# Patient Record
Sex: FEMALE | Employment: OTHER | URBAN - METROPOLITAN AREA
[De-identification: names, ages, dates, MRNs, and addresses within clinical notes are randomized per-mention and may not be internally consistent; named-entity substitution may affect disease eponyms.]

---

## 2021-12-06 ENCOUNTER — LAB REQUISITION (OUTPATIENT)
Dept: LAB | Facility: CLINIC | Age: 71
End: 2021-12-06

## 2021-12-06 ENCOUNTER — TRANSITIONAL CARE UNIT VISIT (OUTPATIENT)
Dept: GERIATRICS | Facility: CLINIC | Age: 71
End: 2021-12-06
Payer: COMMERCIAL

## 2021-12-06 VITALS
DIASTOLIC BLOOD PRESSURE: 75 MMHG | WEIGHT: 208 LBS | HEART RATE: 99 BPM | SYSTOLIC BLOOD PRESSURE: 139 MMHG | BODY MASS INDEX: 36.86 KG/M2 | HEIGHT: 63 IN | TEMPERATURE: 97.8 F | OXYGEN SATURATION: 96 % | RESPIRATION RATE: 18 BRPM

## 2021-12-06 DIAGNOSIS — U07.1 COVID-19: ICD-10-CM

## 2021-12-06 DIAGNOSIS — R60.0 EDEMA OF BOTH LEGS: ICD-10-CM

## 2021-12-06 DIAGNOSIS — E11.9 TYPE 2 DIABETES, HBA1C GOAL < 7% (H): Primary | ICD-10-CM

## 2021-12-06 DIAGNOSIS — Z86.73 HISTORY OF CVA (CEREBROVASCULAR ACCIDENT): ICD-10-CM

## 2021-12-06 DIAGNOSIS — I48.20 ATRIAL FIBRILLATION, CHRONIC (H): ICD-10-CM

## 2021-12-06 PROCEDURE — 99306 1ST NF CARE HIGH MDM 50: CPT | Performed by: FAMILY MEDICINE

## 2021-12-06 PROCEDURE — U0005 INFEC AGEN DETEC AMPLI PROBE: HCPCS | Performed by: FAMILY MEDICINE

## 2021-12-06 RX ORDER — ACETAMINOPHEN 325 MG/1
650 TABLET ORAL EVERY 4 HOURS PRN
COMMUNITY
End: 2023-05-10

## 2021-12-06 RX ORDER — FUROSEMIDE 20 MG
10 TABLET ORAL DAILY
COMMUNITY
End: 2022-04-08

## 2021-12-06 RX ORDER — LANOLIN ALCOHOL/MO/W.PET/CERES
1 CREAM (GRAM) TOPICAL
COMMUNITY
End: 2022-09-14

## 2021-12-06 RX ORDER — GLIMEPIRIDE 1 MG/1
1 TABLET ORAL
COMMUNITY
End: 2022-01-14

## 2021-12-06 RX ORDER — ATORVASTATIN CALCIUM 20 MG/1
20 TABLET, FILM COATED ORAL AT BEDTIME
COMMUNITY

## 2021-12-06 RX ORDER — METOPROLOL TARTRATE 100 MG
25 TABLET ORAL 2 TIMES DAILY
COMMUNITY
End: 2022-09-14

## 2021-12-06 ASSESSMENT — MIFFLIN-ST. JEOR: SCORE: 1409.67

## 2021-12-06 NOTE — LETTER
12/6/2021        RE: Angela Beckham  [address] [facility] [address]        [facility]       Patient Angela Beckham  MRN: [id number]        Reason for Visit     Chief Complaint   Patient presents with     Hospital F/U       Code Status     CPR/Full code     Assessment     Acute ischemic rt MCA CVA  Acute encephalopathy/delirium secondary to CVA  New onset atrial fibrillation with rapid ventricular response  C-spine ligamentous injury  Persistent leukocytosis  Chronic lower extremity edema  Diabetes type 2  Hypertension  Generalized weakness    Plan     Pt is admitted to TCU for strengthening and rehab.  Patient admitted after a welfare check was done on her and she was found on the floor.  She was brought in with acute delirium to the hospital subsequently work-up revealed the delirium was secondary to CVA.  Imaging had shown acute ischemic CVA.  Neurology was consulted.  They suspect this is embolic.  She is on atorvastatin 20 mg daily.  Aspirin has been discontinued.  Speech-language pathology recommended a dysphagia diet.  She is on a modified diet  She is on anticoagulation with Eliquis.  Noted to have elevated heart rates with atrial fibrillation which is new in onset.  Currently discharged on a higher dose of metoprolol.  Continue Eliquis.  Noted to have a C-spine ligamentous injury on imaging.  Neurosurgery consulted.  Given an [place] collar.  She will need an outpatient cervical MRI to further work-up for ligamentous injury.  Also noted to have chronic lymphedema bilateral lower extremity with worsening she was given a higher dose of diuretics.  Weights to be monitored.  Recheck labs for leukocytosis.  Has pressure injury of her back.  Also noted to have a blister on her BKA stump which will require wound care  Diabetes treatment optimized she remains on Metformin and glimepiride as well as Lantus.  Continue to monitor  So far all blood sugars are under 200  Patient is also exhibiting  signs of some cognitive impairment.  BMs 12/15.  Will await for a CPT.  Recheck labs  Continue with PT/OT-at present is quite weak and requires a two-person assist with ADLs and a Kendy for transfers  She is also reportedly incontinent for bowel and bladder.  She continues to have several psychosocial issues and remains unrealistic in her expectations.  She told me she like to go home in the evening.  When I asked her how she had no answer she thought somebody would transfer her home.  She has been living independently as per her and believes she can still manage her cares though she just had an episode of bowel incontinence in her bed.  On questioning she believes she could have clean herself up.  In addition she stated that she has been driving up till now.  She also has been managing her cares independently with minimal psychosocial support other than some friends she does not have any children.  Will await cognitive testing results as well    will have  involved suspect she may no longer be a candidate for living independently.    Patient however is determined that she wants to go home  She denies any weakness from her CVA though at present she cannot walk.  Continue with her PT and OT.  Total time spent is 45 minutes with 28 minutes spent face-to-face talking this patient reviewing her care concerns including discharge planning and her ability to live independently especially with below the knee amputation.  Patient reports that she has been doing well so far and managing her cares including driving all by herself prior to hospitalization.  She has no recall of events leading to her being found on the floor    History     Patient is a very pleasant 71 year old female who is admitted to TCU  Patient was admitted after she was found on the floor.  She was brought to the emergency room  Imaging did reveal that she had acute ischemic CVA.  She also had acute encephalopathy with fluctuating level of  consciousness.  Neurology was consulted.  She also was found a new onset atrial fibrillation with rapid ventricular response.  She has been started on metoprolol.  Noted to have a C-spine ligamentous injury on CT imaging.  Neurosurgery consulted and she has been given an [place] collar.  She had significant edema of her bilateral lower extremity with worsening diuretics were resumed at discharge  Also noted to have a pressure injury and discharged to the TCU for strengthening and rehab    Past Medical & Surgical History     PAST MEDICAL HISTORY:   Past Medical History:   Diagnosis Date     Diabetic neuropathy (H)      HTN      Type 2 diabetes mellitus (H)       PAST SURGICAL HISTORY:  natividad henderson      Past Social History     Reviewed,  has an unknown smoking status. She has never used smokeless tobacco. She reports that she does not drink alcohol and does not use drugs.    Family History     Reviewed, and family history includes Alzheimer Disease in her father; Cerebrovascular Disease in her maternal grandmother and mother; Diabetes in her paternal grandfather; Hypertension in her brother and mother; Obesity in her brother; Respiratory in her father.    Medication List   Post Discharge Medication Reconciliation Status: Post Discharge Medication Reconciliation Status: discharge medications reconciled, continue medications without change.  Current Outpatient Medications   Medication     acetaminophen (TYLENOL) 325 MG tablet     apixaban ANTICOAGULANT (ELIQUIS) 5 MG tablet     atorvastatin (LIPITOR) 20 MG tablet     cyanocobalamin 1000 MCG SUBL     dimethicone 1 % external cream     furosemide (LASIX) 20 MG tablet     glimepiride (AMARYL) 1 MG tablet     melatonin 3 MG tablet     METFORMIN  MG OR TABS     metoprolol tartrate (LOPRESSOR) 100 MG tablet     vitamin B-Complex     vitamin D3 (CHOLECALCIFEROL) 250 mcg (79171 units) capsule     GLIMEPIRIDE 4 MG OR TABS     LISINOPRIL 20 MG OR TABS     SB LOW DOSE ASA EC 81 MG  "OR TBEC     SIMVASTATIN 20 MG OR TABS     No current facility-administered medications for this visit.       Allergies     No Known Allergies    Review of Systems   A comprehensive review of 14 systems was done. Pertinent findings noted here and in history of present illness. All the rest negative.  Constitutional: Negative.  Negative for fever, chills, she has  activity change, appetite change and fatigue.   HENT: Negative for congestion and facial swelling.    Eyes: Negative for photophobia, redness and visual disturbance.   Respiratory: Negative for cough and chest tightness.    Cardiovascular: Negative for chest pain, palpitations and leg swelling.   Gastrointestinal: Negative for nausea, diarrhea, constipation, blood in stool and abdominal distention.   Genitourinary: Negative.  Reporting incontinence  Musculoskeletal: Currently very weak and bedbound she cannot walk currently  Skin: Negative.    Neurological: Negative for dizziness, tremors, syncope, weakness, light-headedness and headaches.   Denies any weakness from cva even though she is bed bound  Hematological: Does not bruise/bleed easily.   Psychiatric/Behavioral: Negative.  Affect is flat  Patient is unrealistic in her expectations thinking that she could discharge tonight to her own home and come back tomorrow  Continues to insist she can manage her own cares      Physical Exam   /75   Pulse 99   Temp 97.8  F (36.6  C)   Resp 18   Ht 1.588 m (5' 2.5\")   Wt 94.3 kg (208 lb)   SpO2 96%   BMI 37.44 kg/m       Constitutional: Oriented to person, place, and time and appears well-developed.   HEENT:  Normocephalic and atraumatic.  Eyes: Conjunctivae and EOM are normal. Pupils are equal, round, and reactive to light. No discharge.  No scleral icterus. Nose normal. Mouth/Throat: Oropharynx is clear and moist. No oropharyngeal exudate.    NECK: Normal range of motion. Neck supple. No JVD present. No tracheal deviation present. No thyromegaly " present.   CARDIOVASCULAR: Normal rate, regular rhythm and intact distal pulses.  Exam reveals no gallop and no friction rub.  Systolic murmur present.  PULMONARY: Effort normal and breath sounds normal. No respiratory distress.No Wheezing or rales.  ABDOMEN: Soft. Bowel sounds are normal. No distension and no mass.  There is no tenderness. There is no rebound and no guarding. No HSM.  Stage 2 pressure injury on left low back  MUSCULOSKELETAL: Normal range of motion. No edema and no tenderness. Mild kyphosis, no tenderness.  Has a left BKA  LYMPH NODES: Has no cervical, supraclavicular, axillary and groin adenopathy.   NEUROLOGICAL: Alert and oriented to person, place, and time. No cranial nerve deficit.  Normal muscle tone. Coordination normal.   GENITOURINARY: Deferred exam.  SKIN: Skin is warm and dry. No rash noted. No erythema. No pallor.   Skin blister on her left BKA is healing with no concern for infection  EXTREMITIES: No cyanosis, no clubbing, no edema. No Deformity.  PSYCHIATRIC: abNormal mood, affect and behavior.  Does not have much recall of recent events.  Expectations are also somewhat unrealistic      Lab Results       Creat 0.9    Electronically signed by    Ginny Johnson MD                             Sincerely,        OPAL Galarza

## 2021-12-07 ENCOUNTER — LAB REQUISITION (OUTPATIENT)
Dept: LAB | Facility: CLINIC | Age: 71
End: 2021-12-07

## 2021-12-07 DIAGNOSIS — U07.1 COVID-19: ICD-10-CM

## 2021-12-07 LAB
SARS-COV-2 RNA RESP QL NAA+PROBE: NEGATIVE
SARS-COV-2 RNA RESP QL NAA+PROBE: NEGATIVE

## 2021-12-07 PROCEDURE — U0003 INFECTIOUS AGENT DETECTION BY NUCLEIC ACID (DNA OR RNA); SEVERE ACUTE RESPIRATORY SYNDROME CORONAVIRUS 2 (SARS-COV-2) (CORONAVIRUS DISEASE [COVID-19]), AMPLIFIED PROBE TECHNIQUE, MAKING USE OF HIGH THROUGHPUT TECHNOLOGIES AS DESCRIBED BY CMS-2020-01-R: HCPCS | Performed by: FAMILY MEDICINE

## 2021-12-09 ENCOUNTER — LAB REQUISITION (OUTPATIENT)
Dept: LAB | Facility: CLINIC | Age: 71
End: 2021-12-09
Payer: COMMERCIAL

## 2021-12-09 ENCOUNTER — TRANSITIONAL CARE UNIT VISIT (OUTPATIENT)
Dept: GERIATRICS | Facility: CLINIC | Age: 71
End: 2021-12-09
Payer: COMMERCIAL

## 2021-12-09 VITALS
DIASTOLIC BLOOD PRESSURE: 73 MMHG | WEIGHT: 208 LBS | OXYGEN SATURATION: 97 % | BODY MASS INDEX: 36.86 KG/M2 | SYSTOLIC BLOOD PRESSURE: 148 MMHG | HEIGHT: 63 IN | HEART RATE: 76 BPM | RESPIRATION RATE: 18 BRPM | TEMPERATURE: 98 F

## 2021-12-09 DIAGNOSIS — I48.20 ATRIAL FIBRILLATION, CHRONIC (H): ICD-10-CM

## 2021-12-09 DIAGNOSIS — R60.9 EDEMA, UNSPECIFIED TYPE: ICD-10-CM

## 2021-12-09 DIAGNOSIS — I63.9 ISCHEMIC CEREBROVASCULAR ACCIDENT (CVA) (H): Primary | ICD-10-CM

## 2021-12-09 DIAGNOSIS — E11.59 TYPE 2 DIABETES MELLITUS WITH OTHER CIRCULATORY COMPLICATION, WITHOUT LONG-TERM CURRENT USE OF INSULIN (H): ICD-10-CM

## 2021-12-09 DIAGNOSIS — E66.01 MORBID OBESITY (H): ICD-10-CM

## 2021-12-09 DIAGNOSIS — I50.9 HEART FAILURE, UNSPECIFIED (H): ICD-10-CM

## 2021-12-09 DIAGNOSIS — I48.91 UNSPECIFIED ATRIAL FIBRILLATION (H): ICD-10-CM

## 2021-12-09 PROCEDURE — 99310 SBSQ NF CARE HIGH MDM 45: CPT | Performed by: NURSE PRACTITIONER

## 2021-12-09 ASSESSMENT — MIFFLIN-ST. JEOR: SCORE: 1409.67

## 2021-12-09 NOTE — PROGRESS NOTES
Code Status:  FULL CODE  Visit Type: Hospital F/U     Facility:   [facility] ([facility]) [[id number]]        History of Present Illness:   Hospital Admission Date: 11/28/2020  Hospital Discharge Date: 12/5/2021      Angela Beckham is a 71 year old female with a past medical history for type 2 diabetes, osteomyelitis, left BKA, hypertension, peripheral edema and mitral stenosis.  She was recently hospitalized after being found down at home and found to have an acute ischemic CVA.  She was not given lytics and was started on Eliquis and atorvastatin.  Her residual was dysphagia and encephalopathy.  She was found to have new atrial fibrillation and was started on metoprolol along with her Eliquis.  C-spine showed a C-spine ligamentous injury and she was put in an [place] collar and recommended a follow-up MRI with neurosurgery in 2 to 3 weeks.     She was also found to have a blister on her left BKA stump and was treated by a wound care nurse.    Today, she reports dizziness and feeling SOB.  O2 sats are WNL.  Heart sounds are very irregular and averaging low 100s.  Weights is down 10lbs in the past 3 days 208lbs down to 198lbs. she does appear to have some anxiety during my visit. She has intertrigo and miconazole cream was ordered earlier this week. She reports low back pain however states this is chronic. Left BKA with healed scabbed ulcers on either side. No signs and symptoms of infection.    Past Medical History:   Diagnosis Date     Diabetic neuropathy (H)      HTN      Type 2 diabetes mellitus (H)      No past surgical history on file.  Family History   Problem Relation Age of Onset     Cerebrovascular Disease Mother      Hypertension Mother      Alzheimer Disease Father      Respiratory Father      Cerebrovascular Disease Maternal Grandmother      Diabetes Paternal Grandfather      Hypertension Brother      Obesity Brother      Social History     Socioeconomic History     Marital status: Single     Spouse  name: Not on file     Number of children: Not on file     Years of education: Not on file     Highest education level: Not on file   Occupational History     Not on file   Tobacco Use     Smoking status: Unknown If Ever Smoked     Smokeless tobacco: Never Used   Substance and Sexual Activity     Alcohol use: No     Drug use: No     Sexual activity: Never   Other Topics Concern     Parent/sibling w/ CABG, MI or angioplasty before 65F 55M? Not Asked      Service No     Blood Transfusions Yes     Comment: 1970     Caffeine Concern No     Occupational Exposure No     Hobby Hazards No     Sleep Concern No     Stress Concern No     Weight Concern No     Special Diet No     Back Care No     Exercise Yes     Bike Helmet No     Seat Belt Yes     Self-Exams No   Social History Narrative     Not on file     Social Determinants of Health     Financial Resource Strain: Not on file   Food Insecurity: Not on file   Transportation Needs: Not on file   Physical Activity: Not on file   Stress: Not on file   Social Connections: Not on file   Intimate Partner Violence: Not on file   Housing Stability: Not on file       Current Outpatient Medications   Medication Sig Dispense Refill     acetaminophen (TYLENOL) 325 MG tablet Take 650 mg by mouth every 4 hours as needed for mild pain       apixaban ANTICOAGULANT (ELIQUIS) 5 MG tablet Take 5 mg by mouth 2 times daily       atorvastatin (LIPITOR) 20 MG tablet Take 20 mg by mouth daily       cyanocobalamin 1000 MCG SUBL Place 1,000 mcg under the tongue daily       dimethicone 1 % external cream Apply topically 2 times daily       furosemide (LASIX) 20 MG tablet Take 20 mg by mouth daily       glimepiride (AMARYL) 1 MG tablet Take 1 mg by mouth every morning (before breakfast)       melatonin 3 MG tablet Take 1 mg by mouth nightly as needed for sleep       METFORMIN  MG OR TABS 2 TABLETS TWICE DAILY       metoprolol tartrate (LOPRESSOR) 100 MG tablet Take 100 mg by mouth 2  "times daily       vitamin B-Complex Take 1 tablet by mouth daily       vitamin D3 (CHOLECALCIFEROL) 250 mcg (57789 units) capsule Take 1 capsule by mouth daily       No Known Allergies  Immunization History   Administered Date(s) Administered     Influenza (IIV3) PF 01/22/2009, 02/20/2013     Influenza Vaccine IM > 6 months Valent IIV4 (Alfuria,Fluzone) 02/20/2013     Influenza Vaccine, 6+MO IM (QUADRIVALENT W/PRESERVATIVES) 01/22/2009, 02/20/2013     Pneumococcal 23 valent 02/20/2013       Medications list and allergies in the facility chart have been reviewed.  Please see facility EMR for most up to date list.       Review of Systems   Patient denies fever, chills, headache,rhinorrhea, cough, congestion, shortness of breath, chest pain, palpitations, abdominal pain, n/v, diarrhea, constipation, change in appetite, change in sleep pattern, dysuria, frequency, burning or pain with urination.  Other than stated in HPI all other review of systems is negative.         Physical Exam   Vital signs:BP (!) 148/73   Pulse 76   Temp 98  F (36.7  C)   Resp 18   Ht 1.588 m (5' 2.5\")   Wt 94.3 kg (208 lb)   SpO2 97%   BMI 37.44 kg/m     GENERAL APPEARANCE: Well developed, well nourished, in no acute distress.  HEENT: normocephalic, atraumatic   sclerae anicteric, conjunctivae clear and moist, EOM intact  NECK: Supple and symmetric. Trachea is midline, no thyromegaly, no adenopathy, and no tenderness  LUNGS: Lung sounds CTA, no adventitious sounds, respiratory effort normal.  CARD: Irregularly irregular, tacky, S1, S2, without murmurs, gallops, rubs  ABD: Soft, nondistended and nontender with normal bowel sounds.   EXTREMITIES: No cyanosis, clubbing or edema.  NEURO: Alert and oriented x 3. With mild cognitive impairment. Face is symmetric.  SKIN: Inspection of the skin reveals no rashes, ulcerations or petechiae.  PSYCH:     Labs:   CBC with plt (12/01/2021 3:01 PM CST)  CBC with plt (12/01/2021 3:01 PM CST)   Component " Value Ref Range Performed At Pathologist Signature   WBC 13.5 (H) 3.5 - 10.5 x10(9)/L [facility]     RBC 4.51 3.90 - 5.03 x10(12)/L [facility]     Hemoglobin 12.2 12.0 - 15.5 g/dL [facility]     HCT 40.4 34.9 - 44.5 % [facility]     MCV 89.6 80.0 - 100.0 fL [facility]     MCH 27.1 (L) 27.6 - 33.3 pg [facility]     MCHC 30.2 (L) 31.5 - 35.2 g/dL [facility]     RDW 15.0 11.9 - 15.5 % [facility]     Platelets 156 150 - 450 x10(9)/L [facility]     Automated NRBC 0 <=0 /100 WBC [facility]     Basic Metabolic Panel (12/01/2021 1:31 PM CST)  Basic Metabolic Panel (12/01/2021 1:31 PM CST)   Component Value Ref Range Performed At Pathologist [facility]   Sodium 139 136 - 145 mmol/L [facility]     Potassium 4.3 3.5 - 5.1 mmol/L [facility]     Chloride 109 98 - 109 mmol/L [facility]     CO2 20 20 - 29 mmol/L [facility]     Anion Gap 10 7 - 16 mmol/L [facility]     Calcium 8.6 8.4 - 10.4 mg/dL [facility]     BUN 38 (H) 7 - 26 mg/dL [facility]     Creatinine 1.00 0.55 - 1.02 mg/dL [facility]     GFR, Estimated 60 (L) >60 mL/min/1.73m2 [facility]     Glucose 248 (H)Comment: The given reference range is for the fasting state. Non-fasting reference range for glucose is 70 - 180 mg/dL. 70 - 100 mg/dL [facility]       Magnesium in AM (12/03/2021 7:58 AM CST)  Magnesium in AM (12/03/2021 7:58 AM CST)   Component Value Ref Range Performed At Pathologist Signature   Magnesium 2.1 1.6 - 2.6 mg/dL [facility]       Magnesium in AM (12/03/2021 7:58 AM CST)   Specimen   Blood     Magnesium in AM (12/03/2021 7:58 AM CST)   Performing Organization Address City/State/ZIP Code Phone Number   [address] [address]   [phone]       Back to top of Lab Results       Potassium in AM (12/03/2021 7:58 AM CST)  Potassium in AM (12/03/2021 7:58 AM CST)   Component Value Ref Range Performed At  Pathologist Signature   Potassium 5.5 (H) 3.5 - 5.1 mmol/L [facility]         Assessment/plan:   Ischemic cerebrovascular accident (CVA) (H)  Rehab, continue on apixaban, atorvastatin. Watch for mood disorder with anxiety.    Atrial fibrillation, chronic (H)  Rate is rapid on the low end of 100. We will have nursing check apical pulses every shift. Continue on metoprolol and apixaban. She will be seen by cardiology tomorrow.    Morbid obesity (H)  Does decrease her mobility and increases her morbidity.    Edema, unspecified type  No edema at this time I believe that she is over diuresed and so will hold her Lasix x2 days and check a BMP tomorrow.    Type 2 diabetes mellitus with other circulatory complication, without long-term current use of insulin (H)  Blood sugars all less than 180 likely can change blood sugar checks to once a day next week if continues to have good blood sugars. Continue with meds [place] and glipizide.    C-spine ligamentous injury: Wear [place] collar when out of bed. Follow-up with an MRI and neurosurgery in 2 to 3 weeks.    35 total minutes spent with 20 minutes spent with patient in counseling regarding atrial fibrillation and her feeling of dizziness. Counseled patient to be sure to talk with cardiology regarding this tomorrow. Counseled nursing to monitor pulses apically only.    Electronically signed by: Berenice Nolen NP

## 2021-12-09 NOTE — LETTER
12/9/2021        RE: Angela Beckham  [address], [place] [address]        Code Status:  FULL CODE  Visit Type: Hospital F/U     Facility:   [facility] ([facility]) [[id number]]        History of Present Illness:   Hospital Admission Date: 11/28/2020  Hospital Discharge Date: 12/5/2021      Angela Beckham is a 71 year old female with a past medical history for type 2 diabetes, osteomyelitis, left BKA, hypertension, peripheral edema and mitral stenosis.  She was recently hospitalized after being found down at home and found to have an acute ischemic CVA.  She was not given lytics and was started on Eliquis and atorvastatin.  Her residual was dysphagia and encephalopathy.  She was found to have new atrial fibrillation and was started on metoprolol along with her Eliquis.  C-spine showed a C-spine ligamentous injury and she was put in an [place] collar and recommended a follow-up MRI with neurosurgery in 2 to 3 weeks.     She was also found to have a blister on her left BKA stump and was treated by a wound care nurse.    Today, she reports dizziness and feeling SOB.  O2 sats are WNL.  Heart sounds are very irregular and averaging low 100s.  Weights is down 10lbs in the past 3 days 208lbs down to 198lbs. she does appear to have some anxiety during my visit. She has intertrigo and miconazole cream was ordered earlier this week. She reports low back pain however states this is chronic. Left BKA with healed scabbed ulcers on either side. No signs and symptoms of infection.    Past Medical History:   Diagnosis Date     Diabetic neuropathy (H)      HTN      Type 2 diabetes mellitus (H)      No past surgical history on file.  Family History   Problem Relation Age of Onset     Cerebrovascular Disease Mother      Hypertension Mother      Alzheimer Disease Father      Respiratory Father      Cerebrovascular Disease Maternal Grandmother      Diabetes Paternal Grandfather      Hypertension Brother      Obesity Brother       Social History     Socioeconomic History     Marital status: Single     Spouse name: Not on file     Number of children: Not on file     Years of education: Not on file     Highest education level: Not on file   Occupational History     Not on file   Tobacco Use     Smoking status: Unknown If Ever Smoked     Smokeless tobacco: Never Used   Substance and Sexual Activity     Alcohol use: No     Drug use: No     Sexual activity: Never   Other Topics Concern     Parent/sibling w/ CABG, MI or angioplasty before 65F 55M? Not Asked      Service No     Blood Transfusions Yes     Comment: 1970     Caffeine Concern No     Occupational Exposure No     Hobby Hazards No     Sleep Concern No     Stress Concern No     Weight Concern No     Special Diet No     Back Care No     Exercise Yes     Bike Helmet No     Seat Belt Yes     Self-Exams No   Social History Narrative     Not on file     Social Determinants of Health     Financial Resource Strain: Not on file   Food Insecurity: Not on file   Transportation Needs: Not on file   Physical Activity: Not on file   Stress: Not on file   Social Connections: Not on file   Intimate Partner Violence: Not on file   Housing Stability: Not on file       Current Outpatient Medications   Medication Sig Dispense Refill     acetaminophen (TYLENOL) 325 MG tablet Take 650 mg by mouth every 4 hours as needed for mild pain       apixaban ANTICOAGULANT (ELIQUIS) 5 MG tablet Take 5 mg by mouth 2 times daily       atorvastatin (LIPITOR) 20 MG tablet Take 20 mg by mouth daily       cyanocobalamin 1000 MCG SUBL Place 1,000 mcg under the tongue daily       dimethicone 1 % external cream Apply topically 2 times daily       furosemide (LASIX) 20 MG tablet Take 20 mg by mouth daily       glimepiride (AMARYL) 1 MG tablet Take 1 mg by mouth every morning (before breakfast)       melatonin 3 MG tablet Take 1 mg by mouth nightly as needed for sleep       METFORMIN  MG OR TABS 2 TABLETS TWICE  "DAILY       metoprolol tartrate (LOPRESSOR) 100 MG tablet Take 100 mg by mouth 2 times daily       vitamin B-Complex Take 1 tablet by mouth daily       vitamin D3 (CHOLECALCIFEROL) 250 mcg (51610 units) capsule Take 1 capsule by mouth daily       No Known Allergies  Immunization History   Administered Date(s) Administered     Influenza (IIV3) PF 01/22/2009, 02/20/2013     Influenza Vaccine IM > 6 months Valent IIV4 (Alfuria,Fluzone) 02/20/2013     Influenza Vaccine, 6+MO IM (QUADRIVALENT W/PRESERVATIVES) 01/22/2009, 02/20/2013     Pneumococcal 23 valent 02/20/2013       Medications list and allergies in the facility chart have been reviewed.  Please see facility EMR for most up to date list.       Review of Systems   Patient denies fever, chills, headache,rhinorrhea, cough, congestion, shortness of breath, chest pain, palpitations, abdominal pain, n/v, diarrhea, constipation, change in appetite, change in sleep pattern, dysuria, frequency, burning or pain with urination.  Other than stated in HPI all other review of systems is negative.         Physical Exam   Vital signs:BP (!) 148/73   Pulse 76   Temp 98  F (36.7  C)   Resp 18   Ht 1.588 m (5' 2.5\")   Wt 94.3 kg (208 lb)   SpO2 97%   BMI 37.44 kg/m     GENERAL APPEARANCE: Well developed, well nourished, in no acute distress.  HEENT: normocephalic, atraumatic   sclerae anicteric, conjunctivae clear and moist, EOM intact  NECK: Supple and symmetric. Trachea is midline, no thyromegaly, no adenopathy, and no tenderness  LUNGS: Lung sounds CTA, no adventitious sounds, respiratory effort normal.  CARD: Irregularly irregular, tacky, S1, S2, without murmurs, gallops, rubs  ABD: Soft, nondistended and nontender with normal bowel sounds.   EXTREMITIES: No cyanosis, clubbing or edema.  NEURO: Alert and oriented x 3. With mild cognitive impairment. Face is symmetric.  SKIN: Inspection of the skin reveals no rashes, ulcerations or petechiae.  PSYCH:     Labs:   CBC with " plt (12/01/2021 3:01 PM CST)  CBC with plt (12/01/2021 3:01 PM CST)   Component Value Ref Range Performed At Pathologist [facility]   WBC 13.5 (H) 3.5 - 10.5 x10(9)/L [facility]     RBC 4.51 3.90 - 5.03 x10(12)/L [facility]     Hemoglobin 12.2 12.0 - 15.5 g/dL [facility]     HCT 40.4 34.9 - 44.5 % [facility]     MCV 89.6 80.0 - 100.0 fL [facility]     MCH 27.1 (L) 27.6 - 33.3 pg [facility]     MCHC 30.2 (L) 31.5 - 35.2 g/dL [facility]     RDW 15.0 11.9 - 15.5 % [facility]     Platelets 156 150 - 450 x10(9)/L [facility]     Automated NRBC 0 <=0 /100 WBC [facility]     Basic Metabolic Panel (12/01/2021 1:31 PM CST)  Basic Metabolic Panel (12/01/2021 1:31 PM CST)   Component Value Ref Range Performed At Pathologist [facility]   Sodium 139 136 - 145 mmol/L [facility]     Potassium 4.3 3.5 - 5.1 mmol/L [facility]     Chloride 109 98 - 109 mmol/L [facility]     CO2 20 20 - 29 mmol/L [facility]     Anion Gap 10 7 - 16 mmol/L [facility]     Calcium 8.6 8.4 - 10.4 mg/dL [facility]     BUN 38 (H) 7 - 26 mg/dL [facility]     Creatinine 1.00 0.55 - 1.02 mg/dL [facility]     GFR, Estimated 60 (L) >60 mL/min/1.73m2 [facility]     Glucose 248 (H)Comment: The given reference range is for the fasting state. Non-fasting reference range for glucose is 70 - 180 mg/dL. 70 - 100 mg/dL [facility]       Magnesium in AM (12/03/2021 7:58 AM CST)  Magnesium in AM (12/03/2021 7:58 AM CST)   Component Value Ref Range Performed At Pathologist [facility]   Magnesium 2.1 1.6 - 2.6 mg/dL [facility]       Magnesium in AM (12/03/2021 7:58 AM CST)   Specimen   Blood     Magnesium in AM (12/03/2021 7:58 AM CST)   Performing Organization Address City/State/ZIP Code Phone Number   [address] [address]   [phone]       Back to top of Lab Results       Potassium in AM (12/03/2021 7:58 AM  CST)  Potassium in AM (12/03/2021 7:58 AM CST)   Component Value Ref Range Performed At Pathologist [facility]   Potassium 5.5 (H) 3.5 - 5.1 mmol/L [facility]         Assessment/plan:   Ischemic cerebrovascular accident (CVA) (H)  Rehab, continue on apixaban, atorvastatin. Watch for mood disorder with anxiety.    Atrial fibrillation, chronic (H)  Rate is rapid on the low end of 100. We will have nursing check apical pulses every shift. Continue on metoprolol and apixaban. She will be seen by cardiology tomorrow.    Morbid obesity (H)  Does decrease her mobility and increases her morbidity.    Edema, unspecified type  No edema at this time I believe that she is over diuresed and so will hold her Lasix x2 days and check a BMP tomorrow.    Type 2 diabetes mellitus with other circulatory complication, without long-term current use of insulin (H)  Blood sugars all less than 180 likely can change blood sugar checks to once a day next week if continues to have good blood sugars. Continue with meds [place] and glipizide.    C-spine ligamentous injury: Wear [place] collar when out of bed. Follow-up with an MRI and neurosurgery in 2 to 3 weeks.    35 total minutes spent with 20 minutes spent with patient in counseling regarding atrial fibrillation and her feeling of dizziness. Counseled patient to be sure to talk with cardiology regarding this tomorrow. Counseled nursing to monitor pulses apically only.    Electronically signed by: Berenice Nolen NP          Sincerely,        Berenice Nolen NP

## 2021-12-10 ENCOUNTER — TELEPHONE (OUTPATIENT)
Dept: GERIATRICS | Facility: CLINIC | Age: 71
End: 2021-12-10
Payer: COMMERCIAL

## 2021-12-10 ENCOUNTER — LAB REQUISITION (OUTPATIENT)
Dept: LAB | Facility: CLINIC | Age: 71
End: 2021-12-10
Payer: COMMERCIAL

## 2021-12-10 DIAGNOSIS — I63.9 CEREBRAL INFARCTION, UNSPECIFIED (H): ICD-10-CM

## 2021-12-10 LAB
ANION GAP SERPL CALCULATED.3IONS-SCNC: 13 MMOL/L (ref 5–18)
BUN SERPL-MCNC: 21 MG/DL (ref 8–28)
CALCIUM SERPL-MCNC: 9.8 MG/DL (ref 8.5–10.5)
CHLORIDE BLD-SCNC: 100 MMOL/L (ref 98–107)
CO2 SERPL-SCNC: 24 MMOL/L (ref 22–31)
CREAT SERPL-MCNC: 0.8 MG/DL (ref 0.6–1.1)
ERYTHROCYTE [DISTWIDTH] IN BLOOD BY AUTOMATED COUNT: 14.1 % (ref 10–15)
GFR SERPL CREATININE-BSD FRML MDRD: 73 ML/MIN/1.73M2
GLUCOSE BLD-MCNC: 95 MG/DL (ref 70–125)
HCT VFR BLD AUTO: 41.3 % (ref 35–47)
HGB BLD-MCNC: 13.1 G/DL (ref 11.7–15.7)
MAGNESIUM SERPL-MCNC: 1.8 MG/DL (ref 1.8–2.6)
MCH RBC QN AUTO: 27.1 PG (ref 26.5–33)
MCHC RBC AUTO-ENTMCNC: 31.7 G/DL (ref 31.5–36.5)
MCV RBC AUTO: 85 FL (ref 78–100)
PLATELET # BLD AUTO: 291 10E3/UL (ref 150–450)
POTASSIUM BLD-SCNC: 4.7 MMOL/L (ref 3.5–5)
RBC # BLD AUTO: 4.84 10E6/UL (ref 3.8–5.2)
SODIUM SERPL-SCNC: 137 MMOL/L (ref 136–145)
WBC # BLD AUTO: 10.6 10E3/UL (ref 4–11)

## 2021-12-10 PROCEDURE — 36415 COLL VENOUS BLD VENIPUNCTURE: CPT | Performed by: FAMILY MEDICINE

## 2021-12-10 PROCEDURE — 83735 ASSAY OF MAGNESIUM: CPT | Performed by: FAMILY MEDICINE

## 2021-12-10 PROCEDURE — 80048 BASIC METABOLIC PNL TOTAL CA: CPT | Performed by: FAMILY MEDICINE

## 2021-12-10 PROCEDURE — P9603 ONE-WAY ALLOW PRORATED MILES: HCPCS | Performed by: FAMILY MEDICINE

## 2021-12-10 PROCEDURE — 85027 COMPLETE CBC AUTOMATED: CPT | Performed by: FAMILY MEDICINE

## 2021-12-10 NOTE — TELEPHONE ENCOUNTER
[facility] Nurse Triage Telephone Note    Provider: CEDRIC Ortiz  Facility: [facility] Facility Type:  TCU    Caller: Mavis  Call Back Number: [phone]    Allergies:  No Known Allergies     Reason for call: Nurse reporting Heme 2, BMP, and Mg levels.  Notable meds:  Lasix 20mg daily, Glimepiride 1mg daily, Eliquis 5mg BID, Metformin 1000mg BID, Metoprolol 100mg BID.      Verbal Order/Direction given by Provider: No new orders.      Provider giving Order:  CEDRIC Ortiz    Verbal Order given to: Mavis Campbell RN

## 2021-12-13 ENCOUNTER — TRANSITIONAL CARE UNIT VISIT (OUTPATIENT)
Dept: GERIATRICS | Facility: CLINIC | Age: 71
End: 2021-12-13
Payer: COMMERCIAL

## 2021-12-13 VITALS
RESPIRATION RATE: 16 BRPM | TEMPERATURE: 97.7 F | HEIGHT: 70 IN | SYSTOLIC BLOOD PRESSURE: 117 MMHG | BODY MASS INDEX: 27.06 KG/M2 | HEART RATE: 88 BPM | WEIGHT: 189 LBS | OXYGEN SATURATION: 98 % | DIASTOLIC BLOOD PRESSURE: 61 MMHG

## 2021-12-13 DIAGNOSIS — E11.59 TYPE 2 DIABETES MELLITUS WITH OTHER CIRCULATORY COMPLICATION, WITHOUT LONG-TERM CURRENT USE OF INSULIN (H): ICD-10-CM

## 2021-12-13 DIAGNOSIS — E86.0 DEHYDRATION: Primary | ICD-10-CM

## 2021-12-13 DIAGNOSIS — I63.9 ISCHEMIC CEREBROVASCULAR ACCIDENT (CVA) (H): ICD-10-CM

## 2021-12-13 DIAGNOSIS — R09.81 NASAL CONGESTION: ICD-10-CM

## 2021-12-13 DIAGNOSIS — I48.20 ATRIAL FIBRILLATION, CHRONIC (H): ICD-10-CM

## 2021-12-13 PROCEDURE — 99309 SBSQ NF CARE MODERATE MDM 30: CPT | Performed by: NURSE PRACTITIONER

## 2021-12-13 ASSESSMENT — MIFFLIN-ST. JEOR
SCORE: 1480.65
SCORE: 1442.55

## 2021-12-13 NOTE — PROGRESS NOTES
Code Status:  FULL CODE  Visit Type: RECHECK     Facility:   [facility] ([facility]) [[id number]]        History of Present Illness:   Hospital Admission Date: 11/28/2020  Hospital Discharge Date: 12/5/2021      Angela Beckham is a 71 year old female with a past medical history for type 2 diabetes, osteomyelitis, left BKA, hypertension, peripheral edema and mitral stenosis.  She was recently hospitalized after being found down at home and found to have an acute ischemic CVA.  She was not given lytics and was started on Eliquis and atorvastatin.  Her residual was dysphagia and encephalopathy.  She was found to have new atrial fibrillation and was started on metoprolol along with her Eliquis.  C-spine showed a C-spine ligamentous injury and she was put in an [place] collar and recommended a follow-up MRI with neurosurgery in 2 to 3 weeks.     She was also found to have a blister on her left BKA stump and was treated by a wound care nurse.    Today, she reports improvement with her lightheadedness after holding her Lasix x2 days.  Blood pressures are in good range and heart rates are running 80s to low 100s and does not appear to be as bounding. She endorses dry, congested nares. BS running 80-120s. She feels she is eating adequately.       Current Outpatient Medications   Medication Sig Dispense Refill     acetaminophen (TYLENOL) 325 MG tablet Take 650 mg by mouth every 4 hours as needed for mild pain       apixaban ANTICOAGULANT (ELIQUIS) 5 MG tablet Take 5 mg by mouth 2 times daily       atorvastatin (LIPITOR) 20 MG tablet Take 20 mg by mouth daily       cyanocobalamin 1000 MCG SUBL Place 1,000 mcg under the tongue daily       dimethicone 1 % external cream Apply topically 2 times daily       furosemide (LASIX) 20 MG tablet Take 20 mg by mouth daily       glimepiride (AMARYL) 1 MG tablet Take 1 mg by mouth every morning (before breakfast)       melatonin 3 MG tablet Take 1 mg by mouth nightly as needed for sleep  "      METFORMIN  MG OR TABS 2 TABLETS TWICE DAILY       metoprolol tartrate (LOPRESSOR) 100 MG tablet Take 100 mg by mouth 2 times daily       vitamin B-Complex Take 1 tablet by mouth daily       vitamin D3 (CHOLECALCIFEROL) 250 mcg (81963 units) capsule Take 1 capsule by mouth daily       No Known Allergies  Immunization History   Administered Date(s) Administered     Influenza (IIV3) PF 01/22/2009, 02/20/2013     Influenza Vaccine IM > 6 months Valent IIV4 (Alfuria,Fluzone) 02/20/2013     Influenza Vaccine, 6+MO IM (QUADRIVALENT W/PRESERVATIVES) 01/22/2009, 02/20/2013     Pneumococcal 23 valent 02/20/2013       Medications list and allergies in the facility chart have been reviewed.  Please see facility EMR for most up to date list.       Review of Systems   Patient denies fever, chills, headache,rhinorrhea, cough, congestion, shortness of breath, chest pain, palpitations, abdominal pain, n/v, diarrhea, constipation, change in appetite, change in sleep pattern, dysuria, frequency, burning or pain with urination.  Other than stated in HPI all other review of systems is negative.         Physical Exam   Vital signs:/61   Pulse 88   Temp 97.7  F (36.5  C)   Resp 16   Ht 1.778 m (5' 10\")   Wt 85.7 kg (189 lb)   SpO2 98%   BMI 27.12 kg/m     GENERAL APPEARANCE: Well developed, well nourished, in no acute distress.  HEENT: normocephalic, atraumatic   sclerae anicteric, conjunctivae clear and moist, EOM intact  LUNGS: Lung sounds CTA, no adventitious sounds, respiratory effort normal.  CARD: Irregularly irregular,S1, S2, without murmurs, gallops, rubs  ABD: Soft, nondistended and nontender with normal bowel sounds.   EXTREMITIES: No cyanosis, clubbing or edema.  NEURO: Alert and oriented x 3. With mild cognitive impairment. Face is symmetric.  SKIN: Inspection of the skin reveals no rashes, ulcerations or petechiae.  PSYCH: euthymic    Labs:     Last Comprehensive Metabolic Panel:  Sodium   Date Value " Ref Range Status   12/10/2021 137 136 - 145 mmol/L Final     Potassium   Date Value Ref Range Status   12/10/2021 4.7 3.5 - 5.0 mmol/L Final     Chloride   Date Value Ref Range Status   12/10/2021 100 98 - 107 mmol/L Final     Carbon Dioxide (CO2)   Date Value Ref Range Status   12/10/2021 24 22 - 31 mmol/L Final     Anion Gap   Date Value Ref Range Status   12/10/2021 13 5 - 18 mmol/L Final     Glucose   Date Value Ref Range Status   12/10/2021 95 70 - 125 mg/dL Final     Urea Nitrogen   Date Value Ref Range Status   12/10/2021 21 8 - 28 mg/dL Final     Creatinine   Date Value Ref Range Status   12/10/2021 0.80 0.60 - 1.10 mg/dL Final   10/06/2009 0.81 0.52 - 1.04 mg/dL Final     Comment:     New IDMS-traceable calibration  beginning 5/1/08     GFR Estimate   Date Value Ref Range Status   12/10/2021 73 >60 mL/min/1.73m2 Final     Comment:     As of July 11, 2021, eGFR is calculated by the CKD-EPI creatinine equation, without race adjustment. eGFR can be influenced by muscle mass, exercise, and diet. The reported eGFR is an estimation only and is only applicable if the renal function is stable.   10/06/2009 72 >60 mL/min/1.7m2 Final     Calcium   Date Value Ref Range Status   12/10/2021 9.8 8.5 - 10.5 mg/dL Final     Lab Results   Component Value Date    WBC 10.6 12/10/2021    WBC 6.9 10/06/2009     Lab Results   Component Value Date    RBC 4.84 12/10/2021    RBC 4.29 10/06/2009     Lab Results   Component Value Date    HGB 13.1 12/10/2021    HGB 11.8 10/06/2009     Lab Results   Component Value Date    HCT 41.3 12/10/2021    HCT 34.5 10/06/2009     Lab Results   Component Value Date    MCV 85 12/10/2021    MCV 81 10/06/2009     Lab Results   Component Value Date    MCH 27.1 12/10/2021    MCH 27.4 10/06/2009     Lab Results   Component Value Date    MCHC 31.7 12/10/2021    MCHC 34.0 10/06/2009     Lab Results   Component Value Date    RDW 14.1 12/10/2021    RDW 12.9 10/06/2009     Lab Results   Component Value Date      12/10/2021     10/06/2009         Assessment/plan:   Dehydration  Resolved after holding diuretic x 2 days.  Monitor closely and may need to decrease lasix 10mg daily.  Check BMP in the next week    Nasal congestion  Dry environment, ocean nasal spray at bedside    Ischemic cerebrovascular accident (CVA) (H)  Rehab, continue on apixaban and atorvastatin.  Follow-up with neurology on 12/29.    Atrial fibrillation, chronic (H)  Rate is controlled improved on bounding after hydration.  Continue with metoprolol and apixaban.  Follow-up with cardiology on 1/4.    Type 2 diabetes mellitus with other circulatory complication, without long-term current use of insulin (H)  Blood sugars on the soft and so will decrease Metformin to 1000 mg in the a.m. and 500 mg in the p.m.  Could consider discontinuing Amaryl if continues to have low blood sugars as I feel it would be better for her to be on Metformin alone.    Ischemic cerebrovascular accident (CVA) (H)  Rehab, continue on apixaban, atorvastatin. Watch for mood disorder with anxiety.    C-spine ligamentous injury: Wear [place] collar when out of bed. Follow-up with an MRI and neurosurgery in 2 to 3 weeks.      Electronically signed by: Berenice Nolen NP

## 2021-12-13 NOTE — LETTER
12/13/2021        RE: Angela Beckham  [address] [facility] [address]        Code Status:  FULL CODE  Visit Type: RECHECK     Facility:   [facility] ([facility]) [[id number]]        History of Present Illness:   Hospital Admission Date: 11/28/2020  Hospital Discharge Date: 12/5/2021      Angela Beckham is a 71 year old female with a past medical history for type 2 diabetes, osteomyelitis, left BKA, hypertension, peripheral edema and mitral stenosis.  She was recently hospitalized after being found down at home and found to have an acute ischemic CVA.  She was not given lytics and was started on Eliquis and atorvastatin.  Her residual was dysphagia and encephalopathy.  She was found to have new atrial fibrillation and was started on metoprolol along with her Eliquis.  C-spine showed a C-spine ligamentous injury and she was put in an [place] collar and recommended a follow-up MRI with neurosurgery in 2 to 3 weeks.     She was also found to have a blister on her left BKA stump and was treated by a wound care nurse.    Today, she reports improvement with her lightheadedness after holding her Lasix x2 days.  Blood pressures are in good range and heart rates are running 80s to low 100s and does not appear to be as bounding. She endorses dry, congested nares. BS running 80-120s. She feels she is eating adequately.       Current Outpatient Medications   Medication Sig Dispense Refill     acetaminophen (TYLENOL) 325 MG tablet Take 650 mg by mouth every 4 hours as needed for mild pain       apixaban ANTICOAGULANT (ELIQUIS) 5 MG tablet Take 5 mg by mouth 2 times daily       atorvastatin (LIPITOR) 20 MG tablet Take 20 mg by mouth daily       cyanocobalamin 1000 MCG SUBL Place 1,000 mcg under the tongue daily       dimethicone 1 % external cream Apply topically 2 times daily       furosemide (LASIX) 20 MG tablet Take 20 mg by mouth daily       glimepiride (AMARYL) 1 MG tablet Take 1 mg by mouth every morning (before  "breakfast)       melatonin 3 MG tablet Take 1 mg by mouth nightly as needed for sleep       METFORMIN  MG OR TABS 2 TABLETS TWICE DAILY       metoprolol tartrate (LOPRESSOR) 100 MG tablet Take 100 mg by mouth 2 times daily       vitamin B-Complex Take 1 tablet by mouth daily       vitamin D3 (CHOLECALCIFEROL) 250 mcg (07344 units) capsule Take 1 capsule by mouth daily       No Known Allergies  Immunization History   Administered Date(s) Administered     Influenza (IIV3) PF 01/22/2009, 02/20/2013     Influenza Vaccine IM > 6 months Valent IIV4 (Alfuria,Fluzone) 02/20/2013     Influenza Vaccine, 6+MO IM (QUADRIVALENT W/PRESERVATIVES) 01/22/2009, 02/20/2013     Pneumococcal 23 valent 02/20/2013       Medications list and allergies in the facility chart have been reviewed.  Please see facility EMR for most up to date list.       Review of Systems   Patient denies fever, chills, headache,rhinorrhea, cough, congestion, shortness of breath, chest pain, palpitations, abdominal pain, n/v, diarrhea, constipation, change in appetite, change in sleep pattern, dysuria, frequency, burning or pain with urination.  Other than stated in HPI all other review of systems is negative.         Physical Exam   Vital signs:/61   Pulse 88   Temp 97.7  F (36.5  C)   Resp 16   Ht 1.778 m (5' 10\")   Wt 85.7 kg (189 lb)   SpO2 98%   BMI 27.12 kg/m     GENERAL APPEARANCE: Well developed, well nourished, in no acute distress.  HEENT: normocephalic, atraumatic   sclerae anicteric, conjunctivae clear and moist, EOM intact  LUNGS: Lung sounds CTA, no adventitious sounds, respiratory effort normal.  CARD: Irregularly irregular,S1, S2, without murmurs, gallops, rubs  ABD: Soft, nondistended and nontender with normal bowel sounds.   EXTREMITIES: No cyanosis, clubbing or edema.  NEURO: Alert and oriented x 3. With mild cognitive impairment. Face is symmetric.  SKIN: Inspection of the skin reveals no rashes, ulcerations or " petechiae.  PSYCH: euthymic    Labs:     Last Comprehensive Metabolic Panel:  Sodium   Date Value Ref Range Status   12/10/2021 137 136 - 145 mmol/L Final     Potassium   Date Value Ref Range Status   12/10/2021 4.7 3.5 - 5.0 mmol/L Final     Chloride   Date Value Ref Range Status   12/10/2021 100 98 - 107 mmol/L Final     Carbon Dioxide (CO2)   Date Value Ref Range Status   12/10/2021 24 22 - 31 mmol/L Final     Anion Gap   Date Value Ref Range Status   12/10/2021 13 5 - 18 mmol/L Final     Glucose   Date Value Ref Range Status   12/10/2021 95 70 - 125 mg/dL Final     Urea Nitrogen   Date Value Ref Range Status   12/10/2021 21 8 - 28 mg/dL Final     Creatinine   Date Value Ref Range Status   12/10/2021 0.80 0.60 - 1.10 mg/dL Final   10/06/2009 0.81 0.52 - 1.04 mg/dL Final     Comment:     New IDMS-traceable calibration  beginning 5/1/08     GFR Estimate   Date Value Ref Range Status   12/10/2021 73 >60 mL/min/1.73m2 Final     Comment:     As of July 11, 2021, eGFR is calculated by the CKD-EPI creatinine equation, without race adjustment. eGFR can be influenced by muscle mass, exercise, and diet. The reported eGFR is an estimation only and is only applicable if the renal function is stable.   10/06/2009 72 >60 mL/min/1.7m2 Final     Calcium   Date Value Ref Range Status   12/10/2021 9.8 8.5 - 10.5 mg/dL Final     Lab Results   Component Value Date    WBC 10.6 12/10/2021    WBC 6.9 10/06/2009     Lab Results   Component Value Date    RBC 4.84 12/10/2021    RBC 4.29 10/06/2009     Lab Results   Component Value Date    HGB 13.1 12/10/2021    HGB 11.8 10/06/2009     Lab Results   Component Value Date    HCT 41.3 12/10/2021    HCT 34.5 10/06/2009     Lab Results   Component Value Date    MCV 85 12/10/2021    MCV 81 10/06/2009     Lab Results   Component Value Date    MCH 27.1 12/10/2021    MCH 27.4 10/06/2009     Lab Results   Component Value Date    MCHC 31.7 12/10/2021    [facility] 34.0 10/06/2009     Lab Results    Component Value Date    RDW 14.1 12/10/2021    RDW 12.9 10/06/2009     Lab Results   Component Value Date     12/10/2021     10/06/2009         Assessment/plan:   Dehydration  Resolved after holding diuretic x 2 days.  Monitor closely and may need to decrease lasix 10mg daily.  Check BMP in the next week    Nasal congestion  Dry environment, [place] nasal spray at bedside    Ischemic cerebrovascular accident (CVA) (H)  Rehab, continue on apixaban and atorvastatin.  Follow-up with neurology on 12/29.    Atrial fibrillation, chronic (H)  Rate is controlled improved on bounding after hydration.  Continue with metoprolol and apixaban.  Follow-up with cardiology on 1/4.    Type 2 diabetes mellitus with other circulatory complication, without long-term current use of insulin (H)  Blood sugars on the soft and so will decrease Metformin to 1000 mg in the a.m. and 500 mg in the p.m.  Could consider discontinuing Amaryl if continues to have low blood sugars as I feel it would be better for her to be on Metformin alone.    Ischemic cerebrovascular accident (CVA) (H)  Rehab, continue on apixaban, atorvastatin. Watch for mood disorder with anxiety.    C-spine ligamentous injury: Wear [place] collar when out of bed. Follow-up with an MRI and neurosurgery in 2 to 3 weeks.      Electronically signed by: Berenice Nolen NP          Sincerely,        Berenice Nolen NP

## 2021-12-17 ENCOUNTER — LAB REQUISITION (OUTPATIENT)
Dept: LAB | Facility: CLINIC | Age: 71
End: 2021-12-17

## 2021-12-17 DIAGNOSIS — U07.1 COVID-19: ICD-10-CM

## 2021-12-17 PROCEDURE — U0003 INFECTIOUS AGENT DETECTION BY NUCLEIC ACID (DNA OR RNA); SEVERE ACUTE RESPIRATORY SYNDROME CORONAVIRUS 2 (SARS-COV-2) (CORONAVIRUS DISEASE [COVID-19]), AMPLIFIED PROBE TECHNIQUE, MAKING USE OF HIGH THROUGHPUT TECHNOLOGIES AS DESCRIBED BY CMS-2020-01-R: HCPCS | Performed by: FAMILY MEDICINE

## 2021-12-18 LAB — SARS-COV-2 RNA RESP QL NAA+PROBE: NEGATIVE

## 2021-12-20 ENCOUNTER — TRANSITIONAL CARE UNIT VISIT (OUTPATIENT)
Dept: GERIATRICS | Facility: CLINIC | Age: 71
End: 2021-12-20
Payer: COMMERCIAL

## 2021-12-20 ENCOUNTER — LAB REQUISITION (OUTPATIENT)
Dept: LAB | Facility: CLINIC | Age: 71
End: 2021-12-20
Payer: COMMERCIAL

## 2021-12-20 VITALS
BODY MASS INDEX: 33.42 KG/M2 | HEIGHT: 63 IN | DIASTOLIC BLOOD PRESSURE: 66 MMHG | TEMPERATURE: 98 F | SYSTOLIC BLOOD PRESSURE: 117 MMHG | HEART RATE: 55 BPM | WEIGHT: 188.6 LBS | RESPIRATION RATE: 16 BRPM | OXYGEN SATURATION: 96 %

## 2021-12-20 DIAGNOSIS — I48.20 ATRIAL FIBRILLATION, CHRONIC (H): ICD-10-CM

## 2021-12-20 DIAGNOSIS — I63.9 ISCHEMIC CEREBROVASCULAR ACCIDENT (CVA) (H): Primary | ICD-10-CM

## 2021-12-20 DIAGNOSIS — R60.9 EDEMA, UNSPECIFIED TYPE: ICD-10-CM

## 2021-12-20 DIAGNOSIS — E11.59 TYPE 2 DIABETES MELLITUS WITH OTHER CIRCULATORY COMPLICATION, WITHOUT LONG-TERM CURRENT USE OF INSULIN (H): ICD-10-CM

## 2021-12-20 DIAGNOSIS — I50.9 HEART FAILURE, UNSPECIFIED (H): ICD-10-CM

## 2021-12-20 PROCEDURE — 99309 SBSQ NF CARE MODERATE MDM 30: CPT | Performed by: NURSE PRACTITIONER

## 2021-12-20 ASSESSMENT — MIFFLIN-ST. JEOR: SCORE: 1321.67

## 2021-12-20 NOTE — LETTER
"    12/20/2021        RE: Angela Beckham  [address] [facility] [address]        Code Status:  FULL CODE  Visit Type: RECHECK     Facility:   [facility] ([facility]) [[id number]]        History of Present Illness:   Hospital Admission Date: 11/28/2020  Hospital Discharge Date: 12/5/2021      Angela Beckham is a 71 year old female with a past medical history for type 2 diabetes, osteomyelitis, left BKA, hypertension, peripheral edema and mitral stenosis.  She was recently hospitalized after being found down at home and found to have an acute ischemic CVA.  She was not given lytics and was started on Eliquis and atorvastatin.  Her residual was dysphagia and encephalopathy.  She was found to have new atrial fibrillation and was started on metoprolol along with her Eliquis.  C-spine showed a C-spine ligamentous injury and she was put in an [place] collar and recommended a follow-up MRI with neurosurgery in 2 to 3 weeks.     She was also found to have a blister on her left BKA stump and was treated by a wound care nurse.    Today, she denies any lightheadedness.  She is down 10lbs from admission now at 188lbs.  She has no edema and feels her breathing is \"good\".  HR documented at 55 however when auscultated apically rate is about 100.  Blood sugars are improving ranging from 102-183 with most at 110s.  She reports a good appetite and is on supplements.  She reports [place] nasal spray is improving her nasal congestion.      Current Outpatient Medications   Medication Sig Dispense Refill     acetaminophen (TYLENOL) 325 MG tablet Take 650 mg by mouth every 4 hours as needed for mild pain       apixaban ANTICOAGULANT (ELIQUIS) 5 MG tablet Take 5 mg by mouth 2 times daily       atorvastatin (LIPITOR) 20 MG tablet Take 20 mg by mouth daily       cyanocobalamin 1000 MCG SUBL Place 1,000 mcg under the tongue daily       dimethicone 1 % external cream Apply topically 2 times daily       furosemide (LASIX) 20 MG tablet Take 20 mg " "by mouth daily       glimepiride (AMARYL) 1 MG tablet Take 1 mg by mouth every morning (before breakfast)       melatonin 3 MG tablet Take 1 mg by mouth nightly as needed for sleep       METFORMIN  MG OR TABS Take 500-1,000 mg by mouth 2 times daily (with meals) 1000mg with breakfast and 500mg with supper.       metoprolol tartrate (LOPRESSOR) 100 MG tablet Take 100 mg by mouth 2 times daily       vitamin B-Complex Take 1 tablet by mouth daily       vitamin D3 (CHOLECALCIFEROL) 250 mcg (61525 units) capsule Take 1 capsule by mouth daily       No Known Allergies  Immunization History   Administered Date(s) Administered     Influenza (IIV3) PF 01/22/2009, 02/20/2013     Influenza Vaccine IM > 6 months Valent IIV4 (Alfuria,Fluzone) 02/20/2013     Influenza Vaccine, 6+MO IM (QUADRIVALENT W/PRESERVATIVES) 01/22/2009, 02/20/2013     Pneumococcal 23 valent 02/20/2013       Medications list and allergies in the facility chart have been reviewed.  Please see facility EMR for most up to date list.       Review of Systems   Patient denies fever, chills, headache,rhinorrhea, cough, congestion, shortness of breath, chest pain, palpitations, abdominal pain, n/v, diarrhea, constipation, change in appetite, change in sleep pattern, dysuria, frequency, burning or pain with urination.  Other than stated in HPI all other review of systems is negative.         Physical Exam no changes since my last physical exam  Vital signs:/66   Pulse 55   Temp 98  F (36.7  C)   Resp 16   Ht 1.588 m (5' 2.5\")   Wt 85.5 kg (188 lb 9.6 oz)   SpO2 96%   BMI 33.95 kg/m     GENERAL APPEARANCE: Well developed, well nourished, in no acute distress.  HEENT: normocephalic, atraumatic   sclerae anicteric, conjunctivae clear and moist, EOM intact  LUNGS: Lung sounds CTA, no adventitious sounds, respiratory effort normal.  CARD: Irregularly irregular,S1, S2, without murmurs, gallops, rubs  ABD: Soft, nondistended and nontender with normal " bowel sounds.   EXTREMITIES: No cyanosis, clubbing or edema.  NEURO: Alert and oriented x 3. With mild cognitive impairment. Face is symmetric.  SKIN: Inspection of the skin reveals no rashes, ulcerations or petechiae.  PSYCH: euthymic    Labs:     Last Comprehensive Metabolic Panel:  Sodium   Date Value Ref Range Status   12/10/2021 137 136 - 145 mmol/L Final     Potassium   Date Value Ref Range Status   12/10/2021 4.7 3.5 - 5.0 mmol/L Final     Chloride   Date Value Ref Range Status   12/10/2021 100 98 - 107 mmol/L Final     Carbon Dioxide (CO2)   Date Value Ref Range Status   12/10/2021 24 22 - 31 mmol/L Final     Anion Gap   Date Value Ref Range Status   12/10/2021 13 5 - 18 mmol/L Final     Glucose   Date Value Ref Range Status   12/10/2021 95 70 - 125 mg/dL Final     Urea Nitrogen   Date Value Ref Range Status   12/10/2021 21 8 - 28 mg/dL Final     Creatinine   Date Value Ref Range Status   12/10/2021 0.80 0.60 - 1.10 mg/dL Final   10/06/2009 0.81 0.52 - 1.04 mg/dL Final     Comment:     New IDMS-traceable calibration  beginning 5/1/08     GFR Estimate   Date Value Ref Range Status   12/10/2021 73 >60 mL/min/1.73m2 Final     Comment:     As of July 11, 2021, eGFR is calculated by the CKD-EPI creatinine equation, without race adjustment. eGFR can be influenced by muscle mass, exercise, and diet. The reported eGFR is an estimation only and is only applicable if the renal function is stable.   10/06/2009 72 >60 mL/min/1.7m2 Final     Calcium   Date Value Ref Range Status   12/10/2021 9.8 8.5 - 10.5 mg/dL Final     Lab Results   Component Value Date    WBC 10.6 12/10/2021    WBC 6.9 10/06/2009     Lab Results   Component Value Date    RBC 4.84 12/10/2021    RBC 4.29 10/06/2009     Lab Results   Component Value Date    HGB 13.1 12/10/2021    HGB 11.8 10/06/2009     Lab Results   Component Value Date    HCT 41.3 12/10/2021    HCT 34.5 10/06/2009     Lab Results   Component Value Date    MCV 85 12/10/2021    MCV 81  10/06/2009     Lab Results   Component Value Date    MCH 27.1 12/10/2021    MCH 27.4 10/06/2009     Lab Results   Component Value Date    MCHC 31.7 12/10/2021    MCHC 34.0 10/06/2009     Lab Results   Component Value Date    RDW 14.1 12/10/2021    RDW 12.9 10/06/2009     Lab Results   Component Value Date     12/10/2021     10/06/2009         Assessment/plan:   Ischemic cerebrovascular accident (CVA) (H)  Stable, continue on apixaban and atorvastatin.  She will need to follow-up with neurology on 12/29.    Edema, unspecified type  No edema noted weight down.  Patient happy with current results of Lasix dose.  Check a BMP and consider scaling back if needed.    Weight loss  Combination of caloric versus diuretics.  Is on oral supplements.    Type 2 diabetes mellitus with other circulatory complication, without long-term current use of insulin (H)  Blood sugars improving, continue on lower dose Metformin of 1000 mg in the a.m. and 500 at supper.  Continue with glimepiride.  If blood sugars become consistent in the 200s would consider discontinuing glimepiride.    Atrial fibrillation, chronic (H)  Rate is controlled, apical pulses, continue with metoprolol and apixaban.  Will need follow-up with cardiology scheduled on 1/4.    Dehydration  This was resolved last week after holding diuretics.  I did discuss possibly scaling back on her lasix however she is apprehensive as she feels as if her edema is much improved with current dose of Lasix.  Will continue and monitor hydration status ongoing.     Nasal congestion  Improvement with [place] nasal spray at bedside    C-spine ligamentous injury: Wear [place] collar when out of bed. Follow-up with an MRI and neurosurgery in 2 to 3 weeks.      Electronically signed by: Berenice Nolen NP          Sincerely,        Berenice Nolen NP

## 2021-12-20 NOTE — PROGRESS NOTES
"Code Status:  FULL CODE  Visit Type: RECHECK     Facility:   [facility] ([facility]) [[id number]]        History of Present Illness:   Hospital Admission Date: 11/28/2020  Hospital Discharge Date: 12/5/2021      Angela Beckham is a 71 year old female with a past medical history for type 2 diabetes, osteomyelitis, left BKA, hypertension, peripheral edema and mitral stenosis.  She was recently hospitalized after being found down at home and found to have an acute ischemic CVA.  She was not given lytics and was started on Eliquis and atorvastatin.  Her residual was dysphagia and encephalopathy.  She was found to have new atrial fibrillation and was started on metoprolol along with her Eliquis.  C-spine showed a C-spine ligamentous injury and she was put in an [place] collar and recommended a follow-up MRI with neurosurgery in 2 to 3 weeks.     She was also found to have a blister on her left BKA stump and was treated by a wound care nurse.    Today, she denies any lightheadedness.  She is down 10lbs from admission now at 188lbs.  She has no edema and feels her breathing is \"good\".  HR documented at 55 however when auscultated apically rate is about 100.  Blood sugars are improving ranging from 102-183 with most at 110s.  She reports a good appetite and is on supplements.  She reports [place] nasal spray is improving her nasal congestion.      Current Outpatient Medications   Medication Sig Dispense Refill     acetaminophen (TYLENOL) 325 MG tablet Take 650 mg by mouth every 4 hours as needed for mild pain       apixaban ANTICOAGULANT (ELIQUIS) 5 MG tablet Take 5 mg by mouth 2 times daily       atorvastatin (LIPITOR) 20 MG tablet Take 20 mg by mouth daily       cyanocobalamin 1000 MCG SUBL Place 1,000 mcg under the tongue daily       dimethicone 1 % external cream Apply topically 2 times daily       furosemide (LASIX) 20 MG tablet Take 20 mg by mouth daily       glimepiride (AMARYL) 1 MG tablet Take 1 mg by mouth every " "morning (before breakfast)       melatonin 3 MG tablet Take 1 mg by mouth nightly as needed for sleep       METFORMIN  MG OR TABS Take 500-1,000 mg by mouth 2 times daily (with meals) 1000mg with breakfast and 500mg with supper.       metoprolol tartrate (LOPRESSOR) 100 MG tablet Take 100 mg by mouth 2 times daily       vitamin B-Complex Take 1 tablet by mouth daily       vitamin D3 (CHOLECALCIFEROL) 250 mcg (17322 units) capsule Take 1 capsule by mouth daily       No Known Allergies  Immunization History   Administered Date(s) Administered     Influenza (IIV3) PF 01/22/2009, 02/20/2013     Influenza Vaccine IM > 6 months Valent IIV4 (Alfuria,Fluzone) 02/20/2013     Influenza Vaccine, 6+MO IM (QUADRIVALENT W/PRESERVATIVES) 01/22/2009, 02/20/2013     Pneumococcal 23 valent 02/20/2013       Medications list and allergies in the facility chart have been reviewed.  Please see facility EMR for most up to date list.       Review of Systems   Patient denies fever, chills, headache,rhinorrhea, cough, congestion, shortness of breath, chest pain, palpitations, abdominal pain, n/v, diarrhea, constipation, change in appetite, change in sleep pattern, dysuria, frequency, burning or pain with urination.  Other than stated in HPI all other review of systems is negative.         Physical Exam no changes since my last physical exam  Vital signs:/66   Pulse 55   Temp 98  F (36.7  C)   Resp 16   Ht 1.588 m (5' 2.5\")   Wt 85.5 kg (188 lb 9.6 oz)   SpO2 96%   BMI 33.95 kg/m     GENERAL APPEARANCE: Well developed, well nourished, in no acute distress.  HEENT: normocephalic, atraumatic   sclerae anicteric, conjunctivae clear and moist, EOM intact  LUNGS: Lung sounds CTA, no adventitious sounds, respiratory effort normal.  CARD: Irregularly irregular,S1, S2, without murmurs, gallops, rubs  ABD: Soft, nondistended and nontender with normal bowel sounds.   EXTREMITIES: No cyanosis, clubbing or edema.  NEURO: Alert and " oriented x 3. With mild cognitive impairment. Face is symmetric.  SKIN: Inspection of the skin reveals no rashes, ulcerations or petechiae.  PSYCH: euthymic    Labs:     Last Comprehensive Metabolic Panel:  Sodium   Date Value Ref Range Status   12/10/2021 137 136 - 145 mmol/L Final     Potassium   Date Value Ref Range Status   12/10/2021 4.7 3.5 - 5.0 mmol/L Final     Chloride   Date Value Ref Range Status   12/10/2021 100 98 - 107 mmol/L Final     Carbon Dioxide (CO2)   Date Value Ref Range Status   12/10/2021 24 22 - 31 mmol/L Final     Anion Gap   Date Value Ref Range Status   12/10/2021 13 5 - 18 mmol/L Final     Glucose   Date Value Ref Range Status   12/10/2021 95 70 - 125 mg/dL Final     Urea Nitrogen   Date Value Ref Range Status   12/10/2021 21 8 - 28 mg/dL Final     Creatinine   Date Value Ref Range Status   12/10/2021 0.80 0.60 - 1.10 mg/dL Final   10/06/2009 0.81 0.52 - 1.04 mg/dL Final     Comment:     New IDMS-traceable calibration  beginning 5/1/08     GFR Estimate   Date Value Ref Range Status   12/10/2021 73 >60 mL/min/1.73m2 Final     Comment:     As of July 11, 2021, eGFR is calculated by the CKD-EPI creatinine equation, without race adjustment. eGFR can be influenced by muscle mass, exercise, and diet. The reported eGFR is an estimation only and is only applicable if the renal function is stable.   10/06/2009 72 >60 mL/min/1.7m2 Final     Calcium   Date Value Ref Range Status   12/10/2021 9.8 8.5 - 10.5 mg/dL Final     Lab Results   Component Value Date    WBC 10.6 12/10/2021    WBC 6.9 10/06/2009     Lab Results   Component Value Date    RBC 4.84 12/10/2021    RBC 4.29 10/06/2009     Lab Results   Component Value Date    HGB 13.1 12/10/2021    HGB 11.8 10/06/2009     Lab Results   Component Value Date    HCT 41.3 12/10/2021    HCT 34.5 10/06/2009     Lab Results   Component Value Date    MCV 85 12/10/2021    MCV 81 10/06/2009     Lab Results   Component Value Date    MCH 27.1 12/10/2021    [facility]  27.4 10/06/2009     Lab Results   Component Value Date    MCHC 31.7 12/10/2021    MCHC 34.0 10/06/2009     Lab Results   Component Value Date    RDW 14.1 12/10/2021    RDW 12.9 10/06/2009     Lab Results   Component Value Date     12/10/2021     10/06/2009         Assessment/plan:   Ischemic cerebrovascular accident (CVA) (H)  Stable, continue on apixaban and atorvastatin.  She will need to follow-up with neurology on 12/29.    Edema, unspecified type  No edema noted weight down.  Patient happy with current results of Lasix dose.  Check a BMP and consider scaling back if needed.    Weight loss  Combination of caloric versus diuretics.  Is on oral supplements.    Type 2 diabetes mellitus with other circulatory complication, without long-term current use of insulin (H)  Blood sugars improving, continue on lower dose Metformin of 1000 mg in the a.m. and 500 at supper.  Continue with glimepiride.  If blood sugars become consistent in the 200s would consider discontinuing glimepiride.    Atrial fibrillation, chronic (H)  Rate is controlled, apical pulses, continue with metoprolol and apixaban.  Will need follow-up with cardiology scheduled on 1/4.    Dehydration  This was resolved last week after holding diuretics.  I did discuss possibly scaling back on her lasix however she is apprehensive as she feels as if her edema is much improved with current dose of Lasix.  Will continue and monitor hydration status ongoing.     Nasal congestion  Improvement with [place] nasal spray at bedside    C-spine ligamentous injury: Wear [place] collar when out of bed. Follow-up with an MRI and neurosurgery in 2 to 3 weeks.      Electronically signed by: Berenice Nolen NP

## 2021-12-21 ENCOUNTER — TELEPHONE (OUTPATIENT)
Dept: GERIATRICS | Facility: CLINIC | Age: 71
End: 2021-12-21
Payer: COMMERCIAL

## 2021-12-21 LAB
ANION GAP SERPL CALCULATED.3IONS-SCNC: 9 MMOL/L (ref 5–18)
BUN SERPL-MCNC: 29 MG/DL (ref 8–28)
CALCIUM SERPL-MCNC: 9.7 MG/DL (ref 8.5–10.5)
CHLORIDE BLD-SCNC: 101 MMOL/L (ref 98–107)
CO2 SERPL-SCNC: 29 MMOL/L (ref 22–31)
CREAT SERPL-MCNC: 0.87 MG/DL (ref 0.6–1.1)
GFR SERPL CREATININE-BSD FRML MDRD: 70 ML/MIN/1.73M2
GLUCOSE BLD-MCNC: 105 MG/DL (ref 70–125)
POTASSIUM BLD-SCNC: 4.6 MMOL/L (ref 3.5–5)
SODIUM SERPL-SCNC: 139 MMOL/L (ref 136–145)

## 2021-12-21 PROCEDURE — 36415 COLL VENOUS BLD VENIPUNCTURE: CPT | Performed by: FAMILY MEDICINE

## 2021-12-21 PROCEDURE — P9604 ONE-WAY ALLOW PRORATED TRIP: HCPCS | Performed by: FAMILY MEDICINE

## 2021-12-21 PROCEDURE — 80048 BASIC METABOLIC PNL TOTAL CA: CPT | Performed by: FAMILY MEDICINE

## 2021-12-22 NOTE — TELEPHONE ENCOUNTER
[facility] Nurse Triage Telephone Note    Provider: CEDRIC Ortiz  Facility: [facility] Facility Type:  TCU    Caller: Julio C  Call Back Number: [phone]    Allergies:  No Known Allergies     Reason for call: Nurse calling to report BMP results.  Notable meds:  Metformin 1000mg Q AM and 500mg Q PM, Lasix 20mg daily, Eliquis 5mg BID, Glimepiride 1mg daily, Metoprolol 100mg BID.      Verbal Order/Direction given by Provider: Decrease Lasix to 10mg daily.      Provider giving Order:  CEDRIC Ortiz    Verbal Order given to: Julio C Campbell RN

## 2021-12-24 ENCOUNTER — LAB REQUISITION (OUTPATIENT)
Dept: LAB | Facility: CLINIC | Age: 71
End: 2021-12-24

## 2021-12-24 DIAGNOSIS — U07.1 COVID-19: ICD-10-CM

## 2021-12-24 PROCEDURE — U0003 INFECTIOUS AGENT DETECTION BY NUCLEIC ACID (DNA OR RNA); SEVERE ACUTE RESPIRATORY SYNDROME CORONAVIRUS 2 (SARS-COV-2) (CORONAVIRUS DISEASE [COVID-19]), AMPLIFIED PROBE TECHNIQUE, MAKING USE OF HIGH THROUGHPUT TECHNOLOGIES AS DESCRIBED BY CMS-2020-01-R: HCPCS | Performed by: FAMILY MEDICINE

## 2021-12-25 LAB — SARS-COV-2 RNA RESP QL NAA+PROBE: NEGATIVE

## 2021-12-26 ENCOUNTER — LAB REQUISITION (OUTPATIENT)
Dept: LAB | Facility: CLINIC | Age: 71
End: 2021-12-26

## 2021-12-26 DIAGNOSIS — U07.1 COVID-19: ICD-10-CM

## 2021-12-26 PROCEDURE — U0005 INFEC AGEN DETEC AMPLI PROBE: HCPCS | Performed by: FAMILY MEDICINE

## 2021-12-27 LAB — SARS-COV-2 RNA RESP QL NAA+PROBE: NEGATIVE

## 2021-12-27 NOTE — PROGRESS NOTES
Code Status:  FULL CODE  Visit Type: RECHECK     Facility:   [facility] ([facility]) [[id number]]        History of Present Illness:   Hospital Admission Date: 11/28/2020  Hospital Discharge Date: 12/5/2021      Angela Beckham is a 71 year old female with a past medical history for type 2 diabetes, osteomyelitis, left BKA, hypertension, peripheral edema and mitral stenosis.  She was recently hospitalized after being found down at home and found to have an acute ischemic CVA.  She was not given lytics and was started on Eliquis and atorvastatin.  Her residual was dysphagia and encephalopathy.  She was found to have new atrial fibrillation and was started on metoprolol along with her Eliquis.  C-spine showed a C-spine ligamentous injury and she was put in an [place] collar and recommended a follow-up MRI with neurosurgery in 2 to 3 weeks.     She was also found to have a blister on her left BKA stump and was treated by a wound care nurse.    Today, patient is sitting in wheelchair and denies any pain or discomforts.  She has had quite a lot of weight loss since her admission with an admission weight of 208 pounds now down to 180 pounds.  She does not exhibit any lower extremity edema.  Last week I did decrease her Lasix due to an increased BUN of 29 and weight loss.  Patient states she does not like some of the food here.  Blood sugars have been acceptable .  Blood pressures stable with systolics 103-140s with most in the 110s.      Current Outpatient Medications   Medication Sig Dispense Refill     acetaminophen (TYLENOL) 325 MG tablet Take 650 mg by mouth every 4 hours as needed for mild pain       apixaban ANTICOAGULANT (ELIQUIS) 5 MG tablet Take 5 mg by mouth 2 times daily       atorvastatin (LIPITOR) 20 MG tablet Take 20 mg by mouth daily       cyanocobalamin 1000 MCG SUBL Place 1,000 mcg under the tongue daily       dimethicone 1 % external cream Apply topically 2 times daily       furosemide (LASIX)  "20 MG tablet Take 10 mg by mouth daily       glimepiride (AMARYL) 1 MG tablet Take 1 mg by mouth every morning (before breakfast)       melatonin 3 MG tablet Take 1 mg by mouth nightly as needed for sleep       METFORMIN  MG OR TABS Take 500-1,000 mg by mouth 2 times daily (with meals) 1000mg with breakfast and 500mg with supper.       metoprolol tartrate (LOPRESSOR) 100 MG tablet Take 100 mg by mouth 2 times daily       vitamin B-Complex Take 1 tablet by mouth daily       vitamin D3 (CHOLECALCIFEROL) 250 mcg (67237 units) capsule Take 1 capsule by mouth daily       No Known Allergies  Immunization History   Administered Date(s) Administered     Influenza (High Dose) 3 valent vaccine 10/02/2013     Influenza (IIV3) PF 01/22/2009, 02/20/2013     Influenza Vaccine IM > 6 months Valent IIV4 (Alfuria,Fluzone) 02/20/2013     Influenza Vaccine, 6+MO IM (QUADRIVALENT W/PRESERVATIVES) 01/22/2009, 02/20/2013     Pneumococcal 23 valent 02/20/2013       Medications list and allergies in the facility chart have been reviewed.  Please see facility EMR for most up to date list.       Review of Systems   Patient denies fever, chills, headache,rhinorrhea, cough, congestion, shortness of breath, chest pain, palpitations, abdominal pain, n/v, diarrhea, constipation, change in appetite, change in sleep pattern, dysuria, frequency, burning or pain with urination.  Other than stated in HPI all other review of systems is negative.         Physical Exam no changes since my last physical exam  Vital signs:/71   Pulse 74   Temp 97.5  F (36.4  C)   Resp 18   Ht 1.588 m (5' 2.5\")   Wt 81.6 kg (180 lb)   SpO2 97%   BMI 32.40 kg/m     GENERAL APPEARANCE: Well developed, in no acute distress.  HEENT: normocephalic, atraumatic   sclerae anicteric, conjunctivae clear and moist, EOM intact  LUNGS: Lung sounds CTA, no adventitious sounds, respiratory effort normal.  CARD: Irregularly irregular,S1, S2, without murmurs, gallops, " rubs  ABD: Soft, nondistended and nontender with normal bowel sounds.   EXTREMITIES: No cyanosis, clubbing or edema.  NEURO: Alert and oriented x 3. With mild cognitive impairment. Face is symmetric.  SKIN: Inspection of the skin reveals no rashes, ulcerations or petechiae.  PSYCH: euthymic    Labs:     Last Comprehensive Metabolic Panel:  Sodium   Date Value Ref Range Status   12/21/2021 139 136 - 145 mmol/L Final     Potassium   Date Value Ref Range Status   12/21/2021 4.6 3.5 - 5.0 mmol/L Final     Chloride   Date Value Ref Range Status   12/21/2021 101 98 - 107 mmol/L Final     Carbon Dioxide (CO2)   Date Value Ref Range Status   12/21/2021 29 22 - 31 mmol/L Final     Anion Gap   Date Value Ref Range Status   12/21/2021 9 5 - 18 mmol/L Final     Glucose   Date Value Ref Range Status   12/21/2021 105 70 - 125 mg/dL Final     Urea Nitrogen   Date Value Ref Range Status   12/21/2021 29 (H) 8 - 28 mg/dL Final     Creatinine   Date Value Ref Range Status   12/21/2021 0.87 0.60 - 1.10 mg/dL Final   10/06/2009 0.81 0.52 - 1.04 mg/dL Final     Comment:     New IDMS-traceable calibration  beginning 5/1/08     GFR Estimate   Date Value Ref Range Status   12/21/2021 70 >60 mL/min/1.73m2 Final     Comment:     Effective December 21, 2021 eGFRcr in adults is calculated using the 2021 CKD-EPI creatinine equation which includes age and gender (Moe zhang al., NEJ, DOI: 10.1056/KSFHcg7287968)   10/06/2009 72 >60 mL/min/1.7m2 Final     Calcium   Date Value Ref Range Status   12/21/2021 9.7 8.5 - 10.5 mg/dL Final     Lab Results   Component Value Date    WBC 10.6 12/10/2021    WBC 6.9 10/06/2009     Lab Results   Component Value Date    RBC 4.84 12/10/2021    RBC 4.29 10/06/2009     Lab Results   Component Value Date    HGB 13.1 12/10/2021    HGB 11.8 10/06/2009     Lab Results   Component Value Date    HCT 41.3 12/10/2021    HCT 34.5 10/06/2009     Lab Results   Component Value Date    MCV 85 12/10/2021    MCV 81 10/06/2009      Lab Results   Component Value Date    MCH 27.1 12/10/2021    MCH 27.4 10/06/2009     Lab Results   Component Value Date    MCHC 31.7 12/10/2021    MCHC 34.0 10/06/2009     Lab Results   Component Value Date    RDW 14.1 12/10/2021    RDW 12.9 10/06/2009     Lab Results   Component Value Date     12/10/2021     10/06/2009         Assessment/plan:   Ischemic cerebrovascular accident (CVA) (H)  Stable, continue on apixaban and atorvastatin.  Follow-up with neurology on 1229.    Weight loss  We will have dietitian meet with patient to determine favorite foods.  Start on Magic cup twice daily.    Type 2 diabetes mellitus with other circulatory complication, without long-term current use of insulin (H)  Controlled, blood sugars did improve after decreasing Metformin.  Now on lower dose Metformin 1000 mg in the a.m. and 500 mg at supper.  Continue with her glimepiride.  If blood sugars again drop low would recommend discontinuing glimepiride.    Edema of both legs  None noted, continue with Lasix 10 mg daily likely could discontinue if continues to appear dry.    Atrial fibrillation, chronic (H)  Rate controlled, continue with metoprolol and apixaban.  Follow-up with cardiology on 1/4.    Nasal congestion  Improvement with ocean nasal spray at bedside    C-spine ligamentous injury: No longer wearing [place] collar  Follow-up with an MRI and neurosurgery in the next week.      Electronically signed by: Berenice Nolen NP

## 2021-12-28 ENCOUNTER — TRANSITIONAL CARE UNIT VISIT (OUTPATIENT)
Dept: GERIATRICS | Facility: CLINIC | Age: 71
End: 2021-12-28
Payer: COMMERCIAL

## 2021-12-28 VITALS
RESPIRATION RATE: 18 BRPM | WEIGHT: 180 LBS | BODY MASS INDEX: 31.89 KG/M2 | HEIGHT: 63 IN | TEMPERATURE: 97.5 F | OXYGEN SATURATION: 97 % | HEART RATE: 74 BPM | SYSTOLIC BLOOD PRESSURE: 106 MMHG | DIASTOLIC BLOOD PRESSURE: 71 MMHG

## 2021-12-28 DIAGNOSIS — I48.20 ATRIAL FIBRILLATION, CHRONIC (H): ICD-10-CM

## 2021-12-28 DIAGNOSIS — I63.9 ISCHEMIC CEREBROVASCULAR ACCIDENT (CVA) (H): ICD-10-CM

## 2021-12-28 DIAGNOSIS — R60.0 EDEMA OF BOTH LEGS: ICD-10-CM

## 2021-12-28 DIAGNOSIS — E11.59 TYPE 2 DIABETES MELLITUS WITH OTHER CIRCULATORY COMPLICATION, WITHOUT LONG-TERM CURRENT USE OF INSULIN (H): ICD-10-CM

## 2021-12-28 DIAGNOSIS — R63.4 WEIGHT LOSS: Primary | ICD-10-CM

## 2021-12-28 PROCEDURE — 99309 SBSQ NF CARE MODERATE MDM 30: CPT | Performed by: NURSE PRACTITIONER

## 2021-12-28 ASSESSMENT — MIFFLIN-ST. JEOR: SCORE: 1282.66

## 2021-12-28 NOTE — LETTER
12/28/2021        RE: Angela Beckham  [address] [address]        Code Status:  FULL CODE  Visit Type: RECHECK     Facility:   [facility] ([facility]) [[id number]]        History of Present Illness:   Hospital Admission Date: 11/28/2020  Hospital Discharge Date: 12/5/2021      Angela Beckham is a 71 year old female with a past medical history for type 2 diabetes, osteomyelitis, left BKA, hypertension, peripheral edema and mitral stenosis.  She was recently hospitalized after being found down at home and found to have an acute ischemic CVA.  She was not given lytics and was started on Eliquis and atorvastatin.  Her residual was dysphagia and encephalopathy.  She was found to have new atrial fibrillation and was started on metoprolol along with her Eliquis.  C-spine showed a C-spine ligamentous injury and she was put in an [place] collar and recommended a follow-up MRI with neurosurgery in 2 to 3 weeks.     She was also found to have a blister on her left BKA stump and was treated by a wound care nurse.    Today, patient is sitting in wheelchair and denies any pain or discomforts.  She has had quite a lot of weight loss since her admission with an admission weight of 208 pounds now down to 180 pounds.  She does not exhibit any lower extremity edema.  Last week I did decrease her Lasix due to an increased BUN of 29 and weight loss.  Patient states she does not like some of the food here.  Blood sugars have been acceptable .  Blood pressures stable with systolics 103-140s with most in the 110s.      Current Outpatient Medications   Medication Sig Dispense Refill     acetaminophen (TYLENOL) 325 MG tablet Take 650 mg by mouth every 4 hours as needed for mild pain       apixaban ANTICOAGULANT (ELIQUIS) 5 MG tablet Take 5 mg by mouth 2 times daily       atorvastatin (LIPITOR) 20 MG tablet Take 20 mg by mouth daily       cyanocobalamin 1000 MCG SUBL Place 1,000 mcg under the tongue daily        "dimethicone 1 % external cream Apply topically 2 times daily       furosemide (LASIX) 20 MG tablet Take 10 mg by mouth daily       glimepiride (AMARYL) 1 MG tablet Take 1 mg by mouth every morning (before breakfast)       melatonin 3 MG tablet Take 1 mg by mouth nightly as needed for sleep       METFORMIN  MG OR TABS Take 500-1,000 mg by mouth 2 times daily (with meals) 1000mg with breakfast and 500mg with supper.       metoprolol tartrate (LOPRESSOR) 100 MG tablet Take 100 mg by mouth 2 times daily       vitamin B-Complex Take 1 tablet by mouth daily       vitamin D3 (CHOLECALCIFEROL) 250 mcg (30369 units) capsule Take 1 capsule by mouth daily       No Known Allergies  Immunization History   Administered Date(s) Administered     Influenza (High Dose) 3 valent vaccine 10/02/2013     Influenza (IIV3) PF 01/22/2009, 02/20/2013     Influenza Vaccine IM > 6 months Valent IIV4 (Alfuria,Fluzone) 02/20/2013     Influenza Vaccine, 6+MO IM (QUADRIVALENT W/PRESERVATIVES) 01/22/2009, 02/20/2013     Pneumococcal 23 valent 02/20/2013       Medications list and allergies in the facility chart have been reviewed.  Please see facility EMR for most up to date list.       Review of Systems   Patient denies fever, chills, headache,rhinorrhea, cough, congestion, shortness of breath, chest pain, palpitations, abdominal pain, n/v, diarrhea, constipation, change in appetite, change in sleep pattern, dysuria, frequency, burning or pain with urination.  Other than stated in HPI all other review of systems is negative.         Physical Exam no changes since my last physical exam  Vital signs:/71   Pulse 74   Temp 97.5  F (36.4  C)   Resp 18   Ht 1.588 m (5' 2.5\")   Wt 81.6 kg (180 lb)   SpO2 97%   BMI 32.40 kg/m     GENERAL APPEARANCE: Well developed, in no acute distress.  HEENT: normocephalic, atraumatic   sclerae anicteric, conjunctivae clear and moist, EOM intact  LUNGS: Lung sounds CTA, no adventitious sounds, " respiratory effort normal.  CARD: Irregularly irregular,S1, S2, without murmurs, gallops, rubs  ABD: Soft, nondistended and nontender with normal bowel sounds.   EXTREMITIES: No cyanosis, clubbing or edema.  NEURO: Alert and oriented x 3. With mild cognitive impairment. Face is symmetric.  SKIN: Inspection of the skin reveals no rashes, ulcerations or petechiae.  PSYCH: euthymic    Labs:     Last Comprehensive Metabolic Panel:  Sodium   Date Value Ref Range Status   12/21/2021 139 136 - 145 mmol/L Final     Potassium   Date Value Ref Range Status   12/21/2021 4.6 3.5 - 5.0 mmol/L Final     Chloride   Date Value Ref Range Status   12/21/2021 101 98 - 107 mmol/L Final     Carbon Dioxide (CO2)   Date Value Ref Range Status   12/21/2021 29 22 - 31 mmol/L Final     Anion Gap   Date Value Ref Range Status   12/21/2021 9 5 - 18 mmol/L Final     Glucose   Date Value Ref Range Status   12/21/2021 105 70 - 125 mg/dL Final     Urea Nitrogen   Date Value Ref Range Status   12/21/2021 29 (H) 8 - 28 mg/dL Final     Creatinine   Date Value Ref Range Status   12/21/2021 0.87 0.60 - 1.10 mg/dL Final   10/06/2009 0.81 0.52 - 1.04 mg/dL Final     Comment:     New IDMS-traceable calibration  beginning 5/1/08     GFR Estimate   Date Value Ref Range Status   12/21/2021 70 >60 mL/min/1.73m2 Final     Comment:     Effective December 21, 2021 eGFRcr in adults is calculated using the 2021 CKD-EPI creatinine equation which includes age and gender (Moe et al., NEJM, DOI: 10.1056/SJHOro5672503)   10/06/2009 72 >60 mL/min/1.7m2 Final     Calcium   Date Value Ref Range Status   12/21/2021 9.7 8.5 - 10.5 mg/dL Final     Lab Results   Component Value Date    WBC 10.6 12/10/2021    WBC 6.9 10/06/2009     Lab Results   Component Value Date    RBC 4.84 12/10/2021    RBC 4.29 10/06/2009     Lab Results   Component Value Date    HGB 13.1 12/10/2021    HGB 11.8 10/06/2009     Lab Results   Component Value Date    HCT 41.3 12/10/2021    HCT 34.5  10/06/2009     Lab Results   Component Value Date    MCV 85 12/10/2021    MCV 81 10/06/2009     Lab Results   Component Value Date    MCH 27.1 12/10/2021    MCH 27.4 10/06/2009     Lab Results   Component Value Date    MCHC 31.7 12/10/2021    MCHC 34.0 10/06/2009     Lab Results   Component Value Date    RDW 14.1 12/10/2021    RDW 12.9 10/06/2009     Lab Results   Component Value Date     12/10/2021     10/06/2009         Assessment/plan:   Ischemic cerebrovascular accident (CVA) (H)  Stable, continue on apixaban and atorvastatin.  Follow-up with neurology on 1229.    Weight loss  We will have dietitian meet with patient to determine favorite foods.  Start on Magic cup twice daily.    Type 2 diabetes mellitus with other circulatory complication, without long-term current use of insulin (H)  Controlled, blood sugars did improve after decreasing Metformin.  Now on lower dose Metformin 1000 mg in the a.m. and 500 mg at supper.  Continue with her glimepiride.  If blood sugars again drop low would recommend discontinuing glimepiride.    Edema of both legs  None noted, continue with Lasix 10 mg daily likely could discontinue if continues to appear dry.    Atrial fibrillation, chronic (H)  Rate controlled, continue with metoprolol and apixaban.  Follow-up with cardiology on 1/4.    Nasal congestion  Improvement with [place] nasal spray at bedside    C-spine ligamentous injury: No longer wearing [place] collar  Follow-up with an MRI and neurosurgery in the next week.      Electronically signed by: Berenice Nolen NP          Sincerely,        Berenice Nolen NP

## 2021-12-29 ENCOUNTER — LAB REQUISITION (OUTPATIENT)
Dept: LAB | Facility: CLINIC | Age: 71
End: 2021-12-29
Payer: COMMERCIAL

## 2021-12-29 DIAGNOSIS — I10 ESSENTIAL (PRIMARY) HYPERTENSION: ICD-10-CM

## 2022-01-05 ENCOUNTER — TRANSITIONAL CARE UNIT VISIT (OUTPATIENT)
Dept: GERIATRICS | Facility: CLINIC | Age: 72
End: 2022-01-05
Payer: COMMERCIAL

## 2022-01-05 VITALS
WEIGHT: 182 LBS | HEIGHT: 62 IN | RESPIRATION RATE: 20 BRPM | DIASTOLIC BLOOD PRESSURE: 82 MMHG | OXYGEN SATURATION: 95 % | HEART RATE: 79 BPM | BODY MASS INDEX: 33.49 KG/M2 | SYSTOLIC BLOOD PRESSURE: 124 MMHG | TEMPERATURE: 97.9 F

## 2022-01-05 DIAGNOSIS — I48.20 ATRIAL FIBRILLATION, CHRONIC (H): ICD-10-CM

## 2022-01-05 DIAGNOSIS — I63.9 ISCHEMIC CEREBROVASCULAR ACCIDENT (CVA) (H): Primary | ICD-10-CM

## 2022-01-05 DIAGNOSIS — E11.59 TYPE 2 DIABETES MELLITUS WITH OTHER CIRCULATORY COMPLICATION, WITHOUT LONG-TERM CURRENT USE OF INSULIN (H): ICD-10-CM

## 2022-01-05 DIAGNOSIS — I10 PRIMARY HYPERTENSION: ICD-10-CM

## 2022-01-05 PROCEDURE — 99310 SBSQ NF CARE HIGH MDM 45: CPT | Performed by: FAMILY MEDICINE

## 2022-01-05 ASSESSMENT — MIFFLIN-ST. JEOR: SCORE: 1283.8

## 2022-01-05 NOTE — LETTER
1/5/2022        RE: Angela Beckham  [address] [facility] [address]        [facility]       Patient Angela Beckham  MRN: [id number]        Reason for Visit     Chief Complaint   Patient presents with     RECHECK   follow-up dm; htn ;back pain    Code Status     CPR/Full code     Assessment     Acute low back pain  Acute ischemic rt MCA CVA  Acute encephalopathy/delirium secondary to CVA  New onset atrial fibrillation with rapid ventricular response  C-spine ligamentous injury  Persistent leukocytosis  Chronic lower extremity edema  Diabetes type 2 with low BG; BG as low as 60-90 noted in TCU  Hypertensin with low BP  Generalized weakness    Plan     Pt is admitted to TCU for strengthening and rehab.  Seen today for multiple concerns has been complaining of low back pain and requesting imaging.  No specific trauma or injury has been reported.  I did discuss this with her physical therapist.  She has had some paraspinal pain on exam today which is consistent with the physical therapist finding.  They are trying e-stim which has been effective so far and managing her pain.  She did see her cardiologist and has been told she may need valve surgery she is not very happy with that will await their recommendations for requirement of preop physical.  Cardiology note reviewed.  They have recommended actually that she should have a TAMAR first before any decision is made regarding valve replacement surgery and patient was updated  Diabetes control reviewed.  She has had several low blood sugars including blood sugars of 68 and 86 most of her blood sugars are running under 150 she is not a candidate for tight control.  We will discontinue her Amaryl/glimepiride.  Continue with her Metformin.  Continue to monitor trends.  Not a candidate for tight control  Discontinue magic cups   Follow-up cardiology and neurology as scheduled    History     Patient is a very pleasant 71 year old female who is admitted to  TCU  Patient was admitted after she was found on the floor.  She was brought to the emergency room  Imaging did reveal that she had acute ischemic CVA.  She also had acute encephalopathy with fluctuating level of consciousness.  Neurology was consulted.  She also was found a new onset atrial fibrillation with rapid ventricular response.  She has been started on metoprolol.  Hr are stable  Saw cardiology and now scheduled for TAMAR  Noted to have a C-spine ligamentous injury on CT imaging.  Neurosurgery consulted and she has been given an [place] collar.  She had significant edema of her bilateral lower extremity with worsening diuretics were resumed at discharge  Also noted to have a pressure injury and discharged to the TCU for strengthening and rehab  Upset that she is not walking as yet; blames it on her back injury    Past Medical & Surgical History     PAST MEDICAL HISTORY:   Past Medical History:   Diagnosis Date     Diabetic neuropathy (H)      HTN      Type 2 diabetes mellitus (H)       PAST SURGICAL HISTORY:  natividad henderson      Past Social History     Reviewed,  has an unknown smoking status. She has never used smokeless tobacco. She reports that she does not drink alcohol and does not use drugs.    Family History     Reviewed, and family history includes Alzheimer Disease in her father; Cerebrovascular Disease in her maternal grandmother and mother; Diabetes in her paternal grandfather; Hypertension in her brother and mother; Obesity in her brother; Respiratory in her father.    Medication List   Post Discharge Medication Reconciliation Status: Post Discharge Medication Reconciliation Status: discharge medications reconciled, continue medications without change.  Current Outpatient Medications   Medication     acetaminophen (TYLENOL) 325 MG tablet     apixaban ANTICOAGULANT (ELIQUIS) 5 MG tablet     atorvastatin (LIPITOR) 20 MG tablet     cyanocobalamin 1000 MCG SUBL     dimethicone 1 % external cream     furosemide  "(LASIX) 20 MG tablet     glimepiride (AMARYL) 1 MG tablet     melatonin 3 MG tablet     METFORMIN  MG OR TABS     metoprolol tartrate (LOPRESSOR) 100 MG tablet     miconazole (MICATIN) 2 % cream     sodium chloride (OCEAN) 0.65 % nasal spray     vitamin B-Complex     vitamin D3 (CHOLECALCIFEROL) 250 mcg (78497 units) capsule     No current facility-administered medications for this visit.       Allergies     Allergies   Allergen Reactions     Amoxicillin Unknown     Oxycodone Other (See Comments)     Does not remember     Latex Rash       Review of Systems   A comprehensive review of 14 systems was done. Pertinent findings noted here and in history of present illness. All the rest negative.  Constitutional: Negative.  Negative for fever, chills, she has  activity change, appetite change and fatigue.   HENT: Negative for congestion and facial swelling.    Eyes: Negative for photophobia, redness and visual disturbance.   Respiratory: Negative for cough and chest tightness.    Cardiovascular: Negative for chest pain, palpitations and leg swelling.   Gastrointestinal: Negative for nausea, diarrhea, constipation, blood in stool and abdominal distention.   Genitourinary: Negative.  Reporting incontinence  Musculoskeletal: Currently very weak and bedbound she cannot walk currently  SHE IS having back pain; staff using diathermy  Skin: Negative.    Neurological: Negative for dizziness, tremors, syncope, weakness, light-headedness and headaches.   Denies any weakness from cva even though she is bed bound  Hematological: Does not bruise/bleed easily.   Psychiatric/Behavioral: Negative.  Affect is flat        Physical Exam   /82   Pulse 79   Temp 97.9  F (36.6  C)   Resp 20   Ht 1.575 m (5' 2\")   Wt 82.6 kg (182 lb)   SpO2 95%   BMI 33.29 kg/m       Constitutional: Oriented to person, place, and time and appears well-developed.   HEENT:  Normocephalic and atraumatic.  Eyes: Conjunctivae and EOM are normal. " Pupils are equal, round, and reactive to light. No discharge.  No scleral icterus. Nose normal. Mouth/Throat: Oropharynx is clear and moist. No oropharyngeal exudate.    NECK: Normal range of motion. Neck supple. No JVD present. No tracheal deviation present. No thyromegaly present.   CARDIOVASCULAR: Normal rate, regular rhythm and intact distal pulses.  Exam reveals no gallop and no friction rub.  Systolic murmur present.  PULMONARY: Effort normal and breath sounds normal. No respiratory distress.No Wheezing or rales.  ABDOMEN: Soft. Bowel sounds are normal. No distension and no mass.  There is no tenderness. There is no rebound and no guarding. No HSM.  Stage 2 pressure injury on left low back  MUSCULOSKELETAL: Normal range of motion. No edema and no tenderness. Mild kyphosis, no tenderness.  Has a left BKA  Tender on paraspinal area  LYMPH NODES: Has no cervical, supraclavicular, axillary and groin adenopathy.   NEUROLOGICAL: Alert and oriented to person, place, and time. No cranial nerve deficit.  Normal muscle tone. Coordination normal.   GENITOURINARY: Deferred exam.  SKIN: Skin is warm and dry. No rash noted. No erythema. No pallor.   Skin blister on her left BKA is healing with no concern for infection  EXTREMITIES: No cyanosis, no clubbing, no edema. No Deformity.  PSYCHIATRIC: abNormal mood, affect and behavior.  Does not have much recall of recent events.       Lab Results     Last Comprehensive Metabolic Panel:  Sodium   Date Value Ref Range Status   12/21/2021 139 136 - 145 mmol/L Final     Potassium   Date Value Ref Range Status   12/21/2021 4.6 3.5 - 5.0 mmol/L Final     Chloride   Date Value Ref Range Status   12/21/2021 101 98 - 107 mmol/L Final     Carbon Dioxide (CO2)   Date Value Ref Range Status   12/21/2021 29 22 - 31 mmol/L Final     Anion Gap   Date Value Ref Range Status   12/21/2021 9 5 - 18 mmol/L Final     Glucose   Date Value Ref Range Status   12/21/2021 105 70 - 125 mg/dL Final      Urea Nitrogen   Date Value Ref Range Status   12/21/2021 29 (H) 8 - 28 mg/dL Final     Creatinine   Date Value Ref Range Status   12/21/2021 0.87 0.60 - 1.10 mg/dL Final   10/06/2009 0.81 0.52 - 1.04 mg/dL Final     Comment:     New IDMS-traceable calibration  beginning 5/1/08     GFR Estimate   Date Value Ref Range Status   12/21/2021 70 >60 mL/min/1.73m2 Final     Comment:     Effective December 21, 2021 eGFRcr in adults is calculated using the 2021 CKD-EPI creatinine equation which includes age and gender (Moe et al., NEJM, DOI: 10.1056/DMXMfb5027018)   10/06/2009 72 >60 mL/min/1.7m2 Final     Calcium   Date Value Ref Range Status   12/21/2021 9.7 8.5 - 10.5 mg/dL Final       Electronically signed by    Ginny Johnson MD                                 Sincerely,        OPAL Galarza

## 2022-01-05 NOTE — PROGRESS NOTES
[facility] GERIATRIC SERVICES       Patient Angela Beckham  MRN: [id number]        Reason for Visit     Chief Complaint   Patient presents with     RECHECK   follow-up dm; htn ;back pain    Code Status     CPR/Full code     Assessment     Acute low back pain  Acute ischemic rt MCA CVA  Acute encephalopathy/delirium secondary to CVA  New onset atrial fibrillation with rapid ventricular response  C-spine ligamentous injury  Persistent leukocytosis  Chronic lower extremity edema  Diabetes type 2 with low BG; BG as low as 60-90 noted in TCU  Hypertensin with low BP  Generalized weakness    Plan     Pt is admitted to TCU for strengthening and rehab.  Seen today for multiple concerns has been complaining of low back pain and requesting imaging.  No specific trauma or injury has been reported.  I did discuss this with her physical therapist.  She has had some paraspinal pain on exam today which is consistent with the physical therapist finding.  They are trying e-stim which has been effective so far and managing her pain.  She did see her cardiologist and has been told she may need valve surgery she is not very happy with that will await their recommendations for requirement of preop physical.  Cardiology note reviewed.  They have recommended actually that she should have a TAMAR first before any decision is made regarding valve replacement surgery and patient was updated  Diabetes control reviewed.  She has had several low blood sugars including blood sugars of 68 and 86 most of her blood sugars are running under 150 she is not a candidate for tight control.  We will discontinue her Amaryl/glimepiride.  Continue with her Metformin.  Continue to monitor trends.  Not a candidate for tight control  Discontinue magic cups   Follow-up cardiology and neurology as scheduled    History     Patient is a very pleasant 71 year old female who is admitted to TCU  Patient was admitted after she was found on the floor.  She was brought to  the emergency room  Imaging did reveal that she had acute ischemic CVA.  She also had acute encephalopathy with fluctuating level of consciousness.  Neurology was consulted.  She also was found a new onset atrial fibrillation with rapid ventricular response.  She has been started on metoprolol.  Hr are stable  Saw cardiology and now scheduled for TAMAR  Noted to have a C-spine ligamentous injury on CT imaging.  Neurosurgery consulted and she has been given an [place] collar.  She had significant edema of her bilateral lower extremity with worsening diuretics were resumed at discharge  Also noted to have a pressure injury and discharged to the TCU for strengthening and rehab  Upset that she is not walking as yet; blames it on her back injury    Past Medical & Surgical History     PAST MEDICAL HISTORY:   Past Medical History:   Diagnosis Date     Diabetic neuropathy (H)      HTN      Type 2 diabetes mellitus (H)       PAST SURGICAL HISTORY:  natividad henderson      Past Social History     Reviewed,  has an unknown smoking status. She has never used smokeless tobacco. She reports that she does not drink alcohol and does not use drugs.    Family History     Reviewed, and family history includes Alzheimer Disease in her father; Cerebrovascular Disease in her maternal grandmother and mother; Diabetes in her paternal grandfather; Hypertension in her brother and mother; Obesity in her brother; Respiratory in her father.    Medication List   Post Discharge Medication Reconciliation Status: Post Discharge Medication Reconciliation Status: discharge medications reconciled, continue medications without change.  Current Outpatient Medications   Medication     acetaminophen (TYLENOL) 325 MG tablet     apixaban ANTICOAGULANT (ELIQUIS) 5 MG tablet     atorvastatin (LIPITOR) 20 MG tablet     cyanocobalamin 1000 MCG SUBL     dimethicone 1 % external cream     furosemide (LASIX) 20 MG tablet     glimepiride (AMARYL) 1 MG tablet     melatonin 3 MG  "tablet     METFORMIN  MG OR TABS     metoprolol tartrate (LOPRESSOR) 100 MG tablet     miconazole (MICATIN) 2 % cream     sodium chloride (OCEAN) 0.65 % nasal spray     vitamin B-Complex     vitamin D3 (CHOLECALCIFEROL) 250 mcg (54839 units) capsule     No current facility-administered medications for this visit.       Allergies     Allergies   Allergen Reactions     Amoxicillin Unknown     Oxycodone Other (See Comments)     Does not remember     Latex Rash       Review of Systems   A comprehensive review of 14 systems was done. Pertinent findings noted here and in history of present illness. All the rest negative.  Constitutional: Negative.  Negative for fever, chills, she has  activity change, appetite change and fatigue.   HENT: Negative for congestion and facial swelling.    Eyes: Negative for photophobia, redness and visual disturbance.   Respiratory: Negative for cough and chest tightness.    Cardiovascular: Negative for chest pain, palpitations and leg swelling.   Gastrointestinal: Negative for nausea, diarrhea, constipation, blood in stool and abdominal distention.   Genitourinary: Negative.  Reporting incontinence  Musculoskeletal: Currently very weak and bedbound she cannot walk currently  SHE IS having back pain; staff using diathermy  Skin: Negative.    Neurological: Negative for dizziness, tremors, syncope, weakness, light-headedness and headaches.   Denies any weakness from cva even though she is bed bound  Hematological: Does not bruise/bleed easily.   Psychiatric/Behavioral: Negative.  Affect is flat        Physical Exam   /82   Pulse 79   Temp 97.9  F (36.6  C)   Resp 20   Ht 1.575 m (5' 2\")   Wt 82.6 kg (182 lb)   SpO2 95%   BMI 33.29 kg/m       Constitutional: Oriented to person, place, and time and appears well-developed.   HEENT:  Normocephalic and atraumatic.  Eyes: Conjunctivae and EOM are normal. Pupils are equal, round, and reactive to light. No discharge.  No scleral " icterus. Nose normal. Mouth/Throat: Oropharynx is clear and moist. No oropharyngeal exudate.    NECK: Normal range of motion. Neck supple. No JVD present. No tracheal deviation present. No thyromegaly present.   CARDIOVASCULAR: Normal rate, regular rhythm and intact distal pulses.  Exam reveals no gallop and no friction rub.  Systolic murmur present.  PULMONARY: Effort normal and breath sounds normal. No respiratory distress.No Wheezing or rales.  ABDOMEN: Soft. Bowel sounds are normal. No distension and no mass.  There is no tenderness. There is no rebound and no guarding. No HSM.  Stage 2 pressure injury on left low back  MUSCULOSKELETAL: Normal range of motion. No edema and no tenderness. Mild kyphosis, no tenderness.  Has a left BKA  Tender on paraspinal area  LYMPH NODES: Has no cervical, supraclavicular, axillary and groin adenopathy.   NEUROLOGICAL: Alert and oriented to person, place, and time. No cranial nerve deficit.  Normal muscle tone. Coordination normal.   GENITOURINARY: Deferred exam.  SKIN: Skin is warm and dry. No rash noted. No erythema. No pallor.   Skin blister on her left BKA is healing with no concern for infection  EXTREMITIES: No cyanosis, no clubbing, no edema. No Deformity.  PSYCHIATRIC: abNormal mood, affect and behavior.  Does not have much recall of recent events.       Lab Results     Last Comprehensive Metabolic Panel:  Sodium   Date Value Ref Range Status   12/21/2021 139 136 - 145 mmol/L Final     Potassium   Date Value Ref Range Status   12/21/2021 4.6 3.5 - 5.0 mmol/L Final     Chloride   Date Value Ref Range Status   12/21/2021 101 98 - 107 mmol/L Final     Carbon Dioxide (CO2)   Date Value Ref Range Status   12/21/2021 29 22 - 31 mmol/L Final     Anion Gap   Date Value Ref Range Status   12/21/2021 9 5 - 18 mmol/L Final     Glucose   Date Value Ref Range Status   12/21/2021 105 70 - 125 mg/dL Final     Urea Nitrogen   Date Value Ref Range Status   12/21/2021 29 (H) 8 - 28 mg/dL  Final     Creatinine   Date Value Ref Range Status   12/21/2021 0.87 0.60 - 1.10 mg/dL Final   10/06/2009 0.81 0.52 - 1.04 mg/dL Final     Comment:     New IDMS-traceable calibration  beginning 5/1/08     GFR Estimate   Date Value Ref Range Status   12/21/2021 70 >60 mL/min/1.73m2 Final     Comment:     Effective December 21, 2021 eGFRcr in adults is calculated using the 2021 CKD-EPI creatinine equation which includes age and gender (Moe et al., NEJM, DOI: 10.1056/BWDCsm4945267)   10/06/2009 72 >60 mL/min/1.7m2 Final     Calcium   Date Value Ref Range Status   12/21/2021 9.7 8.5 - 10.5 mg/dL Final       Electronically signed by    Ginny Johnson MD

## 2022-01-14 ENCOUNTER — LAB REQUISITION (OUTPATIENT)
Dept: LAB | Facility: CLINIC | Age: 72
End: 2022-01-14

## 2022-01-14 ENCOUNTER — TRANSITIONAL CARE UNIT VISIT (OUTPATIENT)
Dept: GERIATRICS | Facility: CLINIC | Age: 72
End: 2022-01-14
Payer: COMMERCIAL

## 2022-01-14 VITALS
OXYGEN SATURATION: 98 % | RESPIRATION RATE: 20 BRPM | HEART RATE: 79 BPM | TEMPERATURE: 97.5 F | WEIGHT: 178 LBS | HEIGHT: 62 IN | BODY MASS INDEX: 32.76 KG/M2 | SYSTOLIC BLOOD PRESSURE: 130 MMHG | DIASTOLIC BLOOD PRESSURE: 98 MMHG

## 2022-01-14 DIAGNOSIS — E11.59 TYPE 2 DIABETES MELLITUS WITH OTHER CIRCULATORY COMPLICATION, WITHOUT LONG-TERM CURRENT USE OF INSULIN (H): ICD-10-CM

## 2022-01-14 DIAGNOSIS — I48.20 ATRIAL FIBRILLATION, CHRONIC (H): ICD-10-CM

## 2022-01-14 DIAGNOSIS — I10 PRIMARY HYPERTENSION: ICD-10-CM

## 2022-01-14 DIAGNOSIS — U07.1 COVID-19: ICD-10-CM

## 2022-01-14 DIAGNOSIS — I63.9 ISCHEMIC CEREBROVASCULAR ACCIDENT (CVA) (H): Primary | ICD-10-CM

## 2022-01-14 PROCEDURE — 99309 SBSQ NF CARE MODERATE MDM 30: CPT | Performed by: NURSE PRACTITIONER

## 2022-01-14 PROCEDURE — U0005 INFEC AGEN DETEC AMPLI PROBE: HCPCS | Performed by: FAMILY MEDICINE

## 2022-01-14 PROCEDURE — U0003 INFECTIOUS AGENT DETECTION BY NUCLEIC ACID (DNA OR RNA); SEVERE ACUTE RESPIRATORY SYNDROME CORONAVIRUS 2 (SARS-COV-2) (CORONAVIRUS DISEASE [COVID-19]), AMPLIFIED PROBE TECHNIQUE, MAKING USE OF HIGH THROUGHPUT TECHNOLOGIES AS DESCRIBED BY CMS-2020-01-R: HCPCS | Performed by: FAMILY MEDICINE

## 2022-01-14 ASSESSMENT — MIFFLIN-ST. JEOR: SCORE: 1265.65

## 2022-01-14 NOTE — LETTER
1/14/2022        RE: Angela Beckham  [address] [address]        Code Status:  FULL CODE  Visit Type: RECHECK     Facility:   [facility] ([facility]) [[id number]]        History of Present Illness:   Hospital Admission Date: 11/28/2020  Hospital Discharge Date: 12/5/2021      Angela Beckham is a 71 year old female with a past medical history for type 2 diabetes, osteomyelitis, left BKA, hypertension, peripheral edema and mitral stenosis.  She was recently hospitalized after being found down at home and found to have an acute ischemic CVA.  She was not given lytics and was started on Eliquis and atorvastatin.  Her residual was dysphagia and encephalopathy.  She was found to have new atrial fibrillation and was started on metoprolol along with her Eliquis.  C-spine showed a C-spine ligamentous injury and she was put in an [place] collar and recommended a follow-up MRI with neurosurgery in 2 to 3 weeks.     She was also found to have a blister on her left BKA stump and was treated by a wound care nurse.    Today, she is lying in bed and denies any issues.  She voices concerns that she missed her neurology appointment yesterday.  She was seen by cardiology last week and the plan is to continue to medically manage her mitral valve stenosis and atrial fibrillation.  She will be having a TAMAR procedure at a later date.      Bps with SBPs 110s-130s.  She has some weight loss since admission however seems to have leveled off at 178lbs.      BS were running low this week and so Dr. Johnson did discontinue her Amaryl.  She is now running 140s-low 200s.        Current Outpatient Medications   Medication Sig Dispense Refill     acetaminophen (TYLENOL) 325 MG tablet Take 650 mg by mouth every 4 hours as needed for mild pain       apixaban ANTICOAGULANT (ELIQUIS) 5 MG tablet Take 5 mg by mouth 2 times daily       atorvastatin (LIPITOR) 20 MG tablet Take 20 mg by mouth daily       cyanocobalamin 1000 MCG SUBL Place  1,000 mcg under the tongue daily       dimethicone 1 % external cream Apply topically 2 times daily       furosemide (LASIX) 20 MG tablet Take 10 mg by mouth daily       melatonin 3 MG tablet Take 1 mg by mouth nightly as needed for sleep       METFORMIN  MG OR TABS Take 500-1,000 mg by mouth 2 times daily (with meals) 1000mg with breakfast and 500mg with supper.       metoprolol tartrate (LOPRESSOR) 100 MG tablet Take 100 mg by mouth 2 times daily       miconazole (MICATIN) 2 % cream 2 times daily       sodium chloride (OCEAN) 0.65 % nasal spray Spray 1 spray into both nostrils 4 times daily as needed for congestion       vitamin B-Complex Take 1 tablet by mouth daily       vitamin D3 (CHOLECALCIFEROL) 250 mcg (22308 units) capsule Take 1 capsule by mouth daily       glimepiride (AMARYL) 1 MG tablet Take 1 mg by mouth every morning (before breakfast) (Patient not taking: Reported on 1/14/2022)       Allergies   Allergen Reactions     Amoxicillin Unknown     Oxycodone Other (See Comments)     Does not remember     Latex Rash     Immunization History   Administered Date(s) Administered     Influenza (High Dose) 3 valent vaccine 10/02/2013     Influenza (IIV3) PF 01/22/2009, 02/20/2013     Influenza Vaccine IM > 6 months Valent IIV4 (Alfuria,Fluzone) 02/20/2013     Influenza Vaccine, 6+MO IM (QUADRIVALENT W/PRESERVATIVES) 01/22/2009, 02/20/2013     Pneumo Conj 13-V (2010&after) 01/04/2022     Pneumococcal 23 valent 02/20/2013       Medications list and allergies in the facility chart have been reviewed.  Please see facility EMR for most up to date list.       Review of Systems   Patient denies fever, chills, headache,rhinorrhea, cough, congestion, shortness of breath, chest pain, palpitations, abdominal pain, n/v, diarrhea, constipation, change in appetite, change in sleep pattern, dysuria, frequency, burning or pain with urination.  Other than stated in HPI all other review of systems is negative.  "        Physical Exam no changes since my last physical exam  Vital signs:BP (!) 130/98   Pulse 79   Temp 97.5  F (36.4  C)   Resp 20   Ht 1.575 m (5' 2\")   Wt 80.7 kg (178 lb)   SpO2 98%   BMI 32.56 kg/m     GENERAL APPEARANCE: Well developed, in no acute distress.  HEENT: normocephalic, atraumatic   sclerae anicteric, conjunctivae clear and moist, EOM intact  LUNGS: Lung sounds CTA, no adventitious sounds, respiratory effort normal.  CARD: Irregularly irregular,S1, S2, without murmurs, gallops, rubs  ABD: Soft, nondistended and nontender with normal bowel sounds.   EXTREMITIES: No cyanosis, clubbing or edema.  NEURO: Alert and oriented x 3. With mild cognitive impairment. Face is symmetric.  SKIN: Inspection of the skin reveals no rashes, ulcerations or petechiae.  PSYCH: euthymic    Labs:   Last Comprehensive Metabolic Panel:  Sodium   Date Value Ref Range Status   12/21/2021 139 136 - 145 mmol/L Final     Potassium   Date Value Ref Range Status   12/21/2021 4.6 3.5 - 5.0 mmol/L Final     Chloride   Date Value Ref Range Status   12/21/2021 101 98 - 107 mmol/L Final     Carbon Dioxide (CO2)   Date Value Ref Range Status   12/21/2021 29 22 - 31 mmol/L Final     Anion Gap   Date Value Ref Range Status   12/21/2021 9 5 - 18 mmol/L Final     Glucose   Date Value Ref Range Status   12/21/2021 105 70 - 125 mg/dL Final     Urea Nitrogen   Date Value Ref Range Status   12/21/2021 29 (H) 8 - 28 mg/dL Final     Creatinine   Date Value Ref Range Status   12/21/2021 0.87 0.60 - 1.10 mg/dL Final   10/06/2009 0.81 0.52 - 1.04 mg/dL Final     Comment:     New IDMS-traceable calibration  beginning 5/1/08     GFR Estimate   Date Value Ref Range Status   12/21/2021 70 >60 mL/min/1.73m2 Final     Comment:     Effective December 21, 2021 eGFRcr in adults is calculated using the 2021 CKD-EPI creatinine equation which includes age and gender (Moe zhang al., NEJ, DOI: 10.1056/VBZDiy9761079)   10/06/2009 72 >60 mL/min/1.7m2 Final "     Calcium   Date Value Ref Range Status   12/21/2021 9.7 8.5 - 10.5 mg/dL Final       Lab Results   Component Value Date    WBC 10.6 12/10/2021    WBC 6.9 10/06/2009     Lab Results   Component Value Date    RBC 4.84 12/10/2021    RBC 4.29 10/06/2009     Lab Results   Component Value Date    HGB 13.1 12/10/2021    HGB 11.8 10/06/2009     Lab Results   Component Value Date    HCT 41.3 12/10/2021    HCT 34.5 10/06/2009     Lab Results   Component Value Date    MCV 85 12/10/2021    MCV 81 10/06/2009     Lab Results   Component Value Date    MCH 27.1 12/10/2021    MCH 27.4 10/06/2009     Lab Results   Component Value Date    MCHC 31.7 12/10/2021    MCHC 34.0 10/06/2009     Lab Results   Component Value Date    RDW 14.1 12/10/2021    RDW 12.9 10/06/2009     Lab Results   Component Value Date     12/10/2021     10/06/2009         Assessment/plan:   Ischemic cerebrovascular accident (CVA) (H)  Stable, continue with therapies.  Bps managed with current regimen.  Missed neurology appointment this is to be rescheduled.  Continue with apixaban and atorvastatin.     Type 2 diabetes mellitus with other circulatory complication, without long-term current use of insulin (H)  Improving BS after dcing Amaryl.  Continue to closely monitor on Metformin 1000/500.  Would recommend A1c in 1 months. A1c on 11/29 was 6.5     Atrial fibrillation, chronic (H)  Rate controlled with metoprolol.  Continue with apixaban.  Awaiting cardiology to set up TAMAR    Primary hypertension  Controlled, continue with metoprolol.  Continue with lasix for edema.      Ischemic cerebrovascular accident (CVA) (H)  Stable, continue on apixaban and atorvastatin.  Follow-up with neurology on 1229.    Edema of both legs  None noted, continue with Lasix 10 mg daily likely could discontinue if continues to appear dry.    Atrial fibrillation, chronic (H)  Rate controlled, continue with metoprolol and apixaban.  Follow-up with cardiology on 1/4.    Nasal  congestion  Improvement with ocean nasal spray at bedside    C-spine ligamentous injury: No longer wearing [place] collar  Follow-up with an MRI and NSG done       Electronically signed by: Berenice Nolen NP          Sincerely,        Berenice Nolen, NP

## 2022-01-14 NOTE — PROGRESS NOTES
Code Status:  FULL CODE  Visit Type: RECHECK     Facility:   [facility] ([facility]) [[id number]]        History of Present Illness:   Hospital Admission Date: 11/28/2020  Hospital Discharge Date: 12/5/2021      Angela Beckham is a 71 year old female with a past medical history for type 2 diabetes, osteomyelitis, left BKA, hypertension, peripheral edema and mitral stenosis.  She was recently hospitalized after being found down at home and found to have an acute ischemic CVA.  She was not given lytics and was started on Eliquis and atorvastatin.  Her residual was dysphagia and encephalopathy.  She was found to have new atrial fibrillation and was started on metoprolol along with her Eliquis.  C-spine showed a C-spine ligamentous injury and she was put in an [place] collar and recommended a follow-up MRI with neurosurgery in 2 to 3 weeks.     She was also found to have a blister on her left BKA stump and was treated by a wound care nurse.    Today, she is lying in bed and denies any issues.  She voices concerns that she missed her neurology appointment yesterday.  She was seen by cardiology last week and the plan is to continue to medically manage her mitral valve stenosis and atrial fibrillation.  She will be having a TAMAR procedure at a later date.      Bps with SBPs 110s-130s.  She has some weight loss since admission however seems to have leveled off at 178lbs.      BS were running low this week and so Dr. Johnson did discontinue her Amaryl.  She is now running 140s-low 200s.        Current Outpatient Medications   Medication Sig Dispense Refill     acetaminophen (TYLENOL) 325 MG tablet Take 650 mg by mouth every 4 hours as needed for mild pain       apixaban ANTICOAGULANT (ELIQUIS) 5 MG tablet Take 5 mg by mouth 2 times daily       atorvastatin (LIPITOR) 20 MG tablet Take 20 mg by mouth daily       cyanocobalamin 1000 MCG SUBL Place 1,000 mcg under the tongue daily       dimethicone 1 % external cream Apply  topically 2 times daily       furosemide (LASIX) 20 MG tablet Take 10 mg by mouth daily       melatonin 3 MG tablet Take 1 mg by mouth nightly as needed for sleep       METFORMIN  MG OR TABS Take 500-1,000 mg by mouth 2 times daily (with meals) 1000mg with breakfast and 500mg with supper.       metoprolol tartrate (LOPRESSOR) 100 MG tablet Take 100 mg by mouth 2 times daily       miconazole (MICATIN) 2 % cream 2 times daily       sodium chloride (OCEAN) 0.65 % nasal spray Spray 1 spray into both nostrils 4 times daily as needed for congestion       vitamin B-Complex Take 1 tablet by mouth daily       vitamin D3 (CHOLECALCIFEROL) 250 mcg (87715 units) capsule Take 1 capsule by mouth daily       glimepiride (AMARYL) 1 MG tablet Take 1 mg by mouth every morning (before breakfast) (Patient not taking: Reported on 1/14/2022)       Allergies   Allergen Reactions     Amoxicillin Unknown     Oxycodone Other (See Comments)     Does not remember     Latex Rash     Immunization History   Administered Date(s) Administered     Influenza (High Dose) 3 valent vaccine 10/02/2013     Influenza (IIV3) PF 01/22/2009, 02/20/2013     Influenza Vaccine IM > 6 months Valent IIV4 (Alfuria,Fluzone) 02/20/2013     Influenza Vaccine, 6+MO IM (QUADRIVALENT W/PRESERVATIVES) 01/22/2009, 02/20/2013     Pneumo Conj 13-V (2010&after) 01/04/2022     Pneumococcal 23 valent 02/20/2013       Medications list and allergies in the facility chart have been reviewed.  Please see facility EMR for most up to date list.       Review of Systems   Patient denies fever, chills, headache,rhinorrhea, cough, congestion, shortness of breath, chest pain, palpitations, abdominal pain, n/v, diarrhea, constipation, change in appetite, change in sleep pattern, dysuria, frequency, burning or pain with urination.  Other than stated in HPI all other review of systems is negative.         Physical Exam no changes since my last physical exam  Vital signs:BP (!) 130/98   " Pulse 79   Temp 97.5  F (36.4  C)   Resp 20   Ht 1.575 m (5' 2\")   Wt 80.7 kg (178 lb)   SpO2 98%   BMI 32.56 kg/m     GENERAL APPEARANCE: Well developed, in no acute distress.  HEENT: normocephalic, atraumatic   sclerae anicteric, conjunctivae clear and moist, EOM intact  LUNGS: Lung sounds CTA, no adventitious sounds, respiratory effort normal.  CARD: Irregularly irregular,S1, S2, without murmurs, gallops, rubs  ABD: Soft, nondistended and nontender with normal bowel sounds.   EXTREMITIES: No cyanosis, clubbing or edema.  NEURO: Alert and oriented x 3. With mild cognitive impairment. Face is symmetric.  SKIN: Inspection of the skin reveals no rashes, ulcerations or petechiae.  PSYCH: euthymic    Labs:   Last Comprehensive Metabolic Panel:  Sodium   Date Value Ref Range Status   12/21/2021 139 136 - 145 mmol/L Final     Potassium   Date Value Ref Range Status   12/21/2021 4.6 3.5 - 5.0 mmol/L Final     Chloride   Date Value Ref Range Status   12/21/2021 101 98 - 107 mmol/L Final     Carbon Dioxide (CO2)   Date Value Ref Range Status   12/21/2021 29 22 - 31 mmol/L Final     Anion Gap   Date Value Ref Range Status   12/21/2021 9 5 - 18 mmol/L Final     Glucose   Date Value Ref Range Status   12/21/2021 105 70 - 125 mg/dL Final     Urea Nitrogen   Date Value Ref Range Status   12/21/2021 29 (H) 8 - 28 mg/dL Final     Creatinine   Date Value Ref Range Status   12/21/2021 0.87 0.60 - 1.10 mg/dL Final   10/06/2009 0.81 0.52 - 1.04 mg/dL Final     Comment:     New IDMS-traceable calibration  beginning 5/1/08     GFR Estimate   Date Value Ref Range Status   12/21/2021 70 >60 mL/min/1.73m2 Final     Comment:     Effective December 21, 2021 eGFRcr in adults is calculated using the 2021 CKD-EPI creatinine equation which includes age and gender (Moe zhang al., NEJ, DOI: 10.1056/ZHWEkp9940308)   10/06/2009 72 >60 mL/min/1.7m2 Final     Calcium   Date Value Ref Range Status   12/21/2021 9.7 8.5 - 10.5 mg/dL Final "       Lab Results   Component Value Date    WBC 10.6 12/10/2021    WBC 6.9 10/06/2009     Lab Results   Component Value Date    RBC 4.84 12/10/2021    RBC 4.29 10/06/2009     Lab Results   Component Value Date    HGB 13.1 12/10/2021    HGB 11.8 10/06/2009     Lab Results   Component Value Date    HCT 41.3 12/10/2021    HCT 34.5 10/06/2009     Lab Results   Component Value Date    MCV 85 12/10/2021    MCV 81 10/06/2009     Lab Results   Component Value Date    MCH 27.1 12/10/2021    MCH 27.4 10/06/2009     Lab Results   Component Value Date    MCHC 31.7 12/10/2021    MCHC 34.0 10/06/2009     Lab Results   Component Value Date    RDW 14.1 12/10/2021    RDW 12.9 10/06/2009     Lab Results   Component Value Date     12/10/2021     10/06/2009         Assessment/plan:   Ischemic cerebrovascular accident (CVA) (H)  Stable, continue with therapies.  Bps managed with current regimen.  Missed neurology appointment this is to be rescheduled.  Continue with apixaban and atorvastatin.     Type 2 diabetes mellitus with other circulatory complication, without long-term current use of insulin (H)  Improving BS after dcing Amaryl.  Continue to closely monitor on Metformin 1000/500.  Would recommend A1c in 1 months. A1c on 11/29 was 6.5     Atrial fibrillation, chronic (H)  Rate controlled with metoprolol.  Continue with apixaban.  Awaiting cardiology to set up TAMAR    Primary hypertension  Controlled, continue with metoprolol.  Continue with lasix for edema.      Ischemic cerebrovascular accident (CVA) (H)  Stable, continue on apixaban and atorvastatin.  Follow-up with neurology on 1229.    Edema of both legs  None noted, continue with Lasix 10 mg daily likely could discontinue if continues to appear dry.    Atrial fibrillation, chronic (H)  Rate controlled, continue with metoprolol and apixaban.  Follow-up with cardiology on 1/4.    Nasal congestion  Improvement with ocean nasal spray at bedside    C-spine ligamentous  injury: No longer wearing Aspen collar  Follow-up with an MRI and NSG done       Electronically signed by: Berenice Nolen NP

## 2022-01-15 ENCOUNTER — LAB REQUISITION (OUTPATIENT)
Dept: LAB | Facility: CLINIC | Age: 72
End: 2022-01-15

## 2022-01-15 DIAGNOSIS — U07.1 COVID-19: ICD-10-CM

## 2022-01-15 LAB
SARS-COV-2 RNA RESP QL NAA+PROBE: NEGATIVE
SARS-COV-2 RNA RESP QL NAA+PROBE: NEGATIVE

## 2022-01-16 PROCEDURE — U0005 INFEC AGEN DETEC AMPLI PROBE: HCPCS | Performed by: FAMILY MEDICINE

## 2022-01-17 ENCOUNTER — LAB REQUISITION (OUTPATIENT)
Dept: LAB | Facility: CLINIC | Age: 72
End: 2022-01-17

## 2022-01-17 DIAGNOSIS — U07.1 COVID-19: ICD-10-CM

## 2022-01-17 LAB — SARS-COV-2 RNA RESP QL NAA+PROBE: NEGATIVE

## 2022-01-18 ENCOUNTER — LAB REQUISITION (OUTPATIENT)
Dept: LAB | Facility: CLINIC | Age: 72
End: 2022-01-18

## 2022-01-18 DIAGNOSIS — U07.1 COVID-19: ICD-10-CM

## 2022-01-18 PROCEDURE — U0003 INFECTIOUS AGENT DETECTION BY NUCLEIC ACID (DNA OR RNA); SEVERE ACUTE RESPIRATORY SYNDROME CORONAVIRUS 2 (SARS-COV-2) (CORONAVIRUS DISEASE [COVID-19]), AMPLIFIED PROBE TECHNIQUE, MAKING USE OF HIGH THROUGHPUT TECHNOLOGIES AS DESCRIBED BY CMS-2020-01-R: HCPCS | Performed by: FAMILY MEDICINE

## 2022-01-19 LAB — SARS-COV-2 RNA RESP QL NAA+PROBE: NEGATIVE

## 2022-01-20 VITALS
OXYGEN SATURATION: 96 % | SYSTOLIC BLOOD PRESSURE: 127 MMHG | RESPIRATION RATE: 16 BRPM | HEIGHT: 62 IN | HEART RATE: 78 BPM | TEMPERATURE: 97.3 F | WEIGHT: 178.9 LBS | DIASTOLIC BLOOD PRESSURE: 85 MMHG | BODY MASS INDEX: 32.92 KG/M2

## 2022-01-20 ASSESSMENT — MIFFLIN-ST. JEOR: SCORE: 1269.74

## 2022-01-20 NOTE — PROGRESS NOTES
"[facility] GERIATRIC SERVICES    Chief Complaint   Patient presents with     RECHECK     HPI:  Angela Beckham is a 71 year old  (1950), who is being seen today for an episodic care visit at: [facility] ([facility]) [[id number]]. Today's concern is: TCU follow-up, therapy progress back pain.  She is a history of a stroke in November 2021, hospitalized and discharged to [facility] on 12/5.  She was put on atorvastatin and apixaban.  She does have a history of right BKA.   Her main concern today is back pain that she states been experiencing since she entered the TCU about 6 weeks ago; she points to her right flank and says the pain is intermittent she is worried about exacerbating it with therapy and is requesting x-ray.  She had C-spine x-ray in the hospital and on 1/4 in the TCU, as well as chest x-ray and head CT.  No fracture seen at that time.  She is pointing more to the flank area and there is no redness or bruising on exam.  I explained that this is most likely musculoskeletal and she is agreeable to trying the lidocaine patch.  No history of falls or injury since entering the TCU.    Allergies, and PMH/PSH reviewed in EPIC today.  REVIEW OF SYSTEMS:  4 point ROS including Respiratory, CV, GI and , other than that noted in the HPI,  is negative    Objective:   /85   Pulse 78   Temp 97.3  F (36.3  C)   Resp 16   Ht 1.575 m (5' 2\")   Wt 81.1 kg (178 lb 14.4 oz)   SpO2 96%   BMI 32.72 kg/m    Physical Exam   General appearance: alert, appears stated age and cooperative.   Head: Normocephalic, without obvious abnormality, atraumatic, Eyes: sclera anicteric.  Lungs: respirations unlabored.  Cardiovascular: S1, S2. Regular rate and rhythm.   ABDOMEN: Globular and soft, non tender.    Extremities: R bka. Left with trace edema.   Skin: Very dry scaly skin to LLE/   Neurologic:oriented. No focal deficits.   Psych: interacts well with caregivers, exhibits logical thought processes and " connections, pleasant      Most Recent 3 CBC's:Recent Labs   Lab Test 12/10/21  1257   WBC 10.6   HGB 13.1   MCV 85        Most Recent 3 BMP's:Recent Labs   Lab Test 12/21/21  0710 12/10/21  1257    137   POTASSIUM 4.6 4.7   CHLORIDE 101 100   CO2 29 24   BUN 29* 21   CR 0.87 0.80   ANIONGAP 9 13   SUSIE 9.7 9.8    95       Assessment/Plan:  (Z89.512) Status post below-knee amputation of left lower extremity (H)  (primary encounter diagnosis)  Plan:   PT, OT.     (E11.9) TYPE 2 DIABETES, HBA1C GOAL < 7%  Comment: Recent discontinuation of Amaryl.  Blood sugars generally less than 200.  Plan:   -Monitor clinically.    (R10.9,  G89.29) Chronic right flank pain  Comment: She does have good range of motion and no evidence of fall or injury, no redness, bruising or swelling.  Plan:   -Defer lumbar x-ray at this time however with continued pain consider obtaining.  -Lidocaine patch  R flank area on a.m., off at bedtime.    Severe dry skin: To the left lower extremity.  -AmLactin lotion twice daily.      Electronically signed by: Nidia Hernandez, CNP

## 2022-01-21 ENCOUNTER — TRANSITIONAL CARE UNIT VISIT (OUTPATIENT)
Dept: GERIATRICS | Facility: CLINIC | Age: 72
End: 2022-01-21
Payer: COMMERCIAL

## 2022-01-21 ENCOUNTER — LAB REQUISITION (OUTPATIENT)
Dept: LAB | Facility: CLINIC | Age: 72
End: 2022-01-21

## 2022-01-21 DIAGNOSIS — I27.21 PULMONARY ARTERY HYPERTENSION (H): ICD-10-CM

## 2022-01-21 DIAGNOSIS — Z89.512 STATUS POST BELOW-KNEE AMPUTATION OF LEFT LOWER EXTREMITY (H): Primary | ICD-10-CM

## 2022-01-21 DIAGNOSIS — E11.9 TYPE 2 DIABETES, HBA1C GOAL < 7% (H): ICD-10-CM

## 2022-01-21 DIAGNOSIS — U07.1 COVID-19: ICD-10-CM

## 2022-01-21 DIAGNOSIS — G89.29 CHRONIC RIGHT FLANK PAIN: ICD-10-CM

## 2022-01-21 DIAGNOSIS — R10.9 CHRONIC RIGHT FLANK PAIN: ICD-10-CM

## 2022-01-21 DIAGNOSIS — E66.01 MORBID OBESITY (H): ICD-10-CM

## 2022-01-21 PROCEDURE — U0003 INFECTIOUS AGENT DETECTION BY NUCLEIC ACID (DNA OR RNA); SEVERE ACUTE RESPIRATORY SYNDROME CORONAVIRUS 2 (SARS-COV-2) (CORONAVIRUS DISEASE [COVID-19]), AMPLIFIED PROBE TECHNIQUE, MAKING USE OF HIGH THROUGHPUT TECHNOLOGIES AS DESCRIBED BY CMS-2020-01-R: HCPCS | Performed by: FAMILY MEDICINE

## 2022-01-21 PROCEDURE — 99309 SBSQ NF CARE MODERATE MDM 30: CPT | Performed by: NURSE PRACTITIONER

## 2022-01-21 NOTE — LETTER
"    1/21/2022        RE: Angela Beckham  [address] [address]        [facility]    Chief Complaint   Patient presents with     RECHECK     HPI:  Angela Beckham is a 71 year old  (1950), who is being seen today for an episodic care visit at: [facility] ([facility]) [[id number]]. Today's concern is: TCU follow-up, therapy progress back pain.  She is a history of a stroke in November 2021, hospitalized and discharged to [facility] on 12/5.  She was put on atorvastatin and apixaban.  She does have a history of right BKA.   Her main concern today is back pain that she states been experiencing since she entered the [facility] about 6 weeks ago; she points to her right flank and says the pain is intermittent she is worried about exacerbating it with therapy and is requesting x-ray.  She had C-spine x-ray in the hospital and on 1/4 in the TCU, as well as chest x-ray and head CT.  No fracture seen at that time.  She is pointing more to the flank area and there is no redness or bruising on exam.  I explained that this is most likely musculoskeletal and she is agreeable to trying the lidocaine patch.  No history of falls or injury since entering the TCU.    Allergies, and PMH/PSH reviewed in EPIC today.  REVIEW OF SYSTEMS:  4 point ROS including Respiratory, CV, GI and , other than that noted in the HPI,  is negative    Objective:   /85   Pulse 78   Temp 97.3  F (36.3  C)   Resp 16   Ht 1.575 m (5' 2\")   Wt 81.1 kg (178 lb 14.4 oz)   SpO2 96%   BMI 32.72 kg/m    Physical Exam   General appearance: alert, appears stated age and cooperative.   Head: Normocephalic, without obvious abnormality, atraumatic, Eyes: sclera anicteric.  Lungs: respirations unlabored.  Cardiovascular: S1, S2. Regular rate and rhythm.   ABDOMEN: Globular and soft, non tender.    Extremities: R bka. Left with trace edema.   Skin: Very dry scaly skin to LLE/   Neurologic:oriented. No focal deficits.   Psych: " interacts well with caregivers, exhibits logical thought processes and connections, pleasant      Most Recent 3 CBC's:Recent Labs   Lab Test 12/10/21  1257   WBC 10.6   HGB 13.1   MCV 85        Most Recent 3 BMP's:Recent Labs   Lab Test 12/21/21  0710 12/10/21  1257    137   POTASSIUM 4.6 4.7   CHLORIDE 101 100   CO2 29 24   BUN 29* 21   CR 0.87 0.80   ANIONGAP 9 13   SUSIE 9.7 9.8    95       Assessment/Plan:  (Z89.512) Status post below-knee amputation of left lower extremity (H)  (primary encounter diagnosis)  Plan:   PT, OT.     (E11.9) TYPE 2 DIABETES, HBA1C GOAL < 7%  Comment: Recent discontinuation of Amaryl.  Blood sugars generally less than 200.  Plan:   -Monitor clinically.    (R10.9,  G89.29) Chronic right flank pain  Comment: She does have good range of motion and no evidence of fall or injury, no redness, bruising or swelling.  Plan:   -Defer lumbar x-ray at this time however with continued pain consider obtaining.  -Lidocaine patch  R flank area on a.m., off at bedtime.    Severe dry skin: To the left lower extremity.  -AmLactin lotion twice daily.      Electronically signed by: Nidia Hernandez CNP             Sincerely,        Nidia Hernandez, CNP

## 2022-01-22 LAB — SARS-COV-2 RNA RESP QL NAA+PROBE: NEGATIVE

## 2022-01-23 ENCOUNTER — LAB REQUISITION (OUTPATIENT)
Dept: LAB | Facility: CLINIC | Age: 72
End: 2022-01-23

## 2022-01-23 DIAGNOSIS — U07.1 COVID-19: ICD-10-CM

## 2022-01-23 PROCEDURE — U0003 INFECTIOUS AGENT DETECTION BY NUCLEIC ACID (DNA OR RNA); SEVERE ACUTE RESPIRATORY SYNDROME CORONAVIRUS 2 (SARS-COV-2) (CORONAVIRUS DISEASE [COVID-19]), AMPLIFIED PROBE TECHNIQUE, MAKING USE OF HIGH THROUGHPUT TECHNOLOGIES AS DESCRIBED BY CMS-2020-01-R: HCPCS | Performed by: FAMILY MEDICINE

## 2022-01-24 PROBLEM — R10.9 CHRONIC RIGHT FLANK PAIN: Status: ACTIVE | Noted: 2022-01-24

## 2022-01-24 PROBLEM — G89.29 CHRONIC RIGHT FLANK PAIN: Status: ACTIVE | Noted: 2022-01-24

## 2022-01-24 PROBLEM — Z89.512 STATUS POST BELOW-KNEE AMPUTATION OF LEFT LOWER EXTREMITY (H): Status: ACTIVE | Noted: 2022-01-24

## 2022-01-24 PROBLEM — I27.21 PULMONARY ARTERY HYPERTENSION (H): Status: ACTIVE | Noted: 2022-01-24

## 2022-01-24 LAB — SARS-COV-2 RNA RESP QL NAA+PROBE: NEGATIVE

## 2022-01-27 ENCOUNTER — LAB REQUISITION (OUTPATIENT)
Dept: LAB | Facility: CLINIC | Age: 72
End: 2022-01-27
Payer: COMMERCIAL

## 2022-01-27 DIAGNOSIS — U07.1 COVID-19: ICD-10-CM

## 2022-01-27 PROCEDURE — U0005 INFEC AGEN DETEC AMPLI PROBE: HCPCS | Performed by: FAMILY MEDICINE

## 2022-01-28 ENCOUNTER — TRANSITIONAL CARE UNIT VISIT (OUTPATIENT)
Dept: GERIATRICS | Facility: CLINIC | Age: 72
End: 2022-01-28
Payer: COMMERCIAL

## 2022-01-28 VITALS
OXYGEN SATURATION: 94 % | SYSTOLIC BLOOD PRESSURE: 137 MMHG | WEIGHT: 179 LBS | RESPIRATION RATE: 16 BRPM | DIASTOLIC BLOOD PRESSURE: 74 MMHG | HEART RATE: 102 BPM | BODY MASS INDEX: 32.94 KG/M2 | HEIGHT: 62 IN | TEMPERATURE: 97.5 F

## 2022-01-28 DIAGNOSIS — I10 PRIMARY HYPERTENSION: ICD-10-CM

## 2022-01-28 DIAGNOSIS — E11.59 TYPE 2 DIABETES MELLITUS WITH OTHER CIRCULATORY COMPLICATION, WITHOUT LONG-TERM CURRENT USE OF INSULIN (H): ICD-10-CM

## 2022-01-28 DIAGNOSIS — I48.20 ATRIAL FIBRILLATION, CHRONIC (H): ICD-10-CM

## 2022-01-28 DIAGNOSIS — M62.830 BACK MUSCLE SPASM: Primary | ICD-10-CM

## 2022-01-28 DIAGNOSIS — Z89.512 STATUS POST BELOW-KNEE AMPUTATION OF LEFT LOWER EXTREMITY (H): ICD-10-CM

## 2022-01-28 DIAGNOSIS — Z86.73 HISTORY OF CVA (CEREBROVASCULAR ACCIDENT): ICD-10-CM

## 2022-01-28 LAB — SARS-COV-2 RNA RESP QL NAA+PROBE: NEGATIVE

## 2022-01-28 PROCEDURE — 99310 SBSQ NF CARE HIGH MDM 45: CPT | Performed by: NURSE PRACTITIONER

## 2022-01-28 RX ORDER — AMMONIUM LACTATE 12 G/100G
0.25 CREAM TOPICAL 2 TIMES DAILY
COMMUNITY
End: 2023-10-26

## 2022-01-28 RX ORDER — LIDOCAINE 4 G/G
1 PATCH TOPICAL EVERY 24 HOURS
COMMUNITY
End: 2022-07-20

## 2022-01-28 RX ORDER — TIZANIDINE HYDROCHLORIDE 2 MG/1
2 CAPSULE, GELATIN COATED ORAL EVERY 6 HOURS PRN
COMMUNITY
End: 2022-09-14

## 2022-01-28 ASSESSMENT — MIFFLIN-ST. JEOR: SCORE: 1270.19

## 2022-01-28 NOTE — PROGRESS NOTES
Code Status:  FULL CODE  Visit Type: RECHECK     Facility:   [facility] ([facility]) [[id number]]        History of Present Illness:   Hospital Admission Date: 11/28/2020  Hospital Discharge Date: 12/5/2021      Angela Beckham is a 71 year old female with a past medical history for type 2 diabetes, osteomyelitis, left BKA, hypertension, peripheral edema and mitral stenosis.  She was recently hospitalized after being found down at home and found to have an acute ischemic CVA.  She was not given lytics and was started on Eliquis and atorvastatin.  Her residual was dysphagia and encephalopathy.  She was found to have new atrial fibrillation and was started on metoprolol along with her Eliquis.  C-spine showed a C-spine ligamentous injury and she was put in an [place] collar and recommended a follow-up MRI with neurosurgery in 2 to 3 weeks.     She was also found to have a blister on her left BKA stump and was treated by a wound care nurse.    Today, she endorse thoracic paraspinal discomfort.  Last week she was started on lidoderm which she states helps.  She does have muscle spasm right greater than left.  She reports her pain worsen when she is in the rikki.      BS all 200 or less.      Had follow up with cardiology last week with TAMAR.  Awaiting findings.       Current Outpatient Medications   Medication Sig Dispense Refill     acetaminophen (TYLENOL) 325 MG tablet Take 650 mg by mouth every 4 hours as needed for mild pain       ammonium lactate (AMLACTIN) 12 % external cream Apply topically 2 times daily       apixaban ANTICOAGULANT (ELIQUIS) 5 MG tablet Take 5 mg by mouth 2 times daily       atorvastatin (LIPITOR) 20 MG tablet Take 20 mg by mouth daily       cyanocobalamin 1000 MCG SUBL Place 1,000 mcg under the tongue daily       dimethicone 1 % external cream Apply topically 2 times daily       furosemide (LASIX) 20 MG tablet Take 10 mg by mouth daily       Lidocaine (LIDOCARE) 4 % Patch Place 1 patch onto  "the skin every 24 hours To prevent lidocaine toxicity, patient should be patch free for 12 hrs daily.       melatonin 3 MG tablet Take 1 mg by mouth nightly as needed for sleep       METFORMIN  MG OR TABS Take 500-1,000 mg by mouth 2 times daily (with meals) 1000mg with breakfast and 500mg with supper.       metoprolol tartrate (LOPRESSOR) 100 MG tablet Take 100 mg by mouth 2 times daily       miconazole (MICATIN) 2 % cream 2 times daily       sodium chloride (OCEAN) 0.65 % nasal spray Spray 1 spray into both nostrils 4 times daily as needed for congestion       vitamin B-Complex Take 1 tablet by mouth daily       vitamin D3 (CHOLECALCIFEROL) 250 mcg (13905 units) capsule Take 1 capsule by mouth daily       Allergies   Allergen Reactions     Amoxicillin Unknown     Oxycodone Other (See Comments)     Does not remember     Latex Rash     Immunization History   Administered Date(s) Administered     Influenza (High Dose) 3 valent vaccine 10/02/2013     Influenza (IIV3) PF 01/22/2009, 02/20/2013     Influenza Vaccine IM > 6 months Valent IIV4 (Alfuria,Fluzone) 02/20/2013     Influenza Vaccine, 6+MO IM (QUADRIVALENT W/PRESERVATIVES) 01/22/2009, 02/20/2013     Pneumo Conj 13-V (2010&after) 01/04/2022     Pneumococcal 23 valent 02/20/2013       Medications list and allergies in the facility chart have been reviewed.  Please see facility EMR for most up to date list.       Review of Systems   Patient denies fever, chills, headache,rhinorrhea, cough, congestion, shortness of breath, chest pain, palpitations, abdominal pain, n/v, diarrhea, constipation, change in appetite, change in sleep pattern, dysuria, frequency, burning or pain with urination.  Other than stated in HPI all other review of systems is negative.         Physical Exam   Vital signs:/74   Pulse 102   Temp 97.5  F (36.4  C)   Resp 16   Ht 1.575 m (5' 2\")   Wt 81.2 kg (179 lb)   SpO2 94%   BMI 32.74 kg/m     GENERAL APPEARANCE: Well " developed, in no acute distress.  HEENT: normocephalic, atraumatic   sclerae anicteric, conjunctivae clear and moist, EOM intact  LUNGS: Lung sounds CTA, no adventitious sounds, respiratory effort normal.  CARD: Irregularly irregular,S1, S2, without murmurs, gallops, rubs  ABD: Soft, nondistended and nontender with normal bowel sounds.   BACK: spasm of paraspinal muscles in the thoracic region R>L  EXTREMITIES: No cyanosis, clubbing or edema.  NEURO: Alert and oriented x 3. With mild cognitive impairment. Face is symmetric.  SKIN: Inspection of the skin reveals no rashes, ulcerations or petechiae.  PSYCH: euthymic    Labs:   Last Comprehensive Metabolic Panel:  Sodium   Date Value Ref Range Status   12/21/2021 139 136 - 145 mmol/L Final     Potassium   Date Value Ref Range Status   12/21/2021 4.6 3.5 - 5.0 mmol/L Final     Chloride   Date Value Ref Range Status   12/21/2021 101 98 - 107 mmol/L Final     Carbon Dioxide (CO2)   Date Value Ref Range Status   12/21/2021 29 22 - 31 mmol/L Final     Anion Gap   Date Value Ref Range Status   12/21/2021 9 5 - 18 mmol/L Final     Glucose   Date Value Ref Range Status   12/21/2021 105 70 - 125 mg/dL Final     Urea Nitrogen   Date Value Ref Range Status   12/21/2021 29 (H) 8 - 28 mg/dL Final     Creatinine   Date Value Ref Range Status   12/21/2021 0.87 0.60 - 1.10 mg/dL Final   10/06/2009 0.81 0.52 - 1.04 mg/dL Final     Comment:     New IDMS-traceable calibration  beginning 5/1/08     GFR Estimate   Date Value Ref Range Status   12/21/2021 70 >60 mL/min/1.73m2 Final     Comment:     Effective December 21, 2021 eGFRcr in adults is calculated using the 2021 CKD-EPI creatinine equation which includes age and gender (Moe zhang al., NEJM, DOI: 10.1056/[id number])   10/06/2009 72 >60 mL/min/1.7m2 Final     Calcium   Date Value Ref Range Status   12/21/2021 9.7 8.5 - 10.5 mg/dL Final     Lab Results   Component Value Date    WBC 10.6 12/10/2021    WBC 6.9 10/06/2009     Lab  Results   Component Value Date    RBC 4.84 12/10/2021    RBC 4.29 10/06/2009     Lab Results   Component Value Date    HGB 13.1 12/10/2021    HGB 11.8 10/06/2009     Lab Results   Component Value Date    HCT 41.3 12/10/2021    HCT 34.5 10/06/2009     Lab Results   Component Value Date    MCV 85 12/10/2021    MCV 81 10/06/2009     Lab Results   Component Value Date    MCH 27.1 12/10/2021    MCH 27.4 10/06/2009     Lab Results   Component Value Date    MCHC 31.7 12/10/2021    MCHC 34.0 10/06/2009     Lab Results   Component Value Date    RDW 14.1 12/10/2021    RDW 12.9 10/06/2009     Lab Results   Component Value Date     12/10/2021     10/06/2009       Assessment/plan:   Back muscle spasm  Acute related to poor core and therapy overuse.  Counseled patient on using methyl-camphor cream, lidoderm and will start prn tizanidine.  Patient wanting xray, counseled that xray was not indicated with her current symptoms and would not change her plan of care.     Type 2 diabetes mellitus with other circulatory complication, without long-term current use of insulin (H)  Improved after dcing amaryl and decreasing metformin.  Continue with Metformin 1000/500.  If continues to have afternoon 200 would consider increased supper Metformin to 1000mg.  Recent A1c was 6.5.  Recheck A1 in February.     Atrial fibrillation, chronic (H)  Rate controlled, awaiting TAMAR results.  Continue with Metoprolol and apixaban    Primary hypertension  Controlled, continue with metoprolol and lasix for edema    History of CVA (cerebrovascular accident)  Stable, continue with apixaban and atorvastatin.  Follow with neurology.     Status post below-knee amputation of left lower extremity (H)  Complicates her rehab, using prosthesis.     Ischemic cerebrovascular accident (CVA) (H)  Stable, continue with therapies.  Bps managed with current regimen.  Missed neurology appointment this is to be rescheduled.  Continue with apixaban and  atorvastatin.     Ischemic cerebrovascular accident (CVA) (H)  Stable, continue on apixaban and atorvastatin.  Follow-up with neurology on 1229.    Edema of both legs  None noted, continue with Lasix 10 mg daily.  Lasix was decreased due to clinical overdiuresed      C-spine ligamentous injury: No longer wearing Aspen collar  Follow-up with an MRI and NSG done     35 total minutes spent with 20 minutes spent with patient in counseling on muscle spasms and plan of care.     Electronically signed by: Berenice Nolen NP

## 2022-01-28 NOTE — LETTER
1/28/2022        RE: Angela Beckham  [address] [facility] [address]        Code Status:  FULL CODE  Visit Type: RECHECK     Facility:   [facility] [place] ([facility]) [[id number]]        History of Present Illness:   Hospital Admission Date: 11/28/2020  Hospital Discharge Date: 12/5/2021      Angela Beckham is a 71 year old female with a past medical history for type 2 diabetes, osteomyelitis, left BKA, hypertension, peripheral edema and mitral stenosis.  She was recently hospitalized after being found down at home and found to have an acute ischemic CVA.  She was not given lytics and was started on Eliquis and atorvastatin.  Her residual was dysphagia and encephalopathy.  She was found to have new atrial fibrillation and was started on metoprolol along with her Eliquis.  C-spine showed a C-spine ligamentous injury and she was put in an [place] collar and recommended a follow-up MRI with neurosurgery in 2 to 3 weeks.     She was also found to have a blister on her left BKA stump and was treated by a wound care nurse.    Today, she endorse thoracic paraspinal discomfort.  Last week she was started on lidoderm which she states helps.  She does have muscle spasm right greater than left.  She reports her pain worsen when she is in the rikki.      BS all 200 or less.      Had follow up with cardiology last week with TAMAR.  Awaiting findings.       Current Outpatient Medications   Medication Sig Dispense Refill     acetaminophen (TYLENOL) 325 MG tablet Take 650 mg by mouth every 4 hours as needed for mild pain       ammonium lactate (AMLACTIN) 12 % external cream Apply topically 2 times daily       apixaban ANTICOAGULANT (ELIQUIS) 5 MG tablet Take 5 mg by mouth 2 times daily       atorvastatin (LIPITOR) 20 MG tablet Take 20 mg by mouth daily       cyanocobalamin 1000 MCG SUBL Place 1,000 mcg under the tongue daily       dimethicone 1 % external cream Apply topically 2 times daily       furosemide (LASIX) 20 MG tablet Take  "10 mg by mouth daily       Lidocaine (LIDOCARE) 4 % Patch Place 1 patch onto the skin every 24 hours To prevent lidocaine toxicity, patient should be patch free for 12 hrs daily.       melatonin 3 MG tablet Take 1 mg by mouth nightly as needed for sleep       METFORMIN  MG OR TABS Take 500-1,000 mg by mouth 2 times daily (with meals) 1000mg with breakfast and 500mg with supper.       metoprolol tartrate (LOPRESSOR) 100 MG tablet Take 100 mg by mouth 2 times daily       miconazole (MICATIN) 2 % cream 2 times daily       sodium chloride (OCEAN) 0.65 % nasal spray Spray 1 spray into both nostrils 4 times daily as needed for congestion       vitamin B-Complex Take 1 tablet by mouth daily       vitamin D3 (CHOLECALCIFEROL) 250 mcg (91822 units) capsule Take 1 capsule by mouth daily       Allergies   Allergen Reactions     Amoxicillin Unknown     Oxycodone Other (See Comments)     Does not remember     Latex Rash     Immunization History   Administered Date(s) Administered     Influenza (High Dose) 3 valent vaccine 10/02/2013     Influenza (IIV3) PF 01/22/2009, 02/20/2013     Influenza Vaccine IM > 6 months Valent IIV4 (Alfuria,Fluzone) 02/20/2013     Influenza Vaccine, 6+MO IM (QUADRIVALENT W/PRESERVATIVES) 01/22/2009, 02/20/2013     Pneumo Conj 13-V (2010&after) 01/04/2022     Pneumococcal 23 valent 02/20/2013       Medications list and allergies in the facility chart have been reviewed.  Please see facility EMR for most up to date list.       Review of Systems   Patient denies fever, chills, headache,rhinorrhea, cough, congestion, shortness of breath, chest pain, palpitations, abdominal pain, n/v, diarrhea, constipation, change in appetite, change in sleep pattern, dysuria, frequency, burning or pain with urination.  Other than stated in HPI all other review of systems is negative.         Physical Exam   Vital signs:/74   Pulse 102   Temp 97.5  F (36.4  C)   Resp 16   Ht 1.575 m (5' 2\")   Wt 81.2 kg " (179 lb)   SpO2 94%   BMI 32.74 kg/m     GENERAL APPEARANCE: Well developed, in no acute distress.  HEENT: normocephalic, atraumatic   sclerae anicteric, conjunctivae clear and moist, EOM intact  LUNGS: Lung sounds CTA, no adventitious sounds, respiratory effort normal.  CARD: Irregularly irregular,S1, S2, without murmurs, gallops, rubs  ABD: Soft, nondistended and nontender with normal bowel sounds.   BACK: spasm of paraspinal muscles in the thoracic region R>L  EXTREMITIES: No cyanosis, clubbing or edema.  NEURO: Alert and oriented x 3. With mild cognitive impairment. Face is symmetric.  SKIN: Inspection of the skin reveals no rashes, ulcerations or petechiae.  PSYCH: euthymic    Labs:   Last Comprehensive Metabolic Panel:  Sodium   Date Value Ref Range Status   12/21/2021 139 136 - 145 mmol/L Final     Potassium   Date Value Ref Range Status   12/21/2021 4.6 3.5 - 5.0 mmol/L Final     Chloride   Date Value Ref Range Status   12/21/2021 101 98 - 107 mmol/L Final     Carbon Dioxide (CO2)   Date Value Ref Range Status   12/21/2021 29 22 - 31 mmol/L Final     Anion Gap   Date Value Ref Range Status   12/21/2021 9 5 - 18 mmol/L Final     Glucose   Date Value Ref Range Status   12/21/2021 105 70 - 125 mg/dL Final     Urea Nitrogen   Date Value Ref Range Status   12/21/2021 29 (H) 8 - 28 mg/dL Final     Creatinine   Date Value Ref Range Status   12/21/2021 0.87 0.60 - 1.10 mg/dL Final   10/06/2009 0.81 0.52 - 1.04 mg/dL Final     Comment:     New IDMS-traceable calibration  beginning 5/1/08     GFR Estimate   Date Value Ref Range Status   12/21/2021 70 >60 mL/min/1.73m2 Final     Comment:     Effective December 21, 2021 eGFRcr in adults is calculated using the 2021 CKD-EPI creatinine equation which includes age and gender (Moe zhang al., NEJ, DOI: 10.1056/WKLHwi4473389)   10/06/2009 72 >60 mL/min/1.7m2 Final     Calcium   Date Value Ref Range Status   12/21/2021 9.7 8.5 - 10.5 mg/dL Final     Lab Results   Component  Value Date    WBC 10.6 12/10/2021    WBC 6.9 10/06/2009     Lab Results   Component Value Date    RBC 4.84 12/10/2021    RBC 4.29 10/06/2009     Lab Results   Component Value Date    HGB 13.1 12/10/2021    HGB 11.8 10/06/2009     Lab Results   Component Value Date    HCT 41.3 12/10/2021    HCT 34.5 10/06/2009     Lab Results   Component Value Date    MCV 85 12/10/2021    MCV 81 10/06/2009     Lab Results   Component Value Date    MCH 27.1 12/10/2021    MCH 27.4 10/06/2009     Lab Results   Component Value Date    MCHC 31.7 12/10/2021    MCHC 34.0 10/06/2009     Lab Results   Component Value Date    RDW 14.1 12/10/2021    RDW 12.9 10/06/2009     Lab Results   Component Value Date     12/10/2021     10/06/2009       Assessment/plan:   Back muscle spasm  Acute related to poor core and therapy overuse.  Counseled patient on using methyl-camphor cream, lidoderm and will start prn tizanidine.  Patient wanting xray, counseled that xray was not indicated with her current symptoms and would not change her plan of care.     Type 2 diabetes mellitus with other circulatory complication, without long-term current use of insulin (H)  Improved after dcing amaryl and decreasing metformin.  Continue with Metformin 1000/500.  If continues to have afternoon 200 would consider increased supper Metformin to 1000mg.  Recent A1c was 6.5.  Recheck A1 in February.     Atrial fibrillation, chronic (H)  Rate controlled, awaiting TAMAR results.  Continue with Metoprolol and apixaban    Primary hypertension  Controlled, continue with metoprolol and lasix for edema    History of CVA (cerebrovascular accident)  Stable, continue with apixaban and atorvastatin.  Follow with neurology.     Status post below-knee amputation of left lower extremity (H)  Complicates her rehab, using prosthesis.     Ischemic cerebrovascular accident (CVA) (H)  Stable, continue with therapies.  Bps managed with current regimen.  Missed neurology appointment  this is to be rescheduled.  Continue with apixaban and atorvastatin.     Ischemic cerebrovascular accident (CVA) (H)  Stable, continue on apixaban and atorvastatin.  Follow-up with neurology on 1229.    Edema of both legs  None noted, continue with Lasix 10 mg daily.  Lasix was decreased due to clinical overdiuresed      C-spine ligamentous injury: No longer wearing [place] collar  Follow-up with an MRI and NSG done     35 total minutes spent with 20 minutes spent with patient in counseling on muscle spasms and plan of care.     Electronically signed by: Berenice Nolen NP          Sincerely,        Berenice Nolen, MARJAN

## 2022-01-29 ENCOUNTER — LAB REQUISITION (OUTPATIENT)
Dept: LAB | Facility: CLINIC | Age: 72
End: 2022-01-29

## 2022-01-29 DIAGNOSIS — U07.1 COVID-19: ICD-10-CM

## 2022-01-29 LAB — SARS-COV-2 RNA RESP QL NAA+PROBE: NEGATIVE

## 2022-01-30 ENCOUNTER — LAB REQUISITION (OUTPATIENT)
Dept: LAB | Facility: CLINIC | Age: 72
End: 2022-01-30

## 2022-01-30 DIAGNOSIS — U07.1 COVID-19: ICD-10-CM

## 2022-01-30 PROCEDURE — U0005 INFEC AGEN DETEC AMPLI PROBE: HCPCS | Performed by: FAMILY MEDICINE

## 2022-01-31 LAB — SARS-COV-2 RNA RESP QL NAA+PROBE: NEGATIVE

## 2022-02-01 ENCOUNTER — LAB REQUISITION (OUTPATIENT)
Dept: LAB | Facility: CLINIC | Age: 72
End: 2022-02-01
Payer: COMMERCIAL

## 2022-02-01 DIAGNOSIS — U07.1 COVID-19: ICD-10-CM

## 2022-02-02 ENCOUNTER — TRANSITIONAL CARE UNIT VISIT (OUTPATIENT)
Dept: GERIATRICS | Facility: CLINIC | Age: 72
End: 2022-02-02
Payer: COMMERCIAL

## 2022-02-02 VITALS
HEART RATE: 99 BPM | SYSTOLIC BLOOD PRESSURE: 128 MMHG | HEIGHT: 62 IN | WEIGHT: 175 LBS | OXYGEN SATURATION: 98 % | TEMPERATURE: 97.5 F | DIASTOLIC BLOOD PRESSURE: 87 MMHG | RESPIRATION RATE: 18 BRPM | BODY MASS INDEX: 32.2 KG/M2

## 2022-02-02 DIAGNOSIS — E11.59 TYPE 2 DIABETES MELLITUS WITH OTHER CIRCULATORY COMPLICATION, WITHOUT LONG-TERM CURRENT USE OF INSULIN (H): ICD-10-CM

## 2022-02-02 DIAGNOSIS — M62.830 BACK MUSCLE SPASM: Primary | ICD-10-CM

## 2022-02-02 DIAGNOSIS — I48.20 ATRIAL FIBRILLATION, CHRONIC (H): ICD-10-CM

## 2022-02-02 DIAGNOSIS — Z89.512 STATUS POST BELOW-KNEE AMPUTATION OF LEFT LOWER EXTREMITY (H): ICD-10-CM

## 2022-02-02 DIAGNOSIS — I10 PRIMARY HYPERTENSION: ICD-10-CM

## 2022-02-02 DIAGNOSIS — Z86.73 HISTORY OF CVA (CEREBROVASCULAR ACCIDENT): ICD-10-CM

## 2022-02-02 PROCEDURE — U0005 INFEC AGEN DETEC AMPLI PROBE: HCPCS | Performed by: FAMILY MEDICINE

## 2022-02-02 PROCEDURE — 99309 SBSQ NF CARE MODERATE MDM 30: CPT | Performed by: FAMILY MEDICINE

## 2022-02-02 ASSESSMENT — MIFFLIN-ST. JEOR: SCORE: 1252.04

## 2022-02-02 NOTE — PROGRESS NOTES
[facility]       Patient Angela Beckham  MRN: [id number]        Reason for Visit     Chief Complaint   Patient presents with     RECHECK   follow-up dm; htn ;back pain    Code Status     CPR/Full code     Assessment     Acute low back pain  Acute ischemic rt MCA CVA  Acute encephalopathy/delirium secondary to CVA  New onset atrial fibrillation with rapid ventricular response  C-spine ligamentous injury  Persistent leukocytosis  Chronic lower extremity edema  Diabetes type 2   Generalized weakness    Plan     Pt is admitted to TCU for strengthening and rehab.  Continues to have low back pain.  She is on muscle relaxers as well as topical pain gels but unfortunately still quite limited by her pain.  We will plan on getting L-spine x-ray.  Also diabetes control reviewed and her blood sugars have improved and are stable.  She has been cut down on her medications  Will decrease blood sugar checks to twice daily  She recently had a TAMAR done  Continues to be wheelchair-bound and unfortunately not much progress noted in therapy  Needs LTC placement at [facility]    History     Patient is a very pleasant 71 year old female who is admitted to TCU  Patient was admitted after she was found on the floor.  She was brought to the emergency room  Imaging did reveal that she had acute ischemic CVA.  She also had acute encephalopathy with fluctuating level of consciousness.  Neurology was consulted.  Discharged to the TCU but remains nonambulatory and wheelchair-bound.  She also was found a new onset atrial fibrillation with rapid ventricular response.  She has been started on metoprolol.  Hr are stable  Saw cardiology and now scheduled for TAMAR> she recently had this done we will await the recommendation  Noted to have a C-spine ligamentous injury on CT imaging.  Neurosurgery consulted and she has been given an [place] collar.  She had significant edema of her bilateral lower extremity with worsening diuretics were  resumed at discharge  Patient has a history of diabetes.  She is just on Metformin currently.  Last A1c was 6.5.    Past Medical & Surgical History     PAST MEDICAL HISTORY:   Past Medical History:   Diagnosis Date     Diabetic neuropathy (H)      HTN      Type 2 diabetes mellitus (H)       PAST SURGICAL HISTORY:  natividad henderson      Past Social History     Reviewed,  has an unknown smoking status. She has never used smokeless tobacco. She reports that she does not drink alcohol and does not use drugs.    Family History     Reviewed, and family history includes Alzheimer Disease in her father; Cerebrovascular Disease in her maternal grandmother and mother; Diabetes in her paternal grandfather; Hypertension in her brother and mother; Obesity in her brother; Respiratory in her father.    Medication List   Post Discharge Medication Reconciliation Status: Post Discharge Medication Reconciliation Status: discharge medications reconciled, continue medications without change.  Current Outpatient Medications   Medication     acetaminophen (TYLENOL) 325 MG tablet     ammonium lactate (AMLACTIN) 12 % external cream     apixaban ANTICOAGULANT (ELIQUIS) 5 MG tablet     atorvastatin (LIPITOR) 20 MG tablet     cyanocobalamin 1000 MCG SUBL     dimethicone 1 % external cream     furosemide (LASIX) 20 MG tablet     Lidocaine (LIDOCARE) 4 % Patch     melatonin 3 MG tablet     METFORMIN  MG OR TABS     metoprolol tartrate (LOPRESSOR) 100 MG tablet     miconazole (MICATIN) 2 % cream     sodium chloride (OCEAN) 0.65 % nasal spray     tiZANidine (ZANAFLEX) 2 MG capsule     vitamin B-Complex     vitamin D3 (CHOLECALCIFEROL) 250 mcg (02310 units) capsule     No current facility-administered medications for this visit.       Allergies     Allergies   Allergen Reactions     Amoxicillin Unknown     Oxycodone Other (See Comments)     Does not remember     Latex Rash       Review of Systems   A comprehensive review of 14 systems was done.  "Pertinent findings noted here and in history of present illness. All the rest negative.  Constitutional: Negative.  Negative for fever, chills, she has  activity change, appetite change and fatigue.   HENT: Negative for congestion and facial swelling.    Eyes: Negative for photophobia, redness and visual disturbance.   Respiratory: Negative for cough and chest tightness.    Cardiovascular: Negative for chest pain, palpitations and leg swelling.   Gastrointestinal: Negative for nausea, diarrhea, constipation, blood in stool and abdominal distention.   Genitourinary: Negative.  Reporting incontinence  Musculoskeletal: Currently very weak and bedbound she cannot walk currently  SHE IS having back pain; staff using diathermy  Skin: Negative.    Neurological: Negative for dizziness, tremors, syncope, weakness, light-headedness and headaches.   Denies any weakness from cva even though she is bed bound  Hematological: Does not bruise/bleed easily.   Psychiatric/Behavioral: Negative.  Affect is flat        Physical Exam   /87   Pulse 99   Temp 97.5  F (36.4  C)   Resp 18   Ht 1.575 m (5' 2\")   Wt 79.4 kg (175 lb)   SpO2 98%   BMI 32.01 kg/m       Constitutional: Oriented to person, place, and time and appears well-developed.   HEENT:  Normocephalic and atraumatic.  Eyes: Conjunctivae and EOM are normal. Pupils are equal, round, and reactive to light. No discharge.  No scleral icterus. Nose normal. Mouth/Throat: Oropharynx is clear and moist. No oropharyngeal exudate.    NECK: Normal range of motion. Neck supple. No JVD present. No tracheal deviation present. No thyromegaly present.   CARDIOVASCULAR: Normal rate, regular rhythm and intact distal pulses.  Exam reveals no gallop and no friction rub.  Systolic murmur present.  PULMONARY: Effort normal and breath sounds normal. No respiratory distress.No Wheezing or rales.  ABDOMEN: Soft. Bowel sounds are normal. No distension and no mass.  There is no tenderness. " There is no rebound and no guarding. No HSM.  Stage 2 pressure injury on left low back  MUSCULOSKELETAL: Normal range of motion. No edema and no tenderness. Mild kyphosis, no tenderness.  Has a left BKA  Tender on paraspinal area  LYMPH NODES: Has no cervical, supraclavicular, axillary and groin adenopathy.   NEUROLOGICAL: Alert and oriented to person, place, and time. No cranial nerve deficit.  Normal muscle tone. Coordination normal.   GENITOURINARY: Deferred exam.  SKIN: Skin is warm and dry. No rash noted. No erythema. No pallor.   Skin blister on her left BKA is healing with no concern for infection  EXTREMITIES: No cyanosis, no clubbing, no edema. No Deformity.  PSYCHIATRIC: abNormal mood, affect and behavior.  Does not have much recall of recent events.       Lab Results     Last Comprehensive Metabolic Panel:  Sodium   Date Value Ref Range Status   12/21/2021 139 136 - 145 mmol/L Final     Potassium   Date Value Ref Range Status   12/21/2021 4.6 3.5 - 5.0 mmol/L Final     Chloride   Date Value Ref Range Status   12/21/2021 101 98 - 107 mmol/L Final     Carbon Dioxide (CO2)   Date Value Ref Range Status   12/21/2021 29 22 - 31 mmol/L Final     Anion Gap   Date Value Ref Range Status   12/21/2021 9 5 - 18 mmol/L Final     Glucose   Date Value Ref Range Status   12/21/2021 105 70 - 125 mg/dL Final     Urea Nitrogen   Date Value Ref Range Status   12/21/2021 29 (H) 8 - 28 mg/dL Final     Creatinine   Date Value Ref Range Status   12/21/2021 0.87 0.60 - 1.10 mg/dL Final   10/06/2009 0.81 0.52 - 1.04 mg/dL Final     Comment:     New IDMS-traceable calibration  beginning 5/1/08     GFR Estimate   Date Value Ref Range Status   12/21/2021 70 >60 mL/min/1.73m2 Final     Comment:     Effective December 21, 2021 eGFRcr in adults is calculated using the 2021 CKD-EPI creatinine equation which includes age and gender (Moe zhang al., NEJ, DOI: 10.1056/ATWDpw1831297)   10/06/2009 72 >60 mL/min/1.7m2 Final     Calcium   Date  Value Ref Range Status   12/21/2021 9.7 8.5 - 10.5 mg/dL Final       Electronically signed by    Ginny Johnson MD

## 2022-02-02 NOTE — LETTER
2/2/2022        RE: Angela Beckham  [address] [facility] [address]        M [facility] GERIATRIC [facility]       Patient Angela Beckham  MRN: [id number]        Reason for Visit     Chief Complaint   Patient presents with     RECHECK   follow-up dm; htn ;back pain    Code Status     CPR/Full code     Assessment     Acute low back pain  Acute ischemic rt MCA CVA  Acute encephalopathy/delirium secondary to CVA  New onset atrial fibrillation with rapid ventricular response  C-spine ligamentous injury  Persistent leukocytosis  Chronic lower extremity edema  Diabetes type 2   Generalized weakness    Plan     Pt is admitted to TCU for strengthening and rehab.  Continues to have low back pain.  She is on muscle relaxers as well as topical pain gels but unfortunately still quite limited by her pain.  We will plan on getting L-spine x-ray.  Also diabetes control reviewed and her blood sugars have improved and are stable.  She has been cut down on her medications  Will decrease blood sugar checks to twice daily  She recently had a TAMAR done  Continues to be wheelchair-bound and unfortunately not much progress noted in therapy  Needs LTC placement at [facility]    History     Patient is a very pleasant 71 year old female who is admitted to TCU  Patient was admitted after she was found on the floor.  She was brought to the emergency room  Imaging did reveal that she had acute ischemic CVA.  She also had acute encephalopathy with fluctuating level of consciousness.  Neurology was consulted.  Discharged to the TCU but remains nonambulatory and wheelchair-bound.  She also was found a new onset atrial fibrillation with rapid ventricular response.  She has been started on metoprolol.  Hr are stable  Saw cardiology and now scheduled for TAMAR> she recently had this done we will await the recommendation  Noted to have a C-spine ligamentous injury on CT imaging.  Neurosurgery consulted and she has been given an [place] collar.  She had  significant edema of her bilateral lower extremity with worsening diuretics were resumed at discharge  Patient has a history of diabetes.  She is just on Metformin currently.  Last A1c was 6.5.    Past Medical & Surgical History     PAST MEDICAL HISTORY:   Past Medical History:   Diagnosis Date     Diabetic neuropathy (H)      HTN      Type 2 diabetes mellitus (H)       PAST SURGICAL HISTORY:  natividad henderson      Past Social History     Reviewed,  has an unknown smoking status. She has never used smokeless tobacco. She reports that she does not drink alcohol and does not use drugs.    Family History     Reviewed, and family history includes Alzheimer Disease in her father; Cerebrovascular Disease in her maternal grandmother and mother; Diabetes in her paternal grandfather; Hypertension in her brother and mother; Obesity in her brother; Respiratory in her father.    Medication List   Post Discharge Medication Reconciliation Status: Post Discharge Medication Reconciliation Status: discharge medications reconciled, continue medications without change.  Current Outpatient Medications   Medication     acetaminophen (TYLENOL) 325 MG tablet     ammonium lactate (AMLACTIN) 12 % external cream     apixaban ANTICOAGULANT (ELIQUIS) 5 MG tablet     atorvastatin (LIPITOR) 20 MG tablet     cyanocobalamin 1000 MCG SUBL     dimethicone 1 % external cream     furosemide (LASIX) 20 MG tablet     Lidocaine (LIDOCARE) 4 % Patch     melatonin 3 MG tablet     METFORMIN  MG OR TABS     metoprolol tartrate (LOPRESSOR) 100 MG tablet     miconazole (MICATIN) 2 % cream     sodium chloride (OCEAN) 0.65 % nasal spray     tiZANidine (ZANAFLEX) 2 MG capsule     vitamin B-Complex     vitamin D3 (CHOLECALCIFEROL) 250 mcg (47957 units) capsule     No current facility-administered medications for this visit.       Allergies     Allergies   Allergen Reactions     Amoxicillin Unknown     Oxycodone Other (See Comments)     Does not remember     Latex Rash  "      Review of Systems   A comprehensive review of 14 systems was done. Pertinent findings noted here and in history of present illness. All the rest negative.  Constitutional: Negative.  Negative for fever, chills, she has  activity change, appetite change and fatigue.   HENT: Negative for congestion and facial swelling.    Eyes: Negative for photophobia, redness and visual disturbance.   Respiratory: Negative for cough and chest tightness.    Cardiovascular: Negative for chest pain, palpitations and leg swelling.   Gastrointestinal: Negative for nausea, diarrhea, constipation, blood in stool and abdominal distention.   Genitourinary: Negative.  Reporting incontinence  Musculoskeletal: Currently very weak and bedbound she cannot walk currently  SHE IS having back pain; staff using diathermy  Skin: Negative.    Neurological: Negative for dizziness, tremors, syncope, weakness, light-headedness and headaches.   Denies any weakness from cva even though she is bed bound  Hematological: Does not bruise/bleed easily.   Psychiatric/Behavioral: Negative.  Affect is flat        Physical Exam   /87   Pulse 99   Temp 97.5  F (36.4  C)   Resp 18   Ht 1.575 m (5' 2\")   Wt 79.4 kg (175 lb)   SpO2 98%   BMI 32.01 kg/m       Constitutional: Oriented to person, place, and time and appears well-developed.   HEENT:  Normocephalic and atraumatic.  Eyes: Conjunctivae and EOM are normal. Pupils are equal, round, and reactive to light. No discharge.  No scleral icterus. Nose normal. Mouth/Throat: Oropharynx is clear and moist. No oropharyngeal exudate.    NECK: Normal range of motion. Neck supple. No JVD present. No tracheal deviation present. No thyromegaly present.   CARDIOVASCULAR: Normal rate, regular rhythm and intact distal pulses.  Exam reveals no gallop and no friction rub.  Systolic murmur present.  PULMONARY: Effort normal and breath sounds normal. No respiratory distress.No Wheezing or rales.  ABDOMEN: Soft. Bowel " sounds are normal. No distension and no mass.  There is no tenderness. There is no rebound and no guarding. No HSM.  Stage 2 pressure injury on left low back  MUSCULOSKELETAL: Normal range of motion. No edema and no tenderness. Mild kyphosis, no tenderness.  Has a left BKA  Tender on paraspinal area  LYMPH NODES: Has no cervical, supraclavicular, axillary and groin adenopathy.   NEUROLOGICAL: Alert and oriented to person, place, and time. No cranial nerve deficit.  Normal muscle tone. Coordination normal.   GENITOURINARY: Deferred exam.  SKIN: Skin is warm and dry. No rash noted. No erythema. No pallor.   Skin blister on her left BKA is healing with no concern for infection  EXTREMITIES: No cyanosis, no clubbing, no edema. No Deformity.  PSYCHIATRIC: abNormal mood, affect and behavior.  Does not have much recall of recent events.       Lab Results     Last Comprehensive Metabolic Panel:  Sodium   Date Value Ref Range Status   12/21/2021 139 136 - 145 mmol/L Final     Potassium   Date Value Ref Range Status   12/21/2021 4.6 3.5 - 5.0 mmol/L Final     Chloride   Date Value Ref Range Status   12/21/2021 101 98 - 107 mmol/L Final     Carbon Dioxide (CO2)   Date Value Ref Range Status   12/21/2021 29 22 - 31 mmol/L Final     Anion Gap   Date Value Ref Range Status   12/21/2021 9 5 - 18 mmol/L Final     Glucose   Date Value Ref Range Status   12/21/2021 105 70 - 125 mg/dL Final     Urea Nitrogen   Date Value Ref Range Status   12/21/2021 29 (H) 8 - 28 mg/dL Final     Creatinine   Date Value Ref Range Status   12/21/2021 0.87 0.60 - 1.10 mg/dL Final   10/06/2009 0.81 0.52 - 1.04 mg/dL Final     Comment:     New IDMS-traceable calibration  beginning 5/1/08     GFR Estimate   Date Value Ref Range Status   12/21/2021 70 >60 mL/min/1.73m2 Final     Comment:     Effective December 21, 2021 eGFRcr in adults is calculated using the 2021 CKD-EPI creatinine equation which includes age and gender (Moe et al., NE, DOI:  10.1056/TPPDtq8902473)   10/06/2009 72 >60 mL/min/1.7m2 Final     Calcium   Date Value Ref Range Status   12/21/2021 9.7 8.5 - 10.5 mg/dL Final       Electronically signed by    Ginny Johnson MD                               Sincerely,        OPAL Galarza

## 2022-02-03 ENCOUNTER — TRANSITIONAL CARE UNIT VISIT (OUTPATIENT)
Dept: GERIATRICS | Facility: CLINIC | Age: 72
End: 2022-02-03
Payer: COMMERCIAL

## 2022-02-03 VITALS
TEMPERATURE: 97.7 F | WEIGHT: 173 LBS | HEART RATE: 99 BPM | OXYGEN SATURATION: 97 % | HEIGHT: 62 IN | RESPIRATION RATE: 18 BRPM | BODY MASS INDEX: 31.83 KG/M2 | DIASTOLIC BLOOD PRESSURE: 87 MMHG | SYSTOLIC BLOOD PRESSURE: 128 MMHG

## 2022-02-03 DIAGNOSIS — M54.6 ACUTE RIGHT-SIDED THORACIC BACK PAIN: Primary | ICD-10-CM

## 2022-02-03 DIAGNOSIS — E11.59 TYPE 2 DIABETES MELLITUS WITH OTHER CIRCULATORY COMPLICATION, WITHOUT LONG-TERM CURRENT USE OF INSULIN (H): ICD-10-CM

## 2022-02-03 DIAGNOSIS — Z86.73 HISTORY OF CVA (CEREBROVASCULAR ACCIDENT): ICD-10-CM

## 2022-02-03 DIAGNOSIS — M62.830 BACK MUSCLE SPASM: ICD-10-CM

## 2022-02-03 PROCEDURE — 99309 SBSQ NF CARE MODERATE MDM 30: CPT | Performed by: NURSE PRACTITIONER

## 2022-02-03 ASSESSMENT — MIFFLIN-ST. JEOR: SCORE: 1242.97

## 2022-02-03 NOTE — PROGRESS NOTES
"Code Status:  FULL CODE  Visit Type: RECHECK     Facility:   [facility] ([facility]) [[id number]]        History of Present Illness:   Hospital Admission Date: 11/28/2020  Hospital Discharge Date: 12/5/2021      Angela Beckham is a 71 year old female with a past medical history for type 2 diabetes, osteomyelitis, left BKA, hypertension, peripheral edema and mitral stenosis.  She was recently hospitalized after being found down at home and found to have an acute ischemic CVA.  She was not given lytics and was started on Eliquis and atorvastatin.  Her residual was dysphagia and encephalopathy.  She was found to have new atrial fibrillation and was started on metoprolol along with her Eliquis.  C-spine showed a C-spine ligamentous injury and she was put in an [place] collar and recommended a follow-up MRI with neurosurgery in 2 to 3 weeks.     She was also found to have a blister on her left BKA stump and was treated by a wound care nurse.    Today, I follow up with patient after Lumbar spine xray results.  Xray ordered by Dr. Johnson yesterday due to ongoing complaints of back pain.  Xray showed mild degenerative changes without fracture.  Counseled patient on results and that I still believe the pain is related to muscle spasm.  She reports that tizanidine does help.  She continues to have a feeling of \"muscle ripping\".        Current Outpatient Medications   Medication Sig Dispense Refill     acetaminophen (TYLENOL) 325 MG tablet Take 650 mg by mouth every 4 hours as needed for mild pain       ammonium lactate (AMLACTIN) 12 % external cream Apply topically 2 times daily       apixaban ANTICOAGULANT (ELIQUIS) 5 MG tablet Take 5 mg by mouth 2 times daily       atorvastatin (LIPITOR) 20 MG tablet Take 20 mg by mouth daily       cyanocobalamin 1000 MCG SUBL Place 1,000 mcg under the tongue daily       dimethicone 1 % external cream Apply topically 2 times daily       furosemide (LASIX) 20 MG tablet Take 10 mg by " mouth daily       Lidocaine (LIDOCARE) 4 % Patch Place 1 patch onto the skin every 24 hours To prevent lidocaine toxicity, patient should be patch free for 12 hrs daily.       melatonin 3 MG tablet Take 1 mg by mouth nightly as needed for sleep       METFORMIN  MG OR TABS Take 500-1,000 mg by mouth 2 times daily (with meals) 1000mg with breakfast and 500mg with supper.       metoprolol tartrate (LOPRESSOR) 100 MG tablet Take 100 mg by mouth 2 times daily       miconazole (MICATIN) 2 % cream 2 times daily       sodium chloride (OCEAN) 0.65 % nasal spray Spray 1 spray into both nostrils 4 times daily as needed for congestion       tiZANidine (ZANAFLEX) 2 MG capsule Take 2 mg by mouth every 6 hours as needed       vitamin B-Complex Take 1 tablet by mouth daily       vitamin D3 (CHOLECALCIFEROL) 250 mcg (76395 units) capsule Take 1 capsule by mouth daily       Allergies   Allergen Reactions     Amoxicillin Unknown     Oxycodone Other (See Comments)     Does not remember     Latex Rash     Immunization History   Administered Date(s) Administered     Influenza (High Dose) 3 valent vaccine 10/02/2013     Influenza (IIV3) PF 01/22/2009, 02/20/2013     Influenza Vaccine IM > 6 months Valent IIV4 (Alfuria,Fluzone) 02/20/2013     Influenza Vaccine, 6+MO IM (QUADRIVALENT W/PRESERVATIVES) 01/22/2009, 02/20/2013     Pneumo Conj 13-V (2010&after) 01/04/2022     Pneumococcal 23 valent 02/20/2013       Medications list and allergies in the facility chart have been reviewed.  Please see facility EMR for most up to date list.       Review of Systems   Patient denies fever, chills, headache,rhinorrhea, cough, congestion, shortness of breath, chest pain, palpitations, abdominal pain, n/v, diarrhea, constipation, change in appetite, change in sleep pattern, dysuria, frequency, burning or pain with urination.  Other than stated in HPI all other review of systems is negative.         Physical Exam   Vital signs:/87   Pulse 99  "  Temp 97.7  F (36.5  C)   Resp 18   Ht 1.575 m (5' 2\")   Wt 78.5 kg (173 lb)   SpO2 97%   BMI 31.64 kg/m     GENERAL APPEARANCE: Well developed, in no acute distress.  HEENT: normocephalic, atraumatic   sclerae anicteric, conjunctivae clear and moist, EOM intact  LUNGS: Lung sounds CTA, no adventitious sounds, respiratory effort normal.  CARD: Irregularly irregular,S1, S2, without murmurs, gallops, rubs  ABD: Soft, nondistended and nontender with normal bowel sounds.   BACK: no muscle spasm felt today.   EXTREMITIES: No cyanosis, clubbing or edema.  NEURO: Alert and oriented x 3. With mild cognitive impairment. Face is symmetric.  SKIN: Inspection of the skin reveals no rashes, ulcerations or petechiae.  PSYCH: euthymic    Labs:   No new labs to review      Assessment/plan:   Acute right-sided thoracic back pain  Refer to spine clinic for further imaging.  Counseled to continue with topicals and tizanidine    Back muscle spasm  Topicals and tizanidine.     Type 2 diabetes mellitus with other circulatory complication, without long-term current use of insulin (H)  BS improving all <180s, continue with metformin only.  Recheck A1c next week.      History of CVA (cerebrovascular accident)  Stable, continue with apixaban and atorvastatin.  Follow up with neurology.      Atrial fibrillation, chronic (H)  Rate controlled, awaiting TAMAR results.  Continue with Metoprolol and apixaban    Primary hypertension  Controlled, continue with metoprolol and lasix for edema    Status post below-knee amputation of left lower extremity (H)  Complicates her rehab, using prosthesis.     Edema of both legs  None noted, continue with Lasix 10 mg daily.  Lasix was decreased due to clinical overdiuresed    C-spine ligamentous injury: No longer wearing [place] collar  Follow-up with an MRI and NSG done        Electronically signed by: Berenice Nolen NP    "

## 2022-02-03 NOTE — LETTER
"    2/3/2022        RE: Angela Beckham  [address] [facility] [address]        Code Status:  FULL CODE  Visit Type: RECHECK     [facility] [[id number]]        History of Present Illness:   Hospital Admission Date: 11/28/2020  Hospital Discharge Date: 12/5/2021      Angela Beckham is a 71 year old female with a past medical history for type 2 diabetes, osteomyelitis, left BKA, hypertension, peripheral edema and mitral stenosis.  She was recently hospitalized after being found down at home and found to have an acute ischemic CVA.  She was not given lytics and was started on Eliquis and atorvastatin.  Her residual was dysphagia and encephalopathy.  She was found to have new atrial fibrillation and was started on metoprolol along with her Eliquis.  C-spine showed a C-spine ligamentous injury and she was put in an [place] collar and recommended a follow-up MRI with neurosurgery in 2 to 3 weeks.     She was also found to have a blister on her left BKA stump and was treated by a wound care nurse.    Today, I follow up with patient after Lumbar spine xray results.  Xray ordered by Dr. Johnson yesterday due to ongoing complaints of back pain.  Xray showed mild degenerative changes without fracture.  Counseled patient on results and that I still believe the pain is related to muscle spasm.  She reports that tizanidine does help.  She continues to have a feeling of \"muscle ripping\".        Current Outpatient Medications   Medication Sig Dispense Refill     acetaminophen (TYLENOL) 325 MG tablet Take 650 mg by mouth every 4 hours as needed for mild pain       ammonium lactate (AMLACTIN) 12 % external cream Apply topically 2 times daily       apixaban ANTICOAGULANT (ELIQUIS) 5 MG tablet Take 5 mg by mouth 2 times daily       atorvastatin (LIPITOR) 20 MG tablet Take 20 mg by mouth daily       cyanocobalamin 1000 MCG SUBL Place 1,000 mcg under the tongue daily       dimethicone 1 % external cream Apply " topically 2 times daily       furosemide (LASIX) 20 MG tablet Take 10 mg by mouth daily       Lidocaine (LIDOCARE) 4 % Patch Place 1 patch onto the skin every 24 hours To prevent lidocaine toxicity, patient should be patch free for 12 hrs daily.       melatonin 3 MG tablet Take 1 mg by mouth nightly as needed for sleep       METFORMIN  MG OR TABS Take 500-1,000 mg by mouth 2 times daily (with meals) 1000mg with breakfast and 500mg with supper.       metoprolol tartrate (LOPRESSOR) 100 MG tablet Take 100 mg by mouth 2 times daily       miconazole (MICATIN) 2 % cream 2 times daily       sodium chloride (OCEAN) 0.65 % nasal spray Spray 1 spray into both nostrils 4 times daily as needed for congestion       tiZANidine (ZANAFLEX) 2 MG capsule Take 2 mg by mouth every 6 hours as needed       vitamin B-Complex Take 1 tablet by mouth daily       vitamin D3 (CHOLECALCIFEROL) 250 mcg (52640 units) capsule Take 1 capsule by mouth daily       Allergies   Allergen Reactions     Amoxicillin Unknown     Oxycodone Other (See Comments)     Does not remember     Latex Rash     Immunization History   Administered Date(s) Administered     Influenza (High Dose) 3 valent vaccine 10/02/2013     Influenza (IIV3) PF 01/22/2009, 02/20/2013     Influenza Vaccine IM > 6 months Valent IIV4 (Alfuria,Fluzone) 02/20/2013     Influenza Vaccine, 6+MO IM (QUADRIVALENT W/PRESERVATIVES) 01/22/2009, 02/20/2013     Pneumo Conj 13-V (2010&after) 01/04/2022     Pneumococcal 23 valent 02/20/2013       Medications list and allergies in the facility chart have been reviewed.  Please see facility EMR for most up to date list.       Review of Systems   Patient denies fever, chills, headache,rhinorrhea, cough, congestion, shortness of breath, chest pain, palpitations, abdominal pain, n/v, diarrhea, constipation, change in appetite, change in sleep pattern, dysuria, frequency, burning or pain with urination.  Other than stated in HPI all other review of  "systems is negative.         Physical Exam   Vital signs:/87   Pulse 99   Temp 97.7  F (36.5  C)   Resp 18   Ht 1.575 m (5' 2\")   Wt 78.5 kg (173 lb)   SpO2 97%   BMI 31.64 kg/m     GENERAL APPEARANCE: Well developed, in no acute distress.  HEENT: normocephalic, atraumatic   sclerae anicteric, conjunctivae clear and moist, EOM intact  LUNGS: Lung sounds CTA, no adventitious sounds, respiratory effort normal.  CARD: Irregularly irregular,S1, S2, without murmurs, gallops, rubs  ABD: Soft, nondistended and nontender with normal bowel sounds.   BACK: no muscle spasm felt today.   EXTREMITIES: No cyanosis, clubbing or edema.  NEURO: Alert and oriented x 3. With mild cognitive impairment. Face is symmetric.  SKIN: Inspection of the skin reveals no rashes, ulcerations or petechiae.  PSYCH: euthymic    Labs:   No new labs to review      Assessment/plan:   Acute right-sided thoracic back pain  Refer to spine clinic for further imaging.  Counseled to continue with topicals and tizanidine    Back muscle spasm  Topicals and tizanidine.     Type 2 diabetes mellitus with other circulatory complication, without long-term current use of insulin (H)  BS improving all <180s, continue with metformin only.  Recheck A1c next week.      History of CVA (cerebrovascular accident)  Stable, continue with apixaban and atorvastatin.  Follow up with neurology.      Atrial fibrillation, chronic (H)  Rate controlled, awaiting TAMAR results.  Continue with Metoprolol and apixaban    Primary hypertension  Controlled, continue with metoprolol and lasix for edema    Status post below-knee amputation of left lower extremity (H)  Complicates her rehab, using prosthesis.     Edema of both legs  None noted, continue with Lasix 10 mg daily.  Lasix was decreased due to clinical overdiuresed    C-spine ligamentous injury: No longer wearing [place] collar  Follow-up with an MRI and NSG done        Electronically signed by: Berenice Nolen, " NP          Sincerely,        Berenice Nolen, NP

## 2022-02-04 LAB — SARS-COV-2 RNA RESP QL NAA+PROBE: NOT DETECTED

## 2022-02-06 ENCOUNTER — LAB REQUISITION (OUTPATIENT)
Dept: LAB | Facility: CLINIC | Age: 72
End: 2022-02-06
Payer: COMMERCIAL

## 2022-02-06 PROCEDURE — U0003 INFECTIOUS AGENT DETECTION BY NUCLEIC ACID (DNA OR RNA); SEVERE ACUTE RESPIRATORY SYNDROME CORONAVIRUS 2 (SARS-COV-2) (CORONAVIRUS DISEASE [COVID-19]), AMPLIFIED PROBE TECHNIQUE, MAKING USE OF HIGH THROUGHPUT TECHNOLOGIES AS DESCRIBED BY CMS-2020-01-R: HCPCS | Mod: ORL | Performed by: FAMILY MEDICINE

## 2022-02-07 LAB — SARS-COV-2 RNA RESP QL NAA+PROBE: NEGATIVE

## 2022-02-08 ENCOUNTER — LAB REQUISITION (OUTPATIENT)
Dept: LAB | Facility: CLINIC | Age: 72
End: 2022-02-08
Payer: COMMERCIAL

## 2022-02-08 DIAGNOSIS — U07.1 COVID-19: ICD-10-CM

## 2022-02-08 PROCEDURE — U0005 INFEC AGEN DETEC AMPLI PROBE: HCPCS | Mod: ORL | Performed by: FAMILY MEDICINE

## 2022-02-09 LAB — SARS-COV-2 RNA RESP QL NAA+PROBE: NOT DETECTED

## 2022-02-11 ENCOUNTER — LAB REQUISITION (OUTPATIENT)
Dept: LAB | Facility: CLINIC | Age: 72
End: 2022-02-11
Payer: COMMERCIAL

## 2022-02-11 DIAGNOSIS — U07.1 COVID-19: ICD-10-CM

## 2022-02-11 PROCEDURE — U0003 INFECTIOUS AGENT DETECTION BY NUCLEIC ACID (DNA OR RNA); SEVERE ACUTE RESPIRATORY SYNDROME CORONAVIRUS 2 (SARS-COV-2) (CORONAVIRUS DISEASE [COVID-19]), AMPLIFIED PROBE TECHNIQUE, MAKING USE OF HIGH THROUGHPUT TECHNOLOGIES AS DESCRIBED BY CMS-2020-01-R: HCPCS | Mod: ORL | Performed by: FAMILY MEDICINE

## 2022-02-12 ENCOUNTER — LAB REQUISITION (OUTPATIENT)
Dept: LAB | Facility: CLINIC | Age: 72
End: 2022-02-12
Payer: COMMERCIAL

## 2022-02-12 DIAGNOSIS — U07.1 COVID-19: ICD-10-CM

## 2022-02-12 LAB
SARS-COV-2 RNA RESP QL NAA+PROBE: NEGATIVE
SARS-COV-2 RNA RESP QL NAA+PROBE: NEGATIVE

## 2022-02-13 ENCOUNTER — LAB REQUISITION (OUTPATIENT)
Dept: LAB | Facility: CLINIC | Age: 72
End: 2022-02-13
Payer: COMMERCIAL

## 2022-02-13 PROCEDURE — U0003 INFECTIOUS AGENT DETECTION BY NUCLEIC ACID (DNA OR RNA); SEVERE ACUTE RESPIRATORY SYNDROME CORONAVIRUS 2 (SARS-COV-2) (CORONAVIRUS DISEASE [COVID-19]), AMPLIFIED PROBE TECHNIQUE, MAKING USE OF HIGH THROUGHPUT TECHNOLOGIES AS DESCRIBED BY CMS-2020-01-R: HCPCS | Mod: ORL | Performed by: FAMILY MEDICINE

## 2022-02-14 LAB — SARS-COV-2 RNA RESP QL NAA+PROBE: NEGATIVE

## 2022-02-16 ENCOUNTER — NURSING HOME VISIT (OUTPATIENT)
Dept: GERIATRICS | Facility: CLINIC | Age: 72
End: 2022-02-16
Payer: COMMERCIAL

## 2022-02-16 VITALS
WEIGHT: 167 LBS | BODY MASS INDEX: 30.73 KG/M2 | TEMPERATURE: 97.8 F | HEART RATE: 66 BPM | SYSTOLIC BLOOD PRESSURE: 116 MMHG | DIASTOLIC BLOOD PRESSURE: 88 MMHG | OXYGEN SATURATION: 93 % | RESPIRATION RATE: 16 BRPM | HEIGHT: 62 IN

## 2022-02-16 DIAGNOSIS — M54.6 ACUTE RIGHT-SIDED THORACIC BACK PAIN: Primary | ICD-10-CM

## 2022-02-16 DIAGNOSIS — Z86.73 HISTORY OF CVA (CEREBROVASCULAR ACCIDENT): ICD-10-CM

## 2022-02-16 DIAGNOSIS — M62.830 BACK MUSCLE SPASM: ICD-10-CM

## 2022-02-16 DIAGNOSIS — E11.59 TYPE 2 DIABETES MELLITUS WITH OTHER CIRCULATORY COMPLICATION, WITHOUT LONG-TERM CURRENT USE OF INSULIN (H): ICD-10-CM

## 2022-02-16 DIAGNOSIS — I10 PRIMARY HYPERTENSION: ICD-10-CM

## 2022-02-16 DIAGNOSIS — I48.20 ATRIAL FIBRILLATION, CHRONIC (H): ICD-10-CM

## 2022-02-16 PROCEDURE — 99309 SBSQ NF CARE MODERATE MDM 30: CPT | Performed by: NURSE PRACTITIONER

## 2022-02-16 NOTE — PROGRESS NOTES
Code Status:  FULL CODE  Visit Type: Nursing Home Regulatory     Facility:   [facility] () [[id number]]        History of Present Illness:   Hospital Admission Date: 11/28/2020  Hospital Discharge Date: 12/5/2021      Angela Beckham is a 71 year old female with a past medical history for type 2 diabetes, osteomyelitis, left BKA, hypertension, peripheral edema and mitral stenosis.  She was recently hospitalized after being found down at home and found to have an acute ischemic CVA.  She was not given lytics and was started on Eliquis and atorvastatin.  Her residual was dysphagia and encephalopathy.  She was found to have new atrial fibrillation and was started on metoprolol along with her Eliquis.  C-spine showed a C-spine ligamentous injury and she was put in an [place] collar and recommended a follow-up MRI with neurosurgery in 2 to 3 weeks.     She has now moved to LT due to inability to care for herself.  Progress with therapy is slow.      She states that her spine referral was not set up and is questioning will that be set up.  She reports the lidoderm and tizanidine do help.      BS are acceptable.        Current Outpatient Medications   Medication Sig Dispense Refill     acetaminophen (TYLENOL) 325 MG tablet Take 650 mg by mouth every 4 hours as needed for mild pain       ammonium lactate (AMLACTIN) 12 % external cream Apply topically 2 times daily       apixaban ANTICOAGULANT (ELIQUIS) 5 MG tablet Take 5 mg by mouth 2 times daily       atorvastatin (LIPITOR) 20 MG tablet Take 20 mg by mouth daily       cyanocobalamin 1000 MCG SUBL Place 1,000 mcg under the tongue daily       dimethicone 1 % external cream Apply topically 2 times daily       furosemide (LASIX) 20 MG tablet Take 10 mg by mouth daily       Lidocaine (LIDOCARE) 4 % Patch Place 1 patch onto the skin every 24 hours To prevent lidocaine toxicity, patient should be patch free for 12 hrs daily.       melatonin 3 MG tablet Take 1 mg by  "mouth nightly as needed for sleep       METFORMIN  MG OR TABS Take 500-1,000 mg by mouth 2 times daily (with meals) 1000mg with breakfast and 500mg with supper.       metoprolol tartrate (LOPRESSOR) 100 MG tablet Take 100 mg by mouth 2 times daily       miconazole (MICATIN) 2 % cream 2 times daily       sodium chloride (OCEAN) 0.65 % nasal spray Spray 1 spray into both nostrils 4 times daily as needed for congestion       tiZANidine (ZANAFLEX) 2 MG capsule Take 2 mg by mouth every 6 hours as needed       vitamin B-Complex Take 1 tablet by mouth daily       vitamin D3 (CHOLECALCIFEROL) 250 mcg (07427 units) capsule Take 1 capsule by mouth daily       Allergies   Allergen Reactions     Amoxicillin Unknown     Oxycodone Other (See Comments)     Does not remember     Latex Rash     Immunization History   Administered Date(s) Administered     Influenza (High Dose) 3 valent vaccine 10/02/2013     Influenza (IIV3) PF 01/22/2009, 02/20/2013     Influenza Vaccine IM > 6 months Valent IIV4 (Alfuria,Fluzone) 02/20/2013     Influenza Vaccine, 6+MO IM (QUADRIVALENT W/PRESERVATIVES) 01/22/2009, 02/20/2013     Pneumo Conj 13-V (2010&after) 01/04/2022     Pneumococcal 23 valent 02/20/2013       Medications list and allergies in the facility chart have been reviewed.  Please see facility EMR for most up to date list.       Review of Systems   Patient denies fever, chills, headache,rhinorrhea, cough, congestion, shortness of breath, chest pain, palpitations, abdominal pain, n/v, diarrhea, constipation, change in appetite, change in sleep pattern, dysuria, frequency, burning or pain with urination.  Other than stated in HPI all other review of systems is negative.         Physical Exam no change from previous exam  Vital signs:/88   Pulse 66   Temp 97.8  F (36.6  C)   Resp 16   Ht 1.575 m (5' 2\")   Wt 75.8 kg (167 lb)   SpO2 93%   BMI 30.54 kg/m     GENERAL APPEARANCE: Well developed, in no acute distress.  HEENT: " normocephalic, atraumatic   sclerae anicteric, conjunctivae clear and moist, EOM intact  LUNGS: Lung sounds CTA, no adventitious sounds, respiratory effort normal.  CARD: Irregularly irregular,S1, S2, without murmurs, gallops, rubs  ABD: Soft, nondistended and nontender with normal bowel sounds.   BACK: no muscle spasm felt today.   EXTREMITIES: No cyanosis, clubbing or edema.  NEURO: Alert and oriented x 3. With mild cognitive impairment. Face is symmetric.  SKIN: Inspection of the skin reveals no rashes, ulcerations or petechiae.  PSYCH: euthymic    Labs:   No new labs to review      Assessment/plan:   Acute right-sided thoracic back pain  Spoke with the  and requested the spine clinic appointment be set up    Back muscle spasm  Continue with tizanidine and lidoderm    Type 2 diabetes mellitus with other circulatory complication, without long-term current use of insulin (H)  BS good, will need A1c next month.  Continue with Metformin only    History of CVA (cerebrovascular accident)  Bps controlled continue with apixaban and atorvastatin.  Follow up with neurology    Atrial fibrillation, chronic (H)  Rate controlled, continue with metoprolol and apixaban.  Follow up with cardiology    Primary hypertension  Controlled, continue with metoprolol and low dose lasix.            Electronically signed by: Berenice Nolen NP

## 2022-02-16 NOTE — LETTER
2/16/2022        RE: Angela Beckham  [address] [facility] [address]        Code Status:  FULL CODE  Visit Type: Nursing Home Regulatory     Facility:   [facility] () [[id number]]        History of Present Illness:   Hospital Admission Date: 11/28/2020  Hospital Discharge Date: 12/5/2021      Angela Beckham is a 71 year old female with a past medical history for type 2 diabetes, osteomyelitis, left BKA, hypertension, peripheral edema and mitral stenosis.  She was recently hospitalized after being found down at home and found to have an acute ischemic CVA.  She was not given lytics and was started on Eliquis and atorvastatin.  Her residual was dysphagia and encephalopathy.  She was found to have new atrial fibrillation and was started on metoprolol along with her Eliquis.  C-spine showed a C-spine ligamentous injury and she was put in an [place] collar and recommended a follow-up MRI with neurosurgery in 2 to 3 weeks.     She has now moved to LTC due to inability to care for herself.  Progress with therapy is slow.      She states that her spine referral was not set up and is questioning will that be set up.  She reports the lidoderm and tizanidine do help.      BS are acceptable.        Current Outpatient Medications   Medication Sig Dispense Refill     acetaminophen (TYLENOL) 325 MG tablet Take 650 mg by mouth every 4 hours as needed for mild pain       ammonium lactate (AMLACTIN) 12 % external cream Apply topically 2 times daily       apixaban ANTICOAGULANT (ELIQUIS) 5 MG tablet Take 5 mg by mouth 2 times daily       atorvastatin (LIPITOR) 20 MG tablet Take 20 mg by mouth daily       cyanocobalamin 1000 MCG SUBL Place 1,000 mcg under the tongue daily       dimethicone 1 % external cream Apply topically 2 times daily       furosemide (LASIX) 20 MG tablet Take 10 mg by mouth daily       Lidocaine (LIDOCARE) 4 % Patch Place 1 patch onto the skin every 24 hours To prevent lidocaine toxicity, patient  "should be patch free for 12 hrs daily.       melatonin 3 MG tablet Take 1 mg by mouth nightly as needed for sleep       METFORMIN  MG OR TABS Take 500-1,000 mg by mouth 2 times daily (with meals) 1000mg with breakfast and 500mg with supper.       metoprolol tartrate (LOPRESSOR) 100 MG tablet Take 100 mg by mouth 2 times daily       miconazole (MICATIN) 2 % cream 2 times daily       sodium chloride (OCEAN) 0.65 % nasal spray Spray 1 spray into both nostrils 4 times daily as needed for congestion       tiZANidine (ZANAFLEX) 2 MG capsule Take 2 mg by mouth every 6 hours as needed       vitamin B-Complex Take 1 tablet by mouth daily       vitamin D3 (CHOLECALCIFEROL) 250 mcg (27436 units) capsule Take 1 capsule by mouth daily       Allergies   Allergen Reactions     Amoxicillin Unknown     Oxycodone Other (See Comments)     Does not remember     Latex Rash     Immunization History   Administered Date(s) Administered     Influenza (High Dose) 3 valent vaccine 10/02/2013     Influenza (IIV3) PF 01/22/2009, 02/20/2013     Influenza Vaccine IM > 6 months Valent IIV4 (Alfuria,Fluzone) 02/20/2013     Influenza Vaccine, 6+MO IM (QUADRIVALENT W/PRESERVATIVES) 01/22/2009, 02/20/2013     Pneumo Conj 13-V (2010&after) 01/04/2022     Pneumococcal 23 valent 02/20/2013       Medications list and allergies in the facility chart have been reviewed.  Please see facility EMR for most up to date list.       Review of Systems   Patient denies fever, chills, headache,rhinorrhea, cough, congestion, shortness of breath, chest pain, palpitations, abdominal pain, n/v, diarrhea, constipation, change in appetite, change in sleep pattern, dysuria, frequency, burning or pain with urination.  Other than stated in HPI all other review of systems is negative.         Physical Exam no change from previous exam  Vital signs:/88   Pulse 66   Temp 97.8  F (36.6  C)   Resp 16   Ht 1.575 m (5' 2\")   Wt 75.8 kg (167 lb)   SpO2 93%   BMI " 30.54 kg/m     GENERAL APPEARANCE: Well developed, in no acute distress.  HEENT: normocephalic, atraumatic   sclerae anicteric, conjunctivae clear and moist, EOM intact  LUNGS: Lung sounds CTA, no adventitious sounds, respiratory effort normal.  CARD: Irregularly irregular,S1, S2, without murmurs, gallops, rubs  ABD: Soft, nondistended and nontender with normal bowel sounds.   BACK: no muscle spasm felt today.   EXTREMITIES: No cyanosis, clubbing or edema.  NEURO: Alert and oriented x 3. With mild cognitive impairment. Face is symmetric.  SKIN: Inspection of the skin reveals no rashes, ulcerations or petechiae.  PSYCH: euthymic    Labs:   No new labs to review      Assessment/plan:   Acute right-sided thoracic back pain  Spoke with the  and requested the spine clinic appointment be set up    Back muscle spasm  Continue with tizanidine and lidoderm    Type 2 diabetes mellitus with other circulatory complication, without long-term current use of insulin (H)  BS good, will need A1c next month.  Continue with Metformin only    History of CVA (cerebrovascular accident)  Bps controlled continue with apixaban and atorvastatin.  Follow up with neurology    Atrial fibrillation, chronic (H)  Rate controlled, continue with metoprolol and apixaban.  Follow up with cardiology    Primary hypertension  Controlled, continue with metoprolol and low dose lasix.            Electronically signed by: Berenice Nolen NP          Sincerely,        Berenice Nolen NP

## 2022-02-17 ENCOUNTER — TRANSCRIBE ORDERS (OUTPATIENT)
Dept: GERIATRICS | Facility: CLINIC | Age: 72
End: 2022-02-17
Payer: COMMERCIAL

## 2022-02-17 DIAGNOSIS — M54.9 BACK PAIN: Primary | ICD-10-CM

## 2022-02-22 NOTE — PROGRESS NOTES
ASSESSMENT: Angeal Beckham is a 71 year old female with past medical history significant for morbid obesity, type 2 diabetes mellitus, history of CVA (on Eliquis), atrial fibrillation, hypertension, left BKA, peripheral edema, mitral stenosis who presents today for new patient evaluation of severe thoracic and lumbar spine pain. Patient was hospitalized in November 29 2021 after being found down at home and was found to have an acute ischemic CVA. She had a CT cervical spine which was negative for fracture. There was a small prevertebral soft tissue edema from C2-3 through C4-5. She was evaluated by neurosurgery at [facility] and was placed in a cervical collar secondary to pain. She had a telemedicine follow-up with Tory Ontiveros PA-C from [facility] neurosurgery January 7, 2022. They recommended that she discontinue her cervical collar and follow-up with neurosurgery as needed.  Patient denies any neck pain or upper extremity symptoms currently.  She has had severe thoracic and lumbar spine pain since attempting to stand with assistance x2 after arriving at the  nursing facility following her hospitalization for stroke.  She has been unable to stand/walk since then.  She has not had any imaging of the thoracic or lumbar spine.  Patient demonstrated weakness in the right hip flexors on exam which may have been pain limited.  Good strength in remaining muscle groups in the upper and lower extremities.  She does not have any signs or symptoms concerning for cervical or thoracic myelopathy.  Etiology of the pain is unclear at this time.  Within the differential is fracture, disc herniation, muscle injury.        PLAN:  A shared decision making model was used.  The patient's values and choices were respected.  The following represents what was discussed and decided upon by the physician assistant and the patient.      1.  DIAGNOSTIC TESTS: I reviewed the images of the CT cervical spine from November 2021.  -I  also reviewed the report of the flexion-extension cervical spine x-rays from February 3, 2022.  There is no evidence for dynamic instability.  -I ordered MRI scans of the thoracic and lumbar spine for further evaluation.  I would like to evaluate for fracture, disc herniation, paraspinous muscle injury.    2.  PHYSICAL THERAPY: This patient has participated in physical therapy at her living facility.  We will await the results of her imaging studies.  As long as there is no contraindication to doing so, I would like the patient to participate in more physical therapy.  Patient had a positive experience at [facility] after her left BKA and would prefer to get physical therapy there.    3.  MEDICATIONS:   -Ativan 0.5 mg, #2 with no refills was prescribed for claustrophobia before her MRI.  -Patient has a topical pain relieving cream that is very helpful, although she states it is not applied consistently.  I did indicate on her paperwork that they should apply the cream as directed.  -Patient will continue tizanidine which is helpful.    4.  INTERVENTIONS: No interventions were ordered.    5.  PATIENT EDUCATION: Patient is in agreement the above plan.  All questions were answered.    6.  FOLLOW-UP:   A nurse will call the patient with the results of her imaging studies.  At that time I may refer her to [facility] physical therapy or have her return to the clinic to review the results and discuss treatment options.  If she has questions or concerns in the meantime, she should not hesitate to call.        SUBJECTIVE:  Angela Beckham  Is a 71 year old female who presents today in consultation at request of Berenice Nolen, nurse practitioner for new patient evaluation of severe thoracic spine pain.  Patient was hospitalized in November 29, 2021 after being found down at home and was found to have an acute ischemic CVA.  She estimates that she was down at home for 1.5 to 2 days before being found. She had a CT cervical spine  which was negative for fracture. There was a small prevertebral soft tissue edema from C2-3 through C4-5. She was evaluated by neurosurgery at [facility] and was placed in a cervical collar secondary to pain. She had a telemedicine follow-up with Tory Ontiveros PA-C from [facility] neurosurgery January 7, 2022. They recommended that she discontinue her cervical collar and follow-up with neurosurgery as needed.    Patient reports that shortly after arriving at the nursing facility in late November early December two nursing assistants attempted to transfer her from her wheelchair to a standing position.  She was concerned about her ability to stand because she did not think her legs were strong enough to support her.  She has a left BKA.  Patient reports that when they assisted her to stand she felt a severe, tearing pain from the right upper thoracic region all the way down to the right lower lumbar region.  In January the physical therapist again attempted to help her stand.  She states that she felt her vertebrae in her lower back separate from 1 another and she again experienced severe pain.  This was the last time she has attempted to stand.  She is being transferred using a Kendy lift.  She states that with movement of her back she experiences severe spasms.  Currently the pain is most severe in the bilateral lumbar region.  It radiates up to the thoracic region around the shoulder blades.  Both sides are equally painful.  She denies any neck pain currently.  Denies any pain radiating down the arms or legs.  Denies any numbness, tingling, weakness down the legs.  Her pain is aggravated with any movement of her back.  Sitting too long in her wheelchair can also make her pain worse.  When she is reposition in bed it is very painful.  Pain is alleviated with lying down and not moving or sitting down and not moving.  Patient reports that she has had to start wearing briefs because the assistants cannot help  her to the bathroom in time.  She denies loss of bowel or bladder control.  She denies recent fevers.    Patient does note that she has had chronic low back pain for the past 10 years.  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  She did physical therapy and worked with a chiropractor in 2020.  Both of these treatments were very helpful.  Before her stroke she was living independently and did not need any assistance with walking.    Patient has had physical therapy at her nursing facility.  She has had chiropractic treatment in the past.  She has never had any spine surgeries or spine injections.  She is currently using tizanidine 2 mg every 6 hours which is helpful.  She states there is a topical pain relieving cream can be applied twice per day which is very helpful, but she states the assistance are inconsistent and applying it.    Current Outpatient Medications   Medication     acetaminophen (TYLENOL) 325 MG tablet     ammonium lactate (AMLACTIN) 12 % external cream     apixaban ANTICOAGULANT (ELIQUIS) 5 MG tablet     atorvastatin (LIPITOR) 20 MG tablet     cyanocobalamin 1000 MCG SUBL     dimethicone 1 % external cream     furosemide (LASIX) 20 MG tablet     Lidocaine (LIDOCARE) 4 % Patch     melatonin 3 MG tablet     METFORMIN  MG OR TABS     metoprolol tartrate (LOPRESSOR) 100 MG tablet     miconazole (MICATIN) 2 % cream     sodium chloride (OCEAN) 0.65 % nasal spray     tiZANidine (ZANAFLEX) 2 MG capsule     vitamin B-Complex     vitamin D3 (CHOLECALCIFEROL) 250 mcg (02063 units) capsule         Allergies   Allergen Reactions     Amoxicillin Unknown     Oxycodone Other (See Comments)     Does not remember     Latex Rash       Past Medical History:   Diagnosis Date     Diabetic neuropathy (H)      HTN      Type 2 diabetes mellitus (H)         Patient Active Problem List   Diagnosis     Edema of both legs     Osteomyelitis of ankle or foot      Diabetes mellitus, type 2 (H)     TYPE 2 DIABETES, HBA1C GOAL < 7%     CARDIOVASCULAR SCREENING; LDL GOAL LESS THAN 100     Health Care Home     Morbid obesity (H)     Status post below-knee amputation of left lower extremity (H)     Pulmonary artery hypertension (H)     Chronic right flank pain       No past surgical history on file.    Family History   Problem Relation Age of Onset     Cerebrovascular Disease Mother      Hypertension Mother      Alzheimer Disease Father      Respiratory Father      Cerebrovascular Disease Maternal Grandmother      Diabetes Paternal Grandfather      Hypertension Brother      Obesity Brother        Social history: Patient is .  She is retired.  Prior to her stroke she had been living independently in a house.  She now lives in a nursing facility and is not sure that she will ever be able to live independently again.  She quit smoking in 1991.  Denies alcohol use.  Denies illicit drug use.    ROS: Positive for weight loss, ringing in ears, changes in vision, palpitations, feet/leg swelling, constipation, muscle fatigue, imbalance, falls, insomnia, excessive tiredness, anxiety, depression.  Specifically negative for dysphagia,  fine motor skill difficulties, bowel/bladder dysfunction, fevers,chills, appetite changes. Otherwise 13 systems reviewed are negative.  Please see the patient's intake questionnaire from today for details.      OBJECTIVE:  PHYSICAL EXAMINATION:  CONSTITUTIONAL:  Vital signs as above.  No acute distress.  The patient is well nourished and well groomed.  Patient sits in a wheelchair for the visit.  PSYCHIATRIC:  The patient is awake, alert, oriented to person, place, time and answering questions appropriately with clear speech.    HEENT:  Normocephalic, atraumatic.  Sclera clear.    SKIN:  Skin over the face, bilateral upper extremities, and neck is clean, dry, intact without rashes.  LYMPH NODES:  No palpable or tender anterior/posterior cervical,  submandibular, or supraclavicular lymph nodes.    MUSCLE STRENGTH:  5/5 strength for the bilateral shoulder abductors, elbow flexors/extensors, wrist extensors, finger flexors/abductors.  4/5 right and 5/5 left hip flexors, 5/5 bilateral knee flexors/extensors, 5/5 right ankle dorsi/plantar flexors, EHL.  Left BKA.  NEURO:  CN III-XII are grossly intact.  1-2+ symmetric biceps, brachioradialis, triceps, patellar, right Achilles reflexes bilaterally.  Sensation to light touch intact bilateral upper and lower extremities throughout.  Negative Parra's bilaterally.  No ankle clonus right.  Negative Babinski's on the right.  VASCULAR:  2/4 radial pulses bilaterally.  Warm upper limbs bilaterally.  Capillary refill in the upper extremities is less than 1 second.  MUSCULOSKELETAL: Tender to palpation bilateral lower lumbar paraspinous muscles.    RESULTS: I reviewed the CT cervical spine from [facility] dated November 28, 2021. This shows mild reversal of normal cervical lordosis. There is no fracture. There is small prevertebral soft tissue edema from C2-3 through C4-5 which could be evaluated further with MRI to exclude ligamentous injury. It also showed nonspecific amorphous hyperdensity along the posterior paraspinal soft tissue surrounding C7 spinous process.

## 2022-02-24 ENCOUNTER — MEDICAL CORRESPONDENCE (OUTPATIENT)
Dept: HEALTH INFORMATION MANAGEMENT | Facility: CLINIC | Age: 72
End: 2022-02-24

## 2022-02-24 ENCOUNTER — OFFICE VISIT (OUTPATIENT)
Dept: PHYSICAL MEDICINE AND REHAB | Facility: CLINIC | Age: 72
End: 2022-02-24
Payer: COMMERCIAL

## 2022-02-24 DIAGNOSIS — F40.240 CLAUSTROPHOBIA: ICD-10-CM

## 2022-02-24 DIAGNOSIS — M54.6 PAIN IN THORACIC SPINE: ICD-10-CM

## 2022-02-24 DIAGNOSIS — M54.50 LUMBAR SPINE PAIN: Primary | ICD-10-CM

## 2022-02-24 PROCEDURE — 99204 OFFICE O/P NEW MOD 45 MIN: CPT | Performed by: PHYSICIAN ASSISTANT

## 2022-02-24 RX ORDER — LORAZEPAM 0.5 MG/1
TABLET ORAL
Qty: 2 TABLET | Refills: 0 | Status: SHIPPED | OUTPATIENT
Start: 2022-02-24 | End: 2022-03-09

## 2022-02-24 NOTE — PATIENT INSTRUCTIONS
I ordered MRI scans of your thoracic and lumbar spine for further evaluation of your pain.  Someone will call you to schedule.  You can also call them at [phone] to schedule.    Once I get the results back we will call you and let you know what the scans showed.    You may benefit from more physical therapy.  I am happy to refer you to [facility] if that is your preference.

## 2022-02-24 NOTE — LETTER
2/24/2022         RE: Angela Beckham  [address] [address]        Dear Colleague,    Thank you for referring your patient, Angela Beckham, to the [facility] [place]. Please see a copy of my visit note below.    ASSESSMENT: Angela Beckham is a 71 year old female with past medical history significant for morbid obesity, type 2 diabetes mellitus, history of CVA (on Eliquis), atrial fibrillation, hypertension, left BKA, peripheral edema, mitral stenosis who presents today for new patient evaluation of severe thoracic and lumbar spine pain. Patient was hospitalized in November 29 2021 after being found down at home and was found to have an acute ischemic CVA. She had a CT cervical spine which was negative for fracture. There was a small prevertebral soft tissue edema from C2-3 through C4-5. She was evaluated by neurosurgery at [facility] and was placed in a cervical collar secondary to pain. She had a telemedicine follow-up with Tory Ontiveros PA-C from [facility] neurosurgery January 7, 2022. They recommended that she discontinue her cervical collar and follow-up with neurosurgery as needed.  Patient denies any neck pain or upper extremity symptoms currently.  She has had severe thoracic and lumbar spine pain since attempting to stand with assistance x2 after arriving at the  nursing facility following her hospitalization for stroke.  She has been unable to stand/walk since then.  She has not had any imaging of the thoracic or lumbar spine.  Patient demonstrated weakness in the right hip flexors on exam which may have been pain limited.  Good strength in remaining muscle groups in the upper and lower extremities.  She does not have any signs or symptoms concerning for cervical or thoracic myelopathy.  Etiology of the pain is unclear at this time.  Within the differential is fracture, disc herniation, muscle injury.        PLAN:  A shared decision making model was used.  The  patient's values and choices were respected.  The following represents what was discussed and decided upon by the physician assistant and the patient.      1.  DIAGNOSTIC TESTS: I reviewed the images of the CT cervical spine from November 2021.  -I also reviewed the report of the flexion-extension cervical spine x-rays from February 3, 2022.  There is no evidence for dynamic instability.  -I ordered MRI scans of the thoracic and lumbar spine for further evaluation.  I would like to evaluate for fracture, disc herniation, paraspinous muscle injury.    2.  PHYSICAL THERAPY: This patient has participated in physical therapy at her living facility.  We will await the results of her imaging studies.  As long as there is no contraindication to doing so, I would like the patient to participate in more physical therapy.  Patient had a positive experience at [facility] after her left A and would prefer to get physical therapy there.    3.  MEDICATIONS:   -Ativan 0.5 mg, #2 with no refills was prescribed for claustrophobia before her MRI.  -Patient has a topical pain relieving cream that is very helpful, although she states it is not applied consistently.  I did indicate on her paperwork that they should apply the cream as directed.  -Patient will continue tizanidine which is helpful.    4.  INTERVENTIONS: No interventions were ordered.    5.  PATIENT EDUCATION: Patient is in agreement the above plan.  All questions were answered.    6.  FOLLOW-UP:   A nurse will call the patient with the results of her imaging studies.  At that time I may refer her to [facility] physical therapy or have her return to the clinic to review the results and discuss treatment options.  If she has questions or concerns in the meantime, she should not hesitate to call.        SUBJECTIVE:  Angela Beckham  Is a 71 year old female who presents today in consultation at request of Berenice Nolen, nurse practitioner for new patient evaluation of severe  thoracic spine pain.  Patient was hospitalized in November 29, 2021 after being found down at home and was found to have an acute ischemic CVA.  She estimates that she was down at home for 1.5 to 2 days before being found. She had a CT cervical spine which was negative for fracture. There was a small prevertebral soft tissue edema from C2-3 through C4-5. She was evaluated by neurosurgery at [facility] and was placed in a cervical collar secondary to pain. She had a telemedicine follow-up with Tory Ontiveros PA-C from [facility] neurosurgery January 7, 2022. They recommended that she discontinue her cervical collar and follow-up with neurosurgery as needed.    Patient reports that shortly after arriving at the nursing facility in late November early December two nursing assistants attempted to transfer her from her wheelchair to a standing position.  She was concerned about her ability to stand because she did not think her legs were strong enough to support her.  She has a left BKA.  Patient reports that when they assisted her to stand she felt a severe, tearing pain from the right upper thoracic region all the way down to the right lower lumbar region.  In January the physical therapist again attempted to help her stand.  She states that she felt her vertebrae in her lower back separate from 1 another and she again experienced severe pain.  This was the last time she has attempted to stand.  She is being transferred using a Kendy lift.  She states that with movement of her back she experiences severe spasms.  Currently the pain is most severe in the bilateral lumbar region.  It radiates up to the thoracic region around the shoulder blades.  Both sides are equally painful.  She denies any neck pain currently.  Denies any pain radiating down the arms or legs.  Denies any numbness, tingling, weakness down the legs.  Her pain is aggravated with any movement of her back.  Sitting too long in her wheelchair can also  make her pain worse.  When she is reposition in bed it is very painful.  Pain is alleviated with lying down and not moving or sitting down and not moving.  Patient reports that she has had to start wearing briefs because the assistants cannot help her to the bathroom in time.  She denies loss of bowel or bladder control.  She denies recent fevers.    Patient does note that she has had chronic low back pain for the past 10 years.  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  She did physical therapy and worked with a chiropractor in 2020.  Both of these treatments were very helpful.  Before her stroke she was living independently and did not need any assistance with walking.    Patient has had physical therapy at her nursing facility.  She has had chiropractic treatment in the past.  She has never had any spine surgeries or spine injections.  She is currently using tizanidine 2 mg every 6 hours which is helpful.  She states there is a topical pain relieving cream can be applied twice per day which is very helpful, but she states the assistance are inconsistent and applying it.    Current Outpatient Medications   Medication     acetaminophen (TYLENOL) 325 MG tablet     ammonium lactate (AMLACTIN) 12 % external cream     apixaban ANTICOAGULANT (ELIQUIS) 5 MG tablet     atorvastatin (LIPITOR) 20 MG tablet     cyanocobalamin 1000 MCG SUBL     dimethicone 1 % external cream     furosemide (LASIX) 20 MG tablet     Lidocaine (LIDOCARE) 4 % Patch     melatonin 3 MG tablet     METFORMIN  MG OR TABS     metoprolol tartrate (LOPRESSOR) 100 MG tablet     miconazole (MICATIN) 2 % cream     sodium chloride (OCEAN) 0.65 % nasal spray     tiZANidine (ZANAFLEX) 2 MG capsule     vitamin B-Complex     vitamin D3 (CHOLECALCIFEROL) 250 mcg (47344 units) capsule         Allergies   Allergen Reactions     Amoxicillin Unknown     Oxycodone Other (See Comments)     Does not remember      Latex Rash       Past Medical History:   Diagnosis Date     Diabetic neuropathy (H)      HTN      Type 2 diabetes mellitus (H)         Patient Active Problem List   Diagnosis     Edema of both legs     Osteomyelitis of ankle or foot     Diabetes mellitus, type 2 (H)     TYPE 2 DIABETES, HBA1C GOAL < 7%     CARDIOVASCULAR SCREENING; LDL GOAL LESS THAN 100     Health Care Home     Morbid obesity (H)     Status post below-knee amputation of left lower extremity (H)     Pulmonary artery hypertension (H)     Chronic right flank pain       No past surgical history on file.    Family History   Problem Relation Age of Onset     Cerebrovascular Disease Mother      Hypertension Mother      Alzheimer Disease Father      Respiratory Father      Cerebrovascular Disease Maternal Grandmother      Diabetes Paternal Grandfather      Hypertension Brother      Obesity Brother        Social history: Patient is .  She is retired.  Prior to her stroke she had been living independently in a house.  She now lives in a nursing facility and is not sure that she will ever be able to live independently again.  She quit smoking in 1991.  Denies alcohol use.  Denies illicit drug use.    ROS: Positive for weight loss, ringing in ears, changes in vision, palpitations, feet/leg swelling, constipation, muscle fatigue, imbalance, falls, insomnia, excessive tiredness, anxiety, depression.  Specifically negative for dysphagia,  fine motor skill difficulties, bowel/bladder dysfunction, fevers,chills, appetite changes. Otherwise 13 systems reviewed are negative.  Please see the patient's intake questionnaire from today for details.      OBJECTIVE:  PHYSICAL EXAMINATION:  CONSTITUTIONAL:  Vital signs as above.  No acute distress.  The patient is well nourished and well groomed.  Patient sits in a wheelchair for the visit.  PSYCHIATRIC:  The patient is awake, alert, oriented to person, place, time and answering questions appropriately with  clear speech.    HEENT:  Normocephalic, atraumatic.  Sclera clear.    SKIN:  Skin over the face, bilateral upper extremities, and neck is clean, dry, intact without rashes.  LYMPH NODES:  No palpable or tender anterior/posterior cervical, submandibular, or supraclavicular lymph nodes.    MUSCLE STRENGTH:  5/5 strength for the bilateral shoulder abductors, elbow flexors/extensors, wrist extensors, finger flexors/abductors.  4/5 right and 5/5 left hip flexors, 5/5 bilateral knee flexors/extensors, 5/5 right ankle dorsi/plantar flexors, EHL.  Left BKA.  NEURO:  CN III-XII are grossly intact.  1-2+ symmetric biceps, brachioradialis, triceps, patellar, right Achilles reflexes bilaterally.  Sensation to light touch intact bilateral upper and lower extremities throughout.  Negative Parra's bilaterally.  No ankle clonus right.  Negative Babinski's on the right.  VASCULAR:  2/4 radial pulses bilaterally.  Warm upper limbs bilaterally.  Capillary refill in the upper extremities is less than 1 second.  MUSCULOSKELETAL: Tender to palpation bilateral lower lumbar paraspinous muscles.    RESULTS: I reviewed the CT cervical spine from [facility] dated November 28, 2021. This shows mild reversal of normal cervical lordosis. There is no fracture. There is small prevertebral soft tissue edema from C2-3 through C4-5 which could be evaluated further with MRI to exclude ligamentous injury. It also showed nonspecific amorphous hyperdensity along the posterior paraspinal soft tissue surrounding C7 spinous process.        Again, thank you for allowing me to participate in the care of your patient.        Sincerely,        Leslie Albert PA-C

## 2022-02-27 ENCOUNTER — LAB REQUISITION (OUTPATIENT)
Dept: LAB | Facility: CLINIC | Age: 72
End: 2022-02-27
Payer: COMMERCIAL

## 2022-02-27 PROCEDURE — U0003 INFECTIOUS AGENT DETECTION BY NUCLEIC ACID (DNA OR RNA); SEVERE ACUTE RESPIRATORY SYNDROME CORONAVIRUS 2 (SARS-COV-2) (CORONAVIRUS DISEASE [COVID-19]), AMPLIFIED PROBE TECHNIQUE, MAKING USE OF HIGH THROUGHPUT TECHNOLOGIES AS DESCRIBED BY CMS-2020-01-R: HCPCS | Mod: ORL | Performed by: FAMILY MEDICINE

## 2022-03-01 ENCOUNTER — LAB REQUISITION (OUTPATIENT)
Dept: LAB | Facility: CLINIC | Age: 72
End: 2022-03-01
Payer: COMMERCIAL

## 2022-03-01 DIAGNOSIS — U07.1 COVID-19: ICD-10-CM

## 2022-03-01 LAB — SARS-COV-2 RNA RESP QL NAA+PROBE: NEGATIVE

## 2022-03-08 NOTE — PROGRESS NOTES
[facility]       Patient Angela Beckham  MRN: [id number]        Reason for Visit     Chief Complaint   Patient presents with     Nursing Home Regulatory   follow-up dm; htn ;back pain    Code Status     CPR/Full code     Assessment     Acute low back pain  Acute ischemic rt MCA CVA  Acute encephalopathy/delirium secondary to CVA  New onset atrial fibrillation with rapid ventricular response  C-spine ligamentous injury  Persistent leukocytosis  Chronic lower extremity edema  Diabetes type 2   Generalized weakness    Plan     Patient currently is a resident of long-term care.  She is currently bedbound and poorly ambulatory.  She continues to complain of low back pain which is limiting her ability to progress.  Imaging done in the nursing home has not revealed any acute finding.  She has been sent to the spine clinic where evaluation was also nondiagnostic.  They have recommended an outpatient MRI for her.  She will follow-up with them post MRI again.  She did not have her prosthesis on for her BKA.  Staff reports most days she will not have her prostheses on.  Diabetic control reviewed and has been optimized  Blood sugar control remains erratic she is on Metformin.  We will plan on checking an A1c soon.  Significant weight loss of almost close to 20 pounds noted in the last few months.  She reports that she had lost her dentures in the hospital so is having difficulty with eating.  Dietary and speech are involved in her care and her meal choices have been adjusted.  We will monitor trends.  Blood pressures consistently are also low  Continue with her high-dose metoprolol because of elevated heart rates with underlying history of atrial fibrillation.  May monitor trends    History     Patient is a very pleasant 71 year old female who is a   Patient was admitted after she was found on the floor.  She was brought to the emergency room  Imaging did reveal that she had acute ischemic CVA.  She also had  acute encephalopathy with fluctuating level of consciousness.  Neurology was consulted.  Unfortunately in spite of pursuing optimal rehab she is still wheelchair and bedbound.  She continues to require assistance with all ADL support  She also was found a new onset atrial fibrillation with rapid ventricular response.  She has been started on metoprolol.  Hr are stable  Saw cardiology and now scheduled for TAMAR> she recently had this done   Her blood pressures are borderline low however heart rates are slightly on the high side of normal also  Noted to have a C-spine ligamentous injury on CT imaging.  Neurosurgery consulted and she has been given an [place] collar.  She had significant edema of her bilateral lower extremity with worsening diuretics were resumed at discharge  Patient has a history of diabetes.  She is just on Metformin currently.  Last A1c was 6.5.  Recently seen in the spine clinic because of increased pain concerns in her low back    Past Medical & Surgical History     PAST MEDICAL HISTORY:   Past Medical History:   Diagnosis Date     Diabetic neuropathy (H)      HTN      Type 2 diabetes mellitus (H)       PAST SURGICAL HISTORY:  natividad henderson      Past Social History     Reviewed,  has an unknown smoking status. She has never used smokeless tobacco. She reports that she does not drink alcohol and does not use drugs.    Family History     Reviewed, and family history includes Alzheimer Disease in her father; Cerebrovascular Disease in her maternal grandmother and mother; Diabetes in her paternal grandfather; Hypertension in her brother and mother; Obesity in her brother; Respiratory in her father.    Medication List   Post Discharge Medication Reconciliation Status: Post Discharge Medication Reconciliation Status: discharge medications reconciled, continue medications without change.  Current Outpatient Medications   Medication     acetaminophen (TYLENOL) 325 MG tablet     ammonium lactate (AMLACTIN) 12 %  external cream     apixaban ANTICOAGULANT (ELIQUIS) 5 MG tablet     atorvastatin (LIPITOR) 20 MG tablet     cyanocobalamin 1000 MCG SUBL     furosemide (LASIX) 20 MG tablet     Lidocaine (LIDOCARE) 4 % Patch     melatonin 3 MG tablet     METFORMIN  MG OR TABS     metoprolol tartrate (LOPRESSOR) 100 MG tablet     miconazole (MICATIN) 2 % cream     sodium chloride (OCEAN) 0.65 % nasal spray     tiZANidine (ZANAFLEX) 2 MG capsule     vitamin B-Complex     vitamin D3 (CHOLECALCIFEROL) 250 mcg (77705 units) capsule     dimethicone 1 % external cream     LORazepam (ATIVAN) 0.5 MG tablet     No current facility-administered medications for this visit.       Allergies     Allergies   Allergen Reactions     Amoxicillin Unknown     Oxycodone Other (See Comments)     Does not remember     Latex Rash       Review of Systems   A comprehensive review of 14 systems was done. Pertinent findings noted here and in history of present illness. All the rest negative.  Constitutional: Negative.  Negative for fever, chills, she has  activity change, appetite change and fatigue.   HENT: Negative for congestion and facial swelling.    Eyes: Negative for photophobia, redness and visual disturbance.   Respiratory: Negative for cough and chest tightness.    Cardiovascular: Negative for chest pain, palpitations and leg swelling.   Gastrointestinal: Negative for nausea, diarrhea, constipation, blood in stool and abdominal distention.   Genitourinary: Negative.  Reporting incontinence  Musculoskeletal: Currently very weak and bedbound she cannot walk currently  SHE IS having back pain; staff using diathermy  Skin: Negative.    Neurological: Negative for dizziness, tremors, syncope, weakness, light-headedness and headaches.   Denies any weakness from cva even though she is bed bound  Hematological: Does not bruise/bleed easily.   Psychiatric/Behavioral: Negative.  Affect is flat  Today she reports a lot of stressors relating to some  "financial matters that she is trying to deal with        Physical Exam   BP 92/64   Pulse 96   Temp 97  F (36.1  C)   Resp 18   Ht 1.588 m (5' 2.5\")   Wt 73.9 kg (163 lb)   SpO2 95%   BMI 29.34 kg/m       Constitutional: Oriented to person, place, and time and appears well-developed.   HEENT:  Normocephalic and atraumatic.  Eyes: Conjunctivae and EOM are normal. Pupils are equal, round, and reactive to light. No discharge.  No scleral icterus. Nose normal. Mouth/Throat: Oropharynx is clear and moist. No oropharyngeal exudate.    NECK: Normal range of motion. Neck supple. No JVD present. No tracheal deviation present. No thyromegaly present.   CARDIOVASCULAR: Normal rate, regular rhythm and intact distal pulses.  Exam reveals no gallop and no friction rub.  Systolic murmur present.  PULMONARY: Effort normal and breath sounds normal. No respiratory distress.No Wheezing or rales.  ABDOMEN: Soft. Bowel sounds are normal. No distension and no mass.  There is no tenderness. There is no rebound and no guarding. No HSM.  MUSCULOSKELETAL: Normal range of motion. No edema and no tenderness. Mild kyphosis, no tenderness.  Has a left BKA; does not have any prostheses  Tender on paraspinal area  LYMPH NODES: Has no cervical, supraclavicular, axillary and groin adenopathy.   NEUROLOGICAL: Alert and oriented to person, place, and time. No cranial nerve deficit.  Normal muscle tone. Coordination normal.   GENITOURINARY: Deferred exam.  SKIN: Skin is warm and dry. No rash noted. No erythema. No pallor.   EXTREMITIES: No cyanosis, no clubbing, no edema. No Deformity.  PSYCHIATRIC: abNormal mood, affect and behavior.  Does not have much recall of recent events.       Lab Results     Last Comprehensive Metabolic Panel:  Sodium   Date Value Ref Range Status   12/21/2021 139 136 - 145 mmol/L Final     Potassium   Date Value Ref Range Status   12/21/2021 4.6 3.5 - 5.0 mmol/L Final     Chloride   Date Value Ref Range Status "   12/21/2021 101 98 - 107 mmol/L Final     Carbon Dioxide (CO2)   Date Value Ref Range Status   12/21/2021 29 22 - 31 mmol/L Final     Anion Gap   Date Value Ref Range Status   12/21/2021 9 5 - 18 mmol/L Final     Glucose   Date Value Ref Range Status   12/21/2021 105 70 - 125 mg/dL Final     Urea Nitrogen   Date Value Ref Range Status   12/21/2021 29 (H) 8 - 28 mg/dL Final     Creatinine   Date Value Ref Range Status   12/21/2021 0.87 0.60 - 1.10 mg/dL Final   10/06/2009 0.81 0.52 - 1.04 mg/dL Final     Comment:     New IDMS-traceable calibration  beginning 5/1/08     GFR Estimate   Date Value Ref Range Status   12/21/2021 70 >60 mL/min/1.73m2 Final     Comment:     Effective December 21, 2021 eGFRcr in adults is calculated using the 2021 CKD-EPI creatinine equation which includes age and gender (Moe et al., NEJM, DOI: 10.1056/LEFWpk8994046)   10/06/2009 72 >60 mL/min/1.7m2 Final     Calcium   Date Value Ref Range Status   12/21/2021 9.7 8.5 - 10.5 mg/dL Final       Electronically signed by    Ginny Johnson MD

## 2022-03-09 ENCOUNTER — LAB REQUISITION (OUTPATIENT)
Dept: LAB | Facility: CLINIC | Age: 72
End: 2022-03-09
Payer: COMMERCIAL

## 2022-03-09 ENCOUNTER — NURSING HOME VISIT (OUTPATIENT)
Dept: GERIATRICS | Facility: CLINIC | Age: 72
End: 2022-03-09
Payer: COMMERCIAL

## 2022-03-09 VITALS
OXYGEN SATURATION: 95 % | TEMPERATURE: 97 F | DIASTOLIC BLOOD PRESSURE: 64 MMHG | SYSTOLIC BLOOD PRESSURE: 92 MMHG | WEIGHT: 163 LBS | HEIGHT: 63 IN | HEART RATE: 96 BPM | BODY MASS INDEX: 28.88 KG/M2 | RESPIRATION RATE: 18 BRPM

## 2022-03-09 DIAGNOSIS — E11.59 TYPE 2 DIABETES MELLITUS WITH OTHER CIRCULATORY COMPLICATION, WITHOUT LONG-TERM CURRENT USE OF INSULIN (H): ICD-10-CM

## 2022-03-09 DIAGNOSIS — I10 PRIMARY HYPERTENSION: ICD-10-CM

## 2022-03-09 DIAGNOSIS — I48.20 ATRIAL FIBRILLATION, CHRONIC (H): ICD-10-CM

## 2022-03-09 DIAGNOSIS — E66.01 MORBID OBESITY (H): ICD-10-CM

## 2022-03-09 DIAGNOSIS — M54.50 CHRONIC BILATERAL LOW BACK PAIN WITHOUT SCIATICA: Primary | ICD-10-CM

## 2022-03-09 DIAGNOSIS — U07.1 COVID-19: ICD-10-CM

## 2022-03-09 DIAGNOSIS — Z89.512 STATUS POST BELOW-KNEE AMPUTATION OF LEFT LOWER EXTREMITY (H): ICD-10-CM

## 2022-03-09 DIAGNOSIS — G89.29 CHRONIC BILATERAL LOW BACK PAIN WITHOUT SCIATICA: Primary | ICD-10-CM

## 2022-03-09 DIAGNOSIS — Z86.73 HISTORY OF CVA (CEREBROVASCULAR ACCIDENT): ICD-10-CM

## 2022-03-09 PROCEDURE — 99309 SBSQ NF CARE MODERATE MDM 30: CPT | Performed by: FAMILY MEDICINE

## 2022-03-09 NOTE — LETTER
3/9/2022        RE: Angela Beckham  [address] [facility] [address]        M [facility] GERIATRIC SERVICES       Patient Angela Beckham  MRN: [id number]        Reason for Visit     Chief Complaint   Patient presents with     Nursing Home Regulatory   follow-up dm; htn ;back pain    Code Status     CPR/Full code     Assessment     Acute low back pain  Acute ischemic rt MCA CVA  Acute encephalopathy/delirium secondary to CVA  New onset atrial fibrillation with rapid ventricular response  C-spine ligamentous injury  Persistent leukocytosis  Chronic lower extremity edema  Diabetes type 2   Generalized weakness    Plan     Patient currently is a resident of long-term care.  She is currently bedbound and poorly ambulatory.  She continues to complain of low back pain which is limiting her ability to progress.  Imaging done in the nursing home has not revealed any acute finding.  She has been sent to the spine clinic where evaluation was also nondiagnostic.  They have recommended an outpatient MRI for her.  She will follow-up with them post MRI again.  She did not have her prosthesis on for her BKA.  Staff reports most days she will not have her prostheses on.  Diabetic control reviewed and has been optimized  Blood sugar control remains erratic she is on Metformin.  We will plan on checking an A1c soon.  Significant weight loss of almost close to 20 pounds noted in the last few months.  She reports that she had lost her dentures in the hospital so is having difficulty with eating.  Dietary and speech are involved in her care and her meal choices have been adjusted.  We will monitor trends.  Blood pressures consistently are also low  Continue with her high-dose metoprolol because of elevated heart rates with underlying history of atrial fibrillation.  May monitor trends    History     Patient is a very pleasant 71 year old female who is a   Patient was admitted after she was found on the floor.  She was brought to the  emergency room  Imaging did reveal that she had acute ischemic CVA.  She also had acute encephalopathy with fluctuating level of consciousness.  Neurology was consulted.  Unfortunately in spite of pursuing optimal rehab she is still wheelchair and bedbound.  She continues to require assistance with all ADL support  She also was found a new onset atrial fibrillation with rapid ventricular response.  She has been started on metoprolol.  Hr are stable  Saw cardiology and now scheduled for TAMAR> she recently had this done   Her blood pressures are borderline low however heart rates are slightly on the high side of normal also  Noted to have a C-spine ligamentous injury on CT imaging.  Neurosurgery consulted and she has been given an [place] collar.  She had significant edema of her bilateral lower extremity with worsening diuretics were resumed at discharge  Patient has a history of diabetes.  She is just on Metformin currently.  Last A1c was 6.5.  Recently seen in the spine clinic because of increased pain concerns in her low back    Past Medical & Surgical History     PAST MEDICAL HISTORY:   Past Medical History:   Diagnosis Date     Diabetic neuropathy (H)      HTN      Type 2 diabetes mellitus (H)       PAST SURGICAL HISTORY:  natividad henderson      Past Social History     Reviewed,  has an unknown smoking status. She has never used smokeless tobacco. She reports that she does not drink alcohol and does not use drugs.    Family History     Reviewed, and family history includes Alzheimer Disease in her father; Cerebrovascular Disease in her maternal grandmother and mother; Diabetes in her paternal grandfather; Hypertension in her brother and mother; Obesity in her brother; Respiratory in her father.    Medication List   Post Discharge Medication Reconciliation Status: Post Discharge Medication Reconciliation Status: discharge medications reconciled, continue medications without change.  Current Outpatient Medications   Medication      acetaminophen (TYLENOL) 325 MG tablet     ammonium lactate (AMLACTIN) 12 % external cream     apixaban ANTICOAGULANT (ELIQUIS) 5 MG tablet     atorvastatin (LIPITOR) 20 MG tablet     cyanocobalamin 1000 MCG SUBL     furosemide (LASIX) 20 MG tablet     Lidocaine (LIDOCARE) 4 % Patch     melatonin 3 MG tablet     METFORMIN  MG OR TABS     metoprolol tartrate (LOPRESSOR) 100 MG tablet     miconazole (MICATIN) 2 % cream     sodium chloride (OCEAN) 0.65 % nasal spray     tiZANidine (ZANAFLEX) 2 MG capsule     vitamin B-Complex     vitamin D3 (CHOLECALCIFEROL) 250 mcg (66100 units) capsule     dimethicone 1 % external cream     LORazepam (ATIVAN) 0.5 MG tablet     No current facility-administered medications for this visit.       Allergies     Allergies   Allergen Reactions     Amoxicillin Unknown     Oxycodone Other (See Comments)     Does not remember     Latex Rash       Review of Systems   A comprehensive review of 14 systems was done. Pertinent findings noted here and in history of present illness. All the rest negative.  Constitutional: Negative.  Negative for fever, chills, she has  activity change, appetite change and fatigue.   HENT: Negative for congestion and facial swelling.    Eyes: Negative for photophobia, redness and visual disturbance.   Respiratory: Negative for cough and chest tightness.    Cardiovascular: Negative for chest pain, palpitations and leg swelling.   Gastrointestinal: Negative for nausea, diarrhea, constipation, blood in stool and abdominal distention.   Genitourinary: Negative.  Reporting incontinence  Musculoskeletal: Currently very weak and bedbound she cannot walk currently  SHE IS having back pain; staff using diathermy  Skin: Negative.    Neurological: Negative for dizziness, tremors, syncope, weakness, light-headedness and headaches.   Denies any weakness from cva even though she is bed bound  Hematological: Does not bruise/bleed easily.   Psychiatric/Behavioral: Negative.  " Affect is flat  Today she reports a lot of stressors relating to some financial matters that she is trying to deal with        Physical Exam   BP 92/64   Pulse 96   Temp 97  F (36.1  C)   Resp 18   Ht 1.588 m (5' 2.5\")   Wt 73.9 kg (163 lb)   SpO2 95%   BMI 29.34 kg/m       Constitutional: Oriented to person, place, and time and appears well-developed.   HEENT:  Normocephalic and atraumatic.  Eyes: Conjunctivae and EOM are normal. Pupils are equal, round, and reactive to light. No discharge.  No scleral icterus. Nose normal. Mouth/Throat: Oropharynx is clear and moist. No oropharyngeal exudate.    NECK: Normal range of motion. Neck supple. No JVD present. No tracheal deviation present. No thyromegaly present.   CARDIOVASCULAR: Normal rate, regular rhythm and intact distal pulses.  Exam reveals no gallop and no friction rub.  Systolic murmur present.  PULMONARY: Effort normal and breath sounds normal. No respiratory distress.No Wheezing or rales.  ABDOMEN: Soft. Bowel sounds are normal. No distension and no mass.  There is no tenderness. There is no rebound and no guarding. No HSM.  MUSCULOSKELETAL: Normal range of motion. No edema and no tenderness. Mild kyphosis, no tenderness.  Has a left BKA; does not have any prostheses  Tender on paraspinal area  LYMPH NODES: Has no cervical, supraclavicular, axillary and groin adenopathy.   NEUROLOGICAL: Alert and oriented to person, place, and time. No cranial nerve deficit.  Normal muscle tone. Coordination normal.   GENITOURINARY: Deferred exam.  SKIN: Skin is warm and dry. No rash noted. No erythema. No pallor.   EXTREMITIES: No cyanosis, no clubbing, no edema. No Deformity.  PSYCHIATRIC: abNormal mood, affect and behavior.  Does not have much recall of recent events.       Lab Results     Last Comprehensive Metabolic Panel:  Sodium   Date Value Ref Range Status   12/21/2021 139 136 - 145 mmol/L Final     Potassium   Date Value Ref Range Status   12/21/2021 4.6 3.5 " - 5.0 mmol/L Final     Chloride   Date Value Ref Range Status   12/21/2021 101 98 - 107 mmol/L Final     Carbon Dioxide (CO2)   Date Value Ref Range Status   12/21/2021 29 22 - 31 mmol/L Final     Anion Gap   Date Value Ref Range Status   12/21/2021 9 5 - 18 mmol/L Final     Glucose   Date Value Ref Range Status   12/21/2021 105 70 - 125 mg/dL Final     Urea Nitrogen   Date Value Ref Range Status   12/21/2021 29 (H) 8 - 28 mg/dL Final     Creatinine   Date Value Ref Range Status   12/21/2021 0.87 0.60 - 1.10 mg/dL Final   10/06/2009 0.81 0.52 - 1.04 mg/dL Final     Comment:     New IDMS-traceable calibration  beginning 5/1/08     GFR Estimate   Date Value Ref Range Status   12/21/2021 70 >60 mL/min/1.73m2 Final     Comment:     Effective December 21, 2021 eGFRcr in adults is calculated using the 2021 CKD-EPI creatinine equation which includes age and gender (Moe et al., NEJM, DOI: 10.1056/GXFXyy0851651)   10/06/2009 72 >60 mL/min/1.7m2 Final     Calcium   Date Value Ref Range Status   12/21/2021 9.7 8.5 - 10.5 mg/dL Final       Electronically signed by    Ginny Johnson MD                             Sincerely,        OPAL Galarza

## 2022-03-10 PROCEDURE — U0003 INFECTIOUS AGENT DETECTION BY NUCLEIC ACID (DNA OR RNA); SEVERE ACUTE RESPIRATORY SYNDROME CORONAVIRUS 2 (SARS-COV-2) (CORONAVIRUS DISEASE [COVID-19]), AMPLIFIED PROBE TECHNIQUE, MAKING USE OF HIGH THROUGHPUT TECHNOLOGIES AS DESCRIBED BY CMS-2020-01-R: HCPCS | Mod: ORL | Performed by: FAMILY MEDICINE

## 2022-03-11 ENCOUNTER — LAB REQUISITION (OUTPATIENT)
Dept: LAB | Facility: CLINIC | Age: 72
End: 2022-03-11
Payer: COMMERCIAL

## 2022-03-11 DIAGNOSIS — U07.1 COVID-19: ICD-10-CM

## 2022-03-11 LAB — SARS-COV-2 RNA RESP QL NAA+PROBE: NEGATIVE

## 2022-03-13 ENCOUNTER — LAB REQUISITION (OUTPATIENT)
Dept: LAB | Facility: CLINIC | Age: 72
End: 2022-03-13
Payer: COMMERCIAL

## 2022-03-28 ENCOUNTER — LAB REQUISITION (OUTPATIENT)
Dept: LAB | Facility: CLINIC | Age: 72
End: 2022-03-28
Payer: COMMERCIAL

## 2022-03-28 DIAGNOSIS — U07.1 COVID-19: ICD-10-CM

## 2022-04-03 ENCOUNTER — LAB REQUISITION (OUTPATIENT)
Dept: LAB | Facility: CLINIC | Age: 72
End: 2022-04-03
Payer: COMMERCIAL

## 2022-04-03 PROCEDURE — U0005 INFEC AGEN DETEC AMPLI PROBE: HCPCS | Mod: ORL | Performed by: FAMILY MEDICINE

## 2022-04-04 LAB — SARS-COV-2 RNA RESP QL NAA+PROBE: NEGATIVE

## 2022-04-06 ENCOUNTER — LAB REQUISITION (OUTPATIENT)
Dept: LAB | Facility: CLINIC | Age: 72
End: 2022-04-06
Payer: COMMERCIAL

## 2022-04-06 DIAGNOSIS — U07.1 COVID-19: ICD-10-CM

## 2022-04-06 NOTE — PROGRESS NOTES
[facility] GERIATRICS    PRIMARY CARE PROVIDER AND CLINIC:  Berenice Nolen NP, [address] / [place] [address]  Chief Complaint   Patient presents with     FVP Care Coordination - Home Visit-Annual Assessment      [place] Medical Record Number:  [id number]  Place of Service where encounter took place:  [facility] () [[id number]]     The health plan product change has happened. I have reviewed the  MDS, the preventative needs,  and facility care plan. The level of care is appropriate. I have reviewed the code status/advanced directives.     Angela Beckham  is a 71 year old  (1950), with a past medical history for type 2 diabetes, osteomyelitis, left BKA, hypertension, peripheral edema and mitral stenosis, recent CVA and new onset Afib. She was hospitalized in Nov 2021 for an acute ischemic CVA.  She had poor progress with therapy and was unable to return home, thus transitioned to LTC.      For the past several months she has complained of back pain and so she was referred to the spine clinic.  She was seen on 2/24/2022 and a plan for further workup was made.  She will having an MRI done today.  She reports pain does improve with use of icy hot cream and tizanidine.      Bps run low to soft, she denies any dizziness or headaches. She continues on low dose lasix and metoprolol.     She complains of constipation and is on no medications.      DM: BS mostly acceptable, due for A1c on Metformin 1000mg in the AM and 500mg in the PM.       CODE STATUS/ADVANCE DIRECTIVES DISCUSSION:  Full Code  ALLERGIES:   Allergies   Allergen Reactions     Amoxicillin Unknown     Oxycodone Other (See Comments)     Does not remember     Latex Rash      PAST MEDICAL HISTORY:   Past Medical History:   Diagnosis Date     Diabetic neuropathy (H)      HTN      Type 2 diabetes mellitus (H)       PAST SURGICAL HISTORY:   has no past surgical history on file.  FAMILY HISTORY: family history includes  Alzheimer Disease in her father; Cerebrovascular Disease in her maternal grandmother and mother; Diabetes in her paternal grandfather; Hypertension in her brother and mother; Obesity in her brother; Respiratory in her father.  SOCIAL HISTORY:   has an unknown smoking status. She has never used smokeless tobacco. She reports that she does not drink alcohol and does not use drugs.  Patient's living condition: lives in a skilled nursing facility    Post Discharge Medication Reconciliation Status: patient was not discharged from an inpatient facility  Current Outpatient Medications   Medication Sig     acetaminophen (TYLENOL) 325 MG tablet Take 650 mg by mouth every 4 hours as needed for mild pain     ammonium lactate (AMLACTIN) 12 % external cream Apply topically 2 times daily     apixaban ANTICOAGULANT (ELIQUIS) 5 MG tablet Take 5 mg by mouth 2 times daily     atorvastatin (LIPITOR) 20 MG tablet Take 20 mg by mouth daily     cyanocobalamin 1000 MCG SUBL Place 1,000 mcg under the tongue daily     furosemide (LASIX) 20 MG tablet Take 10 mg by mouth daily     Lidocaine (LIDOCARE) 4 % Patch Place 1 patch onto the skin every 24 hours To prevent lidocaine toxicity, patient should be patch free for 12 hrs daily.     melatonin 3 MG tablet Take 1 mg by mouth nightly as needed for sleep     METFORMIN  MG OR TABS Take 500-1,000 mg by mouth 2 times daily (with meals) 1000mg with breakfast and 500mg with supper.     metoprolol tartrate (LOPRESSOR) 100 MG tablet Take 100 mg by mouth 2 times daily     miconazole (MICATIN) 2 % cream 2 times daily     sodium chloride (OCEAN) 0.65 % nasal spray Spray 1 spray into both nostrils 4 times daily as needed for congestion     tiZANidine (ZANAFLEX) 2 MG capsule Take 2 mg by mouth every 6 hours as needed     vitamin B-Complex Take 1 tablet by mouth daily     vitamin D3 (CHOLECALCIFEROL) 250 mcg (48949 units) capsule Take 1 capsule by mouth daily     No current facility-administered  "medications for this visit.       ROS:  Patient denies fever, chills, headache, lightheadedness, dizziness, rhinorrhea, cough, congestion, shortness of breath, chest pain, palpitations, abdominal pain, n/v, diarrhea, change in appetite, change in sleep pattern, dysuria, frequency, burning or pain with urination.  Other than stated in HPI all other review of systems is negative.       Exam:  Vital signs:BP (!) 109/90   Pulse 98   Temp 97.8  F (36.6  C)   Resp 18   Ht 1.588 m (5' 2.5\")   Wt 73.9 kg (163 lb)   SpO2 96%   BMI 29.34 kg/m     GENERAL APPEARANCE: Well developed, well nourished, in no acute distress.  HEENT: normocephalic, atraumatic  sclerae anicteric, conjunctivae clear and moist, EOM intact  LUNGS: Lung sounds CTA, no adventitious sounds, respiratory effort normal.  CARD: irregularly irregular, S1, S2, without murmurs, gallops, rubs  ABD: Soft, nondistended and nontender with normal bowel sounds.   MSK: Muscle strength and tone were equal bilaterally. Moves all extremities easily and intentionally.   EXTREMITIES: No cyanosis, clubbing or edema.  NEURO: Alert and oriented x 3. Face is symmetric.  SKIN: Inspection of the skin reveals no rashes, ulcerations or petechiae.  PSYCH: euthymic    Lab/Diagnostic data:  Last Comprehensive Metabolic Panel:  Sodium   Date Value Ref Range Status   12/21/2021 139 136 - 145 mmol/L Final     Potassium   Date Value Ref Range Status   12/21/2021 4.6 3.5 - 5.0 mmol/L Final     Chloride   Date Value Ref Range Status   12/21/2021 101 98 - 107 mmol/L Final     Carbon Dioxide (CO2)   Date Value Ref Range Status   12/21/2021 29 22 - 31 mmol/L Final     Anion Gap   Date Value Ref Range Status   12/21/2021 9 5 - 18 mmol/L Final     Glucose   Date Value Ref Range Status   12/21/2021 105 70 - 125 mg/dL Final     Urea Nitrogen   Date Value Ref Range Status   12/21/2021 29 (H) 8 - 28 mg/dL Final     Creatinine   Date Value Ref Range Status   12/21/2021 0.87 0.60 - 1.10 mg/dL " Final   10/06/2009 0.81 0.52 - 1.04 mg/dL Final     Comment:     New IDMS-traceable calibration  beginning 5/1/08     GFR Estimate   Date Value Ref Range Status   12/21/2021 70 >60 mL/min/1.73m2 Final     Comment:     Effective December 21, 2021 eGFRcr in adults is calculated using the 2021 CKD-EPI creatinine equation which includes age and gender (Moe zhang al., NE, DOI: 10.[facility]6/[id number])   10/06/2009 72 >60 mL/min/1.7m2 Final     Calcium   Date Value Ref Range Status   12/21/2021 9.7 8.5 - 10.5 mg/dL Final     Lab Results   Component Value Date    WBC 10.6 12/10/2021    WBC 6.9 10/06/2009     Lab Results   Component Value Date    RBC 4.84 12/10/2021    RBC 4.29 10/06/2009     Lab Results   Component Value Date    HGB 13.1 12/10/2021    HGB 11.8 10/06/2009     Lab Results   Component Value Date    HCT 41.3 12/10/2021    HCT 34.5 10/06/2009     Lab Results   Component Value Date    MCV 85 12/10/2021    MCV 81 10/06/2009     Lab Results   Component Value Date    MCH 27.1 12/10/2021    MCH 27.4 10/06/2009     Lab Results   Component Value Date    MCHC 31.7 12/10/2021    MCHC 34.0 10/06/2009     Lab Results   Component Value Date    RDW 14.1 12/10/2021    RDW 12.9 10/06/2009     Lab Results   Component Value Date     12/10/2021     10/06/2009         ASSESSMENT/PLAN:    Type 2 diabetes mellitus with other circulatory complication, without long-term current use of insulin (H)  Last a1c 6.5, meds have been adjust multiple times in the past 4 months due to lower BS.  Continue on Metformin 1000mg in the AM and 500mg in the evening.  Check A1c next week    Atrial fibrillation, chronic (H)  Rate controlled, decreasing metoprolol due to softer BP and complaints of fatigue.  Metoprolol 25mg BID, continue on apixaban for AC.     Primary hypertension  Hypotension, SBPs 90-low 100s.  Decrease metoprolol 25mg BID, discontinue lasix as she has no edema.     Pulmonary artery hypertension (H)  Weights down in 2  months from 175 to 163lbs.  dcing low dose lasix.  Continue on lower dose of metoprolol    History of CVA (cerebrovascular accident)  Continue on lipitor.  apixaban for AC    Chronic bilateral low back pain without sciatica  Follow up with spine clinic after workup.  Continue with with tizanidine, lidocaine patch and icy hot.     Chronic idiopathic constipation  Start on senna-s daily.      Check vitamin D level, BMP and A1c.    Last Mammogram was noted to be 2014.  Patient would like to hold off on mammogram.   Dexa scan 2013      35 total minutes spent with 20 minutes spent with patient in counseling regarding Bps and need to not be so tightly controlled.  Also counseled mammogram.      Electronically signed by:  Berenice Nolen NP                   The health plan new enrollment has happened. I have reviewed the  MDS, the preventative needs,  and facility care plan. The level of care is appropriate. I have reviewed the code status/advanced directives.

## 2022-04-07 VITALS
HEART RATE: 98 BPM | HEIGHT: 63 IN | WEIGHT: 163 LBS | SYSTOLIC BLOOD PRESSURE: 109 MMHG | BODY MASS INDEX: 28.88 KG/M2 | RESPIRATION RATE: 18 BRPM | DIASTOLIC BLOOD PRESSURE: 90 MMHG | OXYGEN SATURATION: 96 % | TEMPERATURE: 97.8 F

## 2022-04-08 ENCOUNTER — NURSING HOME VISIT (OUTPATIENT)
Dept: GERIATRICS | Facility: CLINIC | Age: 72
End: 2022-04-08
Payer: COMMERCIAL

## 2022-04-08 ENCOUNTER — HOSPITAL ENCOUNTER (OUTPATIENT)
Dept: MRI IMAGING | Facility: CLINIC | Age: 72
Discharge: HOME | End: 2022-04-08
Attending: PHYSICIAN ASSISTANT
Payer: COMMERCIAL

## 2022-04-08 DIAGNOSIS — I27.21 PULMONARY ARTERY HYPERTENSION (H): ICD-10-CM

## 2022-04-08 DIAGNOSIS — Z86.73 HISTORY OF CVA (CEREBROVASCULAR ACCIDENT): ICD-10-CM

## 2022-04-08 DIAGNOSIS — M54.50 LUMBAR SPINE PAIN: ICD-10-CM

## 2022-04-08 DIAGNOSIS — G89.29 CHRONIC BILATERAL LOW BACK PAIN WITHOUT SCIATICA: ICD-10-CM

## 2022-04-08 DIAGNOSIS — I10 PRIMARY HYPERTENSION: ICD-10-CM

## 2022-04-08 DIAGNOSIS — K59.04 CHRONIC IDIOPATHIC CONSTIPATION: ICD-10-CM

## 2022-04-08 DIAGNOSIS — M54.50 CHRONIC BILATERAL LOW BACK PAIN WITHOUT SCIATICA: ICD-10-CM

## 2022-04-08 DIAGNOSIS — M54.6 PAIN IN THORACIC SPINE: ICD-10-CM

## 2022-04-08 DIAGNOSIS — I48.20 ATRIAL FIBRILLATION, CHRONIC (H): ICD-10-CM

## 2022-04-08 DIAGNOSIS — E11.59 TYPE 2 DIABETES MELLITUS WITH OTHER CIRCULATORY COMPLICATION, WITHOUT LONG-TERM CURRENT USE OF INSULIN (H): Primary | ICD-10-CM

## 2022-04-08 PROCEDURE — 99318 PR ANNUAL NURSING FAC ASSESSMNT, STABLE: CPT | Performed by: NURSE PRACTITIONER

## 2022-04-08 PROCEDURE — 72148 MRI LUMBAR SPINE W/O DYE: CPT

## 2022-04-08 PROCEDURE — 72146 MRI CHEST SPINE W/O DYE: CPT

## 2022-04-08 NOTE — LETTER
4/7/2022        RE: Angela Beckham  [address] [facility] [place] [address]        [facility] GERIATRICS    PRIMARY CARE PROVIDER AND CLINIC:  Berenice Nolen NP, [address] / [place] [address]  Chief Complaint   Patient presents with     [facility] Care Coordination - Home Visit-Annual Assessment      [facility] Medical Record Number:  [id number]  Place of Service where encounter took place:  [facility] () [[id number]]    Angela Beckham  is a 71 year old  (1950), with a past medical history for type 2 diabetes, osteomyelitis, left BKA, hypertension, peripheral edema and mitral stenosis, recent CVA and new onset Afib. She was hospitalized in Nov 2021 for an acute ischemic CVA.  She had poor progress with therapy and was unable to return home, thus transitioned to LTC.      For the past several months she has complained of back pain and so she was referred to the spine clinic.  She was seen on 2/24/2022 and a plan for further workup was made.  She will having an MRI done today.  She reports pain does improve with use of icy hot cream and tizanidine.      Bps run low to soft, she denies any dizziness or headaches. She continues on low dose lasix and metoprolol.     She complains of constipation and is on no medications.      DM: BS mostly acceptable, due for A1c on Metformin 1000mg in the AM and 500mg in the PM.       CODE STATUS/ADVANCE DIRECTIVES DISCUSSION:  Full Code  ALLERGIES:   Allergies   Allergen Reactions     Amoxicillin Unknown     Oxycodone Other (See Comments)     Does not remember     Latex Rash      PAST MEDICAL HISTORY:   Past Medical History:   Diagnosis Date     Diabetic neuropathy (H)      HTN      Type 2 diabetes mellitus (H)       PAST SURGICAL HISTORY:   has no past surgical history on file.  FAMILY HISTORY: family history includes Alzheimer Disease in her father; Cerebrovascular Disease in her maternal grandmother and mother; Diabetes in her paternal grandfather;  Hypertension in her brother and mother; Obesity in her brother; Respiratory in her father.  SOCIAL HISTORY:   has an unknown smoking status. She has never used smokeless tobacco. She reports that she does not drink alcohol and does not use drugs.  Patient's living condition: lives in a skilled nursing facility    Post Discharge Medication Reconciliation Status: patient was not discharged from an inpatient facility  Current Outpatient Medications   Medication Sig     acetaminophen (TYLENOL) 325 MG tablet Take 650 mg by mouth every 4 hours as needed for mild pain     ammonium lactate (AMLACTIN) 12 % external cream Apply topically 2 times daily     apixaban ANTICOAGULANT (ELIQUIS) 5 MG tablet Take 5 mg by mouth 2 times daily     atorvastatin (LIPITOR) 20 MG tablet Take 20 mg by mouth daily     cyanocobalamin 1000 MCG SUBL Place 1,000 mcg under the tongue daily     furosemide (LASIX) 20 MG tablet Take 10 mg by mouth daily     Lidocaine (LIDOCARE) 4 % Patch Place 1 patch onto the skin every 24 hours To prevent lidocaine toxicity, patient should be patch free for 12 hrs daily.     melatonin 3 MG tablet Take 1 mg by mouth nightly as needed for sleep     METFORMIN  MG OR TABS Take 500-1,000 mg by mouth 2 times daily (with meals) 1000mg with breakfast and 500mg with supper.     metoprolol tartrate (LOPRESSOR) 100 MG tablet Take 100 mg by mouth 2 times daily     miconazole (MICATIN) 2 % cream 2 times daily     sodium chloride (OCEAN) 0.65 % nasal spray Spray 1 spray into both nostrils 4 times daily as needed for congestion     tiZANidine (ZANAFLEX) 2 MG capsule Take 2 mg by mouth every 6 hours as needed     vitamin B-Complex Take 1 tablet by mouth daily     vitamin D3 (CHOLECALCIFEROL) 250 mcg (32301 units) capsule Take 1 capsule by mouth daily     No current facility-administered medications for this visit.       ROS:  Patient denies fever, chills, headache, lightheadedness, dizziness, rhinorrhea, cough, congestion,  "shortness of breath, chest pain, palpitations, abdominal pain, n/v, diarrhea, change in appetite, change in sleep pattern, dysuria, frequency, burning or pain with urination.  Other than stated in HPI all other review of systems is negative.       Exam:  Vital signs:BP (!) 109/90   Pulse 98   Temp 97.8  F (36.6  C)   Resp 18   Ht 1.588 m (5' 2.5\")   Wt 73.9 kg (163 lb)   SpO2 96%   BMI 29.34 kg/m     GENERAL APPEARANCE: Well developed, well nourished, in no acute distress.  HEENT: normocephalic, atraumatic  sclerae anicteric, conjunctivae clear and moist, EOM intact  LUNGS: Lung sounds CTA, no adventitious sounds, respiratory effort normal.  CARD: irregularly irregular, S1, S2, without murmurs, gallops, rubs  ABD: Soft, nondistended and nontender with normal bowel sounds.   MSK: Muscle strength and tone were equal bilaterally. Moves all extremities easily and intentionally.   EXTREMITIES: No cyanosis, clubbing or edema.  NEURO: Alert and oriented x 3. Face is symmetric.  SKIN: Inspection of the skin reveals no rashes, ulcerations or petechiae.  PSYCH: euthymic    Lab/Diagnostic data:  Last Comprehensive Metabolic Panel:  Sodium   Date Value Ref Range Status   12/21/2021 139 136 - 145 mmol/L Final     Potassium   Date Value Ref Range Status   12/21/2021 4.6 3.5 - 5.0 mmol/L Final     Chloride   Date Value Ref Range Status   12/21/2021 101 98 - 107 mmol/L Final     Carbon Dioxide (CO2)   Date Value Ref Range Status   12/21/2021 29 22 - 31 mmol/L Final     Anion Gap   Date Value Ref Range Status   12/21/2021 9 5 - 18 mmol/L Final     Glucose   Date Value Ref Range Status   12/21/2021 105 70 - 125 mg/dL Final     Urea Nitrogen   Date Value Ref Range Status   12/21/2021 29 (H) 8 - 28 mg/dL Final     Creatinine   Date Value Ref Range Status   12/21/2021 0.87 0.60 - 1.10 mg/dL Final   10/06/2009 0.81 0.52 - 1.04 mg/dL Final     Comment:     New IDMS-traceable calibration  beginning 5/1/08     GFR Estimate   Date " Value Ref Range Status   12/21/2021 70 >60 mL/min/1.73m2 Final     Comment:     Effective December 21, 2021 eGFRcr in adults is calculated using the 2021 CKD-EPI creatinine equation which includes age and gender (Moe zhang al., NEJM, DOI: 10.1056/DIXNrp5669464)   10/06/2009 72 >60 mL/min/1.7m2 Final     Calcium   Date Value Ref Range Status   12/21/2021 9.7 8.5 - 10.5 mg/dL Final     Lab Results   Component Value Date    WBC 10.6 12/10/2021    WBC 6.9 10/06/2009     Lab Results   Component Value Date    RBC 4.84 12/10/2021    RBC 4.29 10/06/2009     Lab Results   Component Value Date    HGB 13.1 12/10/2021    HGB 11.8 10/06/2009     Lab Results   Component Value Date    HCT 41.3 12/10/2021    HCT 34.5 10/06/2009     Lab Results   Component Value Date    MCV 85 12/10/2021    MCV 81 10/06/2009     Lab Results   Component Value Date    MCH 27.1 12/10/2021    MCH 27.4 10/06/2009     Lab Results   Component Value Date    MCHC 31.7 12/10/2021    MCHC 34.0 10/06/2009     Lab Results   Component Value Date    RDW 14.1 12/10/2021    RDW 12.9 10/06/2009     Lab Results   Component Value Date     12/10/2021     10/06/2009         ASSESSMENT/PLAN:    Type 2 diabetes mellitus with other circulatory complication, without long-term current use of insulin (H)  Last a1c 6.5, meds have been adjust multiple times in the past 4 months due to lower BS.  Continue on Metformin 1000mg in the AM and 500mg in the evening.  Check A1c next week    Atrial fibrillation, chronic (H)  Rate controlled, decreasing metoprolol due to softer BP and complaints of fatigue.  Metoprolol 25mg BID, continue on apixaban for AC.     Primary hypertension  Hypotension, SBPs 90-low 100s.  Decrease metoprolol 25mg BID, discontinue lasix as she has no edema.     Pulmonary artery hypertension (H)  Weights down in 2 months from 175 to 163lbs.  dcing low dose lasix.  Continue on lower dose of metoprolol    History of CVA (cerebrovascular  accident)  Continue on lipitor.  apixaban for AC    Chronic bilateral low back pain without sciatica  Follow up with spine clinic after workup.  Continue with with tizanidine, lidocaine patch and icy hot.     Chronic idiopathic constipation  Start on senna-s daily.      Check vitamin D level, BMP and A1c.    Last Mammogram was noted to be 2014.  Patient would like to hold off on mammogram.   Dexa scan 2013      35 total minutes spent with 20 minutes spent with patient in counseling regarding Bps and need to not be so tightly controlled.  Also counseled mammogram.      Electronically signed by:  Berenice Nolen NP                   The health plan new enrollment has happened. I have reviewed the  MDS, the preventative needs,  and facility care plan. The level of care is appropriate. I have reviewed the code status/advanced directives.         Sincerely,        Berenice Nolen NP

## 2022-04-11 DIAGNOSIS — M54.50 LUMBAR SPINE PAIN: ICD-10-CM

## 2022-04-11 DIAGNOSIS — M54.6 PAIN IN THORACIC SPINE: ICD-10-CM

## 2022-04-11 DIAGNOSIS — S39.012S STRAIN OF LUMBAR REGION, SEQUELA: Primary | ICD-10-CM

## 2022-04-12 ENCOUNTER — TELEPHONE (OUTPATIENT)
Dept: PHYSICAL MEDICINE AND REHAB | Facility: CLINIC | Age: 72
End: 2022-04-12
Payer: COMMERCIAL

## 2022-04-12 NOTE — TELEPHONE ENCOUNTER
"Per Leslie RUST, \"Entered referral for vladimir.  Patient can follow up with me after 4 week trial of PT or sooner if needed.  The MRI lumbar spine did show some swelling in the muscles consistent with a strain which may be the source of her pain\"    Call placed to patient with provider's results and recommendations. Pt stated understanding. She will get scheduled with Vladimir. Order was faxed. She would also like to get set up for a follow-up appt with Leslie to review her imaging. Transferred pt to scheduling to make appt.       "

## 2022-04-12 NOTE — TELEPHONE ENCOUNTER
----- Message from Whitney Castro, DO sent at 4/11/2022  2:54 PM CDT -----  Nurse navigation to please call the patient (she does not have MyChart) and let her know that the covering provider reviewed her MRI of the thoracic spine and lumbar spine since Leslie is out of the office today.  Please let her know that her MRIs of the thoracic spine and lumbar spine did not show any significant findings that would correlate with her severe pain.  Leslie will review tomorrow but the recommendation will be for physical therapy (Leslie will place the order to [facility] tomorrow).

## 2022-04-13 ENCOUNTER — LAB REQUISITION (OUTPATIENT)
Dept: LAB | Facility: CLINIC | Age: 72
End: 2022-04-13
Payer: COMMERCIAL

## 2022-04-13 DIAGNOSIS — U07.1 COVID-19: ICD-10-CM

## 2022-04-14 ENCOUNTER — LAB REQUISITION (OUTPATIENT)
Dept: LAB | Facility: CLINIC | Age: 72
End: 2022-04-14
Payer: COMMERCIAL

## 2022-04-14 DIAGNOSIS — I50.9 HEART FAILURE, UNSPECIFIED (H): ICD-10-CM

## 2022-04-14 DIAGNOSIS — R53.1 WEAKNESS: ICD-10-CM

## 2022-04-14 NOTE — PROGRESS NOTES
Assessment:   Angela Beckham is a 71 year old y.o. female with past medical history significant for morbid obesity, type 2 diabetes mellitus, history of CVA (on Eliquis), atrial fibrillation, hypertension, left BKA, peripheral edema, mitral stenosis  who presents today for follow-up regarding thoracic and lumbar spine pain.  My review of an MRI lumbar spine shows mild edema of the paraspinous muscles, right greater than left, as well as left psoas which is likely the etiology of her pain.  There are no fractures in the thoracic or lumbar spine.  There is no high-grade spinal canal stenosis.  There is multilevel lumbar disc degeneration and facet arthropathy.  Patient is neurologically intact on exam.  She has a left BKA.       Plan:     A shared decision making plan was used.  The patient's values and choices were respected.  The following represents what was discussed and decided upon by the physician assistant and the patient.      1.  DIAGNOSTIC TESTS: I reviewed the MRI scans of the thoracic and lumbar spine.  No further diagnostic tests were ordered.    2.  PHYSICAL THERAPY: Patient has been referred to physical therapy at [facility].  She was given the phone number today to schedule.    3.  MEDICATIONS: No changes are made to the patient's medications.  She takes Tylenol 650 mg every 4 hours as needed.  She applies lidocaine.  She takes tizanidine 2 mg every 6 hours as needed.  She also applies a muscle cream.    4.  INTERVENTIONS: No interventions were ordered.    5.  PATIENT EDUCATION: Patient had several complaints about her skilled nursing facility including her diet.  I encouraged her to bring of her complaints with her nurse practitioner there.    6.  FOLLOW-UP:  I offered to follow-up with the patient in 2 months to monitor progress with physical therapy.  She declined.  She prefers to follow-up as needed.  If she has questions or concerns, she should not hesitate to call.    Subjective:     Angela CACERES  Chapincito is a 71 year old female who presents today for follow-up regarding bilateral thoracic and lumbar spine pain.  I saw the patient in consultation February 24, 2022.  I ordered MRI scans which will be described below.  She denies any change in her pain since she was last seen.    Patient complains of bilateral low back pain.  Pain spans across the low back.  Pain then radiates up the spine bilaterally toward the shoulder blades.  She denies any pain down the arms or legs.  She feels weak in her lower back.  She denies numbness or tingling down the legs.  Denies weakness in the legs.  She rates her pain today as a 7 out of 10.  At its worst it is a 10 out of 10.  At its best it is a 3 out of 10.  Pain is aggravated with being moved at the nursing home, sitting in her wheelchair.  Pain is alleviated with applying muscle cream.  She denies loss of bowel or bladder control.  Denies recent fevers.    Patient has had physical therapy at her nursing facility but she did not find it beneficial.  She has been referred to physical therapy at [facility] where she had a more positive experience.  This is not yet scheduled.  She takes Tylenol 650 mg every 4 hours as needed.  She takes tizanidine 2 mg every 6 hours as needed.  She applies lidocaine patches.  She also applies a muscle cream.  Medications are somewhat helpful.    Review of Systems:  Positive for weakness.  Negative for numbness/tingling, loss of bowel/bladder control, inability to urinate, headache, pain much worse at night, trip/stumble/falls, difficulty swallowing, difficulty with hand skills, fevers, unintentional weight loss.     Objective:   CONSTITUTIONAL:  Vital signs as above.  No acute distress.  The patient is well nourished and well groomed.    PSYCHIATRIC:  The patient is awake, alert, oriented to person, place and time.  The patient is answering questions appropriately with clear speech.  Normal affect.  HEENT: Normocephalic, atraumatic.  Sclera  clear.    SKIN:  Skin over the face, posterior torso, bilateral upper and lower extremities is clean, dry, intact without rashes.  VASCULAR: No significant lower extremity edema.  MUSCULOSKELETAL: Gait deferred.  Patient is in wheelchair.  She has a left BKA and is now wearing a prosthetic.  No significant tenderness to palpation thoracic or lumbar paraspinous muscles.  No tenderness palpation thoracic or lumbar spinous processes.     5/5 bilateral hip flexors, 5/5 bilateral knee flexors/extensors, 5/5 right ankle dorsi/plantar flexors, EHL.  Left BKA.  NEUROLOGICAL:  No ankle clonus right.  Sensation to light touch is intact in the bilateral L4, L5, and S1 dermatomes.       RESULTS:    I reviewed the MRI lumbar spine from [facility] dated April 8, 2022.  This shows some nonspecific mild edema of the paraspinous musculature, right greater than left, as well as left psoas.  No fluid collections.  There is multilevel disc degeneration and facet arthropathy.  At L4-5 there is moderate to severe left foraminal stenosis.  There is additional foraminal stenosis which is mild right L2-3, mild right L3-4, mild left L5-S1.

## 2022-04-15 ENCOUNTER — TELEPHONE (OUTPATIENT)
Dept: GERIATRICS | Facility: CLINIC | Age: 72
End: 2022-04-15

## 2022-04-15 ENCOUNTER — OFFICE VISIT (OUTPATIENT)
Dept: PHYSICAL MEDICINE AND REHAB | Facility: CLINIC | Age: 72
End: 2022-04-15
Payer: COMMERCIAL

## 2022-04-15 VITALS
HEART RATE: 104 BPM | BODY MASS INDEX: 28.88 KG/M2 | HEIGHT: 63 IN | DIASTOLIC BLOOD PRESSURE: 60 MMHG | WEIGHT: 163 LBS | SYSTOLIC BLOOD PRESSURE: 125 MMHG

## 2022-04-15 DIAGNOSIS — S39.012S STRAIN OF LUMBAR REGION, SEQUELA: Primary | ICD-10-CM

## 2022-04-15 LAB
ANION GAP SERPL CALCULATED.3IONS-SCNC: 13 MMOL/L (ref 5–18)
BUN SERPL-MCNC: 23 MG/DL (ref 8–28)
CALCIUM SERPL-MCNC: 10.5 MG/DL (ref 8.5–10.5)
CHLORIDE BLD-SCNC: 101 MMOL/L (ref 98–107)
CO2 SERPL-SCNC: 26 MMOL/L (ref 22–31)
CREAT SERPL-MCNC: 0.77 MG/DL (ref 0.6–1.1)
GFR SERPL CREATININE-BSD FRML MDRD: 81 ML/MIN/1.73M2
GLUCOSE BLD-MCNC: 149 MG/DL (ref 70–125)
HBA1C MFR BLD: 6.6 %
POTASSIUM BLD-SCNC: 4.5 MMOL/L (ref 3.5–5)
SODIUM SERPL-SCNC: 140 MMOL/L (ref 136–145)

## 2022-04-15 PROCEDURE — 99214 OFFICE O/P EST MOD 30 MIN: CPT | Performed by: PHYSICIAN ASSISTANT

## 2022-04-15 PROCEDURE — 36415 COLL VENOUS BLD VENIPUNCTURE: CPT | Mod: ORL | Performed by: FAMILY MEDICINE

## 2022-04-15 PROCEDURE — P9604 ONE-WAY ALLOW PRORATED TRIP: HCPCS | Mod: ORL | Performed by: FAMILY MEDICINE

## 2022-04-15 PROCEDURE — 83036 HEMOGLOBIN GLYCOSYLATED A1C: CPT | Mod: ORL | Performed by: FAMILY MEDICINE

## 2022-04-15 PROCEDURE — 82306 VITAMIN D 25 HYDROXY: CPT | Mod: ORL | Performed by: FAMILY MEDICINE

## 2022-04-15 PROCEDURE — 80048 BASIC METABOLIC PNL TOTAL CA: CPT | Mod: ORL | Performed by: FAMILY MEDICINE

## 2022-04-15 ASSESSMENT — PAIN SCALES - GENERAL: PAINLEVEL: SEVERE PAIN (7)

## 2022-04-15 NOTE — PATIENT INSTRUCTIONS
I recommend that you apply ice to your lower back for 20 minutes/h.  You can alternate this with heat for 20 minutes/h.    I recommend that you talk with the nurse practitioner at your living facility about your wishes for your diabetic diet and soft diet.    We have sent the referral to [facility] for physical therapy.  If you do not hear from them you can call them to schedule.

## 2022-04-15 NOTE — TELEPHONE ENCOUNTER
[facility] Triage Nurse Telephone Encounter    Provider: CEDRIC Ortiz  [place] [facility] [place] Facility Type:  [facility]    Caller: [place]  Call Back Number: [phone]    Allergies:    Allergies   Allergen Reactions     Amoxicillin Unknown     Oxycodone Other (See Comments)     Does not remember     Latex Rash        Reason for call: Nurse is reporting BMP results.  The Vitamin D and HgbA1c are still in process.      Verbal Order/Direction given by Provider: No new orders.  Flag HgbA1c and Vitamin D level to primary NP for next week.      Provider giving Order:  Ginny Johnson MD    Verbal Order given to: Evelin Campbell RN

## 2022-04-15 NOTE — LETTER
4/15/2022         RE: Angela Beckham  [facility]  [address] [address]        Dear Colleague,    Thank you for referring your patient, Angela Beckham, to the [facility] SPINE [facility] NEUROSURGERY. Please see a copy of my visit note below.    Assessment:   Angela Beckham is a 71 year old y.o. female with past medical history significant for morbid obesity, type 2 diabetes mellitus, history of CVA (on Eliquis), atrial fibrillation, hypertension, left BKA, peripheral edema, mitral stenosis  who presents today for follow-up regarding thoracic and lumbar spine pain.  My review of an MRI lumbar spine shows mild edema of the paraspinous muscles, right greater than left, as well as left psoas which is likely the etiology of her pain.  There are no fractures in the thoracic or lumbar spine.  There is no high-grade spinal canal stenosis.  There is multilevel lumbar disc degeneration and facet arthropathy.  Patient is neurologically intact on exam.  She has a left BKA.       Plan:     A shared decision making plan was used.  The patient's values and choices were respected.  The following represents what was discussed and decided upon by the physician assistant and the patient.      1.  DIAGNOSTIC TESTS: I reviewed the MRI scans of the thoracic and lumbar spine.  No further diagnostic tests were ordered.    2.  PHYSICAL THERAPY: Patient has been referred to physical therapy at [facility].  She was given the phone number today to schedule.    3.  MEDICATIONS: No changes are made to the patient's medications.  She takes Tylenol 650 mg every 4 hours as needed.  She applies lidocaine.  She takes tizanidine 2 mg every 6 hours as needed.  She also applies a muscle cream.    4.  INTERVENTIONS: No interventions were ordered.    5.  PATIENT EDUCATION: Patient had several complaints about her skilled nursing facility including her diet.  I encouraged her to bring of her complaints with her nurse  practitioner there.    6.  FOLLOW-UP:  I offered to follow-up with the patient in 2 months to monitor progress with physical therapy.  She declined.  She prefers to follow-up as needed.  If she has questions or concerns, she should not hesitate to call.    Subjective:     Angela Beckham is a 71 year old female who presents today for follow-up regarding bilateral thoracic and lumbar spine pain.  I saw the patient in consultation February 24, 2022.  I ordered MRI scans which will be described below.  She denies any change in her pain since she was last seen.    Patient complains of bilateral low back pain.  Pain spans across the low back.  Pain then radiates up the spine bilaterally toward the shoulder blades.  She denies any pain down the arms or legs.  She feels weak in her lower back.  She denies numbness or tingling down the legs.  Denies weakness in the legs.  She rates her pain today as a 7 out of 10.  At its worst it is a 10 out of 10.  At its best it is a 3 out of 10.  Pain is aggravated with being moved at the nursing home, sitting in her wheelchair.  Pain is alleviated with applying muscle cream.  She denies loss of bowel or bladder control.  Denies recent fevers.    Patient has had physical therapy at her nursing facility but she did not find it beneficial.  She has been referred to physical therapy at [facility] where she had a more positive experience.  This is not yet scheduled.  She takes Tylenol 650 mg every 4 hours as needed.  She takes tizanidine 2 mg every 6 hours as needed.  She applies lidocaine patches.  She also applies a muscle cream.  Medications are somewhat helpful.    Review of Systems:  Positive for weakness.  Negative for numbness/tingling, loss of bowel/bladder control, inability to urinate, headache, pain much worse at night, trip/stumble/falls, difficulty swallowing, difficulty with hand skills, fevers, unintentional weight loss.     Objective:   CONSTITUTIONAL:  Vital signs as above.   No acute distress.  The patient is well nourished and well groomed.    PSYCHIATRIC:  The patient is awake, alert, oriented to person, place and time.  The patient is answering questions appropriately with clear speech.  Normal affect.  HEENT: Normocephalic, atraumatic.  Sclera clear.    SKIN:  Skin over the face, posterior torso, bilateral upper and lower extremities is clean, dry, intact without rashes.  VASCULAR: No significant lower extremity edema.  MUSCULOSKELETAL: Gait deferred.  Patient is in wheelchair.  She has a left BKA and is now wearing a prosthetic.  No significant tenderness to palpation thoracic or lumbar paraspinous muscles.  No tenderness palpation thoracic or lumbar spinous processes.     5/5 bilateral hip flexors, 5/5 bilateral knee flexors/extensors, 5/5 right ankle dorsi/plantar flexors, EHL.  Left BKA.  NEUROLOGICAL:  No ankle clonus right.  Sensation to light touch is intact in the bilateral L4, L5, and S1 dermatomes.       RESULTS:    I reviewed the MRI lumbar spine from [facility] dated April 8, 2022.  This shows some nonspecific mild edema of the paraspinous musculature, right greater than left, as well as left psoas.  No fluid collections.  There is multilevel disc degeneration and facet arthropathy.  At L4-5 there is moderate to severe left foraminal stenosis.  There is additional foraminal stenosis which is mild right L2-3, mild right L3-4, mild left L5-S1.          Again, thank you for allowing me to participate in the care of your patient.        Sincerely,        Leslie Albert PA-C

## 2022-04-16 LAB — DEPRECATED CALCIDIOL+CALCIFEROL SERPL-MC: 102 UG/L (ref 20–75)

## 2022-04-18 ENCOUNTER — TELEPHONE (OUTPATIENT)
Dept: GERIATRICS | Facility: CLINIC | Age: 72
End: 2022-04-18
Payer: COMMERCIAL

## 2022-04-18 NOTE — TELEPHONE ENCOUNTER
[facility]     Provider: CEDRIC Ortiz  Facility: [facility]) Facility Type:  LT    Allergies   Allergen Reactions     Amoxicillin Unknown     Oxycodone Other (See Comments)     Does not remember     Latex Rash       Labs Reviewed by provider: Vitamin D from 4/15/22.       Verbal Order/Direction given by Provider: Discontinue Vitamin D supplement.  Check Vitamin D level on 6/1/22.      Provider giving Order:  Ginny Johnson MD    Verbal Order given to: Matilde Campbell RN

## 2022-04-19 ENCOUNTER — NURSE TRIAGE (OUTPATIENT)
Dept: NURSING | Facility: CLINIC | Age: 72
End: 2022-04-19
Payer: COMMERCIAL

## 2022-04-19 NOTE — TELEPHONE ENCOUNTER
Friend Jacqueline Salas is calling and states that someone from [facility] phoned her yesterday.  FNA relayed that there is no consent to communicate signed by patient for Jacqueline.  Jacqueline understood and states that she will wait and see if anyone phones her again.    COVID 19 Nurse Triage Plan/Patient Instructions    Please be aware that novel coronavirus (COVID-19) may be circulating in the community. If you develop symptoms such as fever, cough, or SOB or if you have concerns about the presence of another infection including coronavirus (COVID-19), please contact your health care provider or visit [email or web address].     Disposition/Instructions    Home care recommended. Follow home care protocol based instructions.    Thank you for taking steps to prevent the spread of this virus.  o Limit your contact with others.  o Wear a simple mask to cover your cough.  o Wash your hands well and often.    Resources    [facility]: About COVID-19: [email or web address]    CDC: What to Do If You're Sick: www.cdc.gov/coronavirus/2019-ncov/about/steps-when-sick.html    CDC: Ending Home Isolation: www.cdc.gov/coronavirus/2019-ncov/hcp/disposition-in-home-patients.html     CDC: Caring for Someone: www.cdc.gov/coronavirus/2019-ncov/if-you-are-sick/care-for-someone.html     [facility]: Interim Guidance for Hospital Discharge to Home: www.health.[facility].[place].us/diseases/coronavirus/hcp/hospdischarge.pdf    [facility] clinical trials (COVID-19 research studies): [email or web address]     Below are the COVID-19 hotlines at the [place] Department of Health ([facility]). Interpreters are available.   o For health questions: Call 825-506-6688 or 1-382.494.8414 (7 a.m. to 7 p.m.)  o For questions about schools and childcare: Call 741-066-0204 or 1-636.580.1284 (7 a.m. to 7 p.m.)

## 2022-04-20 ENCOUNTER — LAB REQUISITION (OUTPATIENT)
Dept: LAB | Facility: CLINIC | Age: 72
End: 2022-04-20
Payer: COMMERCIAL

## 2022-04-22 PROCEDURE — U0003 INFECTIOUS AGENT DETECTION BY NUCLEIC ACID (DNA OR RNA); SEVERE ACUTE RESPIRATORY SYNDROME CORONAVIRUS 2 (SARS-COV-2) (CORONAVIRUS DISEASE [COVID-19]), AMPLIFIED PROBE TECHNIQUE, MAKING USE OF HIGH THROUGHPUT TECHNOLOGIES AS DESCRIBED BY CMS-2020-01-R: HCPCS | Mod: ORL | Performed by: FAMILY MEDICINE

## 2022-04-23 ENCOUNTER — LAB REQUISITION (OUTPATIENT)
Dept: LAB | Facility: CLINIC | Age: 72
End: 2022-04-23
Payer: COMMERCIAL

## 2022-04-23 DIAGNOSIS — U07.1 COVID-19: ICD-10-CM

## 2022-04-23 LAB — SARS-COV-2 RNA RESP QL NAA+PROBE: NEGATIVE

## 2022-04-25 ENCOUNTER — LAB REQUISITION (OUTPATIENT)
Dept: LAB | Facility: CLINIC | Age: 72
End: 2022-04-25
Payer: COMMERCIAL

## 2022-04-25 DIAGNOSIS — U07.1 COVID-19: ICD-10-CM

## 2022-04-27 ENCOUNTER — LAB REQUISITION (OUTPATIENT)
Dept: LAB | Facility: CLINIC | Age: 72
End: 2022-04-27
Payer: COMMERCIAL

## 2022-04-27 DIAGNOSIS — U07.1 COVID-19: ICD-10-CM

## 2022-05-04 ENCOUNTER — LAB REQUISITION (OUTPATIENT)
Dept: LAB | Facility: CLINIC | Age: 72
End: 2022-05-04
Payer: COMMERCIAL

## 2022-05-04 DIAGNOSIS — U07.1 COVID-19: ICD-10-CM

## 2022-05-10 PROCEDURE — U0005 INFEC AGEN DETEC AMPLI PROBE: HCPCS | Mod: ORL | Performed by: FAMILY MEDICINE

## 2022-05-11 ENCOUNTER — NURSING HOME VISIT (OUTPATIENT)
Dept: GERIATRICS | Facility: CLINIC | Age: 72
End: 2022-05-11
Payer: COMMERCIAL

## 2022-05-11 ENCOUNTER — LAB REQUISITION (OUTPATIENT)
Dept: LAB | Facility: CLINIC | Age: 72
End: 2022-05-11
Payer: COMMERCIAL

## 2022-05-11 VITALS
BODY MASS INDEX: 30.55 KG/M2 | HEIGHT: 62 IN | HEART RATE: 96 BPM | OXYGEN SATURATION: 94 % | TEMPERATURE: 97.3 F | RESPIRATION RATE: 20 BRPM | WEIGHT: 166 LBS | SYSTOLIC BLOOD PRESSURE: 148 MMHG | DIASTOLIC BLOOD PRESSURE: 82 MMHG

## 2022-05-11 DIAGNOSIS — M54.50 CHRONIC RIGHT-SIDED LOW BACK PAIN WITHOUT SCIATICA: Primary | ICD-10-CM

## 2022-05-11 DIAGNOSIS — K64.8 INTERNAL HEMORRHOIDS: ICD-10-CM

## 2022-05-11 DIAGNOSIS — E11.59 TYPE 2 DIABETES MELLITUS WITH OTHER CIRCULATORY COMPLICATION, WITHOUT LONG-TERM CURRENT USE OF INSULIN (H): ICD-10-CM

## 2022-05-11 DIAGNOSIS — G89.29 CHRONIC RIGHT-SIDED LOW BACK PAIN WITHOUT SCIATICA: Primary | ICD-10-CM

## 2022-05-11 DIAGNOSIS — I48.20 ATRIAL FIBRILLATION, CHRONIC (H): ICD-10-CM

## 2022-05-11 DIAGNOSIS — Z86.73 HISTORY OF CVA (CEREBROVASCULAR ACCIDENT): ICD-10-CM

## 2022-05-11 DIAGNOSIS — I10 PRIMARY HYPERTENSION: ICD-10-CM

## 2022-05-11 DIAGNOSIS — U07.1 COVID-19: ICD-10-CM

## 2022-05-11 LAB — SARS-COV-2 RNA RESP QL NAA+PROBE: NEGATIVE

## 2022-05-11 PROCEDURE — 99309 SBSQ NF CARE MODERATE MDM 30: CPT | Performed by: NURSE PRACTITIONER

## 2022-05-11 RX ORDER — AMOXICILLIN 250 MG
1 CAPSULE ORAL DAILY
COMMUNITY
End: 2022-10-12

## 2022-05-11 RX ORDER — HYDROCORTISONE ACETATE SUPPOSITORY 30 MG/1
1 SUPPOSITORY RECTAL DAILY
COMMUNITY
Start: 2022-05-11 | End: 2022-05-18

## 2022-05-11 NOTE — PROGRESS NOTES
[facility] GERIATRICS  Chief Complaint   Patient presents with     group home Regulatory     [place] Medical Record Number:  [id number]  Place of Service where encounter took place:  [facility] () [[id number]]    HPI:    Angela Beckham  is 71 year old (1950), who is being seen today for a federally mandated E/M visit.  She has a past medical history for type 2 diabetes, osteomyelitis, left BKA, hypertension, peripheral edema and mitral stenosis, recent CVA and new onset Afib. She was hospitalized in Nov 2021 for an acute ischemic CVA.  She had poor progress with therapy and was unable to return home, thus transitioned to LTC.      She has been seen in the Spine clinic for ongoing back pain.  MRI done and showed mild edema of paraspinal musculature, right greater than left as well as the left psoas.  She was recommended to ice and alternate heat to reduce the edema.  She states this has not been consistent in the facility.  She continues with tizanidine and muscle cream with mild control.      She complains of issues with BM however stools are soft.  She endorses rectal spasm.     BS are acceptable, last A1c 6.6      ALLERGIES:Amoxicillin, Oxycodone, and Latex  PAST MEDICAL HISTORY:   Past Medical History:   Diagnosis Date     Diabetic neuropathy (H)      HTN      Type 2 diabetes mellitus (H)      PAST SURGICAL HISTORY:   has no past surgical history on file.  FAMILY HISTORY: family history includes Alzheimer Disease in her father; Cerebrovascular Disease in her maternal grandmother and mother; Diabetes in her paternal grandfather; Hypertension in her brother and mother; Obesity in her brother; Respiratory in her father.  SOCIAL HISTORY:  has an unknown smoking status. She has never used smokeless tobacco. She reports that she does not drink alcohol and does not use drugs.    MEDICATIONS:     Review of your medicines          Accurate as of May 11, 2022  3:12 PM. If you have any  questions, ask your nurse or doctor.            CONTINUE these medicines which have NOT CHANGED      Dose / Directions   acetaminophen 325 MG tablet  Commonly known as: TYLENOL      Dose: 650 mg  Take 650 mg by mouth every 4 hours as needed for mild pain  Refills: 0     ammonium lactate 12 % external cream  Commonly known as: AMLACTIN      Apply topically 2 times daily  Refills: 0     apixaban ANTICOAGULANT 5 MG tablet  Commonly known as: ELIQUIS      Dose: 5 mg  Take 5 mg by mouth 2 times daily  Refills: 0     atorvastatin 20 MG tablet  Commonly known as: LIPITOR      Dose: 20 mg  Take 20 mg by mouth daily  Refills: 0     cyanocobalamin 1000 MCG Subl sublingual tablet  Commonly known as: VITAMIN B-12      Dose: 1,000 mcg  Place 1,000 mcg under the tongue daily  Refills: 0     hydrocortisone 10 % rectal foam  Commonly known as: PROCTOCORT      Dose: 1 applicator  Place 1 applicator rectally 2 times daily  Refills: 0     Lidocaine 4 % Patch  Commonly known as: LIDOCARE      Dose: 1 patch  Place 1 patch onto the skin every 24 hours To prevent lidocaine toxicity, patient should be patch free for 12 hrs daily.  Refills: 0     melatonin 3 MG tablet      Dose: 1 mg  Take 1 mg by mouth nightly as needed for sleep  Refills: 0     metFORMIN 500 MG tablet  Commonly known as: GLUCOPHAGE      Dose: 500-1,000 mg  Take 500-1,000 mg by mouth 2 times daily (with meals) 1000mg with breakfast and 500mg with supper.  Refills: 0     metoprolol tartrate 100 MG tablet  Commonly known as: LOPRESSOR      Dose: 25 mg  Take 25 mg by mouth 2 times daily  Refills: 0     miconazole 2 % cream  Commonly known as: MICATIN      2 times daily  Refills: 0     Proctocort 30 MG Supp  Generic drug: hydrocortisone acetate      Dose: 1 suppository  Place 1 suppository rectally daily  Refills: 0     senna-docusate 8.6-50 MG tablet  Commonly known as: SENOKOT-S/PERICOLACE      Dose: 1 tablet  Take 1 tablet by mouth daily  Refills: 0     sodium chloride 0.65  "% nasal spray  Commonly known as: OCEAN      Dose: 1 spray  Spray 1 spray into both nostrils 4 times daily as needed for congestion  Refills: 0     tiZANidine 2 MG capsule  Commonly known as: ZANAFLEX      Dose: 2 mg  Take 2 mg by mouth every 6 hours as needed  Refills: 0     vitamin B-Complex      Dose: 1 tablet  Take 1 tablet by mouth daily  Refills: 0           Case Management:  I have reviewed the care plan and MDS and do agree with the plan. Patient's desire to return to the community is present, but is not able due to care needs . Information reviewed:  Medications, vital signs, orders, and nursing notes.    ROS:  Patient denies fever, chills, headache, lightheadedness, dizziness, rhinorrhea, cough, congestion, shortness of breath, chest pain, palpitations, abdominal pain, n/v, diarrhea, constipation, change in appetite, change in sleep pattern, dysuria, frequency, burning or pain with urination.  Other than stated in HPI all other review of systems is negative.       Exam:  Vital signs:BP (!) 148/82   Pulse 96   Temp 97.3  F (36.3  C)   Resp 20   Ht 1.575 m (5' 2\")   Wt 75.3 kg (166 lb)   SpO2 94%   BMI 30.36 kg/m     GENERAL APPEARANCE: Well developed, well nourished, in no acute distress.  HEENT: normocephalic, atraumatic   sclerae anicteric, conjunctivae clear and moist, EOM intact  LUNGS: Lung sounds CTA, no adventitious sounds, respiratory effort normal.  CARD:irregularly irregular, S1, S2, without murmurs, gallops, rubs  BACK: No kyphosis of the thoracic spine.  no spinal tenderness   ABD: Soft, nondistended and nontender with normal bowel sounds.   MSK: Muscle strength and tone were equal bilaterally. Moves all extremities easily and intentionally.   EXTREMITIES: left BKA, no BLE edema  NEURO: Alert and oriented x 3.Face is symmetric.  SKIN: Inspection of the skin reveals no rashes, ulcerations or petechiae.  PSYCH: euthymic          Lab/Diagnostic data:   Last Comprehensive Metabolic " Panel:  Sodium   Date Value Ref Range Status   04/15/2022 140 136 - 145 mmol/L Final     Potassium   Date Value Ref Range Status   04/15/2022 4.5 3.5 - 5.0 mmol/L Final     Chloride   Date Value Ref Range Status   04/15/2022 101 98 - 107 mmol/L Final     Carbon Dioxide (CO2)   Date Value Ref Range Status   04/15/2022 26 22 - 31 mmol/L Final     Anion Gap   Date Value Ref Range Status   04/15/2022 13 5 - 18 mmol/L Final     Glucose   Date Value Ref Range Status   04/15/2022 149 (H) 70 - 125 mg/dL Final     Urea Nitrogen   Date Value Ref Range Status   04/15/2022 23 8 - 28 mg/dL Final     Creatinine   Date Value Ref Range Status   04/15/2022 0.77 0.60 - 1.10 mg/dL Final   10/06/2009 0.81 0.52 - 1.04 mg/dL Final     Comment:     New IDMS-traceable calibration  beginning 5/1/08     GFR Estimate   Date Value Ref Range Status   04/15/2022 81 >60 mL/min/1.73m2 Final     Comment:     Effective December 21, 2021 eGFRcr in adults is calculated using the 2021 CKD-EPI creatinine equation which includes age and gender (Moe et al., NEJM, DOI: 10.1056/[id number])   10/06/2009 72 >60 mL/min/1.7m2 Final     Calcium   Date Value Ref Range Status   04/15/2022 10.5 8.5 - 10.5 mg/dL Final     Lab Results   Component Value Date    WBC 10.6 12/10/2021    WBC 6.9 10/06/2009     Lab Results   Component Value Date    RBC 4.84 12/10/2021    RBC 4.29 10/06/2009     Lab Results   Component Value Date    HGB 13.1 12/10/2021    HGB 11.8 10/06/2009     Lab Results   Component Value Date    HCT 41.3 12/10/2021    HCT 34.5 10/06/2009     Lab Results   Component Value Date    MCV 85 12/10/2021    MCV 81 10/06/2009     Lab Results   Component Value Date    MCH 27.1 12/10/2021    MCH 27.4 10/06/2009     Lab Results   Component Value Date    MCHC 31.7 12/10/2021    MCHC 34.0 10/06/2009     Lab Results   Component Value Date    RDW 14.1 12/10/2021    RDW 12.9 10/06/2009     Lab Results   Component Value Date     12/10/2021      10/06/2009         ASSESSMENT/PLAN  Chronic right-sided low back pain without sciatica  Order given to ice/heat x 6 hours/day x 7 days. Continue with topical cream and tizanidine.  Follow up with spine as needed    Type 2 diabetes mellitus with other circulatory complication, without long-term current use of insulin (H)  Controlled, A1c 6.6, will need a recheck in 6 months.  Continue with metformin    Atrial fibrillation, chronic (H)  Controlled, continue with metoprolol, AC on apixaban    Primary hypertension  Controlled, continue with metoprolol    History of CVA (cerebrovascular accident)  Stable, continue with no asa as on apixaban, continue with atorvasatin     Internal hemorrhoids  Start proctocort suppository x 7 days and increase senna S 2 tabs daily.       Electronically signed by:  Berenice Nolen NP

## 2022-05-11 NOTE — LETTER
5/11/2022        RE: Angela Beckham  [facility]  [address] [address]        [facility] GERIATRICS  Chief Complaint   Patient presents with     detention Regulatory     [place] Medical Record Number:  [id number]  Place of Service where encounter took place:  [facility] () [[id number]]    HPI:    Angela Beckham  is 71 year old (1950), who is being seen today for a federally mandated E/M visit.  She has a past medical history for type 2 diabetes, osteomyelitis, left BKA, hypertension, peripheral edema and mitral stenosis, recent CVA and new onset Afib. She was hospitalized in Nov 2021 for an acute ischemic CVA.  She had poor progress with therapy and was unable to return home, thus transitioned to LTC.      She has been seen in the Spine clinic for ongoing back pain.  MRI done and showed mild edema of paraspinal musculature, right greater than left as well as the left psoas.  She was recommended to ice and alternate heat to reduce the edema.  She states this has not been consistent in the facility.  She continues with tizanidine and muscle cream with mild control.      She complains of issues with BM however stools are soft.  She endorses rectal spasm.     BS are acceptable, last A1c 6.6      ALLERGIES:Amoxicillin, Oxycodone, and Latex  PAST MEDICAL HISTORY:   Past Medical History:   Diagnosis Date     Diabetic neuropathy (H)      HTN      Type 2 diabetes mellitus (H)      PAST SURGICAL HISTORY:   has no past surgical history on file.  FAMILY HISTORY: family history includes Alzheimer Disease in her father; Cerebrovascular Disease in her maternal grandmother and mother; Diabetes in her paternal grandfather; Hypertension in her brother and mother; Obesity in her brother; Respiratory in her father.  SOCIAL HISTORY:  has an unknown smoking status. She has never used smokeless tobacco. She reports that she does not drink alcohol and does not use  drugs.    MEDICATIONS:     Review of your medicines          Accurate as of May 11, 2022  3:12 PM. If you have any questions, ask your nurse or doctor.            CONTINUE these medicines which have NOT CHANGED      Dose / Directions   acetaminophen 325 MG tablet  Commonly known as: TYLENOL      Dose: 650 mg  Take 650 mg by mouth every 4 hours as needed for mild pain  Refills: 0     ammonium lactate 12 % external cream  Commonly known as: AMLACTIN      Apply topically 2 times daily  Refills: 0     apixaban ANTICOAGULANT 5 MG tablet  Commonly known as: ELIQUIS      Dose: 5 mg  Take 5 mg by mouth 2 times daily  Refills: 0     atorvastatin 20 MG tablet  Commonly known as: LIPITOR      Dose: 20 mg  Take 20 mg by mouth daily  Refills: 0     cyanocobalamin 1000 MCG Subl sublingual tablet  Commonly known as: VITAMIN B-12      Dose: 1,000 mcg  Place 1,000 mcg under the tongue daily  Refills: 0     hydrocortisone 10 % rectal foam  Commonly known as: PROCTOCORT      Dose: 1 applicator  Place 1 applicator rectally 2 times daily  Refills: 0     Lidocaine 4 % Patch  Commonly known as: LIDOCARE      Dose: 1 patch  Place 1 patch onto the skin every 24 hours To prevent lidocaine toxicity, patient should be patch free for 12 hrs daily.  Refills: 0     melatonin 3 MG tablet      Dose: 1 mg  Take 1 mg by mouth nightly as needed for sleep  Refills: 0     metFORMIN 500 MG tablet  Commonly known as: GLUCOPHAGE      Dose: 500-1,000 mg  Take 500-1,000 mg by mouth 2 times daily (with meals) 1000mg with breakfast and 500mg with supper.  Refills: 0     metoprolol tartrate 100 MG tablet  Commonly known as: LOPRESSOR      Dose: 25 mg  Take 25 mg by mouth 2 times daily  Refills: 0     miconazole 2 % cream  Commonly known as: MICATIN      2 times daily  Refills: 0     Proctocort 30 MG Supp  Generic drug: hydrocortisone acetate      Dose: 1 suppository  Place 1 suppository rectally daily  Refills: 0     senna-docusate 8.6-50 MG tablet  Commonly  "known as: SENOKOT-S/PERICOLACE      Dose: 1 tablet  Take 1 tablet by mouth daily  Refills: 0     sodium chloride 0.65 % nasal spray  Commonly known as: OCEAN      Dose: 1 spray  Spray 1 spray into both nostrils 4 times daily as needed for congestion  Refills: 0     tiZANidine 2 MG capsule  Commonly known as: ZANAFLEX      Dose: 2 mg  Take 2 mg by mouth every 6 hours as needed  Refills: 0     vitamin B-Complex      Dose: 1 tablet  Take 1 tablet by mouth daily  Refills: 0           Case Management:  I have reviewed the care plan and MDS and do agree with the plan. Patient's desire to return to the community is present, but is not able due to care needs . Information reviewed:  Medications, vital signs, orders, and nursing notes.    ROS:  Patient denies fever, chills, headache, lightheadedness, dizziness, rhinorrhea, cough, congestion, shortness of breath, chest pain, palpitations, abdominal pain, n/v, diarrhea, constipation, change in appetite, change in sleep pattern, dysuria, frequency, burning or pain with urination.  Other than stated in HPI all other review of systems is negative.       Exam:  Vital signs:BP (!) 148/82   Pulse 96   Temp 97.3  F (36.3  C)   Resp 20   Ht 1.575 m (5' 2\")   Wt 75.3 kg (166 lb)   SpO2 94%   BMI 30.36 kg/m     GENERAL APPEARANCE: Well developed, well nourished, in no acute distress.  HEENT: normocephalic, atraumatic   sclerae anicteric, conjunctivae clear and moist, EOM intact  LUNGS: Lung sounds CTA, no adventitious sounds, respiratory effort normal.  CARD:irregularly irregular, S1, S2, without murmurs, gallops, rubs  BACK: No kyphosis of the thoracic spine.  no spinal tenderness   ABD: Soft, nondistended and nontender with normal bowel sounds.   MSK: Muscle strength and tone were equal bilaterally. Moves all extremities easily and intentionally.   EXTREMITIES: left BKA, no BLE edema  NEURO: Alert and oriented x 3.Face is symmetric.  SKIN: Inspection of the skin reveals no " rashes, ulcerations or petechiae.  PSYCH: euthymic          Lab/Diagnostic data:   Last Comprehensive Metabolic Panel:  Sodium   Date Value Ref Range Status   04/15/2022 140 136 - 145 mmol/L Final     Potassium   Date Value Ref Range Status   04/15/2022 4.5 3.5 - 5.0 mmol/L Final     Chloride   Date Value Ref Range Status   04/15/2022 101 98 - 107 mmol/L Final     Carbon Dioxide (CO2)   Date Value Ref Range Status   04/15/2022 26 22 - 31 mmol/L Final     Anion Gap   Date Value Ref Range Status   04/15/2022 13 5 - 18 mmol/L Final     Glucose   Date Value Ref Range Status   04/15/2022 149 (H) 70 - 125 mg/dL Final     Urea Nitrogen   Date Value Ref Range Status   04/15/2022 23 8 - 28 mg/dL Final     Creatinine   Date Value Ref Range Status   04/15/2022 0.77 0.60 - 1.10 mg/dL Final   10/06/2009 0.81 0.52 - 1.04 mg/dL Final     Comment:     New IDMS-traceable calibration  beginning 5/1/08     GFR Estimate   Date Value Ref Range Status   04/15/2022 81 >60 mL/min/1.73m2 Final     Comment:     Effective December 21, 2021 eGFRcr in adults is calculated using the 2021 CKD-EPI creatinine equation which includes age and gender (Moe et al., NE, DOI: 10.1056/OGQRmb8285455)   10/06/2009 72 >60 mL/min/1.7m2 Final     Calcium   Date Value Ref Range Status   04/15/2022 10.5 8.5 - 10.5 mg/dL Final     Lab Results   Component Value Date    WBC 10.6 12/10/2021    WBC 6.9 10/06/2009     Lab Results   Component Value Date    RBC 4.84 12/10/2021    RBC 4.29 10/06/2009     Lab Results   Component Value Date    HGB 13.1 12/10/2021    HGB 11.8 10/06/2009     Lab Results   Component Value Date    HCT 41.3 12/10/2021    HCT 34.5 10/06/2009     Lab Results   Component Value Date    MCV 85 12/10/2021    MCV 81 10/06/2009     Lab Results   Component Value Date    MCH 27.1 12/10/2021    MCH 27.4 10/06/2009     Lab Results   Component Value Date    MCHC 31.7 12/10/2021    MCHC 34.0 10/06/2009     Lab Results   Component Value Date    RDW 14.1  12/10/2021    RDW 12.9 10/06/2009     Lab Results   Component Value Date     12/10/2021     10/06/2009         ASSESSMENT/PLAN  Chronic right-sided low back pain without sciatica  Order given to ice/heat x 6 hours/day x 7 days. Continue with topical cream and tizanidine.  Follow up with spine as needed    Type 2 diabetes mellitus with other circulatory complication, without long-term current use of insulin (H)  Controlled, A1c 6.6, will need a recheck in 6 months.  Continue with metformin    Atrial fibrillation, chronic (H)  Controlled, continue with metoprolol, AC on apixaban    Primary hypertension  Controlled, continue with metoprolol    History of CVA (cerebrovascular accident)  Stable, continue with no asa as on apixaban, continue with atorvasatin     Internal hemorrhoids  Start proctocort suppository x 7 days and increase senna S 2 tabs daily.       Electronically signed by:  Berenice Nolen NP              Sincerely,        Berenice Nolen NP

## 2022-05-17 PROCEDURE — U0003 INFECTIOUS AGENT DETECTION BY NUCLEIC ACID (DNA OR RNA); SEVERE ACUTE RESPIRATORY SYNDROME CORONAVIRUS 2 (SARS-COV-2) (CORONAVIRUS DISEASE [COVID-19]), AMPLIFIED PROBE TECHNIQUE, MAKING USE OF HIGH THROUGHPUT TECHNOLOGIES AS DESCRIBED BY CMS-2020-01-R: HCPCS | Mod: ORL | Performed by: FAMILY MEDICINE

## 2022-05-18 ENCOUNTER — LAB REQUISITION (OUTPATIENT)
Dept: LAB | Facility: CLINIC | Age: 72
End: 2022-05-18
Payer: COMMERCIAL

## 2022-05-18 DIAGNOSIS — U07.1 COVID-19: ICD-10-CM

## 2022-05-18 LAB — SARS-COV-2 RNA RESP QL NAA+PROBE: NEGATIVE

## 2022-05-20 PROCEDURE — U0005 INFEC AGEN DETEC AMPLI PROBE: HCPCS | Mod: ORL | Performed by: FAMILY MEDICINE

## 2022-05-21 ENCOUNTER — LAB REQUISITION (OUTPATIENT)
Dept: LAB | Facility: CLINIC | Age: 72
End: 2022-05-21
Payer: COMMERCIAL

## 2022-05-21 DIAGNOSIS — U07.1 COVID-19: ICD-10-CM

## 2022-05-21 LAB — SARS-COV-2 RNA RESP QL NAA+PROBE: NEGATIVE

## 2022-05-22 PROCEDURE — U0003 INFECTIOUS AGENT DETECTION BY NUCLEIC ACID (DNA OR RNA); SEVERE ACUTE RESPIRATORY SYNDROME CORONAVIRUS 2 (SARS-COV-2) (CORONAVIRUS DISEASE [COVID-19]), AMPLIFIED PROBE TECHNIQUE, MAKING USE OF HIGH THROUGHPUT TECHNOLOGIES AS DESCRIBED BY CMS-2020-01-R: HCPCS | Mod: ORL | Performed by: FAMILY MEDICINE

## 2022-05-23 ENCOUNTER — LAB REQUISITION (OUTPATIENT)
Dept: LAB | Facility: CLINIC | Age: 72
End: 2022-05-23
Payer: COMMERCIAL

## 2022-05-23 DIAGNOSIS — U07.1 COVID-19: ICD-10-CM

## 2022-05-23 LAB — SARS-COV-2 RNA RESP QL NAA+PROBE: NEGATIVE

## 2022-05-24 PROCEDURE — U0003 INFECTIOUS AGENT DETECTION BY NUCLEIC ACID (DNA OR RNA); SEVERE ACUTE RESPIRATORY SYNDROME CORONAVIRUS 2 (SARS-COV-2) (CORONAVIRUS DISEASE [COVID-19]), AMPLIFIED PROBE TECHNIQUE, MAKING USE OF HIGH THROUGHPUT TECHNOLOGIES AS DESCRIBED BY CMS-2020-01-R: HCPCS | Mod: ORL | Performed by: FAMILY MEDICINE

## 2022-05-25 ENCOUNTER — LAB REQUISITION (OUTPATIENT)
Dept: LAB | Facility: CLINIC | Age: 72
End: 2022-05-25
Payer: COMMERCIAL

## 2022-05-25 DIAGNOSIS — U07.1 COVID-19: ICD-10-CM

## 2022-05-25 LAB — SARS-COV-2 RNA RESP QL NAA+PROBE: NEGATIVE

## 2022-05-31 ENCOUNTER — LAB REQUISITION (OUTPATIENT)
Dept: LAB | Facility: CLINIC | Age: 72
End: 2022-05-31
Payer: COMMERCIAL

## 2022-05-31 DIAGNOSIS — E55.9 VITAMIN D DEFICIENCY, UNSPECIFIED: ICD-10-CM

## 2022-06-01 ENCOUNTER — TELEPHONE (OUTPATIENT)
Dept: GERIATRICS | Facility: CLINIC | Age: 72
End: 2022-06-01
Payer: COMMERCIAL

## 2022-06-01 ENCOUNTER — LAB REQUISITION (OUTPATIENT)
Dept: LAB | Facility: CLINIC | Age: 72
End: 2022-06-01
Payer: COMMERCIAL

## 2022-06-01 DIAGNOSIS — U07.1 COVID-19: ICD-10-CM

## 2022-06-01 LAB — DEPRECATED CALCIDIOL+CALCIFEROL SERPL-MC: 57 UG/L (ref 20–75)

## 2022-06-01 PROCEDURE — 36415 COLL VENOUS BLD VENIPUNCTURE: CPT | Mod: ORL | Performed by: NURSE PRACTITIONER

## 2022-06-01 PROCEDURE — 82306 VITAMIN D 25 HYDROXY: CPT | Mod: ORL | Performed by: NURSE PRACTITIONER

## 2022-06-01 PROCEDURE — P9604 ONE-WAY ALLOW PRORATED TRIP: HCPCS | Mod: ORL | Performed by: NURSE PRACTITIONER

## 2022-06-01 NOTE — TELEPHONE ENCOUNTER
[facility] Triage Nurse Telephone Encounter    Provider: CEDRIC Ortiz  Facility: [facility] Facility Type:  LTC      Call Back Number: [phone]    Allergies:    Allergies   Allergen Reactions     Amoxicillin Unknown     Oxycodone Other (See Comments)     Does not remember     Latex Rash        Reason for call: New orders per NP.      Verbal Order/Direction given by Provider: Put the Vitamin D level results in the communication book for NP to address tomorrow.      Provider giving Order:  CEDRIC Ortiz    Verbal Order given to: Deirdre Campbell RN

## 2022-06-03 PROCEDURE — U0005 INFEC AGEN DETEC AMPLI PROBE: HCPCS | Mod: ORL | Performed by: FAMILY MEDICINE

## 2022-06-04 ENCOUNTER — LAB REQUISITION (OUTPATIENT)
Dept: LAB | Facility: CLINIC | Age: 72
End: 2022-06-04
Payer: COMMERCIAL

## 2022-06-04 DIAGNOSIS — U07.1 COVID-19: ICD-10-CM

## 2022-06-04 LAB — SARS-COV-2 RNA RESP QL NAA+PROBE: NEGATIVE

## 2022-06-05 PROCEDURE — U0005 INFEC AGEN DETEC AMPLI PROBE: HCPCS | Mod: ORL | Performed by: FAMILY MEDICINE

## 2022-06-06 ENCOUNTER — LAB REQUISITION (OUTPATIENT)
Dept: LAB | Facility: CLINIC | Age: 72
End: 2022-06-06
Payer: COMMERCIAL

## 2022-06-06 DIAGNOSIS — U07.1 COVID-19: ICD-10-CM

## 2022-06-06 LAB — SARS-COV-2 RNA RESP QL NAA+PROBE: NEGATIVE

## 2022-06-07 PROCEDURE — U0005 INFEC AGEN DETEC AMPLI PROBE: HCPCS | Mod: ORL | Performed by: FAMILY MEDICINE

## 2022-06-08 ENCOUNTER — LAB REQUISITION (OUTPATIENT)
Dept: LAB | Facility: CLINIC | Age: 72
End: 2022-06-08
Payer: COMMERCIAL

## 2022-06-08 DIAGNOSIS — U07.1 COVID-19: ICD-10-CM

## 2022-06-08 LAB — SARS-COV-2 RNA RESP QL NAA+PROBE: NEGATIVE

## 2022-06-14 ENCOUNTER — LAB REQUISITION (OUTPATIENT)
Dept: LAB | Facility: CLINIC | Age: 72
End: 2022-06-14
Payer: COMMERCIAL

## 2022-06-14 PROCEDURE — U0005 INFEC AGEN DETEC AMPLI PROBE: HCPCS | Mod: ORL | Performed by: FAMILY MEDICINE

## 2022-06-15 LAB — SARS-COV-2 RNA RESP QL NAA+PROBE: NEGATIVE

## 2022-06-17 PROCEDURE — U0005 INFEC AGEN DETEC AMPLI PROBE: HCPCS | Mod: ORL | Performed by: FAMILY MEDICINE

## 2022-06-18 ENCOUNTER — LAB REQUISITION (OUTPATIENT)
Dept: LAB | Facility: CLINIC | Age: 72
End: 2022-06-18
Payer: COMMERCIAL

## 2022-06-18 DIAGNOSIS — U07.1 COVID-19: ICD-10-CM

## 2022-06-18 LAB — SARS-COV-2 RNA RESP QL NAA+PROBE: NEGATIVE

## 2022-06-19 ENCOUNTER — LAB REQUISITION (OUTPATIENT)
Dept: LAB | Facility: CLINIC | Age: 72
End: 2022-06-19
Payer: COMMERCIAL

## 2022-06-19 PROCEDURE — U0005 INFEC AGEN DETEC AMPLI PROBE: HCPCS | Mod: ORL | Performed by: FAMILY MEDICINE

## 2022-06-20 ENCOUNTER — LAB REQUISITION (OUTPATIENT)
Dept: LAB | Facility: CLINIC | Age: 72
End: 2022-06-20
Payer: COMMERCIAL

## 2022-06-20 DIAGNOSIS — U07.1 COVID-19: ICD-10-CM

## 2022-06-20 LAB — SARS-COV-2 RNA RESP QL NAA+PROBE: NEGATIVE

## 2022-06-21 PROCEDURE — U0005 INFEC AGEN DETEC AMPLI PROBE: HCPCS | Mod: ORL | Performed by: FAMILY MEDICINE

## 2022-06-22 ENCOUNTER — LAB REQUISITION (OUTPATIENT)
Dept: LAB | Facility: CLINIC | Age: 72
End: 2022-06-22
Payer: COMMERCIAL

## 2022-06-22 DIAGNOSIS — U07.1 COVID-19: ICD-10-CM

## 2022-06-22 LAB — SARS-COV-2 RNA RESP QL NAA+PROBE: NEGATIVE

## 2022-06-28 PROCEDURE — U0003 INFECTIOUS AGENT DETECTION BY NUCLEIC ACID (DNA OR RNA); SEVERE ACUTE RESPIRATORY SYNDROME CORONAVIRUS 2 (SARS-COV-2) (CORONAVIRUS DISEASE [COVID-19]), AMPLIFIED PROBE TECHNIQUE, MAKING USE OF HIGH THROUGHPUT TECHNOLOGIES AS DESCRIBED BY CMS-2020-01-R: HCPCS | Mod: ORL | Performed by: FAMILY MEDICINE

## 2022-06-29 ENCOUNTER — LAB REQUISITION (OUTPATIENT)
Dept: LAB | Facility: CLINIC | Age: 72
End: 2022-06-29
Payer: COMMERCIAL

## 2022-06-29 DIAGNOSIS — U07.1 COVID-19: ICD-10-CM

## 2022-06-30 LAB — SARS-COV-2 RNA RESP QL NAA+PROBE: NEGATIVE

## 2022-07-03 ENCOUNTER — LAB REQUISITION (OUTPATIENT)
Dept: LAB | Facility: CLINIC | Age: 72
End: 2022-07-03
Payer: COMMERCIAL

## 2022-07-03 DIAGNOSIS — U07.1 COVID-19: ICD-10-CM

## 2022-07-03 PROCEDURE — U0003 INFECTIOUS AGENT DETECTION BY NUCLEIC ACID (DNA OR RNA); SEVERE ACUTE RESPIRATORY SYNDROME CORONAVIRUS 2 (SARS-COV-2) (CORONAVIRUS DISEASE [COVID-19]), AMPLIFIED PROBE TECHNIQUE, MAKING USE OF HIGH THROUGHPUT TECHNOLOGIES AS DESCRIBED BY CMS-2020-01-R: HCPCS | Mod: ORL | Performed by: FAMILY MEDICINE

## 2022-07-04 LAB — SARS-COV-2 RNA RESP QL NAA+PROBE: NEGATIVE

## 2022-07-05 PROCEDURE — U0005 INFEC AGEN DETEC AMPLI PROBE: HCPCS | Mod: ORL | Performed by: FAMILY MEDICINE

## 2022-07-06 ENCOUNTER — LAB REQUISITION (OUTPATIENT)
Dept: LAB | Facility: CLINIC | Age: 72
End: 2022-07-06
Payer: COMMERCIAL

## 2022-07-06 DIAGNOSIS — U07.1 COVID-19: ICD-10-CM

## 2022-07-06 LAB — SARS-COV-2 RNA RESP QL NAA+PROBE: NEGATIVE

## 2022-07-12 DIAGNOSIS — I27.21 PULMONARY ARTERY HYPERTENSION (H): Primary | ICD-10-CM

## 2022-07-12 PROCEDURE — U0003 INFECTIOUS AGENT DETECTION BY NUCLEIC ACID (DNA OR RNA); SEVERE ACUTE RESPIRATORY SYNDROME CORONAVIRUS 2 (SARS-COV-2) (CORONAVIRUS DISEASE [COVID-19]), AMPLIFIED PROBE TECHNIQUE, MAKING USE OF HIGH THROUGHPUT TECHNOLOGIES AS DESCRIBED BY CMS-2020-01-R: HCPCS | Mod: ORL | Performed by: FAMILY MEDICINE

## 2022-07-12 RX ORDER — METOPROLOL TARTRATE 25 MG/1
TABLET, FILM COATED ORAL
Qty: 60 TABLET | Refills: 0 | Status: SHIPPED | OUTPATIENT
Start: 2022-07-12 | End: 2022-07-20

## 2022-07-13 ENCOUNTER — LAB REQUISITION (OUTPATIENT)
Dept: LAB | Facility: CLINIC | Age: 72
End: 2022-07-13
Payer: COMMERCIAL

## 2022-07-13 DIAGNOSIS — U07.1 COVID-19: ICD-10-CM

## 2022-07-14 LAB — SARS-COV-2 RNA RESP QL NAA+PROBE: NEGATIVE

## 2022-07-17 ENCOUNTER — LAB REQUISITION (OUTPATIENT)
Dept: LAB | Facility: CLINIC | Age: 72
End: 2022-07-17
Payer: COMMERCIAL

## 2022-07-17 DIAGNOSIS — U07.1 COVID-19: ICD-10-CM

## 2022-07-17 PROCEDURE — U0005 INFEC AGEN DETEC AMPLI PROBE: HCPCS | Mod: ORL | Performed by: FAMILY MEDICINE

## 2022-07-18 LAB — SARS-COV-2 RNA RESP QL NAA+PROBE: NEGATIVE

## 2022-07-19 PROCEDURE — U0003 INFECTIOUS AGENT DETECTION BY NUCLEIC ACID (DNA OR RNA); SEVERE ACUTE RESPIRATORY SYNDROME CORONAVIRUS 2 (SARS-COV-2) (CORONAVIRUS DISEASE [COVID-19]), AMPLIFIED PROBE TECHNIQUE, MAKING USE OF HIGH THROUGHPUT TECHNOLOGIES AS DESCRIBED BY CMS-2020-01-R: HCPCS | Mod: ORL

## 2022-07-19 NOTE — PROGRESS NOTES
[facility] GERIATRIC SERVICES       Patient Angela Beckham  MRN: [id number]        Reason for Visit     Chief Complaint   Patient presents with     Nursing Home Regulatory   follow-up dm; htn ;back pain    Code Status     CPR/Full code     Assessment     S/p ischemic rt MCA CVA  Cognitive impairment  atrial fibrillation   C-spine ligamentous injury  Persistent leukocytosis  Chronic lower extremity edema  Diabetes type 2   L BKA   Generalized weakness    Plan     Patient currently is a resident of long-term [facility].  She is currently bedbound and poorly ambulatory.  She continues to complain of low back pain which is limiting her ability to progress.  Topical pain gels as well as muscle relaxer including tizanidine has been added to her regimen.  MRI done did not show any significant finding other than mild edema of the paraspinous muscles.  Continue follow-up with spine clinic.  Blood pressures continue to be low this is a chronic issue for her.  She has atrial fibrillation and is on metoprolol heart rates appear to be stable.  Hold parameters have been given for this medication based on heart rates and blood pressures.  She is also on apixaban for anticoagulation  At baseline continues to be bedbound and does not have a prosthesis on today.  She is seeing psychology for concerns about diabetes recent note was reviewed and it seems her mood is stable they have recommended no further intervention for now.  Diabetic control adequate blood sugar checks at twice daily only last A1c shows adequate control at 6.6 all blood sugars are less than 150  Weights are stable    History     Patient is a very pleasant 71 year old female who is a resident of long-term care  Currently she remains bedbound and poorly ambulatory.  Imaging did reveal that she had acute ischemic CVA.  She also had acute encephalopathy with fluctuating level of consciousness.  Neurology was consulted.  Unfortunately in spite of pursuing optimal rehab she is  still wheelchair and bedbound.  She continues to require assistance with all ADL support  She also was found a new onset atrial fibrillation with rapid ventricular response.  She has been started on metoprolol.  Hr are stable  Saw cardiology and now scheduled for TAMAR> she recently had this done   Her blood pressures continue to be on the low side.  She has all parameters given for both heart rates and blood pressures for her metoprolol.  On anticoagulation with apixaban  Noted to have a C-spine ligamentous injury on CT imaging.  Neurosurgery consulted and she has been given an [place] collar.  She had significant edema of her bilateral lower extremity with worsening diuretics were resumed at discharge  Patient has a history of diabetes.  She is just on Metformin currently.  Last A1c was 6.5.  Recently seen in the spine clinic because of increased pain concerns in her low back  MRI did reveal some edema but no other high-grade spinal stenoses requiring any surgical intervention    Past Medical & Surgical History     PAST MEDICAL HISTORY:   Past Medical History:   Diagnosis Date     Diabetic neuropathy (H)      HTN      Type 2 diabetes mellitus (H)       PAST SURGICAL HISTORY:  natividad hnederson      Past Social History     Reviewed,  has an unknown smoking status. She has never used smokeless tobacco. She reports that she does not drink alcohol and does not use drugs.    Family History     Reviewed, and family history includes Alzheimer Disease in her father; Cerebrovascular Disease in her maternal grandmother and mother; Diabetes in her paternal grandfather; Hypertension in her brother and mother; Obesity in her brother; Respiratory in her father.    Medication List   Post Discharge Medication Reconciliation Status: Post Discharge Medication Reconciliation Status: discharge medications reconciled, continue medications without change.  Current Outpatient Medications   Medication     acetaminophen (TYLENOL) 325 MG tablet      ammonium lactate (AMLACTIN) 12 % external cream     apixaban ANTICOAGULANT (ELIQUIS) 5 MG tablet     atorvastatin (LIPITOR) 20 MG tablet     cyanocobalamin 1000 MCG SUBL     melatonin 3 MG tablet     METFORMIN  MG OR TABS     metoprolol tartrate (LOPRESSOR) 100 MG tablet     miconazole (MICATIN) 2 % cream     senna-docusate (SENOKOT-S/PERICOLACE) 8.6-50 MG tablet     tiZANidine (ZANAFLEX) 2 MG capsule     vitamin B-Complex     hydrocortisone (PROCTOCORT) 10 % rectal foam     Lidocaine (LIDOCARE) 4 % Patch     metoprolol tartrate (LOPRESSOR) 25 MG tablet     sodium chloride (OCEAN) 0.65 % nasal spray     No current facility-administered medications for this visit.       Allergies     Allergies   Allergen Reactions     Amoxicillin Unknown     Oxycodone Other (See Comments)     Does not remember     Latex Rash       Review of Systems   A comprehensive review of 14 systems was done. Pertinent findings noted here and in history of present illness. All the rest negative.  Constitutional: Negative.  Negative for fever, chills, she has  activity change, appetite change and fatigue.   HENT: Negative for congestion and facial swelling.    Eyes: Negative for photophobia, redness and visual disturbance.   Respiratory: Negative for cough and chest tightness.    Cardiovascular: Negative for chest pain, palpitations and leg swelling.   Gastrointestinal: Negative for nausea, diarrhea, constipation, blood in stool and abdominal distention.   Genitourinary: Negative.  Reporting incontinence  Musculoskeletal: Currently very weak and bedbound she cannot walk currently  SHE IS having back pain; staff using diathermy  Has seen spine clinic and does not need surgery  Skin: Negative.    Neurological: Negative for dizziness, tremors, syncope, weakness, light-headedness and headaches.   Denies any weakness from cva even though she is bed bound  Hematological: Does not bruise/bleed easily.   Psychiatric/Behavioral: Negative.  Affect is  "flat  HAS been seen by psychology which is helpful per pt        Physical Exam   /79   Pulse 87   Temp 97.8  F (36.6  C)   Resp 20   Ht 1.588 m (5' 2.5\")   Wt 73.9 kg (163 lb)   SpO2 98%   BMI 29.34 kg/m       Constitutional: Oriented to person, place, and time and appears well-developed.   HEENT:  Normocephalic and atraumatic.  Eyes: Conjunctivae and EOM are normal. Pupils are equal, round, and reactive to light. No discharge.  No scleral icterus. Nose normal. Mouth/Throat: Oropharynx is clear and moist. No oropharyngeal exudate.    NECK: Normal range of motion. Neck supple. No JVD present. No tracheal deviation present. No thyromegaly present.   CARDIOVASCULAR: Normal rate, regular rhythm and intact distal pulses.  Exam reveals no gallop and no friction rub.  Systolic murmur present.  PULMONARY: Effort normal and breath sounds normal. No respiratory distress.No Wheezing or rales.  ABDOMEN: Soft. Bowel sounds are normal. No distension and no mass.  There is no tenderness. There is no rebound and no guarding. No HSM.  MUSCULOSKELETAL: Normal range of motion. No edema and no tenderness. Mild kyphosis, no tenderness.  Has a left BKA; does not have any prostheses  Tender on paraspinal area  LYMPH NODES: Has no cervical, supraclavicular, axillary and groin adenopathy.   NEUROLOGICAL: Alert and oriented to person, place, and time. No cranial nerve deficit.  Normal muscle tone. Coordination normal.   GENITOURINARY: Deferred exam.  SKIN: Skin is warm and dry. No rash noted. No erythema. No pallor.   EXTREMITIES: No cyanosis, no clubbing, no edema. No Deformity.  PSYCHIATRIC: abNormal mood, affect and behavior.  Does not have much recall of recent events.       Lab Results     Last Comprehensive Metabolic Panel:  Sodium   Date Value Ref Range Status   04/15/2022 140 136 - 145 mmol/L Final     Potassium   Date Value Ref Range Status   04/15/2022 4.5 3.5 - 5.0 mmol/L Final     Chloride   Date Value Ref Range " Status   04/15/2022 101 98 - 107 mmol/L Final     Carbon Dioxide (CO2)   Date Value Ref Range Status   04/15/2022 26 22 - 31 mmol/L Final     Anion Gap   Date Value Ref Range Status   04/15/2022 13 5 - 18 mmol/L Final     Glucose   Date Value Ref Range Status   04/15/2022 149 (H) 70 - 125 mg/dL Final     Urea Nitrogen   Date Value Ref Range Status   04/15/2022 23 8 - 28 mg/dL Final     Creatinine   Date Value Ref Range Status   04/15/2022 0.77 0.60 - 1.10 mg/dL Final   10/06/2009 0.81 0.52 - 1.04 mg/dL Final     Comment:     New IDMS-traceable calibration  beginning 5/1/08     GFR Estimate   Date Value Ref Range Status   04/15/2022 81 >60 mL/min/1.73m2 Final     Comment:     Effective December 21, 2021 eGFRcr in adults is calculated using the 2021 CKD-EPI creatinine equation which includes age and gender (Moe et al., NEJM, DOI: 10.1056/MDXDrg6439587)   10/06/2009 72 >60 mL/min/1.7m2 Final     Calcium   Date Value Ref Range Status   04/15/2022 10.5 8.5 - 10.5 mg/dL Final       Electronically signed by    Ginny Johnson MD

## 2022-07-20 ENCOUNTER — NURSING HOME VISIT (OUTPATIENT)
Dept: GERIATRICS | Facility: CLINIC | Age: 72
End: 2022-07-20
Payer: COMMERCIAL

## 2022-07-20 VITALS
TEMPERATURE: 97.8 F | DIASTOLIC BLOOD PRESSURE: 79 MMHG | BODY MASS INDEX: 28.88 KG/M2 | WEIGHT: 163 LBS | HEART RATE: 87 BPM | RESPIRATION RATE: 20 BRPM | SYSTOLIC BLOOD PRESSURE: 118 MMHG | HEIGHT: 63 IN | OXYGEN SATURATION: 98 %

## 2022-07-20 DIAGNOSIS — I10 PRIMARY HYPERTENSION: ICD-10-CM

## 2022-07-20 DIAGNOSIS — M54.50 CHRONIC BILATERAL LOW BACK PAIN WITHOUT SCIATICA: ICD-10-CM

## 2022-07-20 DIAGNOSIS — E66.01 MORBID OBESITY (H): ICD-10-CM

## 2022-07-20 DIAGNOSIS — Z89.512 STATUS POST BELOW-KNEE AMPUTATION OF LEFT LOWER EXTREMITY (H): ICD-10-CM

## 2022-07-20 DIAGNOSIS — Z86.73 HISTORY OF CVA (CEREBROVASCULAR ACCIDENT): ICD-10-CM

## 2022-07-20 DIAGNOSIS — I48.20 ATRIAL FIBRILLATION, CHRONIC (H): ICD-10-CM

## 2022-07-20 DIAGNOSIS — G89.29 CHRONIC BILATERAL LOW BACK PAIN WITHOUT SCIATICA: ICD-10-CM

## 2022-07-20 DIAGNOSIS — E11.59 TYPE 2 DIABETES MELLITUS WITH OTHER CIRCULATORY COMPLICATION, WITHOUT LONG-TERM CURRENT USE OF INSULIN (H): Primary | ICD-10-CM

## 2022-07-20 PROCEDURE — 99309 SBSQ NF CARE MODERATE MDM 30: CPT | Performed by: FAMILY MEDICINE

## 2022-07-20 NOTE — LETTER
7/20/2022        RE: Angela Beckham  [facility]  [address] [id number]        [facility] GERIATRIC SERVICES       Patient Angela Beckham  MRN: [id number]        Reason for Visit     Chief Complaint   Patient presents with     Nursing Home Regulatory   follow-up dm; htn ;back pain    Code Status     CPR/Full code     Assessment     S/p ischemic rt MCA CVA  Cognitive impairment  atrial fibrillation   C-spine ligamentous injury  Persistent leukocytosis  Chronic lower extremity edema  Diabetes type 2   L BKA   Generalized weakness    Plan     Patient currently is a resident of long-term care.  She is currently bedbound and poorly ambulatory.  She continues to complain of low back pain which is limiting her ability to progress.  Topical pain gels as well as muscle relaxer including tizanidine has been added to her regimen.  MRI done did not show any significant finding other than mild edema of the paraspinous muscles.  Continue follow-up with spine clinic.  Blood pressures continue to be low this is a chronic issue for her.  She has atrial fibrillation and is on metoprolol heart rates appear to be stable.  Hold parameters have been given for this medication based on heart rates and blood pressures.  She is also on apixaban for anticoagulation  At baseline continues to be bedbound and does not have a prosthesis on today.  She is seeing psychology for concerns about diabetes recent note was reviewed and it seems her mood is stable they have recommended no further intervention for now.  Diabetic control adequate blood sugar checks at twice daily only last A1c shows adequate control at 6.6 all blood sugars are less than 150  Weights are stable    History     Patient is a very pleasant 71 year old female who is a resident of long-term care  Currently she remains bedbound and poorly ambulatory.  Imaging did reveal that she had acute ischemic CVA.  She also had acute encephalopathy with  fluctuating level of consciousness.  Neurology was consulted.  Unfortunately in spite of pursuing optimal rehab she is still wheelchair and bedbound.  She continues to require assistance with all ADL support  She also was found a new onset atrial fibrillation with rapid ventricular response.  She has been started on metoprolol.  Hr are stable  Saw cardiology and now scheduled for TAMAR> she recently had this done   Her blood pressures continue to be on the low side.  She has all parameters given for both heart rates and blood pressures for her metoprolol.  On anticoagulation with apixaban  Noted to have a C-spine ligamentous injury on CT imaging.  Neurosurgery consulted and she has been given an [place] collar.  She had significant edema of her bilateral lower extremity with worsening diuretics were resumed at discharge  Patient has a history of diabetes.  She is just on Metformin currently.  Last A1c was 6.5.  Recently seen in the spine clinic because of increased pain concerns in her low back  MRI did reveal some edema but no other high-grade spinal stenoses requiring any surgical intervention    Past Medical & Surgical History     PAST MEDICAL HISTORY:   Past Medical History:   Diagnosis Date     Diabetic neuropathy (H)      HTN      Type 2 diabetes mellitus (H)       PAST SURGICAL HISTORY:  natividad henderson      Past Social History     Reviewed,  has an unknown smoking status. She has never used smokeless tobacco. She reports that she does not drink alcohol and does not use drugs.    Family History     Reviewed, and family history includes Alzheimer Disease in her father; Cerebrovascular Disease in her maternal grandmother and mother; Diabetes in her paternal grandfather; Hypertension in her brother and mother; Obesity in her brother; Respiratory in her father.    Medication List   Post Discharge Medication Reconciliation Status: Post Discharge Medication Reconciliation Status: discharge medications reconciled, continue  medications without change.  Current Outpatient Medications   Medication     acetaminophen (TYLENOL) 325 MG tablet     ammonium lactate (AMLACTIN) 12 % external cream     apixaban ANTICOAGULANT (ELIQUIS) 5 MG tablet     atorvastatin (LIPITOR) 20 MG tablet     cyanocobalamin 1000 MCG SUBL     melatonin 3 MG tablet     METFORMIN  MG OR TABS     metoprolol tartrate (LOPRESSOR) 100 MG tablet     miconazole (MICATIN) 2 % cream     senna-docusate (SENOKOT-S/PERICOLACE) 8.6-50 MG tablet     tiZANidine (ZANAFLEX) 2 MG capsule     vitamin B-Complex     hydrocortisone (PROCTOCORT) 10 % rectal foam     Lidocaine (LIDOCARE) 4 % Patch     metoprolol tartrate (LOPRESSOR) 25 MG tablet     sodium chloride (OCEAN) 0.65 % nasal spray     No current facility-administered medications for this visit.       Allergies     Allergies   Allergen Reactions     Amoxicillin Unknown     Oxycodone Other (See Comments)     Does not remember     Latex Rash       Review of Systems   A comprehensive review of 14 systems was done. Pertinent findings noted here and in history of present illness. All the rest negative.  Constitutional: Negative.  Negative for fever, chills, she has  activity change, appetite change and fatigue.   HENT: Negative for congestion and facial swelling.    Eyes: Negative for photophobia, redness and visual disturbance.   Respiratory: Negative for cough and chest tightness.    Cardiovascular: Negative for chest pain, palpitations and leg swelling.   Gastrointestinal: Negative for nausea, diarrhea, constipation, blood in stool and abdominal distention.   Genitourinary: Negative.  Reporting incontinence  Musculoskeletal: Currently very weak and bedbound she cannot walk currently  SHE IS having back pain; staff using diathermy  Has seen spine clinic and does not need surgery  Skin: Negative.    Neurological: Negative for dizziness, tremors, syncope, weakness, light-headedness and headaches.   Denies any weakness from cva  "even though she is bed bound  Hematological: Does not bruise/bleed easily.   Psychiatric/Behavioral: Negative.  Affect is flat  HAS been seen by psychology which is helpful per pt        Physical Exam   /79   Pulse 87   Temp 97.8  F (36.6  C)   Resp 20   Ht 1.588 m (5' 2.5\")   Wt 73.9 kg (163 lb)   SpO2 98%   BMI 29.34 kg/m       Constitutional: Oriented to person, place, and time and appears well-developed.   HEENT:  Normocephalic and atraumatic.  Eyes: Conjunctivae and EOM are normal. Pupils are equal, round, and reactive to light. No discharge.  No scleral icterus. Nose normal. Mouth/Throat: Oropharynx is clear and moist. No oropharyngeal exudate.    NECK: Normal range of motion. Neck supple. No JVD present. No tracheal deviation present. No thyromegaly present.   CARDIOVASCULAR: Normal rate, regular rhythm and intact distal pulses.  Exam reveals no gallop and no friction rub.  Systolic murmur present.  PULMONARY: Effort normal and breath sounds normal. No respiratory distress.No Wheezing or rales.  ABDOMEN: Soft. Bowel sounds are normal. No distension and no mass.  There is no tenderness. There is no rebound and no guarding. No HSM.  MUSCULOSKELETAL: Normal range of motion. No edema and no tenderness. Mild kyphosis, no tenderness.  Has a left BKA; does not have any prostheses  Tender on paraspinal area  LYMPH NODES: Has no cervical, supraclavicular, axillary and groin adenopathy.   NEUROLOGICAL: Alert and oriented to person, place, and time. No cranial nerve deficit.  Normal muscle tone. Coordination normal.   GENITOURINARY: Deferred exam.  SKIN: Skin is warm and dry. No rash noted. No erythema. No pallor.   EXTREMITIES: No cyanosis, no clubbing, no edema. No Deformity.  PSYCHIATRIC: abNormal mood, affect and behavior.  Does not have much recall of recent events.       Lab Results     Last Comprehensive Metabolic Panel:  Sodium   Date Value Ref Range Status   04/15/2022 140 136 - 145 mmol/L Final "     Potassium   Date Value Ref Range Status   04/15/2022 4.5 3.5 - 5.0 mmol/L Final     Chloride   Date Value Ref Range Status   04/15/2022 101 98 - 107 mmol/L Final     Carbon Dioxide (CO2)   Date Value Ref Range Status   04/15/2022 26 22 - 31 mmol/L Final     Anion Gap   Date Value Ref Range Status   04/15/2022 13 5 - 18 mmol/L Final     Glucose   Date Value Ref Range Status   04/15/2022 149 (H) 70 - 125 mg/dL Final     Urea Nitrogen   Date Value Ref Range Status   04/15/2022 23 8 - 28 mg/dL Final     Creatinine   Date Value Ref Range Status   04/15/2022 0.77 0.60 - 1.10 mg/dL Final   10/06/2009 0.81 0.52 - 1.04 mg/dL Final     Comment:     New IDMS-traceable calibration  beginning 5/1/08     GFR Estimate   Date Value Ref Range Status   04/15/2022 81 >60 mL/min/1.73m2 Final     Comment:     Effective December 21, 2021 eGFRcr in adults is calculated using the 2021 CKD-EPI creatinine equation which includes age and gender (Moe et al., NEJM, DOI: 10.1056/CJCJrk5155671)   10/06/2009 72 >60 mL/min/1.7m2 Final     Calcium   Date Value Ref Range Status   04/15/2022 10.5 8.5 - 10.5 mg/dL Final       Electronically signed by    Ginny Johnson MD                             Sincerely,        OPAL Galarza

## 2022-07-27 ENCOUNTER — LAB REQUISITION (OUTPATIENT)
Dept: LAB | Facility: CLINIC | Age: 72
End: 2022-07-27
Payer: COMMERCIAL

## 2022-07-31 PROCEDURE — U0003 INFECTIOUS AGENT DETECTION BY NUCLEIC ACID (DNA OR RNA); SEVERE ACUTE RESPIRATORY SYNDROME CORONAVIRUS 2 (SARS-COV-2) (CORONAVIRUS DISEASE [COVID-19]), AMPLIFIED PROBE TECHNIQUE, MAKING USE OF HIGH THROUGHPUT TECHNOLOGIES AS DESCRIBED BY CMS-2020-01-R: HCPCS | Mod: ORL | Performed by: FAMILY MEDICINE

## 2022-08-01 ENCOUNTER — LAB REQUISITION (OUTPATIENT)
Dept: LAB | Facility: CLINIC | Age: 72
End: 2022-08-01
Payer: COMMERCIAL

## 2022-08-01 DIAGNOSIS — U07.1 COVID-19: ICD-10-CM

## 2022-08-02 LAB — SARS-COV-2 RNA RESP QL NAA+PROBE: NEGATIVE

## 2022-08-02 PROCEDURE — U0005 INFEC AGEN DETEC AMPLI PROBE: HCPCS | Mod: ORL | Performed by: FAMILY MEDICINE

## 2022-08-03 ENCOUNTER — LAB REQUISITION (OUTPATIENT)
Dept: LAB | Facility: CLINIC | Age: 72
End: 2022-08-03
Payer: COMMERCIAL

## 2022-08-03 LAB — SARS-COV-2 RNA RESP QL NAA+PROBE: NEGATIVE

## 2022-08-05 ENCOUNTER — LAB REQUISITION (OUTPATIENT)
Dept: LAB | Facility: CLINIC | Age: 72
End: 2022-08-05
Payer: COMMERCIAL

## 2022-08-05 PROCEDURE — U0005 INFEC AGEN DETEC AMPLI PROBE: HCPCS | Mod: ORL | Performed by: FAMILY MEDICINE

## 2022-08-06 LAB — SARS-COV-2 RNA RESP QL NAA+PROBE: NEGATIVE

## 2022-08-09 PROCEDURE — U0005 INFEC AGEN DETEC AMPLI PROBE: HCPCS | Mod: ORL | Performed by: FAMILY MEDICINE

## 2022-08-14 PROCEDURE — U0003 INFECTIOUS AGENT DETECTION BY NUCLEIC ACID (DNA OR RNA); SEVERE ACUTE RESPIRATORY SYNDROME CORONAVIRUS 2 (SARS-COV-2) (CORONAVIRUS DISEASE [COVID-19]), AMPLIFIED PROBE TECHNIQUE, MAKING USE OF HIGH THROUGHPUT TECHNOLOGIES AS DESCRIBED BY CMS-2020-01-R: HCPCS | Mod: ORL | Performed by: FAMILY MEDICINE

## 2022-08-15 ENCOUNTER — LAB REQUISITION (OUTPATIENT)
Dept: LAB | Facility: CLINIC | Age: 72
End: 2022-08-15
Payer: COMMERCIAL

## 2022-08-15 DIAGNOSIS — U07.1 COVID-19: ICD-10-CM

## 2022-08-16 LAB — SARS-COV-2 RNA RESP QL NAA+PROBE: NEGATIVE

## 2022-08-16 PROCEDURE — U0005 INFEC AGEN DETEC AMPLI PROBE: HCPCS | Mod: ORL | Performed by: FAMILY MEDICINE

## 2022-08-17 ENCOUNTER — LAB REQUISITION (OUTPATIENT)
Dept: LAB | Facility: CLINIC | Age: 72
End: 2022-08-17
Payer: COMMERCIAL

## 2022-08-17 DIAGNOSIS — U07.1 COVID-19: ICD-10-CM

## 2022-08-17 LAB — SARS-COV-2 RNA RESP QL NAA+PROBE: NEGATIVE

## 2022-08-19 ENCOUNTER — LAB REQUISITION (OUTPATIENT)
Dept: LAB | Facility: CLINIC | Age: 72
End: 2022-08-19
Payer: COMMERCIAL

## 2022-08-19 DIAGNOSIS — U07.1 COVID-19: ICD-10-CM

## 2022-08-19 PROCEDURE — U0003 INFECTIOUS AGENT DETECTION BY NUCLEIC ACID (DNA OR RNA); SEVERE ACUTE RESPIRATORY SYNDROME CORONAVIRUS 2 (SARS-COV-2) (CORONAVIRUS DISEASE [COVID-19]), AMPLIFIED PROBE TECHNIQUE, MAKING USE OF HIGH THROUGHPUT TECHNOLOGIES AS DESCRIBED BY CMS-2020-01-R: HCPCS | Mod: ORL | Performed by: FAMILY MEDICINE

## 2022-08-20 LAB — SARS-COV-2 RNA RESP QL NAA+PROBE: NEGATIVE

## 2022-08-21 ENCOUNTER — LAB REQUISITION (OUTPATIENT)
Dept: LAB | Facility: CLINIC | Age: 72
End: 2022-08-21
Payer: COMMERCIAL

## 2022-08-21 DIAGNOSIS — U07.1 COVID-19: ICD-10-CM

## 2022-08-21 PROCEDURE — U0003 INFECTIOUS AGENT DETECTION BY NUCLEIC ACID (DNA OR RNA); SEVERE ACUTE RESPIRATORY SYNDROME CORONAVIRUS 2 (SARS-COV-2) (CORONAVIRUS DISEASE [COVID-19]), AMPLIFIED PROBE TECHNIQUE, MAKING USE OF HIGH THROUGHPUT TECHNOLOGIES AS DESCRIBED BY CMS-2020-01-R: HCPCS | Mod: ORL | Performed by: FAMILY MEDICINE

## 2022-08-22 LAB — SARS-COV-2 RNA RESP QL NAA+PROBE: NEGATIVE

## 2022-08-23 PROCEDURE — U0005 INFEC AGEN DETEC AMPLI PROBE: HCPCS | Mod: ORL | Performed by: FAMILY MEDICINE

## 2022-08-24 ENCOUNTER — LAB REQUISITION (OUTPATIENT)
Dept: LAB | Facility: CLINIC | Age: 72
End: 2022-08-24
Payer: COMMERCIAL

## 2022-08-24 DIAGNOSIS — U07.1 COVID-19: ICD-10-CM

## 2022-08-25 LAB — SARS-COV-2 RNA RESP QL NAA+PROBE: NEGATIVE

## 2022-08-26 PROCEDURE — U0003 INFECTIOUS AGENT DETECTION BY NUCLEIC ACID (DNA OR RNA); SEVERE ACUTE RESPIRATORY SYNDROME CORONAVIRUS 2 (SARS-COV-2) (CORONAVIRUS DISEASE [COVID-19]), AMPLIFIED PROBE TECHNIQUE, MAKING USE OF HIGH THROUGHPUT TECHNOLOGIES AS DESCRIBED BY CMS-2020-01-R: HCPCS | Mod: ORL | Performed by: FAMILY MEDICINE

## 2022-08-27 ENCOUNTER — LAB REQUISITION (OUTPATIENT)
Dept: LAB | Facility: CLINIC | Age: 72
End: 2022-08-27
Payer: COMMERCIAL

## 2022-08-27 DIAGNOSIS — U07.1 COVID-19: ICD-10-CM

## 2022-08-28 ENCOUNTER — LAB REQUISITION (OUTPATIENT)
Dept: LAB | Facility: CLINIC | Age: 72
End: 2022-08-28
Payer: COMMERCIAL

## 2022-08-28 DIAGNOSIS — U07.1 COVID-19: ICD-10-CM

## 2022-08-28 LAB — SARS-COV-2 RNA RESP QL NAA+PROBE: NEGATIVE

## 2022-08-28 PROCEDURE — U0005 INFEC AGEN DETEC AMPLI PROBE: HCPCS | Mod: ORL | Performed by: FAMILY MEDICINE

## 2022-08-29 LAB — SARS-COV-2 RNA RESP QL NAA+PROBE: NEGATIVE

## 2022-08-30 ENCOUNTER — LAB REQUISITION (OUTPATIENT)
Dept: LAB | Facility: CLINIC | Age: 72
End: 2022-08-30
Payer: COMMERCIAL

## 2022-08-30 DIAGNOSIS — U07.1 COVID-19: ICD-10-CM

## 2022-09-02 ENCOUNTER — LAB REQUISITION (OUTPATIENT)
Dept: LAB | Facility: CLINIC | Age: 72
End: 2022-09-02
Payer: COMMERCIAL

## 2022-09-02 DIAGNOSIS — U07.1 COVID-19: ICD-10-CM

## 2022-09-02 PROCEDURE — U0005 INFEC AGEN DETEC AMPLI PROBE: HCPCS | Mod: ORL | Performed by: FAMILY MEDICINE

## 2022-09-03 LAB — SARS-COV-2 RNA RESP QL NAA+PROBE: NEGATIVE

## 2022-09-04 ENCOUNTER — LAB REQUISITION (OUTPATIENT)
Dept: LAB | Facility: CLINIC | Age: 72
End: 2022-09-04
Payer: COMMERCIAL

## 2022-09-04 DIAGNOSIS — U07.1 COVID-19: ICD-10-CM

## 2022-09-04 PROCEDURE — U0003 INFECTIOUS AGENT DETECTION BY NUCLEIC ACID (DNA OR RNA); SEVERE ACUTE RESPIRATORY SYNDROME CORONAVIRUS 2 (SARS-COV-2) (CORONAVIRUS DISEASE [COVID-19]), AMPLIFIED PROBE TECHNIQUE, MAKING USE OF HIGH THROUGHPUT TECHNOLOGIES AS DESCRIBED BY CMS-2020-01-R: HCPCS | Mod: ORL | Performed by: FAMILY MEDICINE

## 2022-09-05 LAB — SARS-COV-2 RNA RESP QL NAA+PROBE: NEGATIVE

## 2022-09-11 ENCOUNTER — LAB REQUISITION (OUTPATIENT)
Dept: LAB | Facility: CLINIC | Age: 72
End: 2022-09-11
Payer: COMMERCIAL

## 2022-09-11 DIAGNOSIS — U07.1 COVID-19: ICD-10-CM

## 2022-09-11 PROCEDURE — U0005 INFEC AGEN DETEC AMPLI PROBE: HCPCS | Mod: ORL | Performed by: FAMILY MEDICINE

## 2022-09-12 LAB — SARS-COV-2 RNA RESP QL NAA+PROBE: NEGATIVE

## 2022-09-13 PROCEDURE — U0003 INFECTIOUS AGENT DETECTION BY NUCLEIC ACID (DNA OR RNA); SEVERE ACUTE RESPIRATORY SYNDROME CORONAVIRUS 2 (SARS-COV-2) (CORONAVIRUS DISEASE [COVID-19]), AMPLIFIED PROBE TECHNIQUE, MAKING USE OF HIGH THROUGHPUT TECHNOLOGIES AS DESCRIBED BY CMS-2020-01-R: HCPCS | Mod: ORL | Performed by: FAMILY MEDICINE

## 2022-09-14 ENCOUNTER — NURSING HOME VISIT (OUTPATIENT)
Dept: GERIATRICS | Facility: CLINIC | Age: 72
End: 2022-09-14
Payer: COMMERCIAL

## 2022-09-14 ENCOUNTER — LAB REQUISITION (OUTPATIENT)
Dept: LAB | Facility: CLINIC | Age: 72
End: 2022-09-14
Payer: COMMERCIAL

## 2022-09-14 VITALS
DIASTOLIC BLOOD PRESSURE: 68 MMHG | WEIGHT: 159 LBS | OXYGEN SATURATION: 96 % | HEIGHT: 63 IN | SYSTOLIC BLOOD PRESSURE: 93 MMHG | BODY MASS INDEX: 28.17 KG/M2 | HEART RATE: 78 BPM | RESPIRATION RATE: 20 BRPM | TEMPERATURE: 97.7 F

## 2022-09-14 DIAGNOSIS — I48.20 ATRIAL FIBRILLATION, CHRONIC (H): ICD-10-CM

## 2022-09-14 DIAGNOSIS — I10 PRIMARY HYPERTENSION: Primary | ICD-10-CM

## 2022-09-14 DIAGNOSIS — M54.50 CHRONIC BILATERAL LOW BACK PAIN WITHOUT SCIATICA: ICD-10-CM

## 2022-09-14 DIAGNOSIS — E11.59 TYPE 2 DIABETES MELLITUS WITH OTHER CIRCULATORY COMPLICATION, WITHOUT LONG-TERM CURRENT USE OF INSULIN (H): ICD-10-CM

## 2022-09-14 DIAGNOSIS — E55.9 VITAMIN D DEFICIENCY, UNSPECIFIED: ICD-10-CM

## 2022-09-14 DIAGNOSIS — U07.1 COVID-19: ICD-10-CM

## 2022-09-14 DIAGNOSIS — Z86.73 HISTORY OF CVA (CEREBROVASCULAR ACCIDENT): ICD-10-CM

## 2022-09-14 DIAGNOSIS — G89.29 CHRONIC BILATERAL LOW BACK PAIN WITHOUT SCIATICA: ICD-10-CM

## 2022-09-14 DIAGNOSIS — E11.9 TYPE 2 DIABETES MELLITUS WITHOUT COMPLICATIONS (H): ICD-10-CM

## 2022-09-14 LAB — SARS-COV-2 RNA RESP QL NAA+PROBE: NEGATIVE

## 2022-09-14 PROCEDURE — 99309 SBSQ NF CARE MODERATE MDM 30: CPT | Performed by: NURSE PRACTITIONER

## 2022-09-14 NOTE — PROGRESS NOTES
[facility] GERIATRICS  Chief Complaint   Patient presents with     USP Regulatory     [place] Medical Record Number:  [id number]  Place of Service where encounter took place:  [facility] () [[id number]]    HPI:    Angela Beckham  is 72 year old (1950), who is being seen today for a federally mandated E/M visit. She has a past medical history for type 2 diabetes, osteomyelitis, left BKA, hypertension, peripheral edema and mitral stenosis, recent CVA and new onset Afib.  She is wheelchair bound.      Today, she states she was having cramping with 2 senna s per day and so she has been only taking 1 tab daily.  She reports her back pain has improved and only is exacerbated when she sits in her WC longer than 4 hours.     Bps running low, she denies any symptoms of hypotension.        ALLERGIES:Amoxicillin, Oxycodone, and Latex  PAST MEDICAL HISTORY:   Past Medical History:   Diagnosis Date     Diabetic neuropathy (H)      HTN      Type 2 diabetes mellitus (H)      PAST SURGICAL HISTORY:   has no past surgical history on file.  FAMILY HISTORY: family history includes Alzheimer Disease in her father; Cerebrovascular Disease in her maternal grandmother and mother; Diabetes in her paternal grandfather; Hypertension in her brother and mother; Obesity in her brother; Respiratory in her father.  SOCIAL HISTORY:  has an unknown smoking status. She has never used smokeless tobacco. She reports that she does not drink alcohol and does not use drugs.           Review of your medicines          Accurate as of September 14, 2022 12:51 PM. If you have any questions, ask your nurse or doctor.            CONTINUE these medicines which have NOT CHANGED      Dose / Directions   acetaminophen 325 MG tablet  Commonly known as: TYLENOL      Dose: 650 mg  Take 650 mg by mouth every 4 hours as needed for mild pain  Refills: 0     ammonium lactate 12 % external cream  Commonly known as: AMLACTIN      Apply  "topically 2 times daily  Refills: 0     apixaban ANTICOAGULANT 5 MG tablet  Commonly known as: ELIQUIS      Dose: 5 mg  Take 5 mg by mouth 2 times daily  Refills: 0     atorvastatin 20 MG tablet  Commonly known as: LIPITOR      Dose: 20 mg  Take 20 mg by mouth daily  Refills: 0     cyanocobalamin 1000 MCG Subl sublingual tablet  Commonly known as: VITAMIN B-12      Dose: 1,000 mcg  Place 1,000 mcg under the tongue daily  Refills: 0     metFORMIN 500 MG tablet  Commonly known as: GLUCOPHAGE      Dose: 500-1,000 mg  Take 500-1,000 mg by mouth 2 times daily (with meals) 1000mg with breakfast and 500mg with supper.  Refills: 0     miconazole 2 % cream  Commonly known as: MICATIN      2 times daily  Refills: 0     senna-docusate 8.6-50 MG tablet  Commonly known as: SENOKOT-S/PERICOLACE      Dose: 1 tablet  Take 1 tablet by mouth daily  Refills: 0     vitamin B-Complex      Dose: 1 tablet  Take 1 tablet by mouth daily  Refills: 0        STOP taking    melatonin 3 MG tablet  Stopped by: Berenice Nolen NP        metoprolol tartrate 100 MG tablet  Commonly known as: LOPRESSOR  Stopped by: Berenice Nolen NP        tiZANidine 2 MG capsule  Commonly known as: ZANAFLEX  Stopped by: Berenice Nolen NP               Case Management:  I have reviewed the care plan and MDS and do agree with the plan. Patient's desire to return to the community is present, but is not able due to care needs . Information reviewed:  Medications, vital signs, orders, and nursing notes.    ROS:  Patient denies fever, chills, headache, lightheadedness, dizziness, rhinorrhea, cough, congestion, shortness of breath, chest pain, palpitations, abdominal pain, n/v constipation, change in appetite, change in sleep pattern, dysuria, frequency, burning or pain with urination.  Other than stated in HPI all other review of systems is negative.       Exam:  Vital signs:BP 93/68   Pulse 78   Temp 97.7  F (36.5  C)   Resp 20   Ht 1.588 m (5' 2.5\")   Wt 72.1 " kg (159 lb)   SpO2 96%   BMI 28.62 kg/m     GENERAL APPEARANCE: Well developed, well nourished, in no acute distress.  HEENT: normocephalic, atraumatic   sclerae anicteric, conjunctivae clear and moist, EOM intact  LUNGS: Lung sounds CTA, no adventitious sounds, respiratory effort normal.  CARD: RRR, S1, S2, without murmurs, gallops, rubs  ABD: Soft, nondistended and nontender   MSK: Muscle strength and tone were equal bilaterally. Moves all extremities easily and intentionally.   EXTREMITIES: No cyanosis, clubbing or edema. Left BKA  NEURO: Alert and oriented x 3.  Face is symmetric.  SKIN: Inspection of the skin reveals no rashes, ulcerations or petechiae.  PSYCH: euthymic          Lab/Diagnostic data:   Last Comprehensive Metabolic Panel:  Sodium   Date Value Ref Range Status   04/15/2022 140 136 - 145 mmol/L Final     Potassium   Date Value Ref Range Status   04/15/2022 4.5 3.5 - 5.0 mmol/L Final     Chloride   Date Value Ref Range Status   04/15/2022 101 98 - 107 mmol/L Final     Carbon Dioxide (CO2)   Date Value Ref Range Status   04/15/2022 26 22 - 31 mmol/L Final     Anion Gap   Date Value Ref Range Status   04/15/2022 13 5 - 18 mmol/L Final     Glucose   Date Value Ref Range Status   04/15/2022 149 (H) 70 - 125 mg/dL Final     Urea Nitrogen   Date Value Ref Range Status   04/15/2022 23 8 - 28 mg/dL Final     Creatinine   Date Value Ref Range Status   04/15/2022 0.77 0.60 - 1.10 mg/dL Final   10/06/2009 0.81 0.52 - 1.04 mg/dL Final     Comment:     New IDMS-traceable calibration  beginning 5/1/08     GFR Estimate   Date Value Ref Range Status   04/15/2022 81 >60 mL/min/1.73m2 Final     Comment:     Effective December 21, 2021 eGFRcr in adults is calculated using the 2021 CKD-EPI creatinine equation which includes age and gender (Moe zhang al., NEJM, DOI: 10.1056/PRCMqo3960491)   10/06/2009 72 >60 mL/min/1.7m2 Final     Calcium   Date Value Ref Range Status   04/15/2022 10.5 8.5 - 10.5 mg/dL Final     Lab  Results   Component Value Date    WBC 10.6 12/10/2021    WBC 6.9 10/06/2009     Lab Results   Component Value Date    RBC 4.84 12/10/2021    RBC 4.29 10/06/2009     Lab Results   Component Value Date    HGB 13.1 12/10/2021    HGB 11.8 10/06/2009     Lab Results   Component Value Date    HCT 41.3 12/10/2021    HCT 34.5 10/06/2009     Lab Results   Component Value Date    MCV 85 12/10/2021    MCV 81 10/06/2009     Lab Results   Component Value Date    MCH 27.1 12/10/2021    MCH 27.4 10/06/2009     Lab Results   Component Value Date    MCHC 31.7 12/10/2021    MCHC 34.0 10/06/2009     Lab Results   Component Value Date    RDW 14.1 12/10/2021    RDW 12.9 10/06/2009     Lab Results   Component Value Date     12/10/2021     10/06/2009         ASSESSMENT/PLAN  Primary hypertension  Hypotension, stop metoprolol and monitor BP and HR daily x 14 days.      Atrial fibrillation, chronic (H)  Controlled, stopping metoprolol for hypotension.  Monitor HR, continue on apixaban    Type 2 diabetes mellitus with other circulatory complication, without long-term current use of insulin (H)  Check A1c, continue with metformin    History of CVA (cerebrovascular accident)  Stable, continue with apixaban and atorvastatin    Chronic bilateral low back pain without sciatica  Improved, discontinue tizanidine.     Discontinue Melatonin  Obtain cologuard for colon Ca screening.  At this time she would like to forego a mammogram.       Electronically signed by:  Berenice Nolen NP

## 2022-09-14 NOTE — LETTER
9/14/2022        RE: Angela Beckham  [facility]  [address] [address]        [facility] GERIATRICS  Chief Complaint   Patient presents with     custodial Regulatory     [place] Medical Record Number:  [id number]  Place of Service where encounter took place:  [facility] () [[id number]]    HPI:    Angela Beckham  is 72 year old (1950), who is being seen today for a federally mandated E/M visit. She has a past medical history for type 2 diabetes, osteomyelitis, left BKA, hypertension, peripheral edema and mitral stenosis, recent CVA and new onset Afib.  She is wheelchair bound.      Today, she states she was having cramping with 2 senna s per day and so she has been only taking 1 tab daily.  She reports her back pain has improved and only is exacerbated when she sits in her WC longer than 4 hours.     Bps running low, she denies any symptoms of hypotension.        ALLERGIES:Amoxicillin, Oxycodone, and Latex  PAST MEDICAL HISTORY:   Past Medical History:   Diagnosis Date     Diabetic neuropathy (H)      HTN      Type 2 diabetes mellitus (H)      PAST SURGICAL HISTORY:   has no past surgical history on file.  FAMILY HISTORY: family history includes Alzheimer Disease in her father; Cerebrovascular Disease in her maternal grandmother and mother; Diabetes in her paternal grandfather; Hypertension in her brother and mother; Obesity in her brother; Respiratory in her father.  SOCIAL HISTORY:  has an unknown smoking status. She has never used smokeless tobacco. She reports that she does not drink alcohol and does not use drugs.           Review of your medicines          Accurate as of September 14, 2022 12:51 PM. If you have any questions, ask your nurse or doctor.            CONTINUE these medicines which have NOT CHANGED      Dose / Directions   acetaminophen 325 MG tablet  Commonly known as: TYLENOL      Dose: 650 mg  Take 650 mg by mouth every 4 hours as  needed for mild pain  Refills: 0     ammonium lactate 12 % external cream  Commonly known as: AMLACTIN      Apply topically 2 times daily  Refills: 0     apixaban ANTICOAGULANT 5 MG tablet  Commonly known as: ELIQUIS      Dose: 5 mg  Take 5 mg by mouth 2 times daily  Refills: 0     atorvastatin 20 MG tablet  Commonly known as: LIPITOR      Dose: 20 mg  Take 20 mg by mouth daily  Refills: 0     cyanocobalamin 1000 MCG Subl sublingual tablet  Commonly known as: VITAMIN B-12      Dose: 1,000 mcg  Place 1,000 mcg under the tongue daily  Refills: 0     metFORMIN 500 MG tablet  Commonly known as: GLUCOPHAGE      Dose: 500-1,000 mg  Take 500-1,000 mg by mouth 2 times daily (with meals) 1000mg with breakfast and 500mg with supper.  Refills: 0     miconazole 2 % cream  Commonly known as: MICATIN      2 times daily  Refills: 0     senna-docusate 8.6-50 MG tablet  Commonly known as: SENOKOT-S/PERICOLACE      Dose: 1 tablet  Take 1 tablet by mouth daily  Refills: 0     vitamin B-Complex      Dose: 1 tablet  Take 1 tablet by mouth daily  Refills: 0        STOP taking    melatonin 3 MG tablet  Stopped by: Berenice Nolen NP        metoprolol tartrate 100 MG tablet  Commonly known as: LOPRESSOR  Stopped by: Berenice Nolen NP        tiZANidine 2 MG capsule  Commonly known as: ZANAFLEX  Stopped by: Berenice Nolen NP               Case Management:  I have reviewed the care plan and MDS and do agree with the plan. Patient's desire to return to the community is present, but is not able due to care needs . Information reviewed:  Medications, vital signs, orders, and nursing notes.    ROS:  Patient denies fever, chills, headache, lightheadedness, dizziness, rhinorrhea, cough, congestion, shortness of breath, chest pain, palpitations, abdominal pain, n/v constipation, change in appetite, change in sleep pattern, dysuria, frequency, burning or pain with urination.  Other than stated in HPI all other review of systems is negative.  "      Exam:  Vital signs:BP 93/68   Pulse 78   Temp 97.7  F (36.5  C)   Resp 20   Ht 1.588 m (5' 2.5\")   Wt 72.1 kg (159 lb)   SpO2 96%   BMI 28.62 kg/m     GENERAL APPEARANCE: Well developed, well nourished, in no acute distress.  HEENT: normocephalic, atraumatic   sclerae anicteric, conjunctivae clear and moist, EOM intact  LUNGS: Lung sounds CTA, no adventitious sounds, respiratory effort normal.  CARD: RRR, S1, S2, without murmurs, gallops, rubs  ABD: Soft, nondistended and nontender   MSK: Muscle strength and tone were equal bilaterally. Moves all extremities easily and intentionally.   EXTREMITIES: No cyanosis, clubbing or edema. Left BKA  NEURO: Alert and oriented x 3.  Face is symmetric.  SKIN: Inspection of the skin reveals no rashes, ulcerations or petechiae.  PSYCH: euthymic          Lab/Diagnostic data:   Last Comprehensive Metabolic Panel:  Sodium   Date Value Ref Range Status   04/15/2022 140 136 - 145 mmol/L Final     Potassium   Date Value Ref Range Status   04/15/2022 4.5 3.5 - 5.0 mmol/L Final     Chloride   Date Value Ref Range Status   04/15/2022 101 98 - 107 mmol/L Final     Carbon Dioxide (CO2)   Date Value Ref Range Status   04/15/2022 26 22 - 31 mmol/L Final     Anion Gap   Date Value Ref Range Status   04/15/2022 13 5 - 18 mmol/L Final     Glucose   Date Value Ref Range Status   04/15/2022 149 (H) 70 - 125 mg/dL Final     Urea Nitrogen   Date Value Ref Range Status   04/15/2022 23 8 - 28 mg/dL Final     Creatinine   Date Value Ref Range Status   04/15/2022 0.77 0.60 - 1.10 mg/dL Final   10/06/2009 0.81 0.52 - 1.04 mg/dL Final     Comment:     New IDMS-traceable calibration  beginning 5/1/08     GFR Estimate   Date Value Ref Range Status   04/15/2022 81 >60 mL/min/1.73m2 Final     Comment:     Effective December 21, 2021 eGFRcr in adults is calculated using the 2021 CKD-EPI creatinine equation which includes age and gender (Moe et al., NEJ, DOI: 10.1056/OHROdg5312771)   10/06/2009 " 72 >60 mL/min/1.7m2 Final     Calcium   Date Value Ref Range Status   04/15/2022 10.5 8.5 - 10.5 mg/dL Final     Lab Results   Component Value Date    WBC 10.6 12/10/2021    WBC 6.9 10/06/2009     Lab Results   Component Value Date    RBC 4.84 12/10/2021    RBC 4.29 10/06/2009     Lab Results   Component Value Date    HGB 13.1 12/10/2021    HGB 11.8 10/06/2009     Lab Results   Component Value Date    HCT 41.3 12/10/2021    HCT 34.5 10/06/2009     Lab Results   Component Value Date    MCV 85 12/10/2021    MCV 81 10/06/2009     Lab Results   Component Value Date    MCH 27.1 12/10/2021    MCH 27.4 10/06/2009     Lab Results   Component Value Date    MCHC 31.7 12/10/2021    MCHC 34.0 10/06/2009     Lab Results   Component Value Date    RDW 14.1 12/10/2021    RDW 12.9 10/06/2009     Lab Results   Component Value Date     12/10/2021     10/06/2009         ASSESSMENT/PLAN  Primary hypertension  Hypotension, stop metoprolol and monitor BP and HR daily x 14 days.      Atrial fibrillation, chronic (H)  Controlled, stopping metoprolol for hypotension.  Monitor HR, continue on apixaban    Type 2 diabetes mellitus with other circulatory complication, without long-term current use of insulin (H)  Check A1c, continue with metformin    History of CVA (cerebrovascular accident)  Stable, continue with apixaban and atorvastatin    Chronic bilateral low back pain without sciatica  Improved, discontinue tizanidine.     Discontinue Melatonin  Obtain cologuard for colon Ca screening.  At this time she would like to forego a mammogram.       Electronically signed by:  Berenice Nolen NP              Sincerely,        Berenice Nolen NP

## 2022-09-15 ENCOUNTER — TELEPHONE (OUTPATIENT)
Dept: GERIATRICS | Facility: CLINIC | Age: 72
End: 2022-09-15

## 2022-09-15 LAB
ANION GAP SERPL CALCULATED.3IONS-SCNC: 11 MMOL/L (ref 7–15)
BUN SERPL-MCNC: 21.9 MG/DL (ref 8–23)
CALCIUM SERPL-MCNC: 9.4 MG/DL (ref 8.8–10.2)
CHLORIDE SERPL-SCNC: 102 MMOL/L (ref 98–107)
CREAT SERPL-MCNC: 0.56 MG/DL (ref 0.51–0.95)
DEPRECATED CALCIDIOL+CALCIFEROL SERPL-MC: 42 UG/L (ref 20–75)
DEPRECATED HCO3 PLAS-SCNC: 26 MMOL/L (ref 22–29)
ERYTHROCYTE [DISTWIDTH] IN BLOOD BY AUTOMATED COUNT: 16.1 % (ref 10–15)
GFR SERPL CREATININE-BSD FRML MDRD: >90 ML/MIN/1.73M2
GLUCOSE SERPL-MCNC: 83 MG/DL (ref 70–99)
HBA1C MFR BLD: 6.4 %
HCT VFR BLD AUTO: 39.4 % (ref 35–47)
HGB BLD-MCNC: 12.1 G/DL (ref 11.7–15.7)
MCH RBC QN AUTO: 26.2 PG (ref 26.5–33)
MCHC RBC AUTO-ENTMCNC: 30.7 G/DL (ref 31.5–36.5)
MCV RBC AUTO: 86 FL (ref 78–100)
PLATELET # BLD AUTO: 183 10E3/UL (ref 150–450)
POTASSIUM SERPL-SCNC: 4.3 MMOL/L (ref 3.4–5.3)
RBC # BLD AUTO: 4.61 10E6/UL (ref 3.8–5.2)
SODIUM SERPL-SCNC: 139 MMOL/L (ref 136–145)
WBC # BLD AUTO: 6.8 10E3/UL (ref 4–11)

## 2022-09-15 PROCEDURE — 36415 COLL VENOUS BLD VENIPUNCTURE: CPT | Mod: ORL | Performed by: NURSE PRACTITIONER

## 2022-09-15 PROCEDURE — 85027 COMPLETE CBC AUTOMATED: CPT | Mod: ORL | Performed by: NURSE PRACTITIONER

## 2022-09-15 PROCEDURE — 80048 BASIC METABOLIC PNL TOTAL CA: CPT | Mod: ORL | Performed by: NURSE PRACTITIONER

## 2022-09-15 PROCEDURE — 82306 VITAMIN D 25 HYDROXY: CPT | Mod: ORL | Performed by: NURSE PRACTITIONER

## 2022-09-15 PROCEDURE — 83036 HEMOGLOBIN GLYCOSYLATED A1C: CPT | Mod: ORL | Performed by: NURSE PRACTITIONER

## 2022-09-15 PROCEDURE — P9604 ONE-WAY ALLOW PRORATED TRIP: HCPCS | Mod: ORL | Performed by: NURSE PRACTITIONER

## 2022-09-15 NOTE — TELEPHONE ENCOUNTER
[facility] Lab Note     Provider: CEDRIC Ortiz  Facility: [facility]) Facility Type:  LT    Allergies   Allergen Reactions     Amoxicillin Unknown     Oxycodone Other (See Comments)     Does not remember     Latex Rash       Labs Reviewed by provider: Heme 2(BMP, HgbA1c, Vitamin D are still in process)     Verbal Order/Direction given by Provider: No new orders.  Put other labs in the NP folder to be reviewed tomorrow.  Only call on-call with critical values.      Provider giving Order:  CEDRIC Ortiz    Verbal Order given to: Nikki([phone])    Willie Campbell RN

## 2022-09-18 ENCOUNTER — LAB REQUISITION (OUTPATIENT)
Dept: LAB | Facility: CLINIC | Age: 72
End: 2022-09-18
Payer: COMMERCIAL

## 2022-09-18 DIAGNOSIS — U07.1 COVID-19: ICD-10-CM

## 2022-09-18 PROCEDURE — U0003 INFECTIOUS AGENT DETECTION BY NUCLEIC ACID (DNA OR RNA); SEVERE ACUTE RESPIRATORY SYNDROME CORONAVIRUS 2 (SARS-COV-2) (CORONAVIRUS DISEASE [COVID-19]), AMPLIFIED PROBE TECHNIQUE, MAKING USE OF HIGH THROUGHPUT TECHNOLOGIES AS DESCRIBED BY CMS-2020-01-R: HCPCS | Mod: ORL | Performed by: FAMILY MEDICINE

## 2022-09-19 LAB — SARS-COV-2 RNA RESP QL NAA+PROBE: NEGATIVE

## 2022-09-21 ENCOUNTER — PATIENT OUTREACH (OUTPATIENT)
Dept: GERIATRIC MEDICINE | Facility: CLINIC | Age: 72
End: 2022-09-21

## 2022-09-21 NOTE — PROGRESS NOTES
Opened program in [facility] for launch and for NH care model change.  Yohana Melendez RN, N  [facility]  [phone]

## 2022-09-25 ENCOUNTER — LAB REQUISITION (OUTPATIENT)
Dept: LAB | Facility: CLINIC | Age: 72
End: 2022-09-25
Payer: COMMERCIAL

## 2022-09-25 DIAGNOSIS — U07.1 COVID-19: ICD-10-CM

## 2022-09-25 PROCEDURE — U0005 INFEC AGEN DETEC AMPLI PROBE: HCPCS | Mod: ORL | Performed by: FAMILY MEDICINE

## 2022-09-26 LAB — SARS-COV-2 RNA RESP QL NAA+PROBE: NEGATIVE

## 2022-09-27 ENCOUNTER — PATIENT OUTREACH (OUTPATIENT)
Dept: GERIATRIC MEDICINE | Facility: CLINIC | Age: 72
End: 2022-09-27

## 2022-09-27 NOTE — PROGRESS NOTES
[facility] Care Coordination Contact    Internal CC change effective 10/01/22.  Mailed member CC Change letter.  Additional tasks to be completed by CMS include: update database & Epic and created member files on [employer].    Tonia Silverio  Care Management Specialist Manager  [facility]  [phone]

## 2022-09-27 NOTE — LETTER
September 27, 2022    ANGELA CRUZ  [facility]  [address] [address]      Dear Angela:    As a member of [facility] ([facility] ([facility]), you are provided a care coordinator. I will be your new care coordinator as of 10/01/22. I will be calling you soon to see how you are doing and determine your needs.    If you have any questions, please feel free to call me at [phone]. If you reach my voice mail, please leave a message and your phone number. If you are hearing impaired, please call the [place] Relay at 138 or [phone] (guhwhy-[email or web address] relay service).    I look forward to speaking with you soon.    Sincerely,    Yohana Melendez RN, [facility]  [phone]  [email or web address] is a health plan that contracts with both Medicare and the [place] Medical Assistance (Medicaid) program to provide benefits of both programs to enrollees. Enrollment in [facility] depends on contract renewal.      [facility]  [id number] DHS Approved ([id number])  [id number] (11/18)

## 2022-09-30 PROCEDURE — U0003 INFECTIOUS AGENT DETECTION BY NUCLEIC ACID (DNA OR RNA); SEVERE ACUTE RESPIRATORY SYNDROME CORONAVIRUS 2 (SARS-COV-2) (CORONAVIRUS DISEASE [COVID-19]), AMPLIFIED PROBE TECHNIQUE, MAKING USE OF HIGH THROUGHPUT TECHNOLOGIES AS DESCRIBED BY CMS-2020-01-R: HCPCS | Mod: ORL | Performed by: FAMILY MEDICINE

## 2022-10-01 ENCOUNTER — LAB REQUISITION (OUTPATIENT)
Dept: LAB | Facility: CLINIC | Age: 72
End: 2022-10-01
Payer: COMMERCIAL

## 2022-10-01 DIAGNOSIS — U07.1 COVID-19: ICD-10-CM

## 2022-10-02 LAB — SARS-COV-2 RNA RESP QL NAA+PROBE: NEGATIVE

## 2022-10-03 ENCOUNTER — PATIENT OUTREACH (OUTPATIENT)
Dept: GERIATRIC MEDICINE | Facility: CLINIC | Age: 72
End: 2022-10-03

## 2022-10-03 NOTE — PROGRESS NOTES
[facility] Six-Month Assessment    6 month assessment completed on 10/3/22  with  Jt.    ER visits: No  Hospitalizations: No  TCU stays: No  Significant health status changes: NA   Falls/Injuries: No  ADL/IADL changes: No    Reviewed Institutional Assessment and updated as needed.     Will see member in 6 months for an annual health risk assessment.   Encouraged member to call CC with any questions or concerns in the meantime.   Yohana Melendez RN, PHN  [facility]  [phone]

## 2022-10-11 ENCOUNTER — NURSING HOME VISIT (OUTPATIENT)
Dept: GERIATRICS | Facility: CLINIC | Age: 72
End: 2022-10-11
Payer: COMMERCIAL

## 2022-10-11 VITALS
HEIGHT: 62 IN | BODY MASS INDEX: 29.26 KG/M2 | HEART RATE: 68 BPM | SYSTOLIC BLOOD PRESSURE: 118 MMHG | WEIGHT: 159 LBS | RESPIRATION RATE: 95 BRPM | DIASTOLIC BLOOD PRESSURE: 63 MMHG | TEMPERATURE: 97.4 F

## 2022-10-11 DIAGNOSIS — R10.9 ABDOMINAL DISCOMFORT: Primary | ICD-10-CM

## 2022-10-11 DIAGNOSIS — K59.00 CONSTIPATION, UNSPECIFIED CONSTIPATION TYPE: ICD-10-CM

## 2022-10-11 PROCEDURE — 99309 SBSQ NF CARE MODERATE MDM 30: CPT | Performed by: NURSE PRACTITIONER

## 2022-10-11 RX ORDER — DOCUSATE SODIUM 100 MG/1
100 CAPSULE, LIQUID FILLED ORAL DAILY PRN
COMMUNITY
End: 2023-05-10

## 2022-10-11 NOTE — LETTER
10/11/2022        RE: Angela Beckham  [facility]  [address] [address]        Code Status:  FULL CODE  Visit Type: Nursing Home Acute     Facility:   [facility] () [[id number]]         History of Present Illness: Angela Beckham is a 72 year old female with a past medical history for type 2 diabetes, osteomyelitis, left BKA, hypertension, peripheral edema and mitral stenosis, recent CVA and new onset Afib.     Today, nursing reports that patient would like to stop the miralax due to not liking the taste.  Apparently, 2 weeks ago she had n/v and low grade temp and her senna-s was thought to be the culprit and so she was switched to miralax for constipation.  Today, she reports feeling better now that she is not taking miralax or senna-s.  She denies any abdominal discomfort or cramping.  She reports her bowels are now moving well and she doesn't feel she needs those medications.      Current Outpatient Medications   Medication Sig Dispense Refill     acetaminophen (TYLENOL) 325 MG tablet Take 650 mg by mouth every 4 hours as needed for mild pain       ammonium lactate (AMLACTIN) 12 % external cream Apply topically 2 times daily       apixaban ANTICOAGULANT (ELIQUIS) 5 MG tablet Take 5 mg by mouth 2 times daily       atorvastatin (LIPITOR) 20 MG tablet Take 20 mg by mouth daily       cyanocobalamin 1000 MCG SUBL Place 1,000 mcg under the tongue daily       docusate sodium (COLACE) 100 MG capsule Take 1 capsule (100 mg) by mouth 2 times daily       METFORMIN  MG OR TABS Take 500-1,000 mg by mouth 2 times daily (with meals) 1000mg with breakfast and 500mg with supper.       miconazole (MICATIN) 2 % cream 2 times daily       vitamin B-Complex Take 1 tablet by mouth daily       senna-docusate (SENOKOT-S/PERICOLACE) 8.6-50 MG tablet Take 1 tablet by mouth daily (Patient not taking: Reported on 10/11/2022)         Review of Systems   Patient denies fever, chills,  "headache, lightheadedness, dizziness, rhinorrhea, cough, congestion, shortness of breath, chest pain, palpitations, abdominal pain, n/v, diarrhea, constipation, change in appetite, change in sleep pattern, dysuria, frequency, burning or pain with urination.  Other than stated in HPI all other review of systems is negative.         Physical Exam  Vital signs:/63   Pulse 68   Temp 97.4  F (36.3  C)   Resp (!) 95   Ht 1.575 m (5' 2\")   Wt 72.1 kg (159 lb)   BMI 29.08 kg/m     GENERAL APPEARANCE: frail elderly female, in no acute distress.  HEENT: normocephalic, atraumatic   sclerae anicteric, conjunctivae clear and moist, EOM intact  LUNGS: Lung sounds CTA, no adventitious sounds, respiratory effort normal.  CARD: RRR, S1, S2, without murmurs, gallops, rubs  ABD: Soft, nondistended and nontender with normal bowel sounds.   MSK: Muscle strength and tone were equal bilaterally. Moves all extremities easily and intentionally.   EXTREMITIES: No cyanosis, clubbing or edema.  NEURO: Alert and oriented x 3.Face is symmetric.  SKIN: Inspection of the skin reveals no rashes, ulcerations or petechiae.  PSYCH: euthymic          Labs:   Last Comprehensive Metabolic Panel:  Sodium   Date Value Ref Range Status   09/15/2022 139 136 - 145 mmol/L Final     Potassium   Date Value Ref Range Status   09/15/2022 4.3 3.4 - 5.3 mmol/L Final   04/15/2022 4.5 3.5 - 5.0 mmol/L Final     Chloride   Date Value Ref Range Status   09/15/2022 102 98 - 107 mmol/L Final   04/15/2022 101 98 - 107 mmol/L Final     Carbon Dioxide (CO2)   Date Value Ref Range Status   09/15/2022 26 22 - 29 mmol/L Final   04/15/2022 26 22 - 31 mmol/L Final     Anion Gap   Date Value Ref Range Status   09/15/2022 11 7 - 15 mmol/L Final   04/15/2022 13 5 - 18 mmol/L Final     Glucose   Date Value Ref Range Status   09/15/2022 83 70 - 99 mg/dL Final   04/15/2022 149 (H) 70 - 125 mg/dL Final     Urea Nitrogen   Date Value Ref Range Status   09/15/2022 21.9 8.0 - " 23.0 mg/dL Final   04/15/2022 23 8 - 28 mg/dL Final     Creatinine   Date Value Ref Range Status   09/15/2022 0.56 0.51 - 0.95 mg/dL Final   10/06/2009 0.81 0.52 - 1.04 mg/dL Final     Comment:     New IDMS-traceable calibration  beginning 5/1/08     GFR Estimate   Date Value Ref Range Status   09/15/2022 >90 >60 mL/min/1.73m2 Final     Comment:     Effective December 21, 2021 eGFRcr in adults is calculated using the 2021 CKD-EPI creatinine equation which includes age and gender (Moe et al., NEJ, DOI: 10.1056/MYTRzu1306792)   10/06/2009 72 >60 mL/min/1.7m2 Final     Calcium   Date Value Ref Range Status   09/15/2022 9.4 8.8 - 10.2 mg/dL Final     Lab Results   Component Value Date    WBC 6.8 09/15/2022    WBC 6.9 10/06/2009     Lab Results   Component Value Date    RBC 4.61 09/15/2022    RBC 4.29 10/06/2009     Lab Results   Component Value Date    HGB 12.1 09/15/2022    HGB 11.8 10/06/2009     Lab Results   Component Value Date    HCT 39.4 09/15/2022    HCT 34.5 10/06/2009     Lab Results   Component Value Date    MCV 86 09/15/2022    MCV 81 10/06/2009     Lab Results   Component Value Date    MCH 26.2 09/15/2022    MCH 27.4 10/06/2009     Lab Results   Component Value Date    MCHC 30.7 09/15/2022    MCHC 34.0 10/06/2009     Lab Results   Component Value Date    RDW 16.1 09/15/2022    RDW 12.9 10/06/2009     Lab Results   Component Value Date     09/15/2022     10/06/2009           Assessment/Plan:  Abdominal discomfort  Constipation, unspecified constipation type  Counseled patient on adequate hydration to promote good BMs.  Also advised patient to sit up in chair throughout the day to promote bowel motility.  discontinue miralax, senna s and will continue with docusate prn.        Electronically signed by:Berenice Nolen NP                  Sincerely,        Berenice Nolen NP

## 2022-10-12 NOTE — PROGRESS NOTES
Code Status:  FULL CODE  Visit Type: Nursing Home Acute     Facility:   [facility] () [[id number]]         History of Present Illness: Angela Beckham is a 72 year old female with a past medical history for type 2 diabetes, osteomyelitis, left BKA, hypertension, peripheral edema and mitral stenosis, recent CVA and new onset Afib.     Today, nursing reports that patient would like to stop the miralax due to not liking the taste.  Apparently, 2 weeks ago she had n/v and low grade temp and her senna-s was thought to be the culprit and so she was switched to miralax for constipation.  Today, she reports feeling better now that she is not taking miralax or senna-s.  She denies any abdominal discomfort or cramping.  She reports her bowels are now moving well and she doesn't feel she needs those medications.      Current Outpatient Medications   Medication Sig Dispense Refill     acetaminophen (TYLENOL) 325 MG tablet Take 650 mg by mouth every 4 hours as needed for mild pain       ammonium lactate (AMLACTIN) 12 % external cream Apply topically 2 times daily       apixaban ANTICOAGULANT (ELIQUIS) 5 MG tablet Take 5 mg by mouth 2 times daily       atorvastatin (LIPITOR) 20 MG tablet Take 20 mg by mouth daily       cyanocobalamin 1000 MCG SUBL Place 1,000 mcg under the tongue daily       docusate sodium (COLACE) 100 MG capsule Take 1 capsule (100 mg) by mouth 2 times daily       METFORMIN  MG OR TABS Take 500-1,000 mg by mouth 2 times daily (with meals) 1000mg with breakfast and 500mg with supper.       miconazole (MICATIN) 2 % cream 2 times daily       vitamin B-Complex Take 1 tablet by mouth daily       senna-docusate (SENOKOT-S/PERICOLACE) 8.6-50 MG tablet Take 1 tablet by mouth daily (Patient not taking: Reported on 10/11/2022)         Review of Systems   Patient denies fever, chills, headache, lightheadedness, dizziness, rhinorrhea, cough, congestion, shortness of breath, chest pain, palpitations,  "abdominal pain, n/v, diarrhea, constipation, change in appetite, change in sleep pattern, dysuria, frequency, burning or pain with urination.  Other than stated in HPI all other review of systems is negative.         Physical Exam  Vital signs:/63   Pulse 68   Temp 97.4  F (36.3  C)   Resp (!) 95   Ht 1.575 m (5' 2\")   Wt 72.1 kg (159 lb)   BMI 29.08 kg/m     GENERAL APPEARANCE: frail elderly female, in no acute distress.  HEENT: normocephalic, atraumatic   sclerae anicteric, conjunctivae clear and moist, EOM intact  LUNGS: Lung sounds CTA, no adventitious sounds, respiratory effort normal.  CARD: RRR, S1, S2, without murmurs, gallops, rubs  ABD: Soft, nondistended and nontender with normal bowel sounds.   MSK: Muscle strength and tone were equal bilaterally. Moves all extremities easily and intentionally.   EXTREMITIES: No cyanosis, clubbing or edema.  NEURO: Alert and oriented x 3.Face is symmetric.  SKIN: Inspection of the skin reveals no rashes, ulcerations or petechiae.  PSYCH: euthymic          Labs:   Last Comprehensive Metabolic Panel:  Sodium   Date Value Ref Range Status   09/15/2022 139 136 - 145 mmol/L Final     Potassium   Date Value Ref Range Status   09/15/2022 4.3 3.4 - 5.3 mmol/L Final   04/15/2022 4.5 3.5 - 5.0 mmol/L Final     Chloride   Date Value Ref Range Status   09/15/2022 102 98 - 107 mmol/L Final   04/15/2022 101 98 - 107 mmol/L Final     Carbon Dioxide (CO2)   Date Value Ref Range Status   09/15/2022 26 22 - 29 mmol/L Final   04/15/2022 26 22 - 31 mmol/L Final     Anion Gap   Date Value Ref Range Status   09/15/2022 11 7 - 15 mmol/L Final   04/15/2022 13 5 - 18 mmol/L Final     Glucose   Date Value Ref Range Status   09/15/2022 83 70 - 99 mg/dL Final   04/15/2022 149 (H) 70 - 125 mg/dL Final     Urea Nitrogen   Date Value Ref Range Status   09/15/2022 21.9 8.0 - 23.0 mg/dL Final   04/15/2022 23 8 - 28 mg/dL Final     Creatinine   Date Value Ref Range Status   09/15/2022 0.56 " 0.51 - 0.95 mg/dL Final   10/06/2009 0.81 0.52 - 1.04 mg/dL Final     Comment:     New IDMS-traceable calibration  beginning 5/1/08     GFR Estimate   Date Value Ref Range Status   09/15/2022 >90 >60 mL/min/1.73m2 Final     Comment:     Effective December 21, 2021 eGFRcr in adults is calculated using the 2021 CKD-EPI creatinine equation which includes age and gender (Moe et al., NE, DOI: 10.[facility]6/[id number])   10/06/2009 72 >60 mL/min/1.7m2 Final     Calcium   Date Value Ref Range Status   09/15/2022 9.4 8.8 - 10.2 mg/dL Final     Lab Results   Component Value Date    WBC 6.8 09/15/2022    WBC 6.9 10/06/2009     Lab Results   Component Value Date    RBC 4.61 09/15/2022    RBC 4.29 10/06/2009     Lab Results   Component Value Date    HGB 12.1 09/15/2022    HGB 11.8 10/06/2009     Lab Results   Component Value Date    HCT 39.4 09/15/2022    HCT 34.5 10/06/2009     Lab Results   Component Value Date    MCV 86 09/15/2022    MCV 81 10/06/2009     Lab Results   Component Value Date    MCH 26.2 09/15/2022    MCH 27.4 10/06/2009     Lab Results   Component Value Date    MCHC 30.7 09/15/2022    MCHC 34.0 10/06/2009     Lab Results   Component Value Date    RDW 16.1 09/15/2022    RDW 12.9 10/06/2009     Lab Results   Component Value Date     09/15/2022     10/06/2009           Assessment/Plan:  Abdominal discomfort  Constipation, unspecified constipation type  Counseled patient on adequate hydration to promote good BMs.  Also advised patient to sit up in chair throughout the day to promote bowel motility.  discontinue miralax, senna s and will continue with docusate prn.        Electronically signed by:Berenice Nolen NP

## 2022-10-17 ENCOUNTER — LAB REQUISITION (OUTPATIENT)
Dept: LAB | Facility: CLINIC | Age: 72
End: 2022-10-17
Payer: COMMERCIAL

## 2022-10-17 DIAGNOSIS — U07.1 COVID-19: ICD-10-CM

## 2022-10-21 ENCOUNTER — LAB REQUISITION (OUTPATIENT)
Dept: LAB | Facility: CLINIC | Age: 72
End: 2022-10-21
Payer: COMMERCIAL

## 2022-10-21 DIAGNOSIS — U07.1 COVID-19: ICD-10-CM

## 2022-10-21 PROCEDURE — U0005 INFEC AGEN DETEC AMPLI PROBE: HCPCS | Mod: ORL | Performed by: FAMILY MEDICINE

## 2022-10-22 LAB — SARS-COV-2 RNA RESP QL NAA+PROBE: NEGATIVE

## 2022-10-23 ENCOUNTER — LAB REQUISITION (OUTPATIENT)
Dept: LAB | Facility: CLINIC | Age: 72
End: 2022-10-23
Payer: COMMERCIAL

## 2022-10-23 DIAGNOSIS — U07.1 COVID-19: ICD-10-CM

## 2022-10-23 PROCEDURE — U0005 INFEC AGEN DETEC AMPLI PROBE: HCPCS | Mod: ORL | Performed by: FAMILY MEDICINE

## 2022-10-24 LAB — SARS-COV-2 RNA RESP QL NAA+PROBE: NEGATIVE

## 2022-10-30 ENCOUNTER — LAB REQUISITION (OUTPATIENT)
Dept: LAB | Facility: CLINIC | Age: 72
End: 2022-10-30
Payer: COMMERCIAL

## 2022-10-30 DIAGNOSIS — U07.1 COVID-19: ICD-10-CM

## 2022-10-30 PROCEDURE — U0003 INFECTIOUS AGENT DETECTION BY NUCLEIC ACID (DNA OR RNA); SEVERE ACUTE RESPIRATORY SYNDROME CORONAVIRUS 2 (SARS-COV-2) (CORONAVIRUS DISEASE [COVID-19]), AMPLIFIED PROBE TECHNIQUE, MAKING USE OF HIGH THROUGHPUT TECHNOLOGIES AS DESCRIBED BY CMS-2020-01-R: HCPCS | Mod: ORL | Performed by: FAMILY MEDICINE

## 2022-10-31 LAB — SARS-COV-2 RNA RESP QL NAA+PROBE: NEGATIVE

## 2022-11-04 PROCEDURE — U0005 INFEC AGEN DETEC AMPLI PROBE: HCPCS | Mod: ORL | Performed by: FAMILY MEDICINE

## 2022-11-05 ENCOUNTER — LAB REQUISITION (OUTPATIENT)
Dept: LAB | Facility: CLINIC | Age: 72
End: 2022-11-05
Payer: COMMERCIAL

## 2022-11-05 DIAGNOSIS — U07.1 COVID-19: ICD-10-CM

## 2022-11-06 ENCOUNTER — LAB REQUISITION (OUTPATIENT)
Dept: LAB | Facility: CLINIC | Age: 72
End: 2022-11-06
Payer: COMMERCIAL

## 2022-11-06 DIAGNOSIS — U07.1 COVID-19: ICD-10-CM

## 2022-11-06 LAB — SARS-COV-2 RNA RESP QL NAA+PROBE: NEGATIVE

## 2022-11-06 PROCEDURE — U0005 INFEC AGEN DETEC AMPLI PROBE: HCPCS | Mod: ORL | Performed by: FAMILY MEDICINE

## 2022-11-07 LAB — SARS-COV-2 RNA RESP QL NAA+PROBE: NEGATIVE

## 2022-11-16 ENCOUNTER — NURSING HOME VISIT (OUTPATIENT)
Dept: GERIATRICS | Facility: CLINIC | Age: 72
End: 2022-11-16
Payer: COMMERCIAL

## 2022-11-16 VITALS
WEIGHT: 147 LBS | RESPIRATION RATE: 20 BRPM | DIASTOLIC BLOOD PRESSURE: 66 MMHG | TEMPERATURE: 97.5 F | SYSTOLIC BLOOD PRESSURE: 108 MMHG | BODY MASS INDEX: 27.05 KG/M2 | HEART RATE: 115 BPM | HEIGHT: 62 IN | OXYGEN SATURATION: 97 %

## 2022-11-16 DIAGNOSIS — Z86.73 HISTORY OF CVA (CEREBROVASCULAR ACCIDENT): ICD-10-CM

## 2022-11-16 DIAGNOSIS — M54.50 CHRONIC BILATERAL LOW BACK PAIN WITHOUT SCIATICA: ICD-10-CM

## 2022-11-16 DIAGNOSIS — Z89.512 STATUS POST BELOW-KNEE AMPUTATION OF LEFT LOWER EXTREMITY (H): ICD-10-CM

## 2022-11-16 DIAGNOSIS — I48.20 ATRIAL FIBRILLATION, CHRONIC (H): ICD-10-CM

## 2022-11-16 DIAGNOSIS — G89.29 CHRONIC BILATERAL LOW BACK PAIN WITHOUT SCIATICA: ICD-10-CM

## 2022-11-16 DIAGNOSIS — I10 PRIMARY HYPERTENSION: ICD-10-CM

## 2022-11-16 DIAGNOSIS — E11.59 TYPE 2 DIABETES MELLITUS WITH OTHER CIRCULATORY COMPLICATION, WITHOUT LONG-TERM CURRENT USE OF INSULIN (H): Primary | ICD-10-CM

## 2022-11-16 PROCEDURE — 99309 SBSQ NF CARE MODERATE MDM 30: CPT | Performed by: FAMILY MEDICINE

## 2022-11-16 NOTE — LETTER
11/16/2022        RE: Angela Beckham  [facility]  [address] [address]        [facility]       Patient Angela Beckham  MRN: [id number]        Reason for Visit     Chief Complaint   Patient presents with     Nursing Home Regulatory   follow-up dm; htn ;back pain    Code Status     CPR/Full code     Assessment     S/p ischemic rt MCA CVA  Cognitive impairment  atrial fibrillation with tachycardia noted  C-spine ligamentous injury  Persistent leukocytosis  Chronic lower extremity edema  Diabetes type 2   L BKA   Generalized weakness    Plan     Patient currently is a resident of long-term care.  She is currently bedbound and poorly ambulatory.  Noted to have low blood pressures associated with tachycardia today with underlying history of A. fib.  Recently was taken off metoprolol due to hypotension.  As per cardiology report on 10/24/2022 she was started on metoprolol 150 twice daily which is a very high dose.  Unfortunately this was never started for her.  She was taken off metoprolol prior to that.  Plan is to reintroduce metoprolol XR 12.5 daily and see how he responds to that monitor heart rates and blood pressures  She does have orthostatic drop in blood pressures due to deconditioning and the fact that she is sedentary and bedbound now  Diabetic control reviewed all blood sugars are stable under 180.  A1c on recheck was 6.4.  Discontinue blood sugar checks  She also has a history of weight loss and is on supplements.  However progressive weight loss of more than 10 pounds noted staff was requested to reweigh her and dietary consult requested      History     Patient is a very pleasant 71 year old female who is a resident of long-term care  Currently she remains bedbound and poorly ambulatory.  Imaging did reveal that she had acute ischemic CVA.  She also had acute encephalopathy with fluctuating level of consciousness.  Neurology was consulted.  Unfortunately  in spite of pursuing optimal rehab she is still wheelchair and bedbound.  She refuses to wear her prosthesis  She continues to require assistance with all ADL support  She also was found a new onset atrial fibrillation with rapid ventricular response.    Today noted to be tachycardic.  Chart review done and she was on metoprolol but was stopped because of low blood pressure issues and orthostatic hypotension.  Cardiology saw her and started her on metoprolol 150 twice daily but she was never given that medication.  She is noted to be quite tachycardic for the last 2 days with heart rates around 115  On anticoagulation with apixaban  Noted to have a C-spine ligamentous injury on CT imaging.  Neurosurgery consulted and she has been given an [place] collar.  She had significant edema of her bilateral lower extremity with worsening diuretics were resumed at discharge  Patient has a history of diabetes.  She is just on Metformin currently.  Last A1c was 6.5.  Recently seen in the spine clinic because of increased pain concerns in her low back  MRI did reveal some edema but no other high-grade spinal stenoses requiring any surgical intervention  Has been progressively losing weight  Past Medical & Surgical History     PAST MEDICAL HISTORY:   Past Medical History:   Diagnosis Date     Diabetic neuropathy (H)      HTN      Type 2 diabetes mellitus (H)       PAST SURGICAL HISTORY:  natividad henderson      Past Social History     Reviewed,  reports that she has an unknown smoking status. She has never used smokeless tobacco. She reports that she does not drink alcohol and does not use drugs.    Family History     Reviewed, and family history includes Alzheimer Disease in her father; Cerebrovascular Disease in her maternal grandmother and mother; Diabetes in her paternal grandfather; Hypertension in her brother and mother; Obesity in her brother; Respiratory in her father.    Medication List   Post Discharge Medication Reconciliation Status:  Post Discharge Medication Reconciliation Status: discharge medications reconciled, continue medications without change.  Current Outpatient Medications   Medication     acetaminophen (TYLENOL) 325 MG tablet     ammonium lactate (AMLACTIN) 12 % external cream     apixaban ANTICOAGULANT (ELIQUIS) 5 MG tablet     atorvastatin (LIPITOR) 20 MG tablet     cyanocobalamin 1000 MCG SUBL     docusate sodium (COLACE) 100 MG capsule     METFORMIN  MG OR TABS     miconazole (MICATIN) 2 % cream     vitamin B-Complex     No current facility-administered medications for this visit.       Allergies     Allergies   Allergen Reactions     Amoxicillin Unknown     Oxycodone Other (See Comments)     Does not remember     Latex Rash       Review of Systems   A comprehensive review of 14 systems was done. Pertinent findings noted here and in history of present illness. All the rest negative.  Constitutional: Negative.  Negative for fever, chills, she has  activity change, appetite change and fatigue.   Reports she is eating okay but they are giving her food that she cannot eat as she is edentulous  She did have salad on her plate today.  She has lost 10 pounds  HENT: Negative for congestion and facial swelling.    Eyes: Negative for photophobia, redness and visual disturbance.   Respiratory: Negative for cough and chest tightness.    Cardiovascular: Negative for chest pain, palpitations and leg swelling.   Gastrointestinal: Negative for nausea, diarrhea, constipation, blood in stool and abdominal distention.   Genitourinary: Negative.  Reporting incontinence  Musculoskeletal: Currently very weak and bedbound she cannot walk currently  SHE IS having back pain; staff using diathermy  Has seen spine clinic and does not need surgery  Skin: Negative.    Neurological: Negative for dizziness, tremors, syncope, weakness, light-headedness and headaches.   Denies any weakness from cva even though she is bed bound  Hematological: Does not  "bruise/bleed easily.   Psychiatric/Behavioral: Negative.  Affect is flat  HAS been seen by psychology which is helpful per pt        Physical Exam   /66   Pulse 115   Temp 97.5  F (36.4  C)   Resp 20   Ht 1.575 m (5' 2\")   Wt 66.7 kg (147 lb)   SpO2 97%   BMI 26.89 kg/m       Constitutional: Oriented to person, place, and time and appears well-developed.   HEENT:  Normocephalic and atraumatic.  Eyes: Conjunctivae and EOM are normal. Pupils are equal, round, and reactive to light. No discharge.  No scleral icterus. Nose normal. Mouth/Throat: Oropharynx is clear and moist. No oropharyngeal exudate.    Patient is edentulous  NECK: Normal range of motion. Neck supple. No JVD present. No tracheal deviation present. No thyromegaly present.   CARDIOVASCULAR: Normal rate, regular rhythm and intact distal pulses.  Exam reveals no gallop and no friction rub.  Systolic murmur present.  PULMONARY: Effort normal and breath sounds normal. No respiratory distress.No Wheezing or rales.  ABDOMEN: Soft. Bowel sounds are normal. No distension and no mass.  There is no tenderness. There is no rebound and no guarding. No HSM.  MUSCULOSKELETAL: Normal range of motion. No edema and no tenderness. Mild kyphosis, no tenderness.  Has a left BKA; does not have any prostheses  Tender on paraspinal area  LYMPH NODES: Has no cervical, supraclavicular, axillary and groin adenopathy.   NEUROLOGICAL: Alert and oriented to person, place, and time. No cranial nerve deficit.  Normal muscle tone. Coordination normal.   GENITOURINARY: Deferred exam.  SKIN: Skin is warm and dry. No rash noted. No erythema. No pallor.   EXTREMITIES: No cyanosis, no clubbing, no edema. No Deformity.  PSYCHIATRIC: abNormal mood, affect and behavior.  Does not have much recall of recent events.       Lab Results     Last Comprehensive Metabolic Panel:  Sodium   Date Value Ref Range Status   09/15/2022 139 136 - 145 mmol/L Final     Potassium   Date Value Ref " Range Status   09/15/2022 4.3 3.4 - 5.3 mmol/L Final   04/15/2022 4.5 3.5 - 5.0 mmol/L Final     Chloride   Date Value Ref Range Status   09/15/2022 102 98 - 107 mmol/L Final   04/15/2022 101 98 - 107 mmol/L Final     Carbon Dioxide (CO2)   Date Value Ref Range Status   09/15/2022 26 22 - 29 mmol/L Final   04/15/2022 26 22 - 31 mmol/L Final     Anion Gap   Date Value Ref Range Status   09/15/2022 11 7 - 15 mmol/L Final   04/15/2022 13 5 - 18 mmol/L Final     Glucose   Date Value Ref Range Status   09/15/2022 83 70 - 99 mg/dL Final   04/15/2022 149 (H) 70 - 125 mg/dL Final     Urea Nitrogen   Date Value Ref Range Status   09/15/2022 21.9 8.0 - 23.0 mg/dL Final   04/15/2022 23 8 - 28 mg/dL Final     Creatinine   Date Value Ref Range Status   09/15/2022 0.56 0.51 - 0.95 mg/dL Final   10/06/2009 0.81 0.52 - 1.04 mg/dL Final     Comment:     New IDMS-traceable calibration  beginning 5/1/08     GFR Estimate   Date Value Ref Range Status   09/15/2022 >90 >60 mL/min/1.73m2 Final     Comment:     Effective December 21, 2021 eGFRcr in adults is calculated using the 2021 CKD-EPI creatinine equation which includes age and gender (Moe zhang al., NEJM, DOI: 10.1056/RKVVos0571664)   10/06/2009 72 >60 mL/min/1.7m2 Final     Calcium   Date Value Ref Range Status   09/15/2022 9.4 8.8 - 10.2 mg/dL Final       Electronically signed by    Ginny Johnson MD                             Sincerely,        OPAL Galarza

## 2022-11-16 NOTE — PROGRESS NOTES
[facility] GERIATRIC SERVICES       Patient Angela Beckham  MRN: [id number]        Reason for Visit     Chief Complaint   Patient presents with     Nursing Home Regulatory   follow-up dm; htn ;back pain    Code Status     CPR/Full code     Assessment     S/p ischemic rt MCA CVA  Cognitive impairment  atrial fibrillation with tachycardia noted  C-spine ligamentous injury  Persistent leukocytosis  Chronic lower extremity edema  Diabetes type 2   L BKA   Generalized weakness    Plan     Patient currently is a resident of long-term care.  She is currently bedbound and poorly ambulatory.  Noted to have low blood pressures associated with tachycardia today with underlying history of A. fib.  Recently was taken off metoprolol due to hypotension.  As per cardiology report on 10/24/2022 she was started on metoprolol 150 twice daily which is a very high dose.  Unfortunately this was never started for her.  She was taken off metoprolol prior to that.  Plan is to reintroduce metoprolol XR 12.5 daily and see how he responds to that monitor heart rates and blood pressures  She does have orthostatic drop in blood pressures due to deconditioning and the fact that she is sedentary and bedbound now  Diabetic control reviewed all blood sugars are stable under 180.  A1c on recheck was 6.4.  Discontinue blood sugar checks  She also has a history of weight loss and is on supplements.  However progressive weight loss of more than 10 pounds noted staff was requested to reweigh her and dietary consult requested      History     Patient is a very pleasant 71 year old female who is a resident of long-term care  Currently she remains bedbound and poorly ambulatory.  Imaging did reveal that she had acute ischemic CVA.  She also had acute encephalopathy with fluctuating level of consciousness.  Neurology was consulted.  Unfortunately in spite of pursuing optimal rehab she is still wheelchair and bedbound.  She refuses to wear her prosthesis  She  continues to require assistance with all ADL support  She also was found a new onset atrial fibrillation with rapid ventricular response.    Today noted to be tachycardic.  Chart review done and she was on metoprolol but was stopped because of low blood pressure issues and orthostatic hypotension.  Cardiology saw her and started her on metoprolol 150 twice daily but she was never given that medication.  She is noted to be quite tachycardic for the last 2 days with heart rates around 115  On anticoagulation with apixaban  Noted to have a C-spine ligamentous injury on CT imaging.  Neurosurgery consulted and she has been given an [place] collar.  She had significant edema of her bilateral lower extremity with worsening diuretics were resumed at discharge  Patient has a history of diabetes.  She is just on Metformin currently.  Last A1c was 6.5.  Recently seen in the spine clinic because of increased pain concerns in her low back  MRI did reveal some edema but no other high-grade spinal stenoses requiring any surgical intervention  Has been progressively losing weight  Past Medical & Surgical History     PAST MEDICAL HISTORY:   Past Medical History:   Diagnosis Date     Diabetic neuropathy (H)      HTN      Type 2 diabetes mellitus (H)       PAST SURGICAL HISTORY:  natividad beatriz      Past Social History     Reviewed,  reports that she has an unknown smoking status. She has never used smokeless tobacco. She reports that she does not drink alcohol and does not use drugs.    Family History     Reviewed, and family history includes Alzheimer Disease in her father; Cerebrovascular Disease in her maternal grandmother and mother; Diabetes in her paternal grandfather; Hypertension in her brother and mother; Obesity in her brother; Respiratory in her father.    Medication List   Post Discharge Medication Reconciliation Status: Post Discharge Medication Reconciliation Status: discharge medications reconciled, continue medications without  change.  Current Outpatient Medications   Medication     acetaminophen (TYLENOL) 325 MG tablet     ammonium lactate (AMLACTIN) 12 % external cream     apixaban ANTICOAGULANT (ELIQUIS) 5 MG tablet     atorvastatin (LIPITOR) 20 MG tablet     cyanocobalamin 1000 MCG SUBL     docusate sodium (COLACE) 100 MG capsule     METFORMIN  MG OR TABS     miconazole (MICATIN) 2 % cream     vitamin B-Complex     No current facility-administered medications for this visit.       Allergies     Allergies   Allergen Reactions     Amoxicillin Unknown     Oxycodone Other (See Comments)     Does not remember     Latex Rash       Review of Systems   A comprehensive review of 14 systems was done. Pertinent findings noted here and in history of present illness. All the rest negative.  Constitutional: Negative.  Negative for fever, chills, she has  activity change, appetite change and fatigue.   Reports she is eating okay but they are giving her food that she cannot eat as she is edentulous  She did have salad on her plate today.  She has lost 10 pounds  HENT: Negative for congestion and facial swelling.    Eyes: Negative for photophobia, redness and visual disturbance.   Respiratory: Negative for cough and chest tightness.    Cardiovascular: Negative for chest pain, palpitations and leg swelling.   Gastrointestinal: Negative for nausea, diarrhea, constipation, blood in stool and abdominal distention.   Genitourinary: Negative.  Reporting incontinence  Musculoskeletal: Currently very weak and bedbound she cannot walk currently  SHE IS having back pain; staff using diathermy  Has seen spine clinic and does not need surgery  Skin: Negative.    Neurological: Negative for dizziness, tremors, syncope, weakness, light-headedness and headaches.   Denies any weakness from cva even though she is bed bound  Hematological: Does not bruise/bleed easily.   Psychiatric/Behavioral: Negative.  Affect is flat  HAS been seen by psychology which is  "helpful per pt        Physical Exam   /66   Pulse 115   Temp 97.5  F (36.4  C)   Resp 20   Ht 1.575 m (5' 2\")   Wt 66.7 kg (147 lb)   SpO2 97%   BMI 26.89 kg/m       Constitutional: Oriented to person, place, and time and appears well-developed.   HEENT:  Normocephalic and atraumatic.  Eyes: Conjunctivae and EOM are normal. Pupils are equal, round, and reactive to light. No discharge.  No scleral icterus. Nose normal. Mouth/Throat: Oropharynx is clear and moist. No oropharyngeal exudate.    Patient is edentulous  NECK: Normal range of motion. Neck supple. No JVD present. No tracheal deviation present. No thyromegaly present.   CARDIOVASCULAR: Normal rate, regular rhythm and intact distal pulses.  Exam reveals no gallop and no friction rub.  Systolic murmur present.  PULMONARY: Effort normal and breath sounds normal. No respiratory distress.No Wheezing or rales.  ABDOMEN: Soft. Bowel sounds are normal. No distension and no mass.  There is no tenderness. There is no rebound and no guarding. No HSM.  MUSCULOSKELETAL: Normal range of motion. No edema and no tenderness. Mild kyphosis, no tenderness.  Has a left BKA; does not have any prostheses  Tender on paraspinal area  LYMPH NODES: Has no cervical, supraclavicular, axillary and groin adenopathy.   NEUROLOGICAL: Alert and oriented to person, place, and time. No cranial nerve deficit.  Normal muscle tone. Coordination normal.   GENITOURINARY: Deferred exam.  SKIN: Skin is warm and dry. No rash noted. No erythema. No pallor.   EXTREMITIES: No cyanosis, no clubbing, no edema. No Deformity.  PSYCHIATRIC: abNormal mood, affect and behavior.  Does not have much recall of recent events.       Lab Results     Last Comprehensive Metabolic Panel:  Sodium   Date Value Ref Range Status   09/15/2022 139 136 - 145 mmol/L Final     Potassium   Date Value Ref Range Status   09/15/2022 4.3 3.4 - 5.3 mmol/L Final   04/15/2022 4.5 3.5 - 5.0 mmol/L Final     Chloride   Date " Value Ref Range Status   09/15/2022 102 98 - 107 mmol/L Final   04/15/2022 101 98 - 107 mmol/L Final     Carbon Dioxide (CO2)   Date Value Ref Range Status   09/15/2022 26 22 - 29 mmol/L Final   04/15/2022 26 22 - 31 mmol/L Final     Anion Gap   Date Value Ref Range Status   09/15/2022 11 7 - 15 mmol/L Final   04/15/2022 13 5 - 18 mmol/L Final     Glucose   Date Value Ref Range Status   09/15/2022 83 70 - 99 mg/dL Final   04/15/2022 149 (H) 70 - 125 mg/dL Final     Urea Nitrogen   Date Value Ref Range Status   09/15/2022 21.9 8.0 - 23.0 mg/dL Final   04/15/2022 23 8 - 28 mg/dL Final     Creatinine   Date Value Ref Range Status   09/15/2022 0.56 0.51 - 0.95 mg/dL Final   10/06/2009 0.81 0.52 - 1.04 mg/dL Final     Comment:     New IDMS-traceable calibration  beginning 5/1/08     GFR Estimate   Date Value Ref Range Status   09/15/2022 >90 >60 mL/min/1.73m2 Final     Comment:     Effective December 21, 2021 eGFRcr in adults is calculated using the 2021 CKD-EPI creatinine equation which includes age and gender (Moe et al., NEJM, DOI: 10.1056/LHRBic1133585)   10/06/2009 72 >60 mL/min/1.7m2 Final     Calcium   Date Value Ref Range Status   09/15/2022 9.4 8.8 - 10.2 mg/dL Final       Electronically signed by    Ginny Johnson MD

## 2022-11-18 ENCOUNTER — LAB REQUISITION (OUTPATIENT)
Dept: LAB | Facility: CLINIC | Age: 72
End: 2022-11-18
Payer: COMMERCIAL

## 2022-11-18 DIAGNOSIS — U07.1 COVID-19: ICD-10-CM

## 2022-11-18 PROCEDURE — U0005 INFEC AGEN DETEC AMPLI PROBE: HCPCS | Mod: ORL | Performed by: FAMILY MEDICINE

## 2022-11-19 LAB — SARS-COV-2 RNA RESP QL NAA+PROBE: NEGATIVE

## 2022-11-20 ENCOUNTER — LAB REQUISITION (OUTPATIENT)
Dept: LAB | Facility: CLINIC | Age: 72
End: 2022-11-20
Payer: COMMERCIAL

## 2022-11-20 DIAGNOSIS — U07.1 COVID-19: ICD-10-CM

## 2022-11-20 PROCEDURE — U0005 INFEC AGEN DETEC AMPLI PROBE: HCPCS | Mod: ORL | Performed by: FAMILY MEDICINE

## 2022-11-21 LAB — SARS-COV-2 RNA RESP QL NAA+PROBE: NEGATIVE

## 2022-11-28 ENCOUNTER — CLINICAL UPDATE (OUTPATIENT)
Dept: PHARMACY | Facility: CLINIC | Age: 72
End: 2022-11-28
Payer: COMMERCIAL

## 2022-11-28 DIAGNOSIS — E63.9 NUTRITIONAL DEFICIENCY: ICD-10-CM

## 2022-11-28 DIAGNOSIS — I48.91 ATRIAL FIBRILLATION, UNSPECIFIED TYPE (H): ICD-10-CM

## 2022-11-28 DIAGNOSIS — E11.9 DIABETES MELLITUS, TYPE 2 (H): Primary | ICD-10-CM

## 2022-11-28 DIAGNOSIS — K59.01 SLOW TRANSIT CONSTIPATION: ICD-10-CM

## 2022-11-28 PROCEDURE — 99207 PR NO CHARGE LOS: CPT | Performed by: PHARMACIST

## 2022-11-28 NOTE — PROGRESS NOTES
This patient's medication list and chart were reviewed as part of the service provided by [facility].    Assessment/Recommendations:    Noted that MD encounter from 11/16/22 indicates plan to initiate metoprolol ER 12.5mg daily, with monitoring of HRs and BPs, however this is not on Epic med list.  Please verify and reconcile med list.    B12 level not available to me.  On B12 supplement as well as Vitamin B-complex.  Please consider d'c vitamin B complex due to likely not adding benefit, contributes to pill load.    Docusate is a minimally effective medication, and may not be offering benefit.  Consider d'c and addition of prn senna.  Could schedule senna if necessary.    Kellie Alvarez, Pharm.D.,[facility]  Board Certified Geriatric Pharmacist  Medication Therapy Management Pharmacist  [phone]

## 2022-12-02 ENCOUNTER — LAB REQUISITION (OUTPATIENT)
Dept: LAB | Facility: CLINIC | Age: 72
End: 2022-12-02
Payer: COMMERCIAL

## 2022-12-02 DIAGNOSIS — U07.1 COVID-19: ICD-10-CM

## 2022-12-02 PROCEDURE — U0005 INFEC AGEN DETEC AMPLI PROBE: HCPCS | Mod: ORL | Performed by: FAMILY MEDICINE

## 2022-12-03 LAB — SARS-COV-2 RNA RESP QL NAA+PROBE: NEGATIVE

## 2022-12-04 ENCOUNTER — LAB REQUISITION (OUTPATIENT)
Dept: LAB | Facility: CLINIC | Age: 72
End: 2022-12-04
Payer: COMMERCIAL

## 2022-12-04 DIAGNOSIS — U07.1 COVID-19: ICD-10-CM

## 2022-12-04 PROCEDURE — U0005 INFEC AGEN DETEC AMPLI PROBE: HCPCS | Mod: ORL | Performed by: FAMILY MEDICINE

## 2022-12-05 LAB — SARS-COV-2 RNA RESP QL NAA+PROBE: NEGATIVE

## 2022-12-18 ENCOUNTER — LAB REQUISITION (OUTPATIENT)
Dept: LAB | Facility: CLINIC | Age: 72
End: 2022-12-18
Payer: COMMERCIAL

## 2022-12-18 DIAGNOSIS — U07.1 COVID-19: ICD-10-CM

## 2022-12-18 PROCEDURE — U0005 INFEC AGEN DETEC AMPLI PROBE: HCPCS | Mod: ORL | Performed by: FAMILY MEDICINE

## 2022-12-19 LAB — SARS-COV-2 RNA RESP QL NAA+PROBE: NEGATIVE

## 2023-01-13 ENCOUNTER — APPOINTMENT (OUTPATIENT)
Dept: CT IMAGING | Facility: CLINIC | Age: 73
DRG: 064 | End: 2023-01-13
Attending: FAMILY MEDICINE
Payer: COMMERCIAL

## 2023-01-13 ENCOUNTER — TRANSFERRED RECORDS (OUTPATIENT)
Dept: HEALTH INFORMATION MANAGEMENT | Facility: CLINIC | Age: 73
End: 2023-01-13

## 2023-01-13 ENCOUNTER — TELEPHONE (OUTPATIENT)
Dept: GERIATRICS | Facility: CLINIC | Age: 73
End: 2023-01-13

## 2023-01-13 ENCOUNTER — HOSPITAL ENCOUNTER (INPATIENT)
Facility: CLINIC | Age: 73
LOS: 3 days | Discharge: NURSING, REHAB OR LONG-TERM CARE | DRG: 064 | End: 2023-01-17
Attending: FAMILY MEDICINE | Admitting: INTERNAL MEDICINE
Payer: COMMERCIAL

## 2023-01-13 ENCOUNTER — LAB REQUISITION (OUTPATIENT)
Dept: LAB | Facility: CLINIC | Age: 73
End: 2023-01-13
Payer: COMMERCIAL

## 2023-01-13 DIAGNOSIS — J18.9 PNEUMONIA OF BOTH LUNGS DUE TO INFECTIOUS ORGANISM, UNSPECIFIED PART OF LUNG: ICD-10-CM

## 2023-01-13 DIAGNOSIS — U07.1 INFECTION DUE TO 2019 NOVEL CORONAVIRUS: ICD-10-CM

## 2023-01-13 DIAGNOSIS — G93.40 ACUTE ENCEPHALOPATHY: ICD-10-CM

## 2023-01-13 DIAGNOSIS — U07.1 COVID-19: ICD-10-CM

## 2023-01-13 DIAGNOSIS — Z79.01 LONG TERM CURRENT USE OF ANTICOAGULANT THERAPY: ICD-10-CM

## 2023-01-13 DIAGNOSIS — E83.42 HYPOMAGNESEMIA: ICD-10-CM

## 2023-01-13 DIAGNOSIS — I48.20 CHRONIC ATRIAL FIBRILLATION WITH RVR (H): ICD-10-CM

## 2023-01-13 DIAGNOSIS — I63.9 CEREBROVASCULAR ACCIDENT (CVA), UNSPECIFIED MECHANISM (H): Primary | ICD-10-CM

## 2023-01-13 LAB
ALBUMIN UR-MCNC: 50 MG/DL
ANION GAP SERPL CALCULATED.3IONS-SCNC: 13 MMOL/L (ref 5–18)
APPEARANCE UR: CLEAR
BASE EXCESS BLDV CALC-SCNC: 4.8 MMOL/L
BASOPHILS # BLD AUTO: 0 10E3/UL (ref 0–0.2)
BASOPHILS NFR BLD AUTO: 0 %
BILIRUB UR QL STRIP: NEGATIVE
BUN SERPL-MCNC: 22 MG/DL (ref 8–28)
CALCIUM SERPL-MCNC: 9.5 MG/DL (ref 8.5–10.5)
CHLORIDE BLD-SCNC: 101 MMOL/L (ref 98–107)
CO2 SERPL-SCNC: 24 MMOL/L (ref 22–31)
COLOR UR AUTO: YELLOW
CREAT SERPL-MCNC: 0.68 MG/DL (ref 0.6–1.1)
EOSINOPHIL # BLD AUTO: 0 10E3/UL (ref 0–0.7)
EOSINOPHIL NFR BLD AUTO: 0 %
ERYTHROCYTE [DISTWIDTH] IN BLOOD BY AUTOMATED COUNT: 16.2 % (ref 10–15)
FLUAV RNA SPEC QL NAA+PROBE: NEGATIVE
FLUBV RNA RESP QL NAA+PROBE: NEGATIVE
GFR SERPL CREATININE-BSD FRML MDRD: >90 ML/MIN/1.73M2
GLUCOSE BLD-MCNC: 131 MG/DL (ref 70–125)
GLUCOSE UR STRIP-MCNC: NEGATIVE MG/DL
HCO3 BLDV-SCNC: 29 MMOL/L (ref 24–30)
HCT VFR BLD AUTO: 37.9 % (ref 35–47)
HGB BLD-MCNC: 12.7 G/DL (ref 11.7–15.7)
HGB UR QL STRIP: NEGATIVE
HYALINE CASTS: 1 /LPF
IMM GRANULOCYTES # BLD: 0 10E3/UL
IMM GRANULOCYTES NFR BLD: 1 %
KETONES UR STRIP-MCNC: NEGATIVE MG/DL
LACTATE SERPL-SCNC: 1.2 MMOL/L (ref 0.7–2)
LEUKOCYTE ESTERASE UR QL STRIP: NEGATIVE
LYMPHOCYTES # BLD AUTO: 0.6 10E3/UL (ref 0.8–5.3)
LYMPHOCYTES NFR BLD AUTO: 10 %
MAGNESIUM SERPL-MCNC: 1.5 MG/DL (ref 1.8–2.6)
MCH RBC QN AUTO: 26.5 PG (ref 26.5–33)
MCHC RBC AUTO-ENTMCNC: 33.5 G/DL (ref 31.5–36.5)
MCV RBC AUTO: 79 FL (ref 78–100)
MONOCYTES # BLD AUTO: 0.6 10E3/UL (ref 0–1.3)
MONOCYTES NFR BLD AUTO: 10 %
MUCOUS THREADS #/AREA URNS LPF: PRESENT /LPF
NEUTROPHILS # BLD AUTO: 5.1 10E3/UL (ref 1.6–8.3)
NEUTROPHILS NFR BLD AUTO: 79 %
NITRATE UR QL: NEGATIVE
NRBC # BLD AUTO: 0 10E3/UL
NRBC BLD AUTO-RTO: 0 /100
OXYHGB MFR BLDV: 78.3 % (ref 70–75)
PCO2 BLDV: 41 MM HG (ref 35–50)
PH BLDV: 7.45 [PH] (ref 7.35–7.45)
PH UR STRIP: 5 [PH] (ref 5–7)
PLATELET # BLD AUTO: 160 10E3/UL (ref 150–450)
PO2 BLDV: 42 MM HG (ref 25–47)
POTASSIUM BLD-SCNC: 4.2 MMOL/L (ref 3.5–5)
RBC # BLD AUTO: 4.8 10E6/UL (ref 3.8–5.2)
RBC URINE: 1 /HPF
RSV RNA SPEC NAA+PROBE: NEGATIVE
SAO2 % BLDV: 79.3 % (ref 70–75)
SARS-COV-2 RNA RESP QL NAA+PROBE: POSITIVE
SODIUM SERPL-SCNC: 138 MMOL/L (ref 136–145)
SP GR UR STRIP: 1.02 (ref 1–1.03)
UROBILINOGEN UR STRIP-MCNC: <2 MG/DL
WBC # BLD AUTO: 6.4 10E3/UL (ref 4–11)
WBC URINE: 1 /HPF

## 2023-01-13 PROCEDURE — 80053 COMPREHEN METABOLIC PANEL: CPT | Performed by: FAMILY MEDICINE

## 2023-01-13 PROCEDURE — 82533 TOTAL CORTISOL: CPT | Performed by: INTERNAL MEDICINE

## 2023-01-13 PROCEDURE — 250N000013 HC RX MED GY IP 250 OP 250 PS 637: Performed by: FAMILY MEDICINE

## 2023-01-13 PROCEDURE — 87040 BLOOD CULTURE FOR BACTERIA: CPT | Performed by: FAMILY MEDICINE

## 2023-01-13 PROCEDURE — 71275 CT ANGIOGRAPHY CHEST: CPT

## 2023-01-13 PROCEDURE — 258N000003 HC RX IP 258 OP 636: Performed by: FAMILY MEDICINE

## 2023-01-13 PROCEDURE — 85004 AUTOMATED DIFF WBC COUNT: CPT | Performed by: FAMILY MEDICINE

## 2023-01-13 PROCEDURE — 83605 ASSAY OF LACTIC ACID: CPT | Performed by: FAMILY MEDICINE

## 2023-01-13 PROCEDURE — 82805 BLOOD GASES W/O2 SATURATION: CPT | Performed by: FAMILY MEDICINE

## 2023-01-13 PROCEDURE — 93005 ELECTROCARDIOGRAM TRACING: CPT | Performed by: FAMILY MEDICINE

## 2023-01-13 PROCEDURE — 82248 BILIRUBIN DIRECT: CPT | Performed by: INTERNAL MEDICINE

## 2023-01-13 PROCEDURE — C9803 HOPD COVID-19 SPEC COLLECT: HCPCS

## 2023-01-13 PROCEDURE — 87637 SARSCOV2&INF A&B&RSV AMP PRB: CPT | Performed by: FAMILY MEDICINE

## 2023-01-13 PROCEDURE — 96365 THER/PROPH/DIAG IV INF INIT: CPT | Mod: 59

## 2023-01-13 PROCEDURE — 70450 CT HEAD/BRAIN W/O DYE: CPT

## 2023-01-13 PROCEDURE — 36415 COLL VENOUS BLD VENIPUNCTURE: CPT | Performed by: FAMILY MEDICINE

## 2023-01-13 PROCEDURE — 96361 HYDRATE IV INFUSION ADD-ON: CPT

## 2023-01-13 PROCEDURE — 83735 ASSAY OF MAGNESIUM: CPT | Performed by: FAMILY MEDICINE

## 2023-01-13 PROCEDURE — 99285 EMERGENCY DEPT VISIT HI MDM: CPT | Mod: 25

## 2023-01-13 PROCEDURE — 250N000011 HC RX IP 250 OP 636: Performed by: FAMILY MEDICINE

## 2023-01-13 PROCEDURE — 250N000011 HC RX IP 250 OP 636: Performed by: EMERGENCY MEDICINE

## 2023-01-13 PROCEDURE — 81001 URINALYSIS AUTO W/SCOPE: CPT | Performed by: FAMILY MEDICINE

## 2023-01-13 PROCEDURE — 83036 HEMOGLOBIN GLYCOSYLATED A1C: CPT | Performed by: INTERNAL MEDICINE

## 2023-01-13 PROCEDURE — 258N000003 HC RX IP 258 OP 636: Performed by: EMERGENCY MEDICINE

## 2023-01-13 RX ORDER — ACETAMINOPHEN 650 MG/1
650 SUPPOSITORY RECTAL ONCE
Status: COMPLETED | OUTPATIENT
Start: 2023-01-13 | End: 2023-01-13

## 2023-01-13 RX ORDER — AZITHROMYCIN 500 MG/5ML
500 INJECTION, POWDER, LYOPHILIZED, FOR SOLUTION INTRAVENOUS ONCE
Status: COMPLETED | OUTPATIENT
Start: 2023-01-14 | End: 2023-01-14

## 2023-01-13 RX ORDER — DILTIAZEM HYDROCHLORIDE 5 MG/ML
15 INJECTION INTRAVENOUS ONCE
Status: DISCONTINUED | OUTPATIENT
Start: 2023-01-13 | End: 2023-01-13

## 2023-01-13 RX ORDER — IOPAMIDOL 755 MG/ML
75 INJECTION, SOLUTION INTRAVASCULAR ONCE
Status: COMPLETED | OUTPATIENT
Start: 2023-01-13 | End: 2023-01-13

## 2023-01-13 RX ORDER — METOPROLOL TARTRATE 1 MG/ML
5 INJECTION, SOLUTION INTRAVENOUS ONCE
Status: DISCONTINUED | OUTPATIENT
Start: 2023-01-13 | End: 2023-01-14

## 2023-01-13 RX ORDER — CEFTRIAXONE 2 G/1
2 INJECTION, POWDER, FOR SOLUTION INTRAMUSCULAR; INTRAVENOUS ONCE
Status: COMPLETED | OUTPATIENT
Start: 2023-01-14 | End: 2023-01-14

## 2023-01-13 RX ORDER — MAGNESIUM SULFATE HEPTAHYDRATE 40 MG/ML
2 INJECTION, SOLUTION INTRAVENOUS ONCE
Status: COMPLETED | OUTPATIENT
Start: 2023-01-13 | End: 2023-01-14

## 2023-01-13 RX ADMIN — SODIUM CHLORIDE 1000 ML: 9 INJECTION, SOLUTION INTRAVENOUS at 23:52

## 2023-01-13 RX ADMIN — MAGNESIUM SULFATE HEPTAHYDRATE 2 G: 40 INJECTION, SOLUTION INTRAVENOUS at 23:41

## 2023-01-13 RX ADMIN — SODIUM CHLORIDE 1000 ML: 9 INJECTION, SOLUTION INTRAVENOUS at 21:42

## 2023-01-13 RX ADMIN — IOPAMIDOL 75 ML: 755 INJECTION, SOLUTION INTRAVENOUS at 23:07

## 2023-01-13 RX ADMIN — ACETAMINOPHEN 650 MG: 650 SUPPOSITORY RECTAL at 21:33

## 2023-01-13 ASSESSMENT — ACTIVITIES OF DAILY LIVING (ADL)
ADLS_ACUITY_SCORE: 35
ADLS_ACUITY_SCORE: 37

## 2023-01-13 NOTE — TELEPHONE ENCOUNTER
[facility] Triage Nurse Telephone Encounter    Provider: Ginny Johnson MD  Facility: [facility] Facility Type:  [facility]    Caller: Matilde  Call Back Number: [phone]    Allergies:    Allergies   Allergen Reactions     Amoxicillin Unknown     Oxycodone Other (See Comments)     Does not remember     Latex Rash        Reason for call: Nurse is calling to report that patient tested positive for Covid-19 today.  Patient was tested due to an exposure.  No symptoms noted.  VSS.      Verbal Order/Direction given by Provider: Continue to monitor and update if patient develops symptoms.      Provider giving Order:  Ginny Johnson MD    Verbal Order given to: Matilde Campbell RN

## 2023-01-14 ENCOUNTER — APPOINTMENT (OUTPATIENT)
Dept: CARDIOLOGY | Facility: CLINIC | Age: 73
DRG: 064 | End: 2023-01-14
Attending: INTERNAL MEDICINE
Payer: COMMERCIAL

## 2023-01-14 ENCOUNTER — APPOINTMENT (OUTPATIENT)
Dept: MRI IMAGING | Facility: CLINIC | Age: 73
DRG: 064 | End: 2023-01-14
Attending: INTERNAL MEDICINE
Payer: COMMERCIAL

## 2023-01-14 ENCOUNTER — APPOINTMENT (OUTPATIENT)
Dept: SPEECH THERAPY | Facility: CLINIC | Age: 73
DRG: 064 | End: 2023-01-14
Attending: INTERNAL MEDICINE
Payer: COMMERCIAL

## 2023-01-14 ENCOUNTER — APPOINTMENT (OUTPATIENT)
Dept: PHYSICAL THERAPY | Facility: CLINIC | Age: 73
DRG: 064 | End: 2023-01-14
Attending: INTERNAL MEDICINE
Payer: COMMERCIAL

## 2023-01-14 ENCOUNTER — APPOINTMENT (OUTPATIENT)
Dept: OCCUPATIONAL THERAPY | Facility: CLINIC | Age: 73
DRG: 064 | End: 2023-01-14
Attending: INTERNAL MEDICINE
Payer: COMMERCIAL

## 2023-01-14 PROBLEM — U07.1 INFECTION DUE TO 2019 NOVEL CORONAVIRUS: Status: ACTIVE | Noted: 2023-01-14

## 2023-01-14 PROBLEM — J18.9 PNEUMONIA OF BOTH LUNGS DUE TO INFECTIOUS ORGANISM, UNSPECIFIED PART OF LUNG: Status: ACTIVE | Noted: 2023-01-14

## 2023-01-14 PROBLEM — I48.20 CHRONIC ATRIAL FIBRILLATION WITH RVR (H): Status: ACTIVE | Noted: 2023-01-14

## 2023-01-14 PROBLEM — E83.42 HYPOMAGNESEMIA: Status: ACTIVE | Noted: 2023-01-14

## 2023-01-14 PROBLEM — G93.40 ACUTE ENCEPHALOPATHY: Status: ACTIVE | Noted: 2023-01-14

## 2023-01-14 PROBLEM — Z79.01 LONG TERM CURRENT USE OF ANTICOAGULANT THERAPY: Status: ACTIVE | Noted: 2023-01-14

## 2023-01-14 LAB
ALBUMIN SERPL-MCNC: 3.8 G/DL (ref 3.5–5)
ALP SERPL-CCNC: 67 U/L (ref 45–120)
ALT SERPL W P-5'-P-CCNC: 18 U/L (ref 0–45)
AST SERPL W P-5'-P-CCNC: 22 U/L (ref 0–40)
BILIRUB DIRECT SERPL-MCNC: 0.3 MG/DL
BILIRUB SERPL-MCNC: 0.5 MG/DL (ref 0–1)
C REACTIVE PROTEIN LHE: 3.8 MG/DL (ref 0–?)
CHOLEST SERPL-MCNC: 61 MG/DL
CORTIS SERPL-MCNC: 32.2 UG/DL
GLUCOSE BLDC GLUCOMTR-MCNC: 100 MG/DL (ref 70–99)
GLUCOSE BLDC GLUCOMTR-MCNC: 101 MG/DL (ref 70–99)
GLUCOSE BLDC GLUCOMTR-MCNC: 103 MG/DL (ref 70–99)
HBA1C MFR BLD: 6.1 %
HDLC SERPL-MCNC: 25 MG/DL
LDLC SERPL CALC-MCNC: 24 MG/DL
MAGNESIUM SERPL-MCNC: 2.4 MG/DL (ref 1.8–2.6)
PROCALCITONIN SERPL-MCNC: 0.06 NG/ML (ref 0–0.49)
PROT SERPL-MCNC: 6.9 G/DL (ref 6–8)
TRIGL SERPL-MCNC: 59 MG/DL
TROPONIN I SERPL-MCNC: 0.03 NG/ML (ref 0–0.29)

## 2023-01-14 PROCEDURE — 70549 MR ANGIOGRAPH NECK W/O&W/DYE: CPT

## 2023-01-14 PROCEDURE — 250N000011 HC RX IP 250 OP 636: Performed by: EMERGENCY MEDICINE

## 2023-01-14 PROCEDURE — 83735 ASSAY OF MAGNESIUM: CPT | Performed by: INTERNAL MEDICINE

## 2023-01-14 PROCEDURE — 83718 ASSAY OF LIPOPROTEIN: CPT | Performed by: INTERNAL MEDICINE

## 2023-01-14 PROCEDURE — 97110 THERAPEUTIC EXERCISES: CPT | Mod: GP

## 2023-01-14 PROCEDURE — 255N000002 HC RX 255 OP 636: Performed by: INTERNAL MEDICINE

## 2023-01-14 PROCEDURE — 70544 MR ANGIOGRAPHY HEAD W/O DYE: CPT

## 2023-01-14 PROCEDURE — 250N000011 HC RX IP 250 OP 636: Performed by: INTERNAL MEDICINE

## 2023-01-14 PROCEDURE — 84145 PROCALCITONIN (PCT): CPT | Performed by: INTERNAL MEDICINE

## 2023-01-14 PROCEDURE — 92610 EVALUATE SWALLOWING FUNCTION: CPT | Mod: GN

## 2023-01-14 PROCEDURE — 97535 SELF CARE MNGMENT TRAINING: CPT | Mod: GO

## 2023-01-14 PROCEDURE — 999N000157 HC STATISTIC RCP TIME EA 10 MIN

## 2023-01-14 PROCEDURE — 97166 OT EVAL MOD COMPLEX 45 MIN: CPT | Mod: GO

## 2023-01-14 PROCEDURE — 84484 ASSAY OF TROPONIN QUANT: CPT | Performed by: PHYSICIAN ASSISTANT

## 2023-01-14 PROCEDURE — 97162 PT EVAL MOD COMPLEX 30 MIN: CPT | Mod: GP

## 2023-01-14 PROCEDURE — 250N000013 HC RX MED GY IP 250 OP 250 PS 637: Performed by: HOSPITALIST

## 2023-01-14 PROCEDURE — 82962 GLUCOSE BLOOD TEST: CPT

## 2023-01-14 PROCEDURE — 36415 COLL VENOUS BLD VENIPUNCTURE: CPT | Performed by: INTERNAL MEDICINE

## 2023-01-14 PROCEDURE — 94640 AIRWAY INHALATION TREATMENT: CPT

## 2023-01-14 PROCEDURE — 258N000003 HC RX IP 258 OP 636: Performed by: INTERNAL MEDICINE

## 2023-01-14 PROCEDURE — 86140 C-REACTIVE PROTEIN: CPT | Performed by: INTERNAL MEDICINE

## 2023-01-14 PROCEDURE — 99223 1ST HOSP IP/OBS HIGH 75: CPT | Mod: AI | Performed by: INTERNAL MEDICINE

## 2023-01-14 PROCEDURE — A9585 GADOBUTROL INJECTION: HCPCS | Performed by: INTERNAL MEDICINE

## 2023-01-14 PROCEDURE — 250N000013 HC RX MED GY IP 250 OP 250 PS 637: Performed by: INTERNAL MEDICINE

## 2023-01-14 PROCEDURE — 210N000002 HC R&B HEART CARE

## 2023-01-14 PROCEDURE — 93306 TTE W/DOPPLER COMPLETE: CPT | Mod: 26 | Performed by: INTERNAL MEDICINE

## 2023-01-14 PROCEDURE — 70551 MRI BRAIN STEM W/O DYE: CPT

## 2023-01-14 PROCEDURE — XW033E5 INTRODUCTION OF REMDESIVIR ANTI-INFECTIVE INTO PERIPHERAL VEIN, PERCUTANEOUS APPROACH, NEW TECHNOLOGY GROUP 5: ICD-10-PCS | Performed by: INTERNAL MEDICINE

## 2023-01-14 PROCEDURE — 93306 TTE W/DOPPLER COMPLETE: CPT

## 2023-01-14 PROCEDURE — 250N000009 HC RX 250: Performed by: INTERNAL MEDICINE

## 2023-01-14 PROCEDURE — 258N000003 HC RX IP 258 OP 636: Performed by: EMERGENCY MEDICINE

## 2023-01-14 RX ORDER — LIDOCAINE 40 MG/G
CREAM TOPICAL
Status: DISCONTINUED | OUTPATIENT
Start: 2023-01-14 | End: 2023-01-14

## 2023-01-14 RX ORDER — MENTHOL AND METHYL SALICYLATE 7.6; 29 G/100G; G/100G
OINTMENT TOPICAL 2 TIMES DAILY
Status: DISCONTINUED | OUTPATIENT
Start: 2023-01-14 | End: 2023-01-17 | Stop reason: HOSPADM

## 2023-01-14 RX ORDER — ALBUTEROL SULFATE 0.83 MG/ML
2.5 SOLUTION RESPIRATORY (INHALATION) EVERY 6 HOURS PRN
Status: DISCONTINUED | OUTPATIENT
Start: 2023-01-14 | End: 2023-01-17 | Stop reason: HOSPADM

## 2023-01-14 RX ORDER — ATORVASTATIN CALCIUM 10 MG/1
20 TABLET, FILM COATED ORAL EVERY EVENING
Status: DISCONTINUED | OUTPATIENT
Start: 2023-01-14 | End: 2023-01-17 | Stop reason: HOSPADM

## 2023-01-14 RX ORDER — ACETAMINOPHEN 325 MG/1
650 TABLET ORAL EVERY 6 HOURS PRN
Status: DISCONTINUED | OUTPATIENT
Start: 2023-01-14 | End: 2023-01-17 | Stop reason: HOSPADM

## 2023-01-14 RX ORDER — LANOLIN ALCOHOL/MO/W.PET/CERES
1000 CREAM (GRAM) TOPICAL DAILY
COMMUNITY
Start: 2023-01-10 | End: 2023-03-16

## 2023-01-14 RX ORDER — AZITHROMYCIN 500 MG/5ML
500 INJECTION, POWDER, LYOPHILIZED, FOR SOLUTION INTRAVENOUS EVERY 24 HOURS
Status: DISCONTINUED | OUTPATIENT
Start: 2023-01-15 | End: 2023-01-17 | Stop reason: HOSPADM

## 2023-01-14 RX ORDER — SODIUM CHLORIDE 9 MG/ML
INJECTION, SOLUTION INTRAVENOUS CONTINUOUS
Status: DISCONTINUED | OUTPATIENT
Start: 2023-01-14 | End: 2023-01-15

## 2023-01-14 RX ORDER — DEXTROSE MONOHYDRATE 25 G/50ML
25-50 INJECTION, SOLUTION INTRAVENOUS
Status: DISCONTINUED | OUTPATIENT
Start: 2023-01-14 | End: 2023-01-17 | Stop reason: HOSPADM

## 2023-01-14 RX ORDER — CEFTRIAXONE 2 G/1
2 INJECTION, POWDER, FOR SOLUTION INTRAMUSCULAR; INTRAVENOUS EVERY 24 HOURS
Status: DISCONTINUED | OUTPATIENT
Start: 2023-01-15 | End: 2023-01-17 | Stop reason: HOSPADM

## 2023-01-14 RX ORDER — SODIUM CHLORIDE 9 MG/ML
INJECTION, SOLUTION INTRAVENOUS CONTINUOUS
Status: DISCONTINUED | OUTPATIENT
Start: 2023-01-14 | End: 2023-01-14

## 2023-01-14 RX ORDER — NICOTINE POLACRILEX 4 MG
15-30 LOZENGE BUCCAL
Status: DISCONTINUED | OUTPATIENT
Start: 2023-01-14 | End: 2023-01-17 | Stop reason: HOSPADM

## 2023-01-14 RX ORDER — LIDOCAINE 40 MG/G
CREAM TOPICAL
Status: DISCONTINUED | OUTPATIENT
Start: 2023-01-14 | End: 2023-01-17 | Stop reason: HOSPADM

## 2023-01-14 RX ORDER — METOPROLOL SUCCINATE 25 MG/1
12.5 TABLET, EXTENDED RELEASE ORAL DAILY
Status: ON HOLD | COMMUNITY
Start: 2023-01-10 | End: 2023-01-17

## 2023-01-14 RX ORDER — GLIMEPIRIDE 1 MG/1
1 TABLET ORAL DAILY
COMMUNITY
Start: 2023-01-10 | End: 2023-01-14

## 2023-01-14 RX ORDER — ACETAMINOPHEN 650 MG/1
650 SUPPOSITORY RECTAL EVERY 6 HOURS PRN
Status: DISCONTINUED | OUTPATIENT
Start: 2023-01-14 | End: 2023-01-17 | Stop reason: HOSPADM

## 2023-01-14 RX ORDER — GADOBUTROL 604.72 MG/ML
10 INJECTION INTRAVENOUS ONCE
Status: COMPLETED | OUTPATIENT
Start: 2023-01-14 | End: 2023-01-14

## 2023-01-14 RX ADMIN — AZITHROMYCIN DIHYDRATE 500 MG: 500 INJECTION, POWDER, LYOPHILIZED, FOR SOLUTION INTRAVENOUS at 01:31

## 2023-01-14 RX ADMIN — SODIUM CHLORIDE 50 ML: 9 INJECTION, SOLUTION INTRAVENOUS at 03:29

## 2023-01-14 RX ADMIN — CEFTRIAXONE SODIUM 2 G: 2 INJECTION, POWDER, FOR SOLUTION INTRAMUSCULAR; INTRAVENOUS at 00:54

## 2023-01-14 RX ADMIN — SODIUM CHLORIDE: 9 INJECTION, SOLUTION INTRAVENOUS at 01:39

## 2023-01-14 RX ADMIN — ALBUTEROL SULFATE 2.5 MG: 2.5 SOLUTION RESPIRATORY (INHALATION) at 09:47

## 2023-01-14 RX ADMIN — ATORVASTATIN CALCIUM 20 MG: 10 TABLET, FILM COATED ORAL at 20:39

## 2023-01-14 RX ADMIN — APIXABAN 5 MG: 5 TABLET, FILM COATED ORAL at 20:39

## 2023-01-14 RX ADMIN — GADOBUTROL 10 ML: 604.72 INJECTION INTRAVENOUS at 05:32

## 2023-01-14 RX ADMIN — REMDESIVIR 200 MG: 100 INJECTION, POWDER, LYOPHILIZED, FOR SOLUTION INTRAVENOUS at 03:29

## 2023-01-14 RX ADMIN — SODIUM CHLORIDE: 9 INJECTION, SOLUTION INTRAVENOUS at 15:12

## 2023-01-14 ASSESSMENT — ACTIVITIES OF DAILY LIVING (ADL)
ADLS_ACUITY_SCORE: 43
ADLS_ACUITY_SCORE: 37
DRESS: 0-->INDEPENDENT
ADLS_ACUITY_SCORE: 37
EATING: 1-->ASSISTANCE (EQUIPMENT/PERSON) NEEDED
ADLS_ACUITY_SCORE: 28
ADLS_ACUITY_SCORE: 43
TOILETING_ISSUES: NO
ADLS_ACUITY_SCORE: 43
ADLS_ACUITY_SCORE: 37
WALKING_OR_CLIMBING_STAIRS_DIFFICULTY: YES
TRANSFERRING: 1-->ASSISTANCE (EQUIPMENT/PERSON) NEEDED
NUMBER_OF_TIMES_PATIENT_HAS_FALLEN_WITHIN_LAST_SIX_MONTHS: 2
BATHING: 0-->INDEPENDENT
FALL_HISTORY_WITHIN_LAST_SIX_MONTHS: YES
CONCENTRATING,_REMEMBERING_OR_MAKING_DECISIONS_DIFFICULTY: NO
DEPENDENT_IADLS:: COOKING;CLEANING;LAUNDRY;SHOPPING;MEAL PREPARATION;MEDICATION MANAGEMENT;MONEY MANAGEMENT;TRANSPORTATION
SWALLOWING: 2-->DIFFICULTY SWALLOWING LIQUIDS/FOODS
DRESS: 0-->INDEPENDENT
HEARING_DIFFICULTY_OR_DEAF: NO
DOING_ERRANDS_INDEPENDENTLY_DIFFICULTY: NO
ADLS_ACUITY_SCORE: 39
ADLS_ACUITY_SCORE: 37
CHANGE_IN_FUNCTIONAL_STATUS_SINCE_ONSET_OF_CURRENT_ILLNESS/INJURY: YES
ADLS_ACUITY_SCORE: 29
EATING/SWALLOWING: SWALLOWING SOLID FOOD;SWALLOWING LIQUIDS
TRANSFERRING: 0-->ASSISTANCE NEEDED (DEVELOPMENTALLY APPROPRIATE)
SWALLOWING: 2-->DIFFICULTY SWALLOWING LIQUIDS/FOODS
DRESSING/BATHING_DIFFICULTY: NO
DIFFICULTY_COMMUNICATING: NO
WALKING_OR_CLIMBING_STAIRS: AMBULATION DIFFICULTY, DEPENDENT
ADLS_ACUITY_SCORE: 42
ADLS_ACUITY_SCORE: 37
DIFFICULTY_EATING/SWALLOWING: YES
WEAR_GLASSES_OR_BLIND: NO

## 2023-01-14 NOTE — PROGRESS NOTES
[facility] MEDICINE PROGRESS NOTE      Identification/Summary: Angela Beckham is a 72 year old female with a past medical history of afib,  who was admitted on 1/13/2023 for altered mental status.    Angela Beckham was sent to [facility] from TCU for altered mental status.  On arrival she was alert, cooperative, and nonverbal.  She was diagnosed with acute ischemic cardioembolic stroke of right corona radiata and periventricular white matter based on MRI, as well as chronic right basal ganglia infarct and brain atrophy from chronic microvascular ischemia.  Echo showed biatrial enlargement, moderate mitral stenosis, normal LVEF, borderline RV enlargement with intact function.  MRA head and neck were negative for stenosis.  CT negative for PE, shows diffuse bronchial thickening, remote granulomatous disease, small pericardial effusion, coronary calcification, hypodense thyroid nodules.  Telemetry neurology started atorvastatin.  She was started on azithromycin and ceftriaxone as well as remdesivir.    Assessment and Plan:  Ischemic cardio-embolic stroke  Appreciate tele-neuro  Resumed eliquis    Chronic atrial fibrillation  Rate ~100 on exam  Resume metoprolol    Covid 19  Bronchial thickening  No distress or hypoxia on exam  Reportedly unvaccinated  On remdesivir, LFT's okay  Okay to continue Abx for now    Pre-diabetes  Metformin held    Hypomagnesemia  Resolved    S/p left BKA    Generalized weakness    Blood pressures have come down, afebrile, slightly tachycardic, now on 2 L nasal cannula with 100% saturation  CRP 3.8, HDL 25, A1c 6.1    Established problems without acute assessment/management today by me:    General appearance: poor dentition, paucity of speech, NAD  Discharge comments: pending therapy and pulmonary progress    There is no height or weight on file to calculate BMI.  Wt Readings from Last 5 Encounters:   11/16/22 66.7 kg (147 lb)   10/11/22 72.1 kg (159 lb)   09/14/22  72.1 kg (159 lb)   07/20/22 73.9 kg (163 lb)   05/11/22 75.3 kg (166 lb)       Diet: Combination Diet Regular Diet Adult  DVT Prophylaxis:  DOAC  Code Status: Full Code    Clinically Significant Risk Factors Present on Admission            # Hypomagnesemia: Lowest Mg = 1.5 mg/dL in last 2 days, will replace as needed    # Drug Induced Coagulation Defect: home medication list includes an anticoagulant medication      # Acute Respiratory Failure: Documented O2 saturation < 91%.  Continue supplemental oxygen as needed             I personally reviewed the vital signs for the past 24 hours  I personally reviewed all new medications, labs, imaging/diagnostics reports over the past 24 hours.    Jhonatan Valdez MD, MPH  Hospitalist,[facility]: [facility]  Phone: #[phone]    Medications:   Personally Reviewed.  Medications     - MEDICATION INSTRUCTIONS -       - MEDICATION INSTRUCTIONS -       - MEDICATION INSTRUCTIONS -       - MEDICATION INSTRUCTIONS -       sodium chloride 100 mL/hr at 01/14/23 0943       [START ON 1/15/2023] remdesivir  100 mg Intravenous Q24H    And     [START ON 1/15/2023] sodium chloride 0.9%  50 mL Intravenous Q24H     atorvastatin  20 mg Oral QPM     [START ON 1/15/2023] azithromycin  500 mg Intravenous Q24H     [START ON 1/15/2023] cefTRIAXone  2 g Intravenous Q24H     sodium chloride (PF)  3 mL Intracatheter Q8H     sodium chloride (PF)  3 mL Intracatheter Q8H     sodium chloride (PF)  3 mL Intracatheter Q8H

## 2023-01-14 NOTE — ED TRIAGE NOTES
Pt arrives via ens with altered mental status, pt nods head but will not speak to staff, pt seems to be confused, pt in afib rvr.     Triage Assessment     Row Name 01/13/23 2039       Triage Assessment (Adult)    Airway WDL WDL       Respiratory WDL    Respiratory WDL WDL       Cardiac WDL    Cardiac WDL X;rhythm    Cardiac Rhythm Atrial fibrillation  rvr

## 2023-01-14 NOTE — CONSULTS
"      [facility]    Stroke Consult Note    Reason for Consult:  \"stroke\"    Chief Complaint: Altered Mental Status       HPI  Angela Beckham is a 72 year old R handed female who presented to the ED from her long term care facility for altered mental status. Apparently she had decreased responsiveness and was staring and was potentially also hypoxic for EMS but I cannot find exact documentation of this. She had recently been found to be COVID positive at the facility. She cannot provide any details on this.    She has a past medical history of afib on eliquis, DM2, HTN, left side BKA. She is a resident of long term care because she is bedbound and poorly ambulatory      Today she feels fairly good and doesn't notice any weakness.    Stroke Evaluation Summarized    MRI/Head CT MRI brain shows a tiny focus of diffusion restriction R corona radiata and periventricular white matter, too small to characterize on ADC   Intracranial Vasculature MRA head normal   Cervical Vasculature MRA neck normal     Echocardiogram EF 60%. Severe LA enlargement   EKG/Telemetry afib with RVR   Other Testing Not Applicable      LDL  1/14/2023: 24 mg/dL   A1C  1/13/2023: 6.1 %   Troponin 1/14/2023: 0.03 ng/mL       Impression  Acute ischemic stroke of the R corona radiata and periventricular white matter due to cardioembolism from afib versus small vessel disease.  Due to small size not suspected to be contributing to her encephalopathy.    Recommendations  Acute stroke management  - Neuro checks every 4 hours  - Continue permissive hypertension with systolic cap of 180mmHg, no need to lower blood pressure acutely while hospitalized  - PT/OT/ST consults    Diagnostic testing  - no further testing needed    Secondary stroke prevention  - Continue anticoagulation: eliquis for afib  - Statin for goal LDL 40-70, agree with her home med or could consider lowering dose a little. Some studies indicate risk of ICH with " "lower LDL below 40  - Long term BP goal <135/80 to be achieved as outpatient within several weeks  -- Education: [facility] stroke education consult    Patient Follow-up    - in the next 1 week(s) with PCP. Neurology follow up not necessary at this time    Thank you for this consult. No further stroke evaluation is recommended, so we will sign off. Please contact us with any additional questions.    Brittanie Sutton PA-C  Vascular Neurology    To page me or covering stroke neurology team member, click here: AMCOM  Choose \"On Call\" tab at top, then select \"NEUROLOGY/ALL SITES\" from middle drop-down box, press Enter, then look for \"stroke\" or \"telestroke\" for your site.    _____________________________________________________    Clinically Significant Risk Factors            # Hypomagnesemia: Lowest Mg = 1.5 mg/dL in last 2 days, will replace as needed     # Thrombocytopenia: Lowest platelets = 109 in last 2 days, will monitor for bleeding                 Past Medical History   Past Medical History:   Diagnosis Date     Diabetic neuropathy (H)      HTN      Type 2 diabetes mellitus (H)      Past Surgical History   History reviewed. No pertinent surgical history.  Medications   Home Meds  Prior to Admission medications    Medication Sig Start Date End Date Taking? Authorizing Provider   acetaminophen (TYLENOL) 325 MG tablet Take 650 mg by mouth every 4 hours as needed for mild pain    Reported, Patient   ammonium lactate (AMLACTIN) 12 % external cream Apply topically 2 times daily    Reported, Patient   apixaban ANTICOAGULANT (ELIQUIS) 5 MG tablet Take 5 mg by mouth 2 times daily    Reported, Patient   atorvastatin (LIPITOR) 20 MG tablet Take 20 mg by mouth daily    Reported, Patient   cyanocobalamin 1000 MCG SUBL Place 1,000 mcg under the tongue daily    Reported, Patient   docusate sodium (COLACE) 100 MG capsule Take 1 capsule (100 mg) by mouth 2 times daily    Reported, Patient   METFORMIN  MG OR TABS Take 500-1,000 " mg by mouth 2 times daily (with meals) 1000mg with breakfast and 500mg with supper.    Reported, Patient   miconazole (MICATIN) 2 % cream 2 times daily    Reported, Patient   vitamin B-Complex Take 1 tablet by mouth daily    Reported, Patient       Scheduled Meds    remdesivir  100 mg Intravenous Q24H    And     sodium chloride 0.9%  50 mL Intravenous Q24H     apixaban ANTICOAGULANT  5 mg Oral BID     atorvastatin  20 mg Oral QPM     azithromycin  500 mg Intravenous Q24H     cefTRIAXone  2 g Intravenous Q24H     methyl salicylate-menthol   Topical BID     metoprolol succinate ER  12.5 mg Oral Daily     sodium chloride (PF)  3 mL Intracatheter Q8H       Infusion Meds    - MEDICATION INSTRUCTIONS -       - MEDICATION INSTRUCTIONS -       - MEDICATION INSTRUCTIONS -       - MEDICATION INSTRUCTIONS -         PRN Meds  sodium chloride 0.9%, acetaminophen **OR** acetaminophen, albuterol, glucose **OR** dextrose **OR** glucagon, lidocaine 4%, lidocaine (buffered or not buffered), - MEDICATION INSTRUCTIONS -, - MEDICATION INSTRUCTIONS -, - MEDICATION INSTRUCTIONS -, - MEDICATION INSTRUCTIONS -, sodium chloride (PF), sodium chloride (PF), sodium chloride (PF)    Allergies   Allergies   Allergen Reactions     Amoxicillin Unknown     Oxycodone Other (See Comments)     Does not remember     Latex Rash     Family History   Family History   Problem Relation Age of Onset     Cerebrovascular Disease Mother      Hypertension Mother      Alzheimer Disease Father      Respiratory Father      Cerebrovascular Disease Maternal Grandmother      Diabetes Paternal Grandfather      Hypertension Brother      Obesity Brother      Social History   Social History     Tobacco Use     Smoking status: Unknown     Smokeless tobacco: Never   Substance Use Topics     Alcohol use: No     Drug use: No       PHYSICAL EXAMINATION   Temp:  [97.7  F (36.5  C)-98.5  F (36.9  C)] 98.4  F (36.9  C)  Pulse:  [] 97  Resp:  [16-22] 18  BP: ()/(56-72)  109/72  SpO2:  [85 %-100 %] 97 %    General:  patient lying in bed without any acute distress    HEENT:  normocephalic/atraumatic  Pulmonary:  no respiratory distress    Neurologic  Mental Status:  alert, oriented x 3, follows commands, speech clear and fluent, naming and repetition normal  Cranial Nerves:  visual fields intact (tested by nurse), EOMI with normal smooth pursuit, facial sensation intact and symmetric (tested by nurse), facial movements symmetric, hearing not formally tested but intact to conversation, no dysarthria, shoulder shrug equal bilaterally, tongue protrusion midline  Motor:  no abnormal movements, able to move all limbs antigravity spontaneously (has a L BKA), L pronator drift  Reflexes:  unable to test (telestroke)  Sensory:  light touch sensation intact and symmetric throughout upper and lower extremities (assessed by nurse), no extinction on double simultaneous stimulation (assessed by nurse)  Coordination:  normal finger-to-nose and heel-to-shin bilaterally without dysmetria, rapid alternating movements symmetric  Station/Gait:  unable to test due to telestroke        Imaging  I personally reviewed all imaging; relevant findings per HPI.    Labs Data   CBC  Recent Labs   Lab 01/15/23  0602 01/13/23 2058   WBC 4.3 6.4   RBC 3.95 4.80   HGB 10.5* 12.7   HCT 32.1* 37.9   * 160     Basic Metabolic Panel   Recent Labs   Lab 01/15/23  0842 01/15/23  0602 01/14/23  2227 01/14/23  1202 01/13/23 2058   NA  --  141  --   --  138   POTASSIUM  --  3.5  --   --  4.2   CHLORIDE  --  112*  --   --  101   CO2  --  22  --   --  24   BUN  --  16  --   --  22   CR  --  0.57*  --   --  0.68   GLC 91 100 101*   < > 131*   SUSIE  --  8.5  --   --  9.5    < > = values in this interval not displayed.     Liver Panel  Recent Labs   Lab 01/13/23 2058   PROTTOTAL 6.9   ALBUMIN 3.8   BILITOTAL 0.5   ALKPHOS 67   AST 22   ALT 18     INR  No lab results found.   Lipid Profile    Recent Labs   Lab Test  01/14/23  0426   CHOL 61   HDL 25*   LDL 24   TRIG 59     A1C    Recent Labs   Lab Test 01/13/23  2058 09/15/22  0720 04/15/22  0903   A1C 6.1* 6.4* 6.6*     Troponin    Recent Labs   Lab 01/14/23  0426   TROPONINI 0.03          Stroke Consult Data Data   Telestroke Service Details  (for non-emergent stroke consult with tele)  Video start time 01/15/23   1000   Video end time 01/15/23   1022   Type of service telemedicine diagnostic assessment of acute neurological changes   Reason telemedicine is appropriate patient requires assessment with a specialist for diagnosis and treatment of neurological symptoms   Mode of transmission secure interactive audio and video communication per Magdalena   Originating site (patient location) [facility]    Distant site (provider location) [facility]     I have personally spent a total of 70 minutes providing care today, time spent in reviewing medical records and reviewing tests, examining the patient and obtaining history, coordination of care, and discussion with the patient and/or family regarding diagnostic results, prognosis, symptom management, risks and benefits of management options, and development of plan of care. Greater than 50% was spent in counseling and coordination of care.

## 2023-01-14 NOTE — PROGRESS NOTES
Pt on MRI table now. Pt does follow simple commands. On Heart monitor and O2 monitoring per MD request.      MALU LARSEN RN

## 2023-01-14 NOTE — PROGRESS NOTES
"Speech-Language Pathology: Clinical Swallow Evaluation     01/14/23 1500   Appointment Info   Signing Clinician's Name / Credentials (SLP) Emily Bailey MA, CCC-SLP   General Information   Onset of Illness/Injury or Date of Surgery 01/13/23   Referring Physician Ema Garza MD   Pertinent History of Current Problem Per ordering provider \"I met her in ED-4. She was sent to us from TCU for less responsiveness and also staring  - Her eyes are open and she is following commands when asked to do things, like lifting arms and squeezing my fingers. And able to do it. But no verbal output. No posturing. No preferential gaze     - I did talk to Andrew (brother - [phone]) - She said, patient can talk and seems not at baseline from what I describe what I am seeing. They were informed of plan of care. Informed of COVID (+) and OKd remdesivir. We did discuss benefits and risks, comp - and they agreed because she is not vaccinated.     - During my talk, she was coughing in between. Did not appear in distress. Her O2 sat at RA is 94-95%.      -  In the ED, she is COVID (+). Last 12/18/2022 - she was negative COVID.     - She had PE study and was negative. But has Diffuse thickening of the bronchi with foci of endobronchial mucus, more apparent in the left lower lobe.\"   General Observations Patient is alert and cooperative   Type of Evaluation   Type of Evaluation Swallow Evaluation   Oral Motor   Oral Musculature generally intact   Mucosal Quality adequate   Dentition (Oral Motor)   Comment, Dentition (Oral Motor) Patient reports eating very soft foods at home d/t missing dentition. No reported dentures or partials. She stated that are lost.   Dentition (Oral Motor) significant number of missing teeth;dentition impacts chewing ability   Facial Symmetry (Oral Motor)   Facial Symmetry (Oral Motor) WNL   Lip Function (Oral Motor)   Lip Range of Motion (Oral Motor) unable/difficult to assess   Tongue Function (Oral Motor) "   Tongue ROM (Oral Motor) unable/difficult to assess   Vocal Quality/Secretion Management (Oral Motor)   Vocal Quality (Oral Motor) WNL   Secretion Management (Oral Motor) WNL   General Swallowing Observations   Past History of Dysphagia None per EMR. Pt denies coughing or choking with intake. Primary complaint is difficulty with mastication d/t missing dentition.   Current Diet/Method of Nutritional Intake (General Swallowing Observations, NIS) thin liquids (level 0);regular diet   Swallowing Evaluation Clinical swallow evaluation   Clinical Swallow Evaluation   Clinical Swallow Evaluation Textures Trialed thin liquids;soft & bite-sized;solid foods   Clinical Swallow Eval: Thin Liquid Texture Trial   Mode of Presentation, Thin Liquids cup;straw;self-fed   Volume of Liquid or Food Presented 8oz   Oral Phase of Swallow WFL   Pharyngeal Phase of Swallow intact   Clinical Swallow Eval: Soft & Bite Sized   Mode of Presentation self-fed;fed by clinician   Volume Presented x4oz   Oral Phase WFL   Pharyngeal Phase intact   Diagnostic Statement Variety of foods given from meal tray and items brought by therapist. Pt's mastication was adequate. She felt comfortable with these textures as well.   Clinical Swallow Evaluation: Solid Food Texture Trial   Mode of Presentation spoon   Volume Presented x1 bite   Oral Phase impaired mastication   Pharyngeal Phase   (Not assessed, pt spit out bolus)   Diagnostic Statement Trial of easy to chew texture given. Pt masticated for several minutes before spitting out bolus d/t inability to masticate adequately.   Esophageal Phase of Swallow   Patient reports or presents with symptoms of esophageal dysphagia No   Swallowing Recommendations   Diet Consistency Recommendations thin liquids (level 0);soft & bite-sized (level 6)   Mode of Delivery Recommendations bolus size, small   Recommended Feeding/Eating Techniques (Swallow Eval) maintain upright sitting position for eating;set-up and  prepare tray   Medication Administration Recommendations, Swallowing (SLP) One at a time   Instrumental Assessment Recommendations instrumental evaluation not recommended at this time   Comment, Swallowing Recommendations Clinical Swallow Study completed. Patient had no s/s aspiration with any intake. Oral motor function was WNL. Mastication was impaired with textures harder than soft and bite sized d/t significant number of missing teeth. Anticipate Soft and Bite size is closest to patient's baseline given her report and performance. Hyolaryngeal elevation appears present upon visualization and palpation. Recommend diet of Soft and Bite Sized and Thin liquids (IDDSI 6,0) with strategies of alert and upright posture.   General Therapy Interventions   Planned Therapy Interventions Dysphagia Treatment   Dysphagia treatment Modified diet education   Clinical Impression   Criteria for Skilled Therapeutic Interventions Met (SLP Eval) Yes, treatment indicated   Risks & Benefits of therapy have been explained evaluation/treatment results reviewed;care plan/treatment goals reviewed;risks/benefits reviewed;participants voiced agreement with care plan;participants included;patient   Clinical Impression Comments No overt s/s aspiration. Mastication impaired by missing dentition. Tolerates softer textures.   SLP Total Evaluation Time   Eval: oral/pharyngeal swallow function, clinical swallow Minutes (36714) 25                      SLP Discharge Planning       SLP Discharge Recommendation   (Per treatment team)   SLP Rationale for DC Rec No anticipated SLP services at d/t   SLP Brief overview of current status  Recommend diet of Soft and Bite Sized and Thin liquids (IDDSI 6,0) with strategies of alert and upright posture.

## 2023-01-14 NOTE — CONSULTS
Care Management Initial Consult    General Information  Assessment completed with: Family,    Type of CM/SW Visit: Initial Assessment    Primary Care Provider verified and updated as needed: Yes   Readmission within the last 30 days:        Reason for Consult: discharge planning  Advance Care Planning:            Communication Assessment  Patient's communication style: spoken language (English or Bilingual)             Cognitive  Cognitive/Neuro/Behavioral: mood/behavior, orientation  Level of Consciousness: alert, other (see comments) (intermittent confusion)  Arousal Level: opens eyes spontaneously  Orientation: disoriented to, time  Mood/Behavior: withdrawn, flat affect  Best Language: 0 - No aphasia  Speech: clear    Living Environment:   People in home:       Current living Arrangements: extended care facility  Name of Facility: [facility]   Able to return to prior arrangements: yes       Family/Social Support:  Care provided by:    Provides care for: no one, unable/limited ability to care for self                Description of Support System:           Current Resources:   Patient receiving home care services: No     Community Resources: None  Equipment currently used at home: wheelchair, manual, walker, rolling, prosthesis  Supplies currently used at home: Incontinence Supplies    Employment/Financial:  Employment Status:          Financial Concerns:             Lifestyle & Psychosocial Needs:  Social Determinants of Health     Tobacco Use: Unknown     Smoking Tobacco Use: Unknown     Smokeless Tobacco Use: Never     Passive Exposure: Not on file   Alcohol Use: Not on file   Financial Resource Strain: Not on file   Food Insecurity: Not on file   Transportation Needs: Not on file   Physical Activity: Not on file   Stress: Not on file   Social Connections: Not on file   Intimate Partner Violence: Not on file   Depression: Not on file   Housing Stability: Not on file       Functional Status:  Prior to  admission patient needed assistance:   Dependent ADLs:: Wheelchair-with assist, Toileting, Positioning  Dependent IADLs:: Cooking, Cleaning, Laundry, Shopping, Meal Preparation, Medication Management, Money Management, Transportation  Assesssment of Functional Status: Not at baseline with ADL Functioning    Mental Health Status:          Chemical Dependency Status:                Values/Beliefs:  Spiritual, Cultural Beliefs, Worship Practices, Values that affect care:                 Additional Information:  AIDET completed. Writer called patient in her room, No answer. She is positive for Covid 1/13/2023. Call to her brother Andrew: [phone]. Spoke too with Liza, Andrew's wife. She is primary contact. Pt has been a resident at [facility] since Thanksgiving. She needs a wheel chair, has prosthetic for left leg (below knee). She needs help with all cares. Came to ED with AMS, not verbal, but alert. Pt also has a Gambino.     Discharge back to [facility]. Informed CM will follow. Transport by family.     Genevieve Zapien RN, CM, LORENZO

## 2023-01-14 NOTE — H&P
[facility] MEDICINE ADMISSION HISTORY AND PHYSICAL       Assessment & Plan      1. Altered MS - head CT negative - looked encephalopathic, intermittently following commands, eyes are open and no preferential gaze but no verbal output. Able to lift both UE but slow, moves right LE but unable to lift, and she is left BKA. Challenging neuro exams, given her MS and inability to consistently follow.     I did talk to her brother - current MS is not baseline.     On eliquis, last dose was 4PM. No info how long she was not responsive and altered    - neuro checks  - if stable, proceed with MR head     2. COVID (+), no prior vaccine - O2 sat is 94-95% on RA. Has CT chest findings not sure if typical of COVID. CT chest --- Diffuse thickening of the bronchi with foci of endobronchial mucus, more apparent in the left lower lobe.    - may benefit from remdesivir - given no vaccine, and she is high risk of disease progression - Andrew is OK to proceed ----   - LFT check     3. Chest CT imaging ---- acute  Bronchitis vs possible early PNA - Was given rocephin/azithro. Low Bps in the mid 80s s/p 2Ls of NSS, now in the 90s  - check CBC  - check procalcitonin  - Blood culture, antigens  - PRN NSS bolus    4. TAMAR in Jan 2022 showing severe mitral calcification, mitral stenosis with a mean gradient of 8 mm Hg, moderate mitral regurgitation  - may need repeat ECHO  - tele    5. Low BP - 80s - could be sepsis r/o AI  - check cortisol   - HOLD BP lowering meds     6. HTN - hold meds    7. DM2 - FS, no sliding scale insulin unless above 200 and persistent high     8. Low Mag at 1.5 -- replace per protocol     9. Afib on eliquis  - tele  - PTA meds     158A - Per RN, called TCU last known normal was Jan 13 in AM - but no clear info when she had MS changes.     330A --- MR --- 1.  Tiny focus of diffusion hyperintensity in the right corona radiata and periventricular white matter, too small to characterize on ADC,  but favored to reflect a recent infarct.    350A - called TCU -- No response. Attempted again still no response. Timing when actually symptoms started not clear.    415A - Discussed with Dr Cruz from Neuro -- proceed with MRA head/neck w/wo contrast -- she had contrast from PE study 5-6 hrs ago.  Rec also to keep eliquis for now. Last dose was 4PM Jan 13. Stroke order set     6AM - MRA -- No flow-limiting stenosis or large vessel occlusion. I did update Neurology. No additional recs. They will see her in AM.     638A - I did inform Anu/family - of small CVA.          VTE prophylaxis: on eliquis  Diet:  NPO for now  Code Status: Full,  COVID vaccination: no  Barriers to discharge: admitting clinical condition  Discharge Disposition and goals:  Unable to determine at this point, pending clinical progress and response to treatment. Patient may need transfer to SNF or ACR if unsafe to go home and needed treatment inappropriate at home setting OR may need home health care evaluation if care can be delivered at home settings. Consider referral to care manager/    PPE - I was wearing PPE when I met the patient including but not limited to - N95 mask, Gloves, and/or Safety glasses.      Hospital Medicine - Medical Decision Making Complexity     History -- supplemental history from: Documented in HPI, if applicable. I did review relevant/available external record(s) including records through care everywhere. If applicable/appropriate, I also did talk to family members/caregivers.     Work Up/Care plan - Based on presenting illnesses and acuity/complexity, it required additional treatment and evaluation using diagnostic testings including but not limited to, blood work, EKG, ECHO/stress test, and/or radiographic imagings such as CT or MRI. These were independently reviewed/interpreted by the provider if results are available as documented, and if it is applicable.     Care plan was created based on available  information provided to me, including patient's condition at the time of encounter. This plan was discussed with patient and/or family members using layman's terms, including counseling/education and they have agreed to proceed. At the end of night shift (9PM - 730A), this case will be presented to the AM Hospitalist.      Consultation - Discussion of management/coordination of care with another provider/external physician and/or nurse, respiratory therapist, pharmacy, and laboratory staff: See chart documentation, if applicable     Complicating factors - In addition to acute illness, patient has multiple chronic medical issues and care is impacted by exacerbation of these illnesses and it required care/treatment modification. These chronic conditions include but not limited to - Heart Disease, Hypertension. Care is affected by social determinants of health, if applicable.       It is recommended to revise care plan and review history if there is change in condition and/or new clinical information is not available during my encounter.     Khari Garza MD, MPH, FACP, [facility]  Internal Medicine - Hospitalist        Chief Complaint Altered MS      HISTORY     - I met her in ED-4. She was sent to us from TCU for less responsiveness and also staring  - Her eyes are open and she is following commands when asked to do things, like lifting arms and squeezing my fingers. And able to do it. But no verbal output. No posturing. No preferential gaze    - I did talk to Andrew (brother - [phone]) - She said, patient can talk and seems not at baseline from what I describe what I am seeing. They were informed of plan of care. Informed of COVID (+) and OKd remdesivir. We did discuss benefits and risks, comp - and they agreed because she is not vaccinated.    - During my talk, she was coughing in between. Did not appear in distress. Her O2 sat at RA is 94-95%.     -  In the ED, she is COVID (+). Last 12/18/2022 - she was negative  COVID.    - She had PE study and was negative. But has Diffuse thickening of the bronchi with foci of endobronchial mucus, more apparent in the left lower lobe.    - ROS -- Limited due to MS      Past Medical History     Past Medical History:   Diagnosis Date     Diabetic neuropathy (H)      HTN      Type 2 diabetes mellitus (H)          Surgical History     History reviewed. No pertinent surgical history.     Family History      Family History   Problem Relation Age of Onset     Cerebrovascular Disease Mother      Hypertension Mother      Alzheimer Disease Father      Respiratory Father      Cerebrovascular Disease Maternal Grandmother      Diabetes Paternal Grandfather      Hypertension Brother      Obesity Brother          Social History      .  Social History     Socioeconomic History     Marital status: Single     Spouse name: Not on file     Number of children: Not on file     Years of education: Not on file     Highest education level: Not on file   Occupational History     Not on file   Tobacco Use     Smoking status: Unknown     Smokeless tobacco: Never   Substance and Sexual Activity     Alcohol use: No     Drug use: No     Sexual activity: Never   Other Topics Concern     Parent/sibling w/ CABG, MI or angioplasty before 65F 55M? Not Asked      Service No     Blood Transfusions Yes     Comment: 1970     Caffeine Concern No     Occupational Exposure No     Hobby Hazards No     Sleep Concern No     Stress Concern No     Weight Concern No     Special Diet No     Back Care No     Exercise Yes     Bike Helmet No     Seat Belt Yes     Self-Exams No   Social History Narrative     Not on file     Social Determinants of Health     Financial Resource Strain: Not on file   Food Insecurity: Not on file   Transportation Needs: Not on file   Physical Activity: Not on file   Stress: Not on file   Social Connections: Not on file   Intimate Partner Violence: Not on file   Housing Stability: Not on file           Allergies        Allergies   Allergen Reactions     Amoxicillin Unknown     Oxycodone Other (See Comments)     Does not remember     Latex Rash         Prior to Admission Medications      No current facility-administered medications on file prior to encounter.  acetaminophen (TYLENOL) 325 MG tablet, Take 650 mg by mouth every 4 hours as needed for mild pain  ammonium lactate (AMLACTIN) 12 % external cream, Apply topically 2 times daily  apixaban ANTICOAGULANT (ELIQUIS) 5 MG tablet, Take 5 mg by mouth 2 times daily  atorvastatin (LIPITOR) 20 MG tablet, Take 20 mg by mouth daily  cyanocobalamin 1000 MCG SUBL, Place 1,000 mcg under the tongue daily  docusate sodium (COLACE) 100 MG capsule, Take 1 capsule (100 mg) by mouth 2 times daily  METFORMIN  MG OR TABS, Take 500-1,000 mg by mouth 2 times daily (with meals) 1000mg with breakfast and 500mg with supper.  miconazole (MICATIN) 2 % cream, 2 times daily  vitamin B-Complex, Take 1 tablet by mouth daily            Review of Systems     A 12 point comprehensive review of systems was negative except as noted above in HPI.    PHYSICAL EXAMINATION       Vitals      Vitals: BP (!) 85/50   Pulse 109   Temp 97.9  F (36.6  C) (Temporal)   Resp 25   SpO2 94%   BMI= There is no height or weight on file to calculate BMI.      Examination     General Appearance:  Alert, cooperative, no distress  Head:    Normocephalic, without obvious abnormality, atraumatic  EENT:  PERRL, conjunctiva/corneas clear, EOM's intact.   Neck:   Supple, symmetrical, trachea midline, no adenopathy; no NVE  Back:  Symmetric, no curvature, no CVA tenderness  Chest/Lungs: decreased air entry, mild rhonchi. respirations unlabored, No tenderness or deformity. No abdominal breathing or use of accessory muscles.   Heart:    Regular rate and rhythm, S1 and S2 normal, no murmur, rub   or gallop  Abdomen: Soft, non-tender, bowel sounds active all four quadrants, not peritoneal on palpation. Not  distended  Extremities:  Extremities normal, atraumatic, no swelling   Skin:  Skin color, texture, turgor normal, no rashes or lesion  Neurologic:  Awake but not following commands, no preferential gaze, no facial droop, can raise both UE, right leg seem weak but can do plantar flexion. She has left AKA. No posturing.            Pertinent Lab     Results for orders placed or performed during the hospital encounter of 01/13/23   CT Chest Pulmonary Embolism w Contrast    Impression    IMPRESSION:  1.  Apparent linear filling defects in the pulmonary arteries, more on the left, most likely representing mixing phenomena. No convincing pulmonary emboli on either side. Diffuse thickening of the bronchi with foci of endobronchial mucus, more apparent   in the left lower lobe. Evidence of remote granulomatous disease. Plate-like atelectasis in the lower lungs, more on the left.    2.  Ectatic ascending thoracic aorta. Cardiac enlargement. Dense mitral annulus and coronary artery calcifications. Small pericardial effusion.    3.  Hypodense thyroid nodules can be better evaluated with dedicated ultrasound.   Head CT w/o contrast    Impression    IMPRESSION:  1.  No acute intracranial process.     Lactic Acid STAT   Result Value Ref Range    Lactic Acid 1.2 0.7 - 2.0 mmol/L   Basic metabolic panel   Result Value Ref Range    Sodium 138 136 - 145 mmol/L    Potassium 4.2 3.5 - 5.0 mmol/L    Chloride 101 98 - 107 mmol/L    Carbon Dioxide (CO2) 24 22 - 31 mmol/L    Anion Gap 13 5 - 18 mmol/L    Urea Nitrogen 22 8 - 28 mg/dL    Creatinine 0.68 0.60 - 1.10 mg/dL    Calcium 9.5 8.5 - 10.5 mg/dL    Glucose 131 (H) 70 - 125 mg/dL    GFR Estimate >90 >60 mL/min/1.73m2   Result Value Ref Range    Magnesium 1.5 (L) 1.8 - 2.6 mg/dL   Blood gas venous   Result Value Ref Range    pH Venous 7.45 7.35 - 7.45    pCO2 Venous 41 35 - 50 mm Hg    pO2 Venous 42 25 - 47 mm Hg    Bicarbonate Venous 29 24 - 30 mmol/L    Base Excess/Deficit (+/-) 4.8    mmol/L    Oxyhemoglobin Venous 78.3 (H) 70.0 - 75.0 %    O2 Sat, Venous 79.3 (H) 70.0 - 75.0 %   CBC with platelets and differential   Result Value Ref Range    WBC Count 6.4 4.0 - 11.0 10e3/uL    RBC Count 4.80 3.80 - 5.20 10e6/uL    Hemoglobin 12.7 11.7 - 15.7 g/dL    Hematocrit 37.9 35.0 - 47.0 %    MCV 79 78 - 100 fL    MCH 26.5 26.5 - 33.0 pg    MCHC 33.5 31.5 - 36.5 g/dL    RDW 16.2 (H) 10.0 - 15.0 %    Platelet Count 160 150 - 450 10e3/uL    % Neutrophils 79 %    % Lymphocytes 10 %    % Monocytes 10 %    % Eosinophils 0 %    % Basophils 0 %    % Immature Granulocytes 1 %    NRBCs per 100 WBC 0 <1 /100    Absolute Neutrophils 5.1 1.6 - 8.3 10e3/uL    Absolute Lymphocytes 0.6 (L) 0.8 - 5.3 10e3/uL    Absolute Monocytes 0.6 0.0 - 1.3 10e3/uL    Absolute Eosinophils 0.0 0.0 - 0.7 10e3/uL    Absolute Basophils 0.0 0.0 - 0.2 10e3/uL    Absolute Immature Granulocytes 0.0 <=0.4 10e3/uL    Absolute NRBCs 0.0 10e3/uL   UA with Microscopic reflex to Culture    Specimen: Urine, Gambino Catheter   Result Value Ref Range    Color Urine Yellow Colorless, Straw, Light Yellow, Yellow    Appearance Urine Clear Clear    Glucose Urine Negative Negative mg/dL    Bilirubin Urine Negative Negative    Ketones Urine Negative Negative mg/dL    Specific Gravity Urine 1.021 1.001 - 1.030    Blood Urine Negative Negative    pH Urine 5.0 5.0 - 7.0    Protein Albumin Urine 50 (A) Negative mg/dL    Urobilinogen Urine <2.0 <2.0 mg/dL    Nitrite Urine Negative Negative    Leukocyte Esterase Urine Negative Negative    Mucus Urine Present (A) None Seen /LPF    RBC Urine 1 <=2 /HPF    WBC Urine 1 <=5 /HPF    Hyaline Casts Urine 1 <=2 /LPF   Symptomatic Influenza A/B & SARS-CoV2 (COVID-19) Virus PCR Multiplex Nasopharyngeal    Specimen: Nasopharyngeal; Swab   Result Value Ref Range    Influenza A PCR Negative Negative    Influenza B PCR Negative Negative    RSV PCR Negative Negative    SARS CoV2 PCR Positive (A) Negative           Pertinent  Radiology

## 2023-01-14 NOTE — PROGRESS NOTES
Pt on table now for more detailed MRI. Pt not speaking. Does follow staff with her eyes. Being monitored while in MRI per MD request.    MALU LARSEN RN

## 2023-01-14 NOTE — PHARMACY-CONSULT NOTE
Pharmacy Consult to evaluate for medication related stroke core measures    Angela Beckham, 72 year old female admitted for AMS on 1/13/2023.    Thrombolytic was not given because of Clinical contraindications    VTE Prophylaxis Eliquis given on ordered for 1/14 PM as appropriate prior to end of hospital day 2.    Antithrombotic: apixaban started on 1/14 (ordered), as appropriate by end of hospital day 2. Continue antithrombotic therapy on discharge to meet quality measures, unless contraindicated.    Anticoagulation if history of A-fib/flutter: Patient on apixaban (Eliquis); continue anticoagulation on discharge to meet quality measures, unless contraindicated.    LDL Cholesterol Calculated   Date Value Ref Range Status   01/14/2023 24 <=129 mg/dL Final       Patient's home statin, Lipitor (atorvastatin) restarted; continue statin on discharge to meet quality measures, unless contraindicated.     Recommendations: None at this time    Thank you for the consult.    Kathryn Cadena [facility] 1/14/2023 4:38 PM

## 2023-01-14 NOTE — ED PROVIDER NOTES
EMERGENCY DEPARTMENT ENCOUNTER      NAME: Angela Beckham  AGE: 72 year old female  YOB: 1950  MRN: [id number]  EVALUATION DATE & TIME: 1/13/2023  8:23 PM    PCP: Berenice Nolen    ED PROVIDER: Javon Parker M.D.    Chief Complaint   Patient presents with     Altered Mental Status       FINAL IMPRESSION:  1. Infection due to 2019 novel coronavirus    2. Chronic atrial fibrillation with RVR (H)    3. Acute encephalopathy    4. Hypomagnesemia    5. Long term current use of anticoagulant therapy        ED COURSE & MEDICAL DECISION MAKING:    Pertinent Labs & Imaging studies independently interpreted by me. (See chart for details)  9:08 PM Patient seen and examined, external records reviewed with prior emergency department visits with atrial fibrillation, which appears to be chronic, also history of dementia.  Differential diagnosis includes but not limited to intracranial hemorrhage, CVA, overdose, medication reaction, alcohol intoxication, electrolyte disturbance, pneumonia, urinary tract infection, sepsis, meningitis, encephalitis, hepatic encephalopathy.  Patient presents from nursing home with an episode of decreased responsiveness.  Per phone call with nursing home, patient was not her normal self this evening, staring and not acting like herself.  Diagnosed with COVID earlier today.  Patient herself is unable to really provide any history.  On exam, she is hypotensive and tachycardic, atrial fibrillation with rapid ventricular response.  She has a fever.  Otherwise she can tell me her name and answer some simple questions which sounds like is her baseline.  Labs and CT PE protocol are ordered along with IV fluids and Tylenol.  Will evaluate for other causes fever, most likely this is related to COVID infection.  9:19 PM labs independently interpreted by me demonstrate normal lactate, normal CBC.  Venous blood gases reassuring without hypercarbia.  10:55 PM remaining labs interpreted by  me demonstrate mild hypomagnesemia which will be replaced, normal glucose, basic panel otherwise reassuring.  I did review some prior records with the patient specifically outside emergency department visit from December 2022 at which time it is recorded that patient was answering questions able to give a good history.  This is significantly different than presentation today in the emergency department, as well as reported baseline mental status from nursing home.  At this point, based on this documentation, assume patient is quite far off her baseline with no definite Alinia.  Metoprolol IV is given for rate control.  10:57 PM Signout to oncoming provider.    At the conclusion of the encounter I discussed the results of all of the tests and the disposition. The questions were answered. The patient or family acknowledged understanding and was agreeable with the care plan.     Medical Decision Making    History:    Supplemental history from: See ED Course    External Record(s) reviewed: Documented in HPI, if applicable.    Work Up:    Chart documentation includes differential considered and any EKGs or imaging independently interpreted by provider.    In additional to work up documented, I considered the following work up: See chart documentation, if applicable.    External consultation:    Discussion of management with another provider: See chart documentation, if applicable    Complicating factors:    Care impacted by chronic illness: Diabetes, Heart Disease and Hypertension    Care affected by social determinants of health: Access to Medical Care    Disposition considerations: Admit.        PROCEDURES:       MEDICATIONS GIVEN IN THE EMERGENCY:  Medications   magnesium sulfate 2 g in water intermittent infusion (has no administration in time range)   metoprolol (LOPRESSOR) injection 5 mg (has no administration in time range)   acetaminophen (TYLENOL) Suppository 650 mg (650 mg Rectal Given 1/13/23 213)   0.9%  sodium chloride BOLUS (1,000 mLs Intravenous New Bag 1/13/23 2942)   iopamidol (ISOVUE-370) solution 75 mL (75 mLs Intravenous Given 1/13/23 8687)       NEW PRESCRIPTIONS STARTED AT TODAY'S ER VISIT  New Prescriptions    No medications on file       =================================================================    HPI    Patient information was obtained from: Nursing home staff by phone      Angela Beckham is a 72 year old female with a pertinent history of afib, DMII, HTN, who presents to this ED via EMS for evaluation of AMS.    HPI limited due to AMS.    Apparently patient was not her normal self tonight and had an episode of staring and decreased responsiveness so EMS was called.  By report, she was hypoxic although not sure how low her pulse oximetry was.  On exam here, patient is able to tell me her name, denies pain.    REVIEW OF SYSTEMS   Review of Systems   Unable to perform ROS: Mental status change      All other systems reviewed and negative    PAST MEDICAL HISTORY:  Past Medical History:   Diagnosis Date     Diabetic neuropathy (H)      HTN      Type 2 diabetes mellitus (H)        PAST SURGICAL HISTORY:  History reviewed. No pertinent surgical history.    CURRENT MEDICATIONS:    Current Facility-Administered Medications   Medication     magnesium sulfate 2 g in water intermittent infusion     metoprolol (LOPRESSOR) injection 5 mg     Current Outpatient Medications   Medication     acetaminophen (TYLENOL) 325 MG tablet     ammonium lactate (AMLACTIN) 12 % external cream     apixaban ANTICOAGULANT (ELIQUIS) 5 MG tablet     atorvastatin (LIPITOR) 20 MG tablet     cyanocobalamin 1000 MCG SUBL     docusate sodium (COLACE) 100 MG capsule     METFORMIN  MG OR TABS     miconazole (MICATIN) 2 % cream     vitamin B-Complex       ALLERGIES:  Allergies   Allergen Reactions     Amoxicillin Unknown     Oxycodone Other (See Comments)     Does not remember     Latex Rash       FAMILY HISTORY:  Family History    Problem Relation Age of Onset     Cerebrovascular Disease Mother      Hypertension Mother      Alzheimer Disease Father      Respiratory Father      Cerebrovascular Disease Maternal Grandmother      Diabetes Paternal Grandfather      Hypertension Brother      Obesity Brother        SOCIAL HISTORY:   Social History     Socioeconomic History     Marital status: Single   Tobacco Use     Smoking status: Unknown     Smokeless tobacco: Never   Substance and Sexual Activity     Alcohol use: No     Drug use: No     Sexual activity: Never   Other Topics Concern      Service No     Blood Transfusions Yes     Comment: 1970     Caffeine Concern No     Occupational Exposure No     Hobby Hazards No     Sleep Concern No     Stress Concern No     Weight Concern No     Special Diet No     Back Care No     Exercise Yes     Bike Helmet No     Seat Belt Yes     Self-Exams No       VITALS:  /60   Pulse (!) 127   Temp 100.1  F (37.8  C) (Temporal)   Resp 25   SpO2 93%     PHYSICAL EXAM:  Physical Exam  Vitals and nursing note reviewed.   Constitutional:       Appearance: Normal appearance.   HENT:      Head: Normocephalic and atraumatic.      Right Ear: External ear normal.      Left Ear: External ear normal.      Nose: Nose normal.      Mouth/Throat:      Mouth: Mucous membranes are moist.   Eyes:      Extraocular Movements: Extraocular movements intact.      Conjunctiva/sclera: Conjunctivae normal.      Pupils: Pupils are equal, round, and reactive to light.   Cardiovascular:      Rate and Rhythm: Tachycardia present. Rhythm irregular.   Pulmonary:      Effort: Pulmonary effort is normal.      Breath sounds: Normal breath sounds. No wheezing or rales.   Abdominal:      General: Abdomen is flat. There is no distension.      Palpations: Abdomen is soft.      Tenderness: There is no abdominal tenderness. There is no guarding.   Musculoskeletal:         General: Normal range of motion.      Cervical back: Normal range  of motion and neck supple.      Right lower leg: No edema.      Left lower leg: No edema.      Comments: Left BKA   Lymphadenopathy:      Cervical: No cervical adenopathy.   Skin:     General: Skin is warm and dry.   Neurological:      Mental Status: She is alert. Mental status is at baseline.      Comments: Oriented x1, moves all extremities   Psychiatric:         Mood and Affect: Mood normal.         Behavior: Behavior normal.         Thought Content: Thought content normal.          LAB:  All pertinent labs reviewed and interpreted.  Results for orders placed or performed during the hospital encounter of 01/13/23   Lactic Acid STAT   Result Value Ref Range    Lactic Acid 1.2 0.7 - 2.0 mmol/L   Basic metabolic panel   Result Value Ref Range    Sodium 138 136 - 145 mmol/L    Potassium 4.2 3.5 - 5.0 mmol/L    Chloride 101 98 - 107 mmol/L    Carbon Dioxide (CO2) 24 22 - 31 mmol/L    Anion Gap 13 5 - 18 mmol/L    Urea Nitrogen 22 8 - 28 mg/dL    Creatinine 0.68 0.60 - 1.10 mg/dL    Calcium 9.5 8.5 - 10.5 mg/dL    Glucose 131 (H) 70 - 125 mg/dL    GFR Estimate >90 >60 mL/min/1.73m2   Result Value Ref Range    Magnesium 1.5 (L) 1.8 - 2.6 mg/dL   Blood gas venous   Result Value Ref Range    pH Venous 7.45 7.35 - 7.45    pCO2 Venous 41 35 - 50 mm Hg    pO2 Venous 42 25 - 47 mm Hg    Bicarbonate Venous 29 24 - 30 mmol/L    Base Excess/Deficit (+/-) 4.8   mmol/L    Oxyhemoglobin Venous 78.3 (H) 70.0 - 75.0 %    O2 Sat, Venous 79.3 (H) 70.0 - 75.0 %   CBC with platelets and differential   Result Value Ref Range    WBC Count 6.4 4.0 - 11.0 10e3/uL    RBC Count 4.80 3.80 - 5.20 10e6/uL    Hemoglobin 12.7 11.7 - 15.7 g/dL    Hematocrit 37.9 35.0 - 47.0 %    MCV 79 78 - 100 fL    MCH 26.5 26.5 - 33.0 pg    MCHC 33.5 31.5 - 36.5 g/dL    RDW 16.2 (H) 10.0 - 15.0 %    Platelet Count 160 150 - 450 10e3/uL    % Neutrophils 79 %    % Lymphocytes 10 %    % Monocytes 10 %    % Eosinophils 0 %    % Basophils 0 %    % Immature  Granulocytes 1 %    NRBCs per 100 WBC 0 <1 /100    Absolute Neutrophils 5.1 1.6 - 8.3 10e3/uL    Absolute Lymphocytes 0.6 (L) 0.8 - 5.3 10e3/uL    Absolute Monocytes 0.6 0.0 - 1.3 10e3/uL    Absolute Eosinophils 0.0 0.0 - 0.7 10e3/uL    Absolute Basophils 0.0 0.0 - 0.2 10e3/uL    Absolute Immature Granulocytes 0.0 <=0.4 10e3/uL    Absolute NRBCs 0.0 10e3/uL   UA with Microscopic reflex to Culture    Specimen: Urine, Gambino Catheter   Result Value Ref Range    Color Urine Yellow Colorless, Straw, Light Yellow, Yellow    Appearance Urine Clear Clear    Glucose Urine Negative Negative mg/dL    Bilirubin Urine Negative Negative    Ketones Urine Negative Negative mg/dL    Specific Gravity Urine 1.021 1.001 - 1.030    Blood Urine Negative Negative    pH Urine 5.0 5.0 - 7.0    Protein Albumin Urine 50 (A) Negative mg/dL    Urobilinogen Urine <2.0 <2.0 mg/dL    Nitrite Urine Negative Negative    Leukocyte Esterase Urine Negative Negative    Mucus Urine Present (A) None Seen /LPF    RBC Urine 1 <=2 /HPF    WBC Urine 1 <=5 /HPF    Hyaline Casts Urine 1 <=2 /LPF   Symptomatic Influenza A/B & SARS-CoV2 (COVID-19) Virus PCR Multiplex Nasopharyngeal    Specimen: Nasopharyngeal; Swab   Result Value Ref Range    Influenza A PCR Negative Negative    Influenza B PCR Negative Negative    RSV PCR Negative Negative    SARS CoV2 PCR Positive (A) Negative       RADIOLOGY:  Reviewed all pertinent imaging. Please see official radiology report.  CT Chest Pulmonary Embolism w Contrast    (Results Pending)   Head CT w/o contrast    (Results Pending)       I, Michel Hernandez, am serving as a scribe to document services personally performed by Dr. Parker based on my observation and the provider's statements to me. I, Javon Parker MD attest that Michel Hernandez is acting in a scribe capacity, has observed my performance of the services and has documented them in accordance with my direction.    Javon Parker M.D.  Emergency  Medicine  [facility] EMERGENCY ROOM  [address] [id number] [phone]  Dept: [phone]      Javon Parker MD  01/13/23 0574

## 2023-01-14 NOTE — PROGRESS NOTES
"   01/14/23 1000   Appointment Info   Signing Clinician's Name / Credentials (PT) Clau Guzman, PT, DPT   Rehab Comments (PT) (S)  L BKA. Patient does not have her prosthesis with her in the ED.   Living Environment   People in Home alone   Current Living Arrangements house   Home Accessibility stairs to enter home   Number of Stairs, Main Entrance 3   Stair Railings, Main Entrance railings on both sides of stairs   Transportation Anticipated family or friend will provide   Living Environment Comments Patient reports she lives alone in a house with all needs met on one level, however, patient appears to be a poor historian. Per chart review, patient was admitted from TCU.   Self-Care   Usual Activity Tolerance fair   Current Activity Tolerance poor   Equipment Currently Used at Home crutches;walker, standard   Fall history within last six months yes   Number of times patient has fallen within last six months 1   Activity/Exercise/Self-Care Comment Patient reports she uses [place] crutches and prosthesis at baseline but also owns a standard walker. Patient reports she fell on the stairs outside her house but is unsure when it happened.   General Information   Onset of Illness/Injury or Date of Surgery 01/13/23   Referring Physician Ema Garza MD   Patient/Family Therapy Goals Statement (PT) None stated   Pertinent History of Current Problem (include personal factors and/or comorbidities that impact the POC) Per neurology, \"The patient is a 72 year old female who presented with altered mental status, she will intermittently follow commands. She has episode of staring and decreased level of responsiveness. Slow to response and left.\"   Existing Precautions/Restrictions (S)  fall   Weight-Bearing Status - LLE other (see comments)  (L BKA with prosthesis)   Weight-Bearing Status - RLE full weight-bearing   Cognition   Affect/Mental Status (Cognition) confused;unresponsive   Orientation Status (Cognition) " refused to attempt  (Patient refused to answer orientation questions.)   Follows Commands (Cognition) delayed response/completion   Range of Motion (ROM)   Range of Motion ROM deficits secondary to weakness   Strength (Manual Muscle Testing)   Strength (Manual Muscle Testing) Deficits observed during functional mobility   Bed Mobility   Bed Mobility supine-sit   Supine-Sit Merrick (Bed Mobility) maximum assist (25% patient effort)   Bed Mobility Limitations decreased ability to use arms for pushing/pulling;decreased ability to use legs for bridging/pushing;impaired ability to control trunk for mobility   Impairments Contributing to Impaired Bed Mobility impaired balance;decreased ROM;decreased strength   Comment, (Bed Mobility) [place]   Transfers   Transfers sit-stand transfer   Sit-Stand Transfer   Sit-Stand Merrick (Transfers) unable to assess   Comment, (Sit-Stand Transfer) Patient does not have her prosthesis with her in the ED.   Gait/Stairs (Locomotion)   Merrick Level (Gait) unable to assess   Comment, (Gait/Stairs) Patient does not have her prosthesis with her in the ED.   Clinical Impression   Criteria for Skilled Therapeutic Intervention Yes, treatment indicated   PT Diagnosis (PT) Impaired functional mobility   Influenced by the following impairments Impaired balance, decreased ROM/strength, history of L BKA   Functional limitations due to impairments Bed mobility, transfers, gait, stairs   Clinical Presentation (PT Evaluation Complexity) Evolving/Changing   Clinical Presentation Rationale Patient presents as medically diagnosed.   Clinical Decision Making (Complexity) moderate complexity   Planned Therapy Interventions (PT) balance training;bed mobility training;gait training;home exercise program;neuromuscular re-education;patient/family education;postural re-education;ROM (range of motion);stair training;strengthening;stretching;transfer training;home program guidelines   Anticipated  Equipment Needs at Discharge (PT) other (see comments)  (TBD)   Risk & Benefits of therapy have been explained evaluation/treatment results reviewed;care plan/treatment goals reviewed;patient   PT Total Evaluation Time   PT Eval, Moderate Complexity Minutes (42593) 15   Physical Therapy Goals   PT Frequency 3x/week   PT Predicted Duration/Target Date for Goal Attainment 01/21/23   PT Goals Bed Mobility;PT Goal 1   PT: Bed Mobility Supervision/stand-by assist;Supine to/from sit   PT: Goal 1 Patient will be independent with LE HEP in order to improve strength for transfers.   Interventions   Interventions Quick Adds Therapeutic Procedure   Therapeutic Procedure/Exercise   Ther. Procedure: strength, endurance, ROM, flexibillity Minutes (18074) 10   Symptoms Noted During/After Treatment fatigue   Treatment Detail/Skilled Intervention Patient completed supine LE ther ex x 10 reps RLE. Supervision and verbal cues for attention to task and exercise technique.   PT Discharge Planning   PT Plan Trial PT at 3x/week, increase frequency if patient is able to get prosthesis, bed mobility, sitting balance, core/LE strengthening   PT Discharge Recommendation (DC Rec) (S)  Transitional Care Facility   PT Rationale for DC Rec Patient currently requires assist of 1 for bed mobility. Unable to assess transfer or gait as patient does not have her prosthesis with her in the ED.   PT Brief overview of current status Max assist of 1 for bed mobility.   Total Session Time   Timed Code Treatment Minutes 10   Total Session Time (sum of timed and untimed services) 25

## 2023-01-14 NOTE — PROGRESS NOTES
"   01/14/23 1315   Appointment Info   Signing Clinician's Name / Credentials (OT) Tianna Echavarria OTR/L   Rehab Comments (OT) OT Evaluation   Living Environment   Living Environment Comments Pt shared that she has been at TCU since Thanksgiving   Self-Care   Current Activity Tolerance poor   Activity/Exercise/Self-Care Comment Pt poor historian, unable to provide accurate level of ability at baseline. Reports that she is generally in bed.   General Information   Onset of Illness/Injury or Date of Surgery 01/13/23   Referring Physician Jhonatan Valdez   Patient/Family Therapy Goal Statement (OT) None stated   Additional Occupational Profile Info/Pertinent History of Current Problem Pt is Covid positive, pneumonia of B lungs, acute encephalopathy. Per neurology note \"The patient is a 72 year old female who presented with altered mental status, she will intermittently follow commands. She has episode of staring and decreased level of responsiveness. Slow to response and left. .....imaging impression Tiny focus of diffusion hyperintensity in the right corona radiata and periventricular white matter, too small to characterize on ADC, but favored to reflect a recent infarct.\"   Existing Precautions/Restrictions fall   Limitations/Impairments safety/cognitive   General Observations and Info 2liters of oxygen-near 90% throughout session   Cognitive Status Examination   Orientation Status   (Was unable to answer \"I have been to so many places\")   Follows Commands follows one-step commands;delayed response/completion;increased processing time needed;initiation impaired   Safety Deficit   (fearful of falling)   Cognitive Status Comments Increased processing time, inconsistently able to follow directions and respond.   Pain Assessment   Patient Currently in Pain No   Posture   Posture Comments Leaning to left-when asked reports that she is reminded of that ofte   Range of Motion Comprehensive   General Range of Motion " bilateral upper extremity ROM WFL   Muscle Tone Assessment   Muscle Tone Comments Generalized Weakness not formally testing   Coordination   Fine Motor Coordination Decreased fine motor-max as to manage lid on cup   Bed Mobility   Bed Mobility sit-supine;supine-sit   Comment (Bed Mobility) Dependent   Transfers   Transfer Comments Unable to complete- pt does not have prosthetic   Balance   Balance Comments Unable to sitting fully at EOB, fearful of falling   Activities of Daily Living   BADL Assessment/Intervention toileting;feeding   Eating/Self Feeding   Comment, (Feeding) Max A for set up of food-notified nursing unclear if pt is able to feed self   Toileting   Comment, (Toileting) catheter   Clinical Impression   Criteria for Skilled Therapeutic Interventions Met (OT) Yes, treatment indicated   OT Diagnosis Decreased ADL independence   OT Problem List-Impairments impacting ADL activity tolerance impaired;balance;cognition;pain   Assessment of Occupational Performance 3-5 Performance Deficits   Identified Performance Deficits bed mobility, dressing, bathing, toileting, feeding, sitting unsupported   Planned Therapy Interventions (OT) ADL retraining;bed mobility training;cognition;transfer training;progressive activity/exercise;strengthening   Clinical Decision Making Complexity (OT) moderate complexity   Risk & Benefits of therapy have been explained evaluation/treatment results reviewed   OT Total Evaluation Time   OT Eval, Moderate Complexity Minutes (40273) 15   OT Goals   Therapy Frequency (OT) Daily   OT Predicted Duration/Target Date for Goal Attainment 01/20/23   OT Goals Bed Mobility;Toilet Transfer/Toileting;Cognition   OT: Bed Mobility Moderate assist;supine to/from sitting   OT: Toilet Transfer/Toileting Moderate assist;toilet transfer   OT: Cognitive Patient/caregiver will verbalize understanding of cognitive assessment results/recommendations as needed for safe discharge planning   Interventions  "  Interventions Quick Adds Self-Care/Home Management   Self-Care/Home Management   Self-Care/Home Mgmt/ADL, Compensatory, Meal Prep Minutes (61157) 23   Symptoms Noted During/After Treatment (Meal Preparation/Planning Training) none   Treatment Detail/Skilled Intervention Pt needing increased time to follow directions and process task, easily distracted. Given redirection pt able to verbalize awareness of what is being ask but unable to answer question of follow the direction. Max A 1-2 for supine to sit, leaning on L elbow on gurney. Given support to fully sit upright pt would resist \"waiting for back to adjust\" sat near 5 minutes at EOB unable to come to fully erect sitting balance. Max A for planning and setting up meal in front of pt, not utilizing left arm until OT removed blanket from arm, then began utilizing it for meal prep.   OT Discharge Planning   OT Plan Sitting EOB w/bed rail   OT Discharge Recommendation (DC Rec) Transitional Care Facility   OT Rationale for DC Rec Pt is needing 24 hour supervision for safety with cognition and A x1-2 for fx mobility   Total Session Time   Timed Code Treatment Minutes 23   Total Session Time (sum of timed and untimed services) 38       "

## 2023-01-14 NOTE — PROGRESS NOTES
"Vascular Neurology    Attempted to see patient for telestroke consult this afternoon but at the appointed time she was just being transferred from ED to inpatient unit.    She has a very small stroke unlikely to be contributing to encephalopathy. She should continue anticoagulation and it's ordered. Rest of basic stroke workup is all completed now.    Will tentatively see her at 9 am tomorrow with telestroke cart for full consult.    Brittanie Sutton PA-C  Vascular Neurology  01/14/2023 4:27 PM  Securely message with the Vocera Web Console (learn more here)  To page me or covering stroke neurology team member, click here: [email or web address]  Choose \"On Call\" tab at top, then search dropdown box for \"Neurology\" & press Enter, look for Neuro ICU/Stroke    "

## 2023-01-14 NOTE — PHARMACY-ADMISSION MEDICATION HISTORY
Pharmacy Note - Admission Medication History    Pertinent Provider Information: Patient's facility has been filling glimepiride regularly over the past year, but is not documented on the MAR.  Will continue to investigate.   ______________________________________________________________________    Prior To Admission (PTA) med list completed and updated in EMR.       PTA Med List   Medication Sig Last Dose     acetaminophen (TYLENOL) 325 MG tablet Take 650 mg by mouth every 4 hours as needed for mild pain Unknown     ammonium lactate (AMLACTIN) 12 % external cream Apply 0.25 inches topically 2 times daily To feet 1/13/2023     apixaban ANTICOAGULANT (ELIQUIS) 5 MG tablet Take 5 mg by mouth 2 times daily 1/13/2023     atorvastatin (LIPITOR) 20 MG tablet Take 20 mg by mouth At Bedtime 1/13/2023     camphor-menthol-methyl sal 4-10-30 % CREA Externally apply 1 g topically 2 times daily Apply to thoracic paraspinal muscles for muscle spasm 1/13/2023     cyanocobalamin (VITAMIN B-12) 1000 MCG tablet Take 1,000 mcg by mouth daily 1/13/2023     docusate sodium (COLACE) 100 MG capsule Take 100 mg by mouth daily as needed Unknown     metFORMIN (GLUCOPHAGE) 1000 MG tablet Take 1,000 mg by mouth daily (with breakfast) 1/13/2023     metFORMIN (GLUCOPHAGE) 500 MG tablet Take 500 mg by mouth daily (with dinner) 1/13/2023     metoprolol succinate ER (TOPROL XL) 25 MG 24 hr tablet Take 12.5 mg by mouth daily 1/13/2023     miconazole (MICATIN) 2 % cream 1 applicator 2 times daily Apply to skin folds 1/13/2023     vitamin B-Complex Take 1 tablet by mouth daily 1/13/2023       Information source(s): Facility ([facility] medication list/MAR and CareEverywhere/SureScripts  Method of interview communication: N/A    Summary of Changes to PTA Med List  New: metoprolol, methyl/camphor cream  Discontinued: none  Changed: atorvastatin, docusate, metformin    Patient was asked about OTC/herbal products specifically.  PTA med list reflects  this.    In the past week, patient estimated taking medication this percent of the time:  greater than 90%.    Allergies were reviewed, assessed, and updated with the patient.      Patient did not bring any medications to the hospital and can't retrieve from home. No multi-dose medications are available for use during hospital stay.     The information provided in this note is only as accurate as the sources available at the time of the update(s).    Thank you for the opportunity to participate in the care of this patient.    Ana Rosa Bravo  1/14/2023 9:24 AM

## 2023-01-14 NOTE — PROGRESS NOTES
Patient is alert and oriented x3; disoriented to situation. Pt denies pain or SOB. Pt is on room air, O2 saturations 100%. Pt is on bedrest, Q2 turn. Pt has chronic rosa. Pt is A fib RVR on tele.     Shawanda Amos RN

## 2023-01-14 NOTE — ED NOTES
EMERGENCY DEPARTMENT SIGN OUT NOTE        ED COURSE AND MEDICAL DECISION MAKING  10:50 PM Patient was signed out to me by Dr Javon Parker.   12:11 AM I discussed case with Dr. Garza, hospitalist, who accepts the patient for admission.    In brief, Angela Beckham is a 72 year old female who initially presented for evaluation of AMS. Apparently patient was not her normal self tonight and had an episode of staring and decreased responsiveness so EMS was called.  By report, she was hypoxic although not sure how low her pulse oximetry was.  On exam here, patient is able to tell me her name, denies pain.    At time of sign out, disposition was pending CT Chest PE, Head CT, and labs.      CT scan of the head is negative.  CT PE does not show PE but does show beginnings of pneumonia.  Unclear if this is viral or bacterial.  Given this we will give her antibiotics.  Given IV ceftriaxone and azithromycin.  I suspect the majority of her symptoms are due to COVID.  Does have chronic A. fib.  Is in RVR however blood pressure is borderline.  We will hold off on giving rate related agents and will give fluid first.  At this time I do not think she needs cardioversion.  She is on blood thinners.  Will admit for further care.  Discussed with hospital      FINAL IMPRESSION    1. Infection due to 2019 novel coronavirus    2. Chronic atrial fibrillation with RVR (H)    3. Acute encephalopathy    4. Hypomagnesemia    5. Long term current use of anticoagulant therapy        ED MEDS  Medications   magnesium sulfate 2 g in water intermittent infusion (2 g Intravenous New Bag 1/13/23 7360)   metoprolol (LOPRESSOR) injection 5 mg (has no administration in time range)   0.9% sodium chloride BOLUS (1,000 mLs Intravenous New Bag 1/13/23 0636)   cefTRIAXone (ROCEPHIN) 2 g vial to attach to  ml bag for ADULTS or NS 50 ml bag for PEDS (has no administration in time range)   azithromycin 500 mg (ZITHROMAX) in 0.9% NaCl 250 mL  intermittent infusion 500 mg (has no administration in time range)   acetaminophen (TYLENOL) Suppository 650 mg (650 mg Rectal Given 1/13/23 2133)   0.9% sodium chloride BOLUS (0 mLs Intravenous Stopped 1/13/23 2230)   iopamidol (ISOVUE-370) solution 75 mL (75 mLs Intravenous Given 1/13/23 2307)       LAB  Labs Ordered and Resulted from Time of ED Arrival to Time of ED Departure   BASIC METABOLIC PANEL - Abnormal       Result Value    Sodium 138      Potassium 4.2      Chloride 101      Carbon Dioxide (CO2) 24      Anion Gap 13      Urea Nitrogen 22      Creatinine 0.68      Calcium 9.5      Glucose 131 (*)     GFR Estimate >90     MAGNESIUM - Abnormal    Magnesium 1.5 (*)    BLOOD GAS VENOUS - Abnormal    pH Venous 7.45      pCO2 Venous 41      pO2 Venous 42      Bicarbonate Venous 29      Base Excess/Deficit (+/-) 4.8      Oxyhemoglobin Venous 78.3 (*)     O2 Sat, Venous 79.3 (*)    CBC WITH PLATELETS AND DIFFERENTIAL - Abnormal    WBC Count 6.4      RBC Count 4.80      Hemoglobin 12.7      Hematocrit 37.9      MCV 79      MCH 26.5      MCHC 33.5      RDW 16.2 (*)     Platelet Count 160      % Neutrophils 79      % Lymphocytes 10      % Monocytes 10      % Eosinophils 0      % Basophils 0      % Immature Granulocytes 1      NRBCs per 100 WBC 0      Absolute Neutrophils 5.1      Absolute Lymphocytes 0.6 (*)     Absolute Monocytes 0.6      Absolute Eosinophils 0.0      Absolute Basophils 0.0      Absolute Immature Granulocytes 0.0      Absolute NRBCs 0.0     ROUTINE UA WITH MICROSCOPIC REFLEX TO CULTURE - Abnormal    Color Urine Yellow      Appearance Urine Clear      Glucose Urine Negative      Bilirubin Urine Negative      Ketones Urine Negative      Specific Gravity Urine 1.021      Blood Urine Negative      pH Urine 5.0      Protein Albumin Urine 50 (*)     Urobilinogen Urine <2.0      Nitrite Urine Negative      Leukocyte Esterase Urine Negative      Mucus Urine Present (*)     RBC Urine 1      WBC Urine 1       Hyaline Casts Urine 1     INFLUENZA A/B & SARS-COV2 PCR MULTIPLEX - Abnormal    Influenza A PCR Negative      Influenza B PCR Negative      RSV PCR Negative      SARS CoV2 PCR Positive (*)    LACTIC ACID WHOLE BLOOD - Normal    Lactic Acid 1.2     HEPATIC FUNCTION PANEL   CORTISOL   BLOOD CULTURE   BLOOD CULTURE         RADIOLOGY    CT Chest Pulmonary Embolism w Contrast   Final Result   IMPRESSION:   1.  Apparent linear filling defects in the pulmonary arteries, more on the left, most likely representing mixing phenomena. No convincing pulmonary emboli on either side. Diffuse thickening of the bronchi with foci of endobronchial mucus, more apparent    in the left lower lobe. Evidence of remote granulomatous disease. Plate-like atelectasis in the lower lungs, more on the left.      2.  Ectatic ascending thoracic aorta. Cardiac enlargement. Dense mitral annulus and coronary artery calcifications. Small pericardial effusion.      3.  Hypodense thyroid nodules can be better evaluated with dedicated ultrasound.      Head CT w/o contrast   Final Result   IMPRESSION:   1.  No acute intracranial process.             DISCHARGE MEDS  New Prescriptions    No medications on file       Lucien Wolfe MD  [facility] EMERGENCY ROOM  [address] [id number] [phone]     Lucien Wolfe MD  01/14/23 0135

## 2023-01-14 NOTE — PROGRESS NOTES
Pt seen on RA, Breath sounds coarse;diminshed. No sob  Pt given prn albuterol neb, tolerated well    Mary Clancy, RT

## 2023-01-14 NOTE — ED NOTES
Bed: [facility]-  Expected date:   Expected time:   Means of arrival: Ambulance  Comments:  [facility]  [facility]  from [facility]. Not breathing for 5 minutes

## 2023-01-14 NOTE — CONSULTS
"      [facility]    Stroke Telephone Note    I was called by Ema Garza on 01/14/23 regarding patient Angela Beckham. The patient is a 72 year old female who presented with altered mental status, she will intermittently follow commands. She has episode of staring and decreased level of responsiveness. Slow to response and left.      PMHx: left BKA, Afib on Eliquis, DM, HTN,  LKN 1/13 morning    Imaging Findings                                                                       IMPRESSION:  1.  Tiny focus of diffusion hyperintensity in the right corona radiata and periventricular white matter, too small to characterize on ADC, but favored to reflect a recent infarct.  2.  Generalized volume loss and sequelae of chronic microvascular ischemic disease, as described.  3.  Chronic infarcts in the right basal ganglia.    Impression  Acute ischemic stroke of R corona radiata due to cardioembolism- currently on Eliquis due to afib, in addition has COVID. She also has a history of prior stroke. Last dose given 4pm yesterday.        Recommendations   - Use orderset: \"Ischemic Stroke Routine Admission\" or \"Ischemic Stroke No Thrombolytics/No Thrombectomy ICU Admission\"  - Neurochecks and Vital Signs every Q4H   - Permissive HTN; goal SBP < 220 mmHg  - Statin: Lipitor 20mg, home med, titrated pending on LDL  - MRA Brain With and without contrast  - MRA Neck with and without contrast  -Long term goals, BP <130/80, LDL <70, A1c <7  - TTE (with Bubble Study if age 60 yrs or less)  - Telemetry, EKG  - Bedside Glucose Monitoring  - A1c, Lipid Panel, Troponin x 3  - PT/OT/SLP  - Stroke Education  - Euthermia, Euglycemia   - Resume Eliquis     My recommendations are based on the information provided over the phone by Angela Beckham's in-person providers. They are not intended to replace the clinical judgment of her in-person providers. I was not requested to personally see or examine the patient at " "this time.    The Stroke Staff is Dr. James.    Ariana Cruz MD  Vascular Neurology Fellow    To page me or covering stroke neurology team member, click here: [facility]COM  Choose \"On Call\" tab at top, then select \"NEUROLOGY/ALL SITES\" from middle drop-down box, press Enter, then look for \"stroke\" or \"telestroke\" for your site.        "

## 2023-01-14 NOTE — ED NOTES
Pt back from MRI, pt is alert and able to speak, pt has significantly more strength, and answers all but 1 orientation correctly.

## 2023-01-15 ENCOUNTER — APPOINTMENT (OUTPATIENT)
Dept: SPEECH THERAPY | Facility: CLINIC | Age: 73
DRG: 064 | End: 2023-01-15
Payer: COMMERCIAL

## 2023-01-15 LAB
ANION GAP SERPL CALCULATED.3IONS-SCNC: 7 MMOL/L (ref 5–18)
BUN SERPL-MCNC: 16 MG/DL (ref 8–28)
C REACTIVE PROTEIN LHE: 6.4 MG/DL (ref 0–?)
CALCIUM SERPL-MCNC: 8.5 MG/DL (ref 8.5–10.5)
CHLORIDE BLD-SCNC: 112 MMOL/L (ref 98–107)
CO2 SERPL-SCNC: 22 MMOL/L (ref 22–31)
CREAT SERPL-MCNC: 0.57 MG/DL (ref 0.6–1.1)
D DIMER PPP FEU-MCNC: 0.47 UG/ML FEU (ref 0–0.5)
ERYTHROCYTE [DISTWIDTH] IN BLOOD BY AUTOMATED COUNT: 16.4 % (ref 10–15)
GFR SERPL CREATININE-BSD FRML MDRD: >90 ML/MIN/1.73M2
GLUCOSE BLD-MCNC: 100 MG/DL (ref 70–125)
GLUCOSE BLDC GLUCOMTR-MCNC: 118 MG/DL (ref 70–99)
GLUCOSE BLDC GLUCOMTR-MCNC: 123 MG/DL (ref 70–99)
GLUCOSE BLDC GLUCOMTR-MCNC: 163 MG/DL (ref 70–99)
GLUCOSE BLDC GLUCOMTR-MCNC: 91 MG/DL (ref 70–99)
HCT VFR BLD AUTO: 32.1 % (ref 35–47)
HGB BLD-MCNC: 10.5 G/DL (ref 11.7–15.7)
L PNEUMO1 AG UR QL IA: NEGATIVE
LACTATE SERPL-SCNC: 0.8 MMOL/L (ref 0.7–2)
MAGNESIUM SERPL-MCNC: 1.6 MG/DL (ref 1.8–2.6)
MCH RBC QN AUTO: 26.6 PG (ref 26.5–33)
MCHC RBC AUTO-ENTMCNC: 32.7 G/DL (ref 31.5–36.5)
MCV RBC AUTO: 81 FL (ref 78–100)
PHOSPHATE SERPL-MCNC: 2.3 MG/DL (ref 2.5–4.5)
PLATELET # BLD AUTO: 109 10E3/UL (ref 150–450)
POTASSIUM BLD-SCNC: 3.5 MMOL/L (ref 3.5–5)
RBC # BLD AUTO: 3.95 10E6/UL (ref 3.8–5.2)
S PNEUM AG SPEC QL: NEGATIVE
SODIUM SERPL-SCNC: 141 MMOL/L (ref 136–145)
WBC # BLD AUTO: 4.3 10E3/UL (ref 4–11)

## 2023-01-15 PROCEDURE — 84100 ASSAY OF PHOSPHORUS: CPT | Performed by: HOSPITALIST

## 2023-01-15 PROCEDURE — 258N000003 HC RX IP 258 OP 636: Performed by: INTERNAL MEDICINE

## 2023-01-15 PROCEDURE — 83605 ASSAY OF LACTIC ACID: CPT | Performed by: HOSPITALIST

## 2023-01-15 PROCEDURE — 36415 COLL VENOUS BLD VENIPUNCTURE: CPT | Performed by: INTERNAL MEDICINE

## 2023-01-15 PROCEDURE — 85379 FIBRIN DEGRADATION QUANT: CPT | Performed by: INTERNAL MEDICINE

## 2023-01-15 PROCEDURE — 86140 C-REACTIVE PROTEIN: CPT | Performed by: INTERNAL MEDICINE

## 2023-01-15 PROCEDURE — 85027 COMPLETE CBC AUTOMATED: CPT | Performed by: INTERNAL MEDICINE

## 2023-01-15 PROCEDURE — 80048 BASIC METABOLIC PNL TOTAL CA: CPT | Performed by: INTERNAL MEDICINE

## 2023-01-15 PROCEDURE — 87899 AGENT NOS ASSAY W/OPTIC: CPT | Performed by: INTERNAL MEDICINE

## 2023-01-15 PROCEDURE — 36415 COLL VENOUS BLD VENIPUNCTURE: CPT | Performed by: HOSPITALIST

## 2023-01-15 PROCEDURE — 250N000011 HC RX IP 250 OP 636: Performed by: INTERNAL MEDICINE

## 2023-01-15 PROCEDURE — 250N000013 HC RX MED GY IP 250 OP 250 PS 637: Performed by: INTERNAL MEDICINE

## 2023-01-15 PROCEDURE — 92526 ORAL FUNCTION THERAPY: CPT | Mod: GN

## 2023-01-15 PROCEDURE — 250N000011 HC RX IP 250 OP 636: Performed by: HOSPITALIST

## 2023-01-15 PROCEDURE — 250N000013 HC RX MED GY IP 250 OP 250 PS 637: Performed by: HOSPITALIST

## 2023-01-15 PROCEDURE — 99232 SBSQ HOSP IP/OBS MODERATE 35: CPT | Performed by: HOSPITALIST

## 2023-01-15 PROCEDURE — 210N000002 HC R&B HEART CARE

## 2023-01-15 PROCEDURE — 83735 ASSAY OF MAGNESIUM: CPT | Performed by: HOSPITALIST

## 2023-01-15 RX ORDER — POTASSIUM CHLORIDE 1500 MG/1
20 TABLET, EXTENDED RELEASE ORAL ONCE
Status: COMPLETED | OUTPATIENT
Start: 2023-01-15 | End: 2023-01-15

## 2023-01-15 RX ORDER — MAGNESIUM SULFATE HEPTAHYDRATE 40 MG/ML
2 INJECTION, SOLUTION INTRAVENOUS ONCE
Status: COMPLETED | OUTPATIENT
Start: 2023-01-15 | End: 2023-01-15

## 2023-01-15 RX ADMIN — AZITHROMYCIN DIHYDRATE 500 MG: 500 INJECTION, POWDER, LYOPHILIZED, FOR SOLUTION INTRAVENOUS at 01:12

## 2023-01-15 RX ADMIN — APIXABAN 5 MG: 5 TABLET, FILM COATED ORAL at 20:10

## 2023-01-15 RX ADMIN — SODIUM CHLORIDE 50 ML: 9 INJECTION, SOLUTION INTRAVENOUS at 12:15

## 2023-01-15 RX ADMIN — POTASSIUM CHLORIDE 20 MEQ: 1500 TABLET, EXTENDED RELEASE ORAL at 09:04

## 2023-01-15 RX ADMIN — APIXABAN 5 MG: 5 TABLET, FILM COATED ORAL at 09:04

## 2023-01-15 RX ADMIN — CEFTRIAXONE SODIUM 2 G: 2 INJECTION, POWDER, FOR SOLUTION INTRAMUSCULAR; INTRAVENOUS at 00:19

## 2023-01-15 RX ADMIN — SODIUM CHLORIDE: 9 INJECTION, SOLUTION INTRAVENOUS at 01:14

## 2023-01-15 RX ADMIN — ATORVASTATIN CALCIUM 20 MG: 10 TABLET, FILM COATED ORAL at 20:10

## 2023-01-15 RX ADMIN — REMDESIVIR 100 MG: 100 INJECTION, POWDER, LYOPHILIZED, FOR SOLUTION INTRAVENOUS at 11:36

## 2023-01-15 RX ADMIN — MAGNESIUM SULFATE HEPTAHYDRATE 2 G: 40 INJECTION, SOLUTION INTRAVENOUS at 09:05

## 2023-01-15 ASSESSMENT — ACTIVITIES OF DAILY LIVING (ADL)
ADLS_ACUITY_SCORE: 39
ADLS_ACUITY_SCORE: 35
ADLS_ACUITY_SCORE: 39
ADLS_ACUITY_SCORE: 35
ADLS_ACUITY_SCORE: 35
ADLS_ACUITY_SCORE: 39
ADLS_ACUITY_SCORE: 35
ADLS_ACUITY_SCORE: 35
ADLS_ACUITY_SCORE: 39
ADLS_ACUITY_SCORE: 35
ADLS_ACUITY_SCORE: 39
ADLS_ACUITY_SCORE: 39

## 2023-01-15 NOTE — PROGRESS NOTES
[facility] MEDICINE PROGRESS NOTE      Identification/Summary: Angela Beckham is a 72 year old female with a past medical history of afib,  who was admitted on 1/13/2023 for altered mental status.    Angela Beckham was sent to [facility] from TCU for altered mental status.  On arrival she was alert, cooperative, and nonverbal.  She was diagnosed with acute ischemic cardioembolic stroke of right corona radiata and periventricular white matter based on MRI, as well as chronic right basal ganglia infarct and brain atrophy from chronic microvascular ischemia.  Echo showed biatrial enlargement, moderate mitral stenosis, normal LVEF, borderline RV enlargement with intact function.  MRA head and neck were negative for stenosis.  CT negative for PE, shows diffuse bronchial thickening, remote granulomatous disease, small pericardial effusion, coronary calcification, hypodense thyroid nodules.  Telemetry neurology started atorvastatin.  She was started on azithromycin and ceftriaxone as well as remdesivir.    Assessment and Plan:  Ischemic cardio-embolic stroke  No dysarthria no aphasia today, neuro exam unchanged since yesterday  tele-neuro plans session at 10am  Continue eliquis and clinical neuro checks    Chronic atrial fibrillation  Rate ~103 on exam  Resumed metoprolol at increased dose  Consider metoprolol increase if BP's tolerate    Covid 19  Bronchial thickening  Stable on room air without hypoxia or conversational dyspnea, occasional cough  Reportedly unvaccinated  Reasonable to continue remdesivir given confounded chest imaging, though no GGO  No hypoxia, dexamethasone not started    Community-acquired pneumonia?  VS stable, afebrile  CRP increasing, procalcitonin equivocal  Blood culture, Legionella, Streptococcus urine antigens pending  Was seen by speech, on thins  Unclear if bacterial component present  Continue antibiotics until cultures and antigens come  back    Pre-diabetes  Metformin held for contrast    Hypomagnesemia  Hyperchloremia  Continue RN protocol  Stopping normal saline    Generalized weakness  Checking phosphorus    Thrombocytopenia  Expect dilution, check CBC  tomorrow    S/p left BKA  Poor dentition    General appearance: poor dentition, NAD, ESTEBAN  Discharge comments: TCU    Body mass index is 27.07 kg/m .  Wt Readings from Last 5 Encounters:   01/15/23 67.1 kg (148 lb)   11/16/22 66.7 kg (147 lb)   10/11/22 72.1 kg (159 lb)   09/14/22 72.1 kg (159 lb)   07/20/22 73.9 kg (163 lb)       Diet: Combination Diet Regular Diet Adult; Soft and Bite Sized Diet (level 6); Thin Liquids (level 0)  DVT Prophylaxis:  DOAC  Code Status: Full Code    Clinically Significant Risk Factors            # Hypomagnesemia: Lowest Mg = 1.5 mg/dL in last 2 days, will replace as needed     # Thrombocytopenia: Lowest platelets = 109 in last 2 days, will monitor for bleeding                  I personally reviewed the vital signs for the past 24 hours  I personally reviewed all new medications, labs, imaging/diagnostics reports over the past 24 hours.    Jhonatan Valdez MD, MPH  Hospitalist,[facility]: [facility]  Phone: #[phone]    Medications:   Personally Reviewed.  Medications     - MEDICATION INSTRUCTIONS -       - MEDICATION INSTRUCTIONS -       - MEDICATION INSTRUCTIONS -       - MEDICATION INSTRUCTIONS -         remdesivir  100 mg Intravenous Q24H    And     sodium chloride 0.9%  50 mL Intravenous Q24H     apixaban ANTICOAGULANT  5 mg Oral BID     atorvastatin  20 mg Oral QPM     azithromycin  500 mg Intravenous Q24H     cefTRIAXone  2 g Intravenous Q24H     magnesium sulfate  2 g Intravenous Once     methyl salicylate-menthol   Topical BID     metoprolol succinate ER  12.5 mg Oral Daily     sodium chloride (PF)  3 mL Intracatheter Q8H

## 2023-01-15 NOTE — PLAN OF CARE
Problem: Risk for Delirium  Goal: Improved Attention and Thought Clarity  Outcome: Progressing   Goal Outcome Evaluation:       Patient pleasant, more alert and engaging in conversation with nurse. Orientation improving from beginning of shift. Denies c/o. Uneventful night.

## 2023-01-15 NOTE — PLAN OF CARE
Problem: Diabetes Comorbidity  Goal: Blood Glucose Level Within Targeted Range  Outcome: Progressing     Problem: Hypertension Comorbidity  Goal: Blood Pressure in Desired Range  Outcome: Progressing  Intervention: Maintain Blood Pressure Management  Recent Flowsheet Documentation  Taken 1/14/2023 2038 by hCerise Irvin RN  Medication Review/Management: medications reviewed     Problem: Pneumonia  Goal: Effective Oxygenation and Ventilation  Outcome: Progressing  Intervention: Promote Airway Secretion Clearance  Recent Flowsheet Documentation  Taken 1/14/2023 2038 by Cherise Irvin RN  Cough And Deep Breathing: done independently per patient   Goal Outcome Evaluation:       Remains on RA with good saturation. Infrequent nonproductive cough. Neuro's unchanged from previous reporting with patient disoriented to time and situation and baseline left sided weakness. Continue to monitor.

## 2023-01-15 NOTE — PROGRESS NOTES
Patient is alert and oriented. Pt denies pain and SOB. Pt had telestroke meeting this AM. Pt has decreased sensation in her right arm and hand. Pt diet was advanced. Pt no longer needing feeding assistance, but will require staff to help with food order and tray setup. Jacqueline Garner called for update Pt approved writer to update Jacqueline. Pt remains Q2 turn. Pt is A fib on tele.     Shawanda Amos RN

## 2023-01-16 ENCOUNTER — APPOINTMENT (OUTPATIENT)
Dept: SPEECH THERAPY | Facility: CLINIC | Age: 73
DRG: 064 | End: 2023-01-16
Payer: COMMERCIAL

## 2023-01-16 ENCOUNTER — APPOINTMENT (OUTPATIENT)
Dept: PHYSICAL THERAPY | Facility: CLINIC | Age: 73
DRG: 064 | End: 2023-01-16
Payer: COMMERCIAL

## 2023-01-16 ENCOUNTER — APPOINTMENT (OUTPATIENT)
Dept: OCCUPATIONAL THERAPY | Facility: CLINIC | Age: 73
DRG: 064 | End: 2023-01-16
Payer: COMMERCIAL

## 2023-01-16 LAB
ANION GAP SERPL CALCULATED.3IONS-SCNC: 8 MMOL/L (ref 5–18)
ATRIAL RATE - MUSE: 98 BPM
BUN SERPL-MCNC: 14 MG/DL (ref 8–28)
C REACTIVE PROTEIN LHE: 3.9 MG/DL (ref 0–?)
CALCIUM SERPL-MCNC: 8.5 MG/DL (ref 8.5–10.5)
CHLORIDE BLD-SCNC: 111 MMOL/L (ref 98–107)
CO2 SERPL-SCNC: 22 MMOL/L (ref 22–31)
CREAT SERPL-MCNC: 0.59 MG/DL (ref 0.6–1.1)
D DIMER PPP FEU-MCNC: 0.83 UG/ML FEU (ref 0–0.5)
DIASTOLIC BLOOD PRESSURE - MUSE: NORMAL MMHG
ERYTHROCYTE [DISTWIDTH] IN BLOOD BY AUTOMATED COUNT: 16.3 % (ref 10–15)
GFR SERPL CREATININE-BSD FRML MDRD: >90 ML/MIN/1.73M2
GLUCOSE BLD-MCNC: 150 MG/DL (ref 70–125)
GLUCOSE BLDC GLUCOMTR-MCNC: 112 MG/DL (ref 70–99)
GLUCOSE BLDC GLUCOMTR-MCNC: 124 MG/DL (ref 70–99)
GLUCOSE BLDC GLUCOMTR-MCNC: 87 MG/DL (ref 70–99)
GLUCOSE BLDC GLUCOMTR-MCNC: 99 MG/DL (ref 70–99)
HCT VFR BLD AUTO: 34.7 % (ref 35–47)
HGB BLD-MCNC: 10.5 G/DL (ref 11.7–15.7)
HOLD SPECIMEN: NORMAL
INTERPRETATION ECG - MUSE: NORMAL
LACTATE SERPL-SCNC: 0.9 MMOL/L (ref 0.7–2)
MAGNESIUM SERPL-MCNC: 1.8 MG/DL (ref 1.8–2.6)
MCH RBC QN AUTO: 25.9 PG (ref 26.5–33)
MCHC RBC AUTO-ENTMCNC: 30.3 G/DL (ref 31.5–36.5)
MCV RBC AUTO: 86 FL (ref 78–100)
P AXIS - MUSE: NORMAL DEGREES
PLATELET # BLD AUTO: 114 10E3/UL (ref 150–450)
POTASSIUM BLD-SCNC: 3.5 MMOL/L (ref 3.5–5)
PR INTERVAL - MUSE: NORMAL MS
QRS DURATION - MUSE: 74 MS
QT - MUSE: 324 MS
QTC - MUSE: 482 MS
R AXIS - MUSE: 127 DEGREES
RBC # BLD AUTO: 4.06 10E6/UL (ref 3.8–5.2)
SODIUM SERPL-SCNC: 141 MMOL/L (ref 136–145)
SYSTOLIC BLOOD PRESSURE - MUSE: NORMAL MMHG
T AXIS - MUSE: 31 DEGREES
VENTRICULAR RATE- MUSE: 133 BPM
WBC # BLD AUTO: 3.1 10E3/UL (ref 4–11)

## 2023-01-16 PROCEDURE — 36415 COLL VENOUS BLD VENIPUNCTURE: CPT | Performed by: FAMILY MEDICINE

## 2023-01-16 PROCEDURE — 250N000013 HC RX MED GY IP 250 OP 250 PS 637: Performed by: FAMILY MEDICINE

## 2023-01-16 PROCEDURE — 250N000011 HC RX IP 250 OP 636: Performed by: INTERNAL MEDICINE

## 2023-01-16 PROCEDURE — 250N000013 HC RX MED GY IP 250 OP 250 PS 637: Performed by: INTERNAL MEDICINE

## 2023-01-16 PROCEDURE — 83605 ASSAY OF LACTIC ACID: CPT | Performed by: FAMILY MEDICINE

## 2023-01-16 PROCEDURE — 97530 THERAPEUTIC ACTIVITIES: CPT | Mod: GP

## 2023-01-16 PROCEDURE — 97110 THERAPEUTIC EXERCISES: CPT | Mod: GO

## 2023-01-16 PROCEDURE — 97535 SELF CARE MNGMENT TRAINING: CPT | Mod: GO

## 2023-01-16 PROCEDURE — 99233 SBSQ HOSP IP/OBS HIGH 50: CPT | Performed by: FAMILY MEDICINE

## 2023-01-16 PROCEDURE — 210N000002 HC R&B HEART CARE

## 2023-01-16 PROCEDURE — 86140 C-REACTIVE PROTEIN: CPT | Performed by: INTERNAL MEDICINE

## 2023-01-16 PROCEDURE — 258N000003 HC RX IP 258 OP 636: Performed by: INTERNAL MEDICINE

## 2023-01-16 PROCEDURE — 36415 COLL VENOUS BLD VENIPUNCTURE: CPT | Performed by: INTERNAL MEDICINE

## 2023-01-16 PROCEDURE — 82947 ASSAY GLUCOSE BLOOD QUANT: CPT | Performed by: INTERNAL MEDICINE

## 2023-01-16 PROCEDURE — 83735 ASSAY OF MAGNESIUM: CPT | Performed by: HOSPITALIST

## 2023-01-16 PROCEDURE — 85379 FIBRIN DEGRADATION QUANT: CPT | Performed by: INTERNAL MEDICINE

## 2023-01-16 PROCEDURE — 250N000013 HC RX MED GY IP 250 OP 250 PS 637: Performed by: HOSPITALIST

## 2023-01-16 PROCEDURE — 92526 ORAL FUNCTION THERAPY: CPT | Mod: GN

## 2023-01-16 PROCEDURE — 85018 HEMOGLOBIN: CPT | Performed by: INTERNAL MEDICINE

## 2023-01-16 RX ORDER — METOPROLOL SUCCINATE 25 MG/1
25 TABLET, EXTENDED RELEASE ORAL DAILY
Status: DISCONTINUED | OUTPATIENT
Start: 2023-01-17 | End: 2023-01-17 | Stop reason: HOSPADM

## 2023-01-16 RX ORDER — POTASSIUM CHLORIDE 1500 MG/1
20 TABLET, EXTENDED RELEASE ORAL ONCE
Status: COMPLETED | OUTPATIENT
Start: 2023-01-16 | End: 2023-01-16

## 2023-01-16 RX ORDER — MAGNESIUM OXIDE 400 MG/1
400 TABLET ORAL EVERY 4 HOURS
Status: COMPLETED | OUTPATIENT
Start: 2023-01-16 | End: 2023-01-16

## 2023-01-16 RX ORDER — MULTIVITAMIN,THERAPEUTIC
1 TABLET ORAL DAILY
Status: DISCONTINUED | OUTPATIENT
Start: 2023-01-16 | End: 2023-01-17 | Stop reason: HOSPADM

## 2023-01-16 RX ORDER — ASPIRIN 81 MG/1
81 TABLET ORAL DAILY
Status: DISCONTINUED | OUTPATIENT
Start: 2023-01-16 | End: 2023-01-17 | Stop reason: HOSPADM

## 2023-01-16 RX ADMIN — AZITHROMYCIN DIHYDRATE 500 MG: 500 INJECTION, POWDER, LYOPHILIZED, FOR SOLUTION INTRAVENOUS at 00:57

## 2023-01-16 RX ADMIN — ASPIRIN 81 MG: 81 TABLET, COATED ORAL at 14:14

## 2023-01-16 RX ADMIN — ATORVASTATIN CALCIUM 20 MG: 10 TABLET, FILM COATED ORAL at 20:59

## 2023-01-16 RX ADMIN — METOPROLOL SUCCINATE 12.5 MG: 25 TABLET, FILM COATED, EXTENDED RELEASE ORAL at 09:53

## 2023-01-16 RX ADMIN — METOPROLOL SUCCINATE 12.5 MG: 25 TABLET, FILM COATED, EXTENDED RELEASE ORAL at 14:14

## 2023-01-16 RX ADMIN — MENTHOL AND METHYL SALICYLATE: 7.6; 29 OINTMENT TOPICAL at 09:53

## 2023-01-16 RX ADMIN — POTASSIUM CHLORIDE 20 MEQ: 1500 TABLET, EXTENDED RELEASE ORAL at 16:55

## 2023-01-16 RX ADMIN — METFORMIN HYDROCHLORIDE 500 MG: 500 TABLET, FILM COATED ORAL at 16:55

## 2023-01-16 RX ADMIN — MAGNESIUM OXIDE TAB 400 MG (241.3 MG ELEMENTAL MG) 400 MG: 400 (241.3 MG) TAB at 16:55

## 2023-01-16 RX ADMIN — APIXABAN 5 MG: 5 TABLET, FILM COATED ORAL at 20:59

## 2023-01-16 RX ADMIN — THERA TABS 1 TABLET: TAB at 16:55

## 2023-01-16 RX ADMIN — CEFTRIAXONE SODIUM 2 G: 2 INJECTION, POWDER, FOR SOLUTION INTRAMUSCULAR; INTRAVENOUS at 00:09

## 2023-01-16 RX ADMIN — MENTHOL AND METHYL SALICYLATE: 7.6; 29 OINTMENT TOPICAL at 20:59

## 2023-01-16 RX ADMIN — REMDESIVIR 100 MG: 100 INJECTION, POWDER, LYOPHILIZED, FOR SOLUTION INTRAVENOUS at 09:53

## 2023-01-16 RX ADMIN — MAGNESIUM OXIDE TAB 400 MG (241.3 MG ELEMENTAL MG) 400 MG: 400 (241.3 MG) TAB at 20:59

## 2023-01-16 RX ADMIN — SODIUM CHLORIDE 50 ML: 9 INJECTION, SOLUTION INTRAVENOUS at 09:54

## 2023-01-16 RX ADMIN — APIXABAN 5 MG: 5 TABLET, FILM COATED ORAL at 09:53

## 2023-01-16 ASSESSMENT — ACTIVITIES OF DAILY LIVING (ADL)
ADLS_ACUITY_SCORE: 41

## 2023-01-16 NOTE — PROGRESS NOTES
Physical Therapy Discharge Summary    Reason for therapy discharge:    No further expectations of functional progress.    Progress towards therapy goal(s). See goals on Care Plan in [facility] electronic health record for goal details.  Goals not met.  Barriers to achieving goals:   limited tolerance for therapy.    Therapy recommendation(s):    No further therapy is recommended.

## 2023-01-16 NOTE — PLAN OF CARE
Problem: Stroke, Ischemic (Includes Transient Ischemic Attack)  Goal: Optimal Eating and Swallowing without Aspiration  Outcome: Progressing     Problem: Stroke, Ischemic (Includes Transient Ischemic Attack)  Goal: Effective Oxygenation and Ventilation  Intervention: Optimize Oxygenation and Ventilation     Goal Outcome Evaluation:       Neuro checks unchanged from previous shift. VSS. Turn Q 2hrs. Tolerating PO without difficulty.

## 2023-01-16 NOTE — PROGRESS NOTES
[facility]    Medicine Progress Note - Hospitalist Service    Date of Admission:  1/13/2023    Assessment & Plan   Angela Beckham is a 72 year old female with a past medical history of afib,  who was admitted on 1/13/2023 for altered mental status.     Angela Beckham was sent to [facility] from TCU for altered mental status.  On arrival she was alert, cooperative, and nonverbal.  She was diagnosed with acute ischemic cardioembolic stroke of right corona radiata and periventricular white matter based on MRI, as well as chronic right basal ganglia infarct and brain atrophy from chronic microvascular ischemia.  Echo showed biatrial enlargement, moderate mitral stenosis, normal LVEF, borderline RV enlargement with intact function.  MRA head and neck were negative for stenosis.  CT negative for PE, shows diffuse bronchial thickening, remote granulomatous disease, small pericardial effusion, coronary calcification, hypodense thyroid nodules.  Telemetry neurology started atorvastatin.  She was started on azithromycin and ceftriaxone as well as remdesivir.    Seen by neurology in consultation, does not believe acute stroke was contributing to her encephalopathy due to its small size.      Assessment and Plan:  Ischemic cardio-embolic stroke  No dysarthria no aphasia today  Neurology signed off  PT/OT/SLP  Continue eliquis and clinical neuro checks     Chronic atrial fibrillation  Rate elevated >100 on exam  Increase metoprolol dose to 25 of succinate daily     Covid 19  Bronchial thickening  Stable on room air without hypoxia or conversational dyspnea, occasional cough  Reportedly unvaccinated  Reasonable to continue remdesivir given confounded chest imaging, though no GGO  No hypoxia, dexamethasone not started     Community-acquired pneumonia?  VS stable, afebrile  CRP increasing, procalcitonin equivocal  Blood culture without growth to date  Legionella, Streptococcus urine antigens negative  Was  seen by speech, on thins  Unclear if bacterial component present  Continue antibiotics     Pre-diabetes  Metformin held for contrast     Hypomagnesemia  Hyperchloremia  Continue RN protocol  Stopping normal saline     Generalized weakness  Checking phosphorus     Thrombocytopenia  Expect dilution, check CBC  tomorrow     S/p left BKA    Poor dentition       Diet: Combination Diet Regular Diet Adult; Soft and Bite Sized Diet (level 6); Thin Liquids (level 0)    DVT Prophylaxis: DOAC  Gambino Catheter: PRESENT, indication: Retention  Lines: None     Cardiac Monitoring: ACTIVE order. Indication: Stroke, acute (48 hours)  Code Status: Full Code      Clinically Significant Risk Factors            # Hypomagnesemia: Lowest Mg = 1.6 mg/dL in last 2 days, will replace as needed     # Thrombocytopenia: Lowest platelets = 109 in last 2 days, will monitor for bleeding                 Disposition Plan      Expected Discharge Date: 01/17/2023                The patient's care was discussed with the Attending Physician, Dr. Matthews.    Phoenix Villalobos, PA-S2      Hospitalist Service  [facility]  Securely message with [employer] (more info)  Text page via [employer] Paging/Directory   ______________________________________________________________________    Interval History   Patient feeling alright this morning. She continues to have a cough but states that it is improving. No chest pain, shortness of breath, N/V, abdominal pain, or any other complaints at this time.    Physical Exam   Vital Signs: Temp: 97.6  F (36.4  C) Temp src: Oral BP: 128/78 Pulse: 110   Resp: 20 SpO2: 97 % O2 Device: None (Room air)    Weight: 148 lbs 12.8 oz    General Appearance: No acute distress, laying in bed comfortably  Respiratory: Lung fields difficult to auscultate second to patient weakness. No abnormalities appreciated on exam   Cardiovascular: Tachycardia, no murmurs appreciated  Skin: Visualized skin without  abnormalities  Neuro: No difficulties with speech noted      Medical Decision Making         Data     Most Recent 3 CBC's:  Recent Labs   Lab Test 01/16/23  1024 01/15/23  0602 01/13/23 2058   WBC 3.1* 4.3 6.4   HGB 10.5* 10.5* 12.7   MCV 86 81 79   * 109* 160     Most Recent 3 BMP's:  Recent Labs   Lab Test 01/16/23  1024 01/16/23  0815 01/15/23  2029 01/15/23  0842 01/15/23  0602 01/14/23  1202 01/13/23 2058     --   --   --  141  --  138   POTASSIUM 3.5  --   --   --  3.5  --  4.2   CHLORIDE 111*  --   --   --  112*  --  101   CO2 22  --   --   --  22  --  24   BUN 14  --   --   --  16  --  22   CR 0.59*  --   --   --  0.57*  --  0.68   ANIONGAP 8  --   --   --  7  --  13   SUSIE 8.5  --   --   --  8.5  --  9.5   * 87 118*   < > 100   < > 131*    < > = values in this interval not displayed.     Most Recent 2 LFT's:  Recent Labs   Lab Test 01/13/23 2058   AST 22   ALT 18   ALKPHOS 67   BILITOTAL 0.5     Most Recent D-dimer:  Recent Labs   Lab Test 01/16/23  1024   DD 0.83*     Blood culture x2 1/13/23: NGTD  Strep pneumo & Legionella urine agn: Negative    Most Recent ESR & CRP:  Recent Labs   Lab Test 01/16/23  1024   CRP 3.9*     UA RESULTS:  Recent Labs   Lab Test 01/13/23 2231   COLOR Yellow   APPEARANCE Clear   URINEGLC Negative   URINEBILI Negative   URINEKETONE Negative   SG 1.021   UBLD Negative   URINEPH 5.0   PROTEIN 50*   NITRITE Negative   LEUKEST Negative   RBCU 1   WBCU 1     Venous Blood Gas  Recent Labs   Lab 01/13/23 2058   PHV 7.45   PCO2V 41   PO2V 42   HCO3V 29   WARNER 4.8     1/16/2023   [facility] :Faculty Attestation   I have seen and examined the patient.   I have discussed the case with the PA student Zackary Villalobos.  I agree with the findings, assessment and plan.   Fritz Matthews MD

## 2023-01-16 NOTE — PROGRESS NOTES
Care Management Follow Up    Length of Stay (days): 2    Expected Discharge Date: 01/17/2023     Concerns to be Addressed:       Patient plan of care discussed at interdisciplinary rounds: Yes    Anticipated Discharge Disposition:  [facility]     Anticipated Discharge Services:  PT OT SPT  Anticipated Discharge DME:  N/a - has own WC    Patient/family educated on Medicare website which has current facility and service quality ratings:  yes  Education Provided on the Discharge Plan: yes    Patient/Family in Agreement with the Plan:  yes    Referrals Placed by CM/SW:  n/a  Private pay costs discussed: Not applicable    Additional Information:  SWCM spoke with admissions at [facility]; pt able to return regardless of COVID + diagnosis. Pt will return to LTC bed and was requested to have MD place order for PT OT and SPT; MD aware and agreeable. Pt needing transport;  Health STR set due to COVID (PCS completed and placed on front of hard chart). Care management following. All parties aware and agreeable to discharge plan.  2:07 PM    NELLY Mann  1/16/2023         NELLY Mann

## 2023-01-16 NOTE — PLAN OF CARE
Problem: Diabetes Comorbidity  Goal: Blood Glucose Level Within Targeted Range  Outcome: Progressing     Problem: Hypertension Comorbidity  Goal: Blood Pressure in Desired Range  Outcome: Progressing  Intervention: Maintain Blood Pressure Management  Recent Flowsheet Documentation  Taken 1/15/2023 2015 by Cherise Irvin RN  Medication Review/Management: medications reviewed     Problem: Pneumonia  Goal: Effective Oxygenation and Ventilation  Outcome: Progressing  Intervention: Promote Airway Secretion Clearance  Recent Flowsheet Documentation  Taken 1/15/2023 2015 by Cherise Irvin RN  Cough And Deep Breathing: done independently per patient  Intervention: Optimize Oxygenation and Ventilation  Recent Flowsheet Documentation  Taken 1/15/2023 2015 by Cherise Irvin RN  Head of Bed (HOB) Positioning: HOB at 30 degrees  Taken 1/15/2023 2008 by Cherise Irvin RN  Head of Bed (HOB) Positioning: HOB at 30 degrees   Goal Outcome Evaluation:       VSS, remains on room air. Nonproductive cough, denies shortness of breath. Appetite fair, PO encouraged.

## 2023-01-16 NOTE — PROGRESS NOTES
SPIRITUAL HEALTH SERVICES Progress Note      Saw pt Angela Beckham per admission screening    Patient/Family Understanding of Illness and Goals of Care - Angela shared she has a history of stroke and has Pneumonia.  Angela shared she has lived in a care center the last year and named longs to go home.     Distress and Loss - Angela named multiple losses from the death of her mother to a  loss of independence caused by her declining health     Strengths, Coping, and Resources - Angela engaged in reflective conversation, emotional reflection and grief work    Meaning, Beliefs, and Spirituality -  Angela is a member of  [employer] in [place] and welcomed prayer    Plan of Care - Cont to assess and visit 1-2x a week       Benja Worrell M.Div., [facility]  Staff    [phone]

## 2023-01-16 NOTE — PROGRESS NOTES
"CLINICAL NUTRITION SERVICES - ASSESSMENT NOTE     Nutrition Prescription    RECOMMENDATIONS FOR MDs/PROVIDERS TO ORDER:  None noted     Malnutrition Status:    % Weight Loss:  Up to 10% in 6 months (moderate malnutrition)  % Intake:  Decreased intake noted this admission, unclear on duration   Subcutaneous Fat Loss:  Unable to assess   Muscle Loss:  Unable to assess  Fluid Retention:  None noted    Malnutrition Diagnosis: Moderate malnutrition  In Context of:  Acute vs chronic     Recommendations already ordered by Registered Dietitian (RD):  Start a daily MVI   Trial Glucerna daily     Future/Additional Recommendations:  None noted      REASON FOR ASSESSMENT  Angela Beckham is a/an 72 year old female assessed by the dietitian for Admission Nutrition Risk Screen for wt loss     Pt admitted with ischemic cardio-embolic stroke, covid-19, pneumonia, pre-DM, weakness, 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) , poor dentition     NUTRITION HISTORY  NKFA  Unable to meet pt due to covid precautions, attempted to call several times without being able to reach her     CURRENT NUTRITION ORDERS  Diet: Regular, Soft and Bite Sized  Intake/Tolerance: 25%    LABS  Labs reviewed, Cr-.59    MEDICATIONS  Medications reviewed, Remdesivir, Glucophage     ANTHROPOMETRICS  Height: 5'2\"  Most Recent Weight: 67.5 kg (148 lb 12.8 oz)    IBW: 44 kg adjusted for BKA  BMI: Overweight BMI 25-29.9  Weight History:   Wt Readings from Last 10 Encounters:   01/16/23 67.5 kg (148 lb 12.8 oz)   11/16/22 66.7 kg (147 lb)   10/11/22 72.1 kg (159 lb)   09/14/22 72.1 kg (159 lb)   07/20/22 73.9 kg (163 lb)   05/11/22 75.3 kg (166 lb)   04/15/22 73.9 kg (163 lb)   04/07/22 73.9 kg (163 lb)   03/09/22 73.9 kg (163 lb)   02/16/22 75.8 kg (167 lb)   Pt is down 18# (11%) in 8 months. Wt now stable x 1 month    Dosing Weight: 44 kg    ASSESSED NUTRITION NEEDS  Estimated Energy Needs: 1726-9526 kcals/day (25 - 30 kcals/kg)  Justification: Maintenance  Estimated Protein Needs: " 53-66 grams protein/day (1.2-1.5grams of pro/kg)  Justification: Maintenance/ wound healing   Estimated Fluid Needs: 4734-7306 mL/day (25 - 30 mL/kg)   Justification: Maintenance    PHYSICAL FINDINGS  See malnutrition section below.  Per flowsheet:   GI: fecal incontinence noted   Skin: sacral stage 1 ulcer       MALNUTRITION:  % Weight Loss:  Up to 10% in 6 months (moderate malnutrition)  % Intake:  Decreased intake noted this admission, unclear on duration   Subcutaneous Fat Loss:  Unable to assess   Muscle Loss:  Unable to assess  Fluid Retention:  None noted    Malnutrition Diagnosis: Moderate malnutrition  In Context of:  Acute vs chronic     NUTRITION DIAGNOSIS  Malnutrition related to suspected ongoing reduced po as evidenced by 11% wt loss in 8 months, reduced po this admission    Increased needs r/t wound AEB nonhealing      INTERVENTIONS  Implementation  Start a daily MVI   Trial Glucerna daily     Goals  Patient to consume % of nutritionally adequate meals three times per day, or the equivalent with supplements/snacks.  Promote healing      Monitoring/Evaluation  Progress toward goals will be monitored and evaluated per protocol.

## 2023-01-17 ENCOUNTER — PATIENT OUTREACH (OUTPATIENT)
Dept: GERIATRIC MEDICINE | Facility: CLINIC | Age: 73
End: 2023-01-17
Payer: COMMERCIAL

## 2023-01-17 ENCOUNTER — PATIENT OUTREACH (OUTPATIENT)
Dept: GERIATRIC MEDICINE | Facility: CLINIC | Age: 73
End: 2023-01-17

## 2023-01-17 ENCOUNTER — APPOINTMENT (OUTPATIENT)
Dept: SPEECH THERAPY | Facility: CLINIC | Age: 73
DRG: 064 | End: 2023-01-17
Payer: COMMERCIAL

## 2023-01-17 VITALS
TEMPERATURE: 97.9 F | BODY MASS INDEX: 27.58 KG/M2 | HEART RATE: 103 BPM | WEIGHT: 150.8 LBS | SYSTOLIC BLOOD PRESSURE: 122 MMHG | RESPIRATION RATE: 21 BRPM | DIASTOLIC BLOOD PRESSURE: 89 MMHG | OXYGEN SATURATION: 97 %

## 2023-01-17 LAB
ANION GAP SERPL CALCULATED.3IONS-SCNC: 8 MMOL/L (ref 5–18)
BUN SERPL-MCNC: 13 MG/DL (ref 8–28)
C REACTIVE PROTEIN LHE: 2.8 MG/DL (ref 0–?)
CALCIUM SERPL-MCNC: 8.7 MG/DL (ref 8.5–10.5)
CHLORIDE BLD-SCNC: 113 MMOL/L (ref 98–107)
CO2 SERPL-SCNC: 23 MMOL/L (ref 22–31)
CREAT SERPL-MCNC: 0.55 MG/DL (ref 0.6–1.1)
D DIMER PPP FEU-MCNC: 0.91 UG/ML FEU (ref 0–0.5)
ERYTHROCYTE [DISTWIDTH] IN BLOOD BY AUTOMATED COUNT: 16.3 % (ref 10–15)
GFR SERPL CREATININE-BSD FRML MDRD: >90 ML/MIN/1.73M2
GLUCOSE BLD-MCNC: 100 MG/DL (ref 70–125)
GLUCOSE BLDC GLUCOMTR-MCNC: 99 MG/DL (ref 70–99)
HCT VFR BLD AUTO: 35.8 % (ref 35–47)
HGB BLD-MCNC: 10.8 G/DL (ref 11.7–15.7)
MAGNESIUM SERPL-MCNC: 1.8 MG/DL (ref 1.8–2.6)
MCH RBC QN AUTO: 25.9 PG (ref 26.5–33)
MCHC RBC AUTO-ENTMCNC: 30.2 G/DL (ref 31.5–36.5)
MCV RBC AUTO: 86 FL (ref 78–100)
PLATELET # BLD AUTO: 122 10E3/UL (ref 150–450)
POTASSIUM BLD-SCNC: 3.9 MMOL/L (ref 3.5–5)
RBC # BLD AUTO: 4.17 10E6/UL (ref 3.8–5.2)
SODIUM SERPL-SCNC: 144 MMOL/L (ref 136–145)
WBC # BLD AUTO: 3.5 10E3/UL (ref 4–11)

## 2023-01-17 PROCEDURE — 85027 COMPLETE CBC AUTOMATED: CPT | Performed by: INTERNAL MEDICINE

## 2023-01-17 PROCEDURE — 92526 ORAL FUNCTION THERAPY: CPT | Mod: GN

## 2023-01-17 PROCEDURE — 258N000003 HC RX IP 258 OP 636: Performed by: INTERNAL MEDICINE

## 2023-01-17 PROCEDURE — 250N000013 HC RX MED GY IP 250 OP 250 PS 637: Performed by: HOSPITALIST

## 2023-01-17 PROCEDURE — 85379 FIBRIN DEGRADATION QUANT: CPT | Performed by: INTERNAL MEDICINE

## 2023-01-17 PROCEDURE — 36415 COLL VENOUS BLD VENIPUNCTURE: CPT | Performed by: INTERNAL MEDICINE

## 2023-01-17 PROCEDURE — 83735 ASSAY OF MAGNESIUM: CPT | Performed by: FAMILY MEDICINE

## 2023-01-17 PROCEDURE — 80048 BASIC METABOLIC PNL TOTAL CA: CPT | Performed by: INTERNAL MEDICINE

## 2023-01-17 PROCEDURE — 250N000011 HC RX IP 250 OP 636: Performed by: INTERNAL MEDICINE

## 2023-01-17 PROCEDURE — 86140 C-REACTIVE PROTEIN: CPT | Performed by: INTERNAL MEDICINE

## 2023-01-17 PROCEDURE — 250N000013 HC RX MED GY IP 250 OP 250 PS 637: Performed by: FAMILY MEDICINE

## 2023-01-17 PROCEDURE — 99239 HOSP IP/OBS DSCHRG MGMT >30: CPT | Performed by: FAMILY MEDICINE

## 2023-01-17 RX ORDER — METOPROLOL SUCCINATE 25 MG/1
25 TABLET, EXTENDED RELEASE ORAL DAILY
Qty: 30 TABLET | Refills: 0 | Status: SHIPPED | OUTPATIENT
Start: 2023-01-17 | End: 2023-10-26

## 2023-01-17 RX ORDER — MAGNESIUM OXIDE 400 MG/1
400 TABLET ORAL EVERY 4 HOURS
Status: DISCONTINUED | OUTPATIENT
Start: 2023-01-17 | End: 2023-01-17 | Stop reason: HOSPADM

## 2023-01-17 RX ADMIN — METFORMIN HYDROCHLORIDE 1000 MG: 500 TABLET ORAL at 09:32

## 2023-01-17 RX ADMIN — MAGNESIUM OXIDE TAB 400 MG (241.3 MG ELEMENTAL MG) 400 MG: 400 (241.3 MG) TAB at 06:13

## 2023-01-17 RX ADMIN — THERA TABS 1 TABLET: TAB at 09:31

## 2023-01-17 RX ADMIN — METOPROLOL SUCCINATE 25 MG: 25 TABLET, EXTENDED RELEASE ORAL at 09:31

## 2023-01-17 RX ADMIN — CEFTRIAXONE SODIUM 2 G: 2 INJECTION, POWDER, FOR SOLUTION INTRAMUSCULAR; INTRAVENOUS at 01:12

## 2023-01-17 RX ADMIN — APIXABAN 5 MG: 5 TABLET, FILM COATED ORAL at 09:32

## 2023-01-17 RX ADMIN — ASPIRIN 81 MG: 81 TABLET, COATED ORAL at 09:32

## 2023-01-17 RX ADMIN — AZITHROMYCIN DIHYDRATE 500 MG: 500 INJECTION, POWDER, LYOPHILIZED, FOR SOLUTION INTRAVENOUS at 02:18

## 2023-01-17 ASSESSMENT — ACTIVITIES OF DAILY LIVING (ADL)
ADLS_ACUITY_SCORE: 41

## 2023-01-17 NOTE — PLAN OF CARE
Pt on room air. Repositioned pt every 2 hours as pt would allow. Pt confused and only alert to self. Pt drinking water but did not eat any supper. Incontinent of bowel once during the shift. Gambino in place and patent and draining. Pt took meds whole without issues. A-fib on tele with HR controlled.   Problem: Stroke, Ischemic (Includes Transient Ischemic Attack)  Goal: Effective Oxygenation and Ventilation  Outcome: Progressing  Goal: Optimal Eating and Swallowing without Aspiration  Outcome: Progressing   Goal Outcome Evaluation:

## 2023-01-17 NOTE — DISCHARGE SUMMARY
[facility]  Hospitalist Discharge Summary      Date of Admission:  1/13/2023  Date of Discharge:  1/17/2023  Discharging Provider: Fritz Matthews MD  Discharge Service: Hospitalist Service    Discharge Diagnoses   COVID positive  Acute ischemic stroke  Chronic atrial fibrillation on apixaban    Follow-ups Needed After Discharge   Voiding trial or urology referral    Unresulted Labs Ordered in the Past 30 Days of this Admission     Date and Time Order Name Status Description    1/13/2023  9:10 PM Blood Culture Peripheral Blood Preliminary     1/13/2023  9:10 PM Blood Culture Peripheral Blood Preliminary       These results will be followed up by TCU md    Discharge Disposition   Discharged to long-term care facility  Condition at discharge: Stable    Hospital Course   Angela Beckham is a 72 year old female with a past medical history of afib,  who was admitted on 1/13/2023 for altered mental status.     Patient was sent to [facility] from TCU for altered mental status.  On arrival she was alert, cooperative, and nonverbal.  She was diagnosed with acute ischemic cardioembolic stroke of right corona radiata and periventricular white matter based on MRI, as well as chronic right basal ganglia infarct and brain atrophy from chronic microvascular ischemia.  Echo showed biatrial enlargement, moderate mitral stenosis, normal LVEF, borderline RV enlargement with intact function.  MRA head and neck were negative for stenosis. CT negative for PE, shows diffuse bronchial thickening, remote granulomatous disease, small pericardial effusion, coronary calcification, hypodense thyroid nodules. Telemetry neurology started atorvastatin.  She was treated with several day course of azithromycin and ceftriaxone for potential CAP (finished 1/17) and treated with 3 days course of remdesivir (finished 1/16) for COVID with significant improvement in her cough.     Seen by neurology in consultation who did not  believe acute stroke was contributing to her encephalopathy due to its small size. Therapies consulted and recommended TCU placement.    Patient experienced increased HR while admitted; her metoprolol was subsequently increased to 25 mg daily with improvement.    Of note, patient admitted with rosa in place second to urinary retention. Will discharge with rosa in place; recommend voiding trial at TCU vs outpatient follow up with urology for voiding trial.    The patient continued to improve and was determined stable for discharge to SNF on 1/17/2023. During admission, she was started on ASA 81 mg daily. Medication changes include metoprolol succinate 25 mg daily. She will otherwise continue her PTA medications as prescribed. Will follow up with nursing home physician in 5-7 days. May need beta blocker adjustment.    Patient was noted to be pancytopenic likely from illness/COVID with marrow suppression.  Recommend follow-up CBC as an outpatient.      Consultations This Hospital Stay   NEUROLOGY IP STROKE CONSULT  NEUROLOGY IP STROKE CONSULT  SPEECH LANGUAGE PATH ADULT IP CONSULT  PHARMACY IP CONSULT  PHARMACY IP CONSULT  PHARMACY IP CONSULT  PHYSICAL THERAPY ADULT IP CONSULT  OCCUPATIONAL THERAPY ADULT IP CONSULT  REHAB ADMISSIONS LIAISON IP CONSULT  CARE MANAGEMENT / SOCIAL WORK IP CONSULT  SOCIAL WORK IP CONSULT  PHYSICAL THERAPY ADULT IP CONSULT  OCCUPATIONAL THERAPY ADULT IP CONSULT  SPEECH LANGUAGE PATH ADULT IP CONSULT  SMOKING CESSATION PROGRAM IP CONSULT    Code Status   Full Code    Time Spent on this Encounter   I, Fritz Matthews MD, personally saw the patient today and spent greater than 30 minutes discharging this patient.       YOCASTA Donahue-S2    1/17/2023   [facility] :Faculty Attestation   I have seen and examined the patient.   I have discussed the case with the PA student Zackary Villalobos.  I agree with the findings, assessment and plan.    Fritz Matthews MD   [facility]  HEART CARE  [address] [place] [id number]  Phone: [phone]  Fax: [phone]  ______________________________________________________________________    Physical Exam   Vital Signs: Temp: 97.9  F (36.6  C) Temp src: Oral BP: 122/89 Pulse: 103   Resp: 21 SpO2: 97 % O2 Device: None (Room air)    Weight: 150 lbs 12.8 oz    GENERAL:  Alert, appears comfortable, in no acute distress, appears stated age   HEAD:  Normocephalic, without obvious abnormality, atraumatic   EYES:  PERRL, conjunctiva/corneas clear, no scleral icterus, EOM's intact   NOSE: Nares normal, septum midline, mucosa normal, no drainage   THROAT: Lips, mucosa, and tongue normal; teeth and gums normal, mouth moist   NECK: Supple, symmetrical, trachea midline   BACK:   Symmetric, no curvature, ROM normal   LUNGS:   Clear to auscultation bilaterally, no rales, rhonchi, or wheezing, symmetric chest rise on inhalation, respirations unlabored   CHEST WALL:  No tenderness or deformity   HEART:  Chronic a.fib with rate in low 100's, S1 and S2 normal, no murmur, rub, or gallop    ABDOMEN:   Soft, non-tender, bowel sounds active all four quadrants, no masses, no organomegaly, no rebound or guarding   EXTREMITIES: Extremities normal, atraumatic, no cyanosis or edema    SKIN: Dry to touch, no exanthems in the visualized areas   NEURO: Alert, oriented x 3, moves all four extremities freely/spontaneously   PSYCH: Cooperative, behavior is appropriate           Primary Care Physician   Berenice Nolen    Discharge Orders      General info for SNF    Length of Stay Estimate: Short Term Care: Estimated # of Days <30  Condition at Discharge: Stable  Level of care:skilled   Rehabilitation Potential: Fair  Admission H&P remains valid and up-to-date: Yes  Recent Chemotherapy: N/A  Use Nursing Home Standing Orders: Yes     Mantoux instructions    Give two-step Mantoux (PPD) Per Facility Policy Yes     Reason for your hospital stay    Covid pneumonia and  small stroke.     Activity - Up ad angelina     Follow Up and recommended labs and tests    Follow up with care home physician.  The following labs/tests are recommended: cbc and general recheck.  Blood pressure check/pulse check - may need to increase beta blocker dose for rate control.  Re-eval need for rosa catheter with voiding trial or referral to urology.     Physical Therapy Adult Consult    Evaluate and treat as clinically indicated.    Reason:  gait     Occupational Therapy Adult Consult    Evaluate and treat as clinically indicated.    Reason:ADLs     Speech Language Path Adult Consult    Evaluate and treat as clinically indicated.    Reason:  cva     Airborne and Droplet Isolation    Covid special precautions     Fall precautions     Diet    Follow this diet upon discharge: Orders Placed This Encounter      Snacks/Supplements Adult: Glucerna; Between Meals      Combination Diet Regular Diet Adult; Soft and Bite Sized Diet (level 6); Thin Liquids (level 0)       Significant Results and Procedures   MR Brain w/o contrast 1/14/23  1.  Tiny focus of diffusion hyperintensity in the right corona radiata and periventricular white matter, too small to characterize on ADC, but favored to reflect a recent infarct.  2.  Generalized volume loss and sequelae of chronic microvascular ischemic disease, as described.  3.  Chronic infarcts in the right basal ganglia.    Recent Results (from the past 24 hour(s))   Glucose by meter    Collection Time: 01/16/23 11:59 AM   Result Value Ref Range    GLUCOSE BY METER POCT 124 (H) 70 - 99 mg/dL   Lactic Acid STAT    Collection Time: 01/16/23  2:41 PM   Result Value Ref Range    Lactic Acid 0.9 0.7 - 2.0 mmol/L   Extra Purple Top Tube    Collection Time: 01/16/23  2:41 PM   Result Value Ref Range    Hold Specimen JIC    Glucose by meter    Collection Time: 01/16/23  5:18 PM   Result Value Ref Range    GLUCOSE BY METER POCT 112 (H) 70 - 99 mg/dL   Glucose by meter    Collection  Time: 01/16/23  8:59 PM   Result Value Ref Range    GLUCOSE BY METER POCT 99 70 - 99 mg/dL   CBC with platelets    Collection Time: 01/17/23  4:36 AM   Result Value Ref Range    WBC Count 3.5 (L) 4.0 - 11.0 10e3/uL    RBC Count 4.17 3.80 - 5.20 10e6/uL    Hemoglobin 10.8 (L) 11.7 - 15.7 g/dL    Hematocrit 35.8 35.0 - 47.0 %    MCV 86 78 - 100 fL    MCH 25.9 (L) 26.5 - 33.0 pg    MCHC 30.2 (L) 31.5 - 36.5 g/dL    RDW 16.3 (H) 10.0 - 15.0 %    Platelet Count 122 (L) 150 - 450 10e3/uL   Basic metabolic panel    Collection Time: 01/17/23  4:36 AM   Result Value Ref Range    Sodium 144 136 - 145 mmol/L    Potassium 3.9 3.5 - 5.0 mmol/L    Chloride 113 (H) 98 - 107 mmol/L    Carbon Dioxide (CO2) 23 22 - 31 mmol/L    Anion Gap 8 5 - 18 mmol/L    Urea Nitrogen 13 8 - 28 mg/dL    Creatinine 0.55 (L) 0.60 - 1.10 mg/dL    Calcium 8.7 8.5 - 10.5 mg/dL    Glucose 100 70 - 125 mg/dL    GFR Estimate >90 >60 mL/min/1.73m2   CRP inflammation    Collection Time: 01/17/23  4:36 AM   Result Value Ref Range    CRP 2.8 (H) 0.0 - <0.8 mg/dL   D dimer quantitative    Collection Time: 01/17/23  4:36 AM   Result Value Ref Range    D-Dimer Quantitative 0.91 (H) 0.00 - 0.50 ug/mL FEU   Magnesium    Collection Time: 01/17/23  4:36 AM   Result Value Ref Range    Magnesium 1.8 1.8 - 2.6 mg/dL   Glucose by meter    Collection Time: 01/17/23  8:11 AM   Result Value Ref Range    GLUCOSE BY METER POCT 99 70 - 99 mg/dL     Blood culture x2 1/13/23: NGTD  Strep pneumo & Legionella urine agn: Negative     UA RESULTS:      Recent Labs   Lab Test 01/13/23 2231   COLOR Yellow   APPEARANCE Clear   URINEGLC Negative   URINEBILI Negative   URINEKETONE Negative   SG 1.021   UBLD Negative   URINEPH 5.0   PROTEIN 50*   NITRITE Negative   LEUKEST Negative   RBCU 1   WBCU 1      Venous Blood Gas      Recent Labs   Lab 01/13/23  2058   PHV 7.45   PCO2V 41   PO2V 42   HCO3V 29   WARNER 4.8        Discharge Medications   Current Discharge Medication List      START  taking these medications    Details   aspirin (ASA) 81 MG EC tablet Take 1 tablet (81 mg) by mouth daily  Qty: 30 tablet, Refills: 0    Associated Diagnoses: Cerebrovascular accident (CVA), unspecified mechanism (H)         CONTINUE these medications which have CHANGED    Details   metoprolol succinate ER (TOPROL XL) 25 MG 24 hr tablet Take 1 tablet (25 mg) by mouth daily  Qty: 30 tablet, Refills: 0    Associated Diagnoses: Chronic atrial fibrillation with RVR (H)         CONTINUE these medications which have NOT CHANGED    Details   acetaminophen (TYLENOL) 325 MG tablet Take 650 mg by mouth every 4 hours as needed for mild pain      ammonium lactate (AMLACTIN) 12 % external cream Apply 0.25 inches topically 2 times daily To feet      apixaban ANTICOAGULANT (ELIQUIS) 5 MG tablet Take 5 mg by mouth 2 times daily      atorvastatin (LIPITOR) 20 MG tablet Take 20 mg by mouth At Bedtime      camphor-menthol-methyl sal 4-10-30 % CREA Externally apply 1 g topically 2 times daily Apply to thoracic paraspinal muscles for muscle spasm      cyanocobalamin (VITAMIN B-12) 1000 MCG tablet Take 1,000 mcg by mouth daily      docusate sodium (COLACE) 100 MG capsule Take 100 mg by mouth daily as needed      !! metFORMIN (GLUCOPHAGE) 1000 MG tablet Take 1,000 mg by mouth daily (with breakfast)      !! metFORMIN (GLUCOPHAGE) 500 MG tablet Take 500 mg by mouth daily (with dinner)      miconazole (MICATIN) 2 % cream 1 applicator 2 times daily Apply to skin folds      vitamin B-Complex Take 1 tablet by mouth daily       !! - Potential duplicate medications found. Please discuss with provider.        Allergies   Allergies   Allergen Reactions     Amoxicillin Unknown     Oxycodone Other (See Comments)     Does not remember     Latex Rash

## 2023-01-17 NOTE — PLAN OF CARE
Patient remained free of injury/falls, q2h turns (bottom pink, not open) in bed, vitals stable, room air.   Patient does not complain of pain.   Gambino in place and draining well.   Sommer Marcano, THERESA      Problem: Fall Injury Risk  Goal: Absence of Fall and Fall-Related Injury  Outcome: Progressing  Intervention: Identify and Manage Contributors  Recent Flowsheet Documentation  Taken 1/17/2023 0431 by Sommer Marcano, RN  Medication Review/Management: medications reviewed  Taken 1/17/2023 0135 by Sommer Marcano, RN  Medication Review/Management: medications reviewed  Intervention: Promote Injury-Free Environment  Recent Flowsheet Documentation  Taken 1/17/2023 0431 by Sommer Marcano, RN  Safety Promotion/Fall Prevention: activity supervised  Taken 1/17/2023 0135 by Sommer Marcano, RN  Safety Promotion/Fall Prevention: activity supervised   Goal Outcome Evaluation:

## 2023-01-17 NOTE — PROGRESS NOTES
Occupational Therapy Discharge Summary    Reason for therapy discharge:    Discharged to long term care facility.    Progress towards therapy goal(s). See goals on Care Plan in [facility] electronic health record for goal details.  Goals not met.  Barriers to achieving goals:   discharge from facility.    Therapy recommendation(s):    Continued therapy is recommended.  Rationale/Recommendations:  OT for ADLs and cognition to decrease the burden of care at LTC..

## 2023-01-17 NOTE — PROGRESS NOTES
TRANSITIONS OF CARE (SANGITA) LOG   SANGITA tasks should be completed by the CC within one (1) business day of notification of each transition. Follow up contact with member is required after return to their usual care setting.  Note:  If CC finds out about the transitions fifteen (15) days or more after the member has returned to their usual care setting, no SANGITA log is needed. However, the CC should check in with the member to discuss the transition process, any changes needed to the care plan and document it in a case note.    Member Name:  Angela Beckham O Name:  Ralph O/Health Plan Member ID#: [id number]   Product: [facility] Care Coordinator Contact:  Yohana Melendez RN, PHN Agency/County/Care System: [facility]   Transition Communication Actions from Care Management Contact   Transition #1   Notification Date: 01/17/23 Transition Date:   01/14/23 Transition From: Nursing Home, [facility]     Is this the member s usual care setting?               yes Transition To: [facility]   Transition Type:  Unplanned  Reason for Admission/Comments:  Covid Positive  Stroke -History   Atrial Fif on anticoagulant-History   Contact member/responsible party to offer assistance with transition Date completed: 01/17/23    Notes from conversation with the member/responsible party, provider, discharging and receiving facility (as applicable): CC contacted Hospital /discharge planner via the [facility] Transitional Care Hand-In Process, with community care plan included.  CC reached out to karen Lundberg regarding transition and left a message requesting a return call.  Reviewed and update care plan as needed.  Notified community service providers and placed services None on hold as needed.  Transition log initiated.   PCP notified of hospitalization via EMR.       Shared CC contact info, care plan/services with receiving setting--Date completed: 01/17/23   Name & Title of  receiving setting contact: [facility]   Notified PCP of transition--Date completed:  01/14/23     via  EMR  Name of PCP: Berenice Nolen     Transition #2   Notification Date: 01/18/23 Transition Date:   01/17/23 Transition From: Hospital, [facility]     Is this the member s usual care setting?               no Transition To: Nursing [place], [facility] SN[facility]   Transition Type:  Planned  Reason for Admission/Comments:  Covid Positive  Stroke -History   Atrial Fif on anticoagulant-History     Contact member/responsible party to offer assistance with transition Date completed: 01/18/23    Notes from conversation with the member/responsible party, provider, discharging and receiving facility (as applicable): CC contacted family Sister Anu Lundberg and reviewed discharge summary.  Member has a follow-up appointment with PCP in 7 days: Yes: scheduled on Next visit date in facility by NP/MD from [facility]    Member has had a change in condition: No  Home visit needed: No  Care plan reviewed and updated.  The following home based services None were resumed.  New referrals placed: No  Transition log completed.   PCP notified of transition back to home via EMR.       Shared CC contact info, care plan/services with receiving setting--Date completed: 01/17/23   Name & Title of receiving setting contact: [facility]   Notified PCP of transition--Date completed:  01/14//23     via  EMR  Name of PCP: Berenice Nolen      *RETURN TO USUAL CARE SETTING: *Complete tasks below when the member is discharging TO their usual care setting within one (1) business day of notification..      For situations where the Care Coordinator is notified of the discharge prior to the date of discharge, the Care Coordinator must follow up with the member or designated representative to confirm that discharge actually occurred and discuss required SANGITA  tasks as outlined in the SANGITA Instructions.  (This includes situations where it may be a  new  usual care setting for the member. (i.e., a community member who decides upon permanent nursing home placement following hospitalization and rehab).    Discuss with Member/Responsible Party:    Check  Yes  - if the member, family member and/or SNF/facility staff manages the following:    If  No  provide explanation in the comments section.          Date completed: 01/18/23 Communicated with member or their designated representative about the following:  care transition process; about changes to the member s health status; plan of care updates; education about transitions and how to prevent unplanned transitions/readmissions    Four Pillars for Optimal Transition:    Check  Yes  - if the member, family member and/or SNF/facility staff manages the following:    If  No  provide explanation in the comments section.          [x]  Yes     []  No Does the member have a follow-up appointment scheduled with primary care or specialist? (Mental health hospitalizations--the appt. should be w/in 7 days)              For mental health hospitalizations:  []  Yes     []  No     Does the member have a follow-up appointment scheduled with a mental health practitioner within 7 days of discharge?  [x]  Yes     []  No     Has a medication review been completed with member? If no, refer to PCP, home care nurse, MTM, pharmacist  [x]  Yes     []  No     Can the member manage their medications or is there a system in place to manage medications (e.g. home care set-up)?         [x]  Yes     []  No     Can the member verbalize warning signs and symptoms to watch for and how to respond?  [x]  Yes     []  No     Does the member have a copy of and understand their discharge instructions?  If no, assist to obtain copy of discharge instructions, review discharge instructions, and assist to contact PCP to discuss questions about their recent  hospitalization.  [x]  Yes     []  No     Does the member have adequate food, housing and transportation?  If no, add goal and discuss additional supports available to the member                                                                                                                                                                                 [x]  Yes     []  No     Is the member safe in their home?  If no, document needs and support provided                                                                                                                                                                          []  Yes     [x]  No     Are there any concerns of vulnerability, abuse, or neglect?  If yes, document concerns and actions taken by Care Coordinator as a mandated                                                                                                                                                                              [x]  Yes     []  No     Does the member use a Personal Health Care Record?  Check  Yes  if visit summary, discharge summary, and/or healthcare summary are being used as a PHR.                                                                                                                                                                                  [x]  Yes     []  No     Have you reviewed the discharge summary with the member? If  No  provide explanation in comments.  [x]  Yes     []  No     Have you updated the member s care plan/support plan? Add new diagnosis, medications, treatments, goals & interventions, as applicable. If No, provide explanation in comments.    Comments:   Member lives in nursing home. Nursing staff manage medications and safety concerns.       Notes from conversation with the member/responsible party, provider, discharging and receiving facility (as applicable): CC contacted family Sister Anu ROSA and reviewed discharge summary.  Member  has a follow-up appointment with PCP in 7 days: Yes: scheduled on Next visit date in facility by NP/MD from [facility]    Member has had a change in condition: No  Home visit needed: No  Care plan reviewed and updated.  The following home based services None were resumed.  New referrals placed: No  Transition log completed.   PCP notified of transition back to home via EMR.    Yohana Melendez RN, PHN  Intuitional Care Coordinator [address]  [address] [place] [address]  [email or web address]  Cell [phone] Fax [phone]

## 2023-01-17 NOTE — PROGRESS NOTES
Speech Language Therapy Discharge Summary    Reason for therapy discharge:    Discharged to long term care facility.    Progress towards therapy goal(s). See goals on Care Plan in Epic electronic health record for goal details.  Goals partially met.  Barriers to achieving goals:   discharge from facility.    Therapy recommendation(s):    Continued therapy is recommended.  Rationale/Recommendations:  At time of discharge, recommended diet was Soft & Bite Sized textures and thin liquids.  Recommend direct supervision during meals to ensure that patient adheres to safe swallowing precautions, including sitting fully upright for all intake, eating slowly, and taking single, small sips of liquid.  Patient would benefit from ongoing Speech Therapy at LT facility for continued dysphagia management.  If concerns regarding aspiration arise, consider outpatient video swallow study.

## 2023-01-17 NOTE — PROGRESS NOTES
Care Management Discharge Note    Discharge Date: 01/17/2023       Discharge Disposition: Long Term Care    Discharge Services: None    Discharge DME: None    Discharge Transportation: agency    Private pay costs discussed: Not applicable    PAS Confirmation Code:  Not needed  Patient/family educated on Medicare website which has current facility and service quality ratings: no    Education Provided on the Discharge Plan:  Per team  Persons Notified of Discharge Plans: PT, Facility, Family, nurse  Patient/Family in Agreement with the Plan: yes    Handoff Referral Completed: Yes    Additional Information:  PCM assisting with discharge planning. Patient will be discharged back to her LTC, with some PT, OT, SLP at facility planned. Ride set up. No other needs identified.        Whitney Clarke RN

## 2023-01-17 NOTE — PROGRESS NOTES
[facility] Care Coordination Contact    [facility]  Ambulatory Care Coordination to Inpatient Care Management   Hand-In Communication    Date:  January 17, 2023  Name: Angela Beckham is enrolled in [facility] Care Coordination program and I am the Lead Care Coordinator.  CC Contact Information:.   Payor Source: Payor: [facility] / Plan: [facility] DUAL / Product Type: HMO /   Current services in place:     Please see the CC Snaphot and Care Management Flowsheets for specific  details of this Angela Beckham care plan.   Additional details/specific concerns r/t this admission:    Readmission Risk lives in communal living where there have been covid outbreaks recently.     I will follow this admission in Epic. Please feel free to contact me with questions or for further collaboration in discharge planning.    Yohana Melendez RN, PHN  Intuitional Care Coordinator [address]  [address] [place] [id number]  [email or web address]  Cell [phone] Fax [phone]

## 2023-01-18 ENCOUNTER — DOCUMENTATION ONLY (OUTPATIENT)
Dept: OTHER | Facility: CLINIC | Age: 73
End: 2023-01-18

## 2023-01-18 ENCOUNTER — NURSING HOME VISIT (OUTPATIENT)
Dept: GERIATRICS | Facility: CLINIC | Age: 73
End: 2023-01-18
Payer: COMMERCIAL

## 2023-01-18 VITALS
TEMPERATURE: 98 F | SYSTOLIC BLOOD PRESSURE: 128 MMHG | HEART RATE: 89 BPM | HEIGHT: 62 IN | WEIGHT: 152 LBS | RESPIRATION RATE: 18 BRPM | DIASTOLIC BLOOD PRESSURE: 76 MMHG | OXYGEN SATURATION: 98 % | BODY MASS INDEX: 27.97 KG/M2

## 2023-01-18 DIAGNOSIS — E11.59 TYPE 2 DIABETES MELLITUS WITH OTHER CIRCULATORY COMPLICATION, WITHOUT LONG-TERM CURRENT USE OF INSULIN (H): ICD-10-CM

## 2023-01-18 DIAGNOSIS — G89.29 CHRONIC BILATERAL LOW BACK PAIN WITHOUT SCIATICA: Primary | ICD-10-CM

## 2023-01-18 DIAGNOSIS — Z86.73 HISTORY OF CVA (CEREBROVASCULAR ACCIDENT): ICD-10-CM

## 2023-01-18 DIAGNOSIS — Z89.512 STATUS POST BELOW-KNEE AMPUTATION OF LEFT LOWER EXTREMITY (H): ICD-10-CM

## 2023-01-18 DIAGNOSIS — I48.20 ATRIAL FIBRILLATION, CHRONIC (H): ICD-10-CM

## 2023-01-18 DIAGNOSIS — M54.50 CHRONIC BILATERAL LOW BACK PAIN WITHOUT SCIATICA: Primary | ICD-10-CM

## 2023-01-18 DIAGNOSIS — I27.21 PULMONARY ARTERY HYPERTENSION (H): ICD-10-CM

## 2023-01-18 DIAGNOSIS — I10 PRIMARY HYPERTENSION: ICD-10-CM

## 2023-01-18 DIAGNOSIS — E66.01 MORBID OBESITY (H): ICD-10-CM

## 2023-01-18 PROCEDURE — 99305 1ST NF CARE MODERATE MDM 35: CPT | Performed by: FAMILY MEDICINE

## 2023-01-18 NOTE — LETTER
1/18/2023        RE: Angela Beckham  [facility]  [address] [address]        [facility] GER[facility]ATRIC [facility]       Patient Angela Beckham  MRN: [id number]        Reason for Visit     Chief Complaint   Patient presents with     Hospital F/U       Code Status     CPR/Full code     Assessment     Acute ischemic CVA involving the right cardioembolic CVA  Chronic right basal ganglia infarct  Chronic microvascular ischemia  Community-acquired pneumonia  Active COVID infection  Acute encephalopathy  Urinary retention requiring Gambino catheter placement  History of left below the knee amputation  Generalized weakness  Type 2 diabetes    Plan     Patient is currently readmitted back to long-term care  She was admitted with acute CVA  Evaluated by stroke neurology.  Started on a statin  Started on aspirin daily  She is on eliquis for a fib  PT and OT recommended  Also has acute COVID infection for which she was treated with remdesivir  She is afebrile and not hypoxic  Minimal if any cough reported by patient  Also had community-acquired pneumonia which was treated.  She had some tachycardia in the hospital so she is on a higher dose of metoprolol.  Monitor heart rates and blood pressures.  Had urinary retention and has Gambino catheter placement done.  She will need a voiding trial as ordered versus outpatient follow-up with urology  Dm control good with hga1c of 6.1  Recheck labs  Continue with PT/OT-at baseline she is bedbound and does not wear her prosthesis due to which she does not use her wheelchair much either   she is now agreeable to using a chair and sitting up at least for some part of the day.  Unfortunately does not tolerate sitting up very long she will continue with her PT for now    History     Patient is a very pleasant 72 year old female who is readmitted in long-term care  Patient was admitted to the hospital with altered mental status  MRI imaging of the brain however did show  acute right corona radiata and periventricular white matter cardioembolic CVA  She was evaluated by neurology and started on aspirin as well as atorvastatin  She also was treated for community-acquired pneumonia and was noted to have COVID infection which was treated with remdesivir  While in the hospital some tachycardia noted treated with a higher dose of metoprolol      Past Medical & Surgical History     PAST MEDICAL HISTORY:   Past Medical History:   Diagnosis Date     Diabetic neuropathy (H)      HTN      Type 2 diabetes mellitus (H)           Past Social History     Reviewed,  reports that she has an unknown smoking status. She has never used smokeless tobacco. She reports that she does not drink alcohol and does not use drugs.    Family History     Reviewed, and family history includes Alzheimer Disease in her father; Cerebrovascular Disease in her maternal grandmother and mother; Diabetes in her paternal grandfather; Hypertension in her brother and mother; Obesity in her brother; Respiratory in her father.    Medication List   Post Discharge Medication Reconciliation Status: Post Discharge Medication Reconciliation Status: discharge medications reconciled, continue medications without change.  Current Outpatient Medications   Medication     acetaminophen (TYLENOL) 325 MG tablet     ammonium lactate (AMLACTIN) 12 % external cream     apixaban ANTICOAGULANT (ELIQUIS) 5 MG tablet     aspirin (ASA) 81 MG EC tablet     atorvastatin (LIPITOR) 20 MG tablet     camphor-menthol-methyl sal 4-10-30 % CREA     cyanocobalamin (VITAMIN B-12) 1000 MCG tablet     docusate sodium (COLACE) 100 MG capsule     metFORMIN (GLUCOPHAGE) 1000 MG tablet     metFORMIN (GLUCOPHAGE) 500 MG tablet     metoprolol succinate ER (TOPROL XL) 25 MG 24 hr tablet     miconazole (MICATIN) 2 % cream     vitamin B-Complex     No current facility-administered medications for this visit.          Allergies     Allergies   Allergen Reactions      "Amoxicillin Unknown     Oxycodone Other (See Comments)     Does not remember     Latex Rash       Review of Systems   A comprehensive review of 14 systems was done. Pertinent findings noted here and in history of present illness. All the rest negative.  Constitutional: Negative.  Negative for fever, chills, she has  activity change, appetite change and fatigue.   HENT: Negative for congestion and facial swelling.    Eyes: Negative for photophobia, redness and visual disturbance.   Respiratory: Negative for cough and chest tightness.    Cardiovascular: Negative for chest pain, palpitations and leg swelling.   Was tachycardic in the hospital  Gastrointestinal: Negative for nausea, diarrhea, constipation, blood in stool and abdominal distention.   Genitourinary: Negative.  Has an indwelling Gambino catheter placed  Musculoskeletal: Does not wear her prosthesis generally but agreed to wear one today.  Cannot tolerate sitting up very long as she likes to lay flat in bed most of the time  Has chronic back pain issues  Skin: Negative.    Neurological: Negative for dizziness, tremors, syncope, weakness, light-headedness and headaches.   Hematological: Does not bruise/bleed easily.   Psychiatric/Behavioral: Negative.        Physical Exam   /76   Pulse 89   Temp 98  F (36.7  C)   Resp 18   Ht 1.575 m (5' 2\")   Wt 68.9 kg (152 lb)   SpO2 98%   BMI 27.80 kg/m       Constitutional: Oriented to person, place,  and appears well-developed.   HEENT:  Normocephalic and atraumatic.  Eyes: Conjunctivae and EOM are normal. Pupils are equal, round, and reactive to light. No discharge.  No scleral icterus. Nose normal. Mouth/Throat: Oropharynx is clear and moist. No oropharyngeal exudate.    NECK: Normal range of motion. Neck supple. No JVD present. No tracheal deviation present. No thyromegaly present.   CARDIOVASCULAR: Normal rate, regular rhythm and intact distal pulses.  Exam reveals no gallop and no friction rub.  Systolic " murmur present.  PULMONARY: Effort normal and breath sounds normal. No respiratory distress.No Wheezing or rales.  ABDOMEN: Soft. Bowel sounds are normal. No distension and no mass.  There is no tenderness. There is no rebound and no guarding. No HSM.  MUSCULOSKELETAL: Normal range of motion. No edema and no tenderness. Mild kyphosis, no tenderness.  Has left BKA  LYMPH NODES: Has no cervical, supraclavicular, axillary and groin adenopathy.   NEUROLOGICAL: Alert and oriented to person, place, and time. No cranial nerve deficit.  Normal muscle tone. Coordination normal.   GENITOURINARY: Deferred exam.  Indwelling Gambino catheter  SKIN: Skin is warm and dry. No rash noted. No erythema. No pallor.   EXTREMITIES: No cyanosis, no clubbing, no edema. No Deformity.  PSYCHIATRIC: Normal mood, affect and behavior.      Lab Results     Recent Results (from the past 240 hour(s))   ECG 12-LEAD WITH MUSE ([facility])    Collection Time: 01/13/23  8:31 PM   Result Value Ref Range    Systolic Blood Pressure  mmHg    Diastolic Blood Pressure  mmHg    Ventricular Rate 133 BPM    Atrial Rate 98 BPM    ME Interval  ms    QRS Duration 74 ms     ms    QTc 482 ms    P Axis  degrees    R AXIS 127 degrees    T Axis 31 degrees    Interpretation ECG       Undetermined rhythm  Right axis deviation  Septal infarct , age undetermined  Abnormal ECG  No previous ECGs available  Confirmed by SEE ED PROVIDER NOTE FOR, ECG INTERPRETATION ([id number]),  VIMAL MANNING ([id number]) on 1/16/2023 8:09:23 AM     Lactic Acid STAT    Collection Time: 01/13/23  8:58 PM   Result Value Ref Range    Lactic Acid 1.2 0.7 - 2.0 mmol/L   Basic metabolic panel    Collection Time: 01/13/23  8:58 PM   Result Value Ref Range    Sodium 138 136 - 145 mmol/L    Potassium 4.2 3.5 - 5.0 mmol/L    Chloride 101 98 - 107 mmol/L    Carbon Dioxide (CO2) 24 22 - 31 mmol/L    Anion Gap 13 5 - 18 mmol/L    Urea Nitrogen 22 8 - 28 mg/dL    Creatinine 0.68 0.60 - 1.10 mg/dL    Calcium  9.5 8.5 - 10.5 mg/dL    Glucose 131 (H) 70 - 125 mg/dL    GFR Estimate >90 >60 mL/min/1.73m2   Magnesium    Collection Time: 01/13/23  8:58 PM   Result Value Ref Range    Magnesium 1.5 (L) 1.8 - 2.6 mg/dL   Blood gas venous    Collection Time: 01/13/23  8:58 PM   Result Value Ref Range    pH Venous 7.45 7.35 - 7.45    pCO2 Venous 41 35 - 50 mm Hg    pO2 Venous 42 25 - 47 mm Hg    Bicarbonate Venous 29 24 - 30 mmol/L    Base Excess/Deficit (+/-) 4.8   mmol/L    Oxyhemoglobin Venous 78.3 (H) 70.0 - 75.0 %    O2 Sat, Venous 79.3 (H) 70.0 - 75.0 %   CBC with platelets and differential    Collection Time: 01/13/23  8:58 PM   Result Value Ref Range    WBC Count 6.4 4.0 - 11.0 10e3/uL    RBC Count 4.80 3.80 - 5.20 10e6/uL    Hemoglobin 12.7 11.7 - 15.7 g/dL    Hematocrit 37.9 35.0 - 47.0 %    MCV 79 78 - 100 fL    MCH 26.5 26.5 - 33.0 pg    MCHC 33.5 31.5 - 36.5 g/dL    RDW 16.2 (H) 10.0 - 15.0 %    Platelet Count 160 150 - 450 10e3/uL    % Neutrophils 79 %    % Lymphocytes 10 %    % Monocytes 10 %    % Eosinophils 0 %    % Basophils 0 %    % Immature Granulocytes 1 %    NRBCs per 100 WBC 0 <1 /100    Absolute Neutrophils 5.1 1.6 - 8.3 10e3/uL    Absolute Lymphocytes 0.6 (L) 0.8 - 5.3 10e3/uL    Absolute Monocytes 0.6 0.0 - 1.3 10e3/uL    Absolute Eosinophils 0.0 0.0 - 0.7 10e3/uL    Absolute Basophils 0.0 0.0 - 0.2 10e3/uL    Absolute Immature Granulocytes 0.0 <=0.4 10e3/uL    Absolute NRBCs 0.0 10e3/uL   Hepatic panel    Collection Time: 01/13/23  8:58 PM   Result Value Ref Range    Bilirubin Total 0.5 0.0 - 1.0 mg/dL    Bilirubin Direct 0.3 <=0.5 mg/dL    Protein Total 6.9 6.0 - 8.0 g/dL    Albumin 3.8 3.5 - 5.0 g/dL    Alkaline Phosphatase 67 45 - 120 U/L    AST 22 0 - 40 U/L    ALT 18 0 - 45 U/L   Cortisol    Collection Time: 01/13/23  8:58 PM   Result Value Ref Range    Cortisol 32.2   ug/dL   Hemoglobin A1c    Collection Time: 01/13/23  8:58 PM   Result Value Ref Range    Hemoglobin A1C 6.1 (H) <5.7 %   Symptomatic  Influenza A/B & SARS-CoV2 (COVID-19) Virus PCR Multiplex Nasopharyngeal    Collection Time: 01/13/23  9:44 PM    Specimen: Nasopharyngeal; Swab   Result Value Ref Range    Influenza A PCR Negative Negative    Influenza B PCR Negative Negative    RSV PCR Negative Negative    SARS CoV2 PCR Positive (A) Negative   Blood Culture Peripheral Blood    Collection Time: 01/13/23  9:45 PM    Specimen: Peripheral Blood   Result Value Ref Range    Culture No growth after 4 days    Blood Culture Peripheral Blood    Collection Time: 01/13/23  9:45 PM    Specimen: Peripheral Blood   Result Value Ref Range    Culture No growth after 4 days    UA with Microscopic reflex to Culture    Collection Time: 01/13/23 10:31 PM    Specimen: Urine, Gambino Catheter   Result Value Ref Range    Color Urine Yellow Colorless, Straw, Light Yellow, Yellow    Appearance Urine Clear Clear    Glucose Urine Negative Negative mg/dL    Bilirubin Urine Negative Negative    Ketones Urine Negative Negative mg/dL    Specific Gravity Urine 1.021 1.001 - 1.030    Blood Urine Negative Negative    pH Urine 5.0 5.0 - 7.0    Protein Albumin Urine 50 (A) Negative mg/dL    Urobilinogen Urine <2.0 <2.0 mg/dL    Nitrite Urine Negative Negative    Leukocyte Esterase Urine Negative Negative    Mucus Urine Present (A) None Seen /LPF    RBC Urine 1 <=2 /HPF    WBC Urine 1 <=5 /HPF    Hyaline Casts Urine 1 <=2 /LPF   ECG 12-LEAD WITH MUSE ([facility])    Collection Time: 01/13/23 11:20 PM   Result Value Ref Range    Systolic Blood Pressure 107 mmHg    Diastolic Blood Pressure 66 mmHg    Ventricular Rate 119 BPM    Atrial Rate 120 BPM    CO Interval  ms    QRS Duration 84 ms     ms    QTc 486 ms    P Axis  degrees    R AXIS 105 degrees    T Axis -13 degrees    Interpretation ECG       Atrial fibrillation with rapid ventricular response with premature ventricular or aberrantly conducted complexes  Rightward axis  Low voltage QRS  Abnormal QRS-T angle, consider primary T wave  abnormality  Abnormal ECG  When compared with ECG of 13-JAN-2023 20:31,  Previous ECG has undetermined rhythm, needs review  Nonspecific T wave abnormality, worse in Inferior leads     Magnesium Lab    Collection Time: 01/14/23  4:26 AM   Result Value Ref Range    Magnesium 2.4 1.8 - 2.6 mg/dL   CRP inflammation    Collection Time: 01/14/23  4:26 AM   Result Value Ref Range    CRP 3.8 (H) 0.0 - <0.8 mg/dL   Procalcitonin    Collection Time: 01/14/23  4:26 AM   Result Value Ref Range    Procalcitonin 0.06 0.00 - 0.49 ng/mL   Lipid panel reflex to direct LDL: Non-fasting    Collection Time: 01/14/23  4:26 AM   Result Value Ref Range    Cholesterol 61 <=199 mg/dL    Triglycerides 59 <=149 mg/dL    Direct Measure HDL 25 (L) >=50 mg/dL    LDL Cholesterol Calculated 24 <=129 mg/dL   Troponin I    Collection Time: 01/14/23  4:26 AM   Result Value Ref Range    Troponin I 0.03 0.00 - 0.29 ng/mL   Glucose by meter    Collection Time: 01/14/23 12:02 PM   Result Value Ref Range    GLUCOSE BY METER POCT 103 (H) 70 - 99 mg/dL   Glucose by meter    Collection Time: 01/14/23  5:15 PM   Result Value Ref Range    GLUCOSE BY METER POCT 100 (H) 70 - 99 mg/dL   Glucose by meter    Collection Time: 01/14/23 10:27 PM   Result Value Ref Range    GLUCOSE BY METER POCT 101 (H) 70 - 99 mg/dL   Legionella pneumophila antigen urine    Collection Time: 01/15/23 12:24 AM    Specimen: Urine, Gambino Catheter   Result Value Ref Range    Legionella pneumophila serogroup 1 urinary antigen Negative Negative   Streptococcus pneumoniae antigen    Collection Time: 01/15/23 12:24 AM    Specimen: Urine, Gambino Catheter   Result Value Ref Range    Streptococcus pneumoniae antigen Negative Negative   CBC with platelets    Collection Time: 01/15/23  6:02 AM   Result Value Ref Range    WBC Count 4.3 4.0 - 11.0 10e3/uL    RBC Count 3.95 3.80 - 5.20 10e6/uL    Hemoglobin 10.5 (L) 11.7 - 15.7 g/dL    Hematocrit 32.1 (L) 35.0 - 47.0 %    MCV 81 78 - 100 fL    MCH  26.6 26.5 - 33.0 pg    MCHC 32.7 31.5 - 36.5 g/dL    RDW 16.4 (H) 10.0 - 15.0 %    Platelet Count 109 (L) 150 - 450 10e3/uL   Basic metabolic panel    Collection Time: 01/15/23  6:02 AM   Result Value Ref Range    Sodium 141 136 - 145 mmol/L    Potassium 3.5 3.5 - 5.0 mmol/L    Chloride 112 (H) 98 - 107 mmol/L    Carbon Dioxide (CO2) 22 22 - 31 mmol/L    Anion Gap 7 5 - 18 mmol/L    Urea Nitrogen 16 8 - 28 mg/dL    Creatinine 0.57 (L) 0.60 - 1.10 mg/dL    Calcium 8.5 8.5 - 10.5 mg/dL    Glucose 100 70 - 125 mg/dL    GFR Estimate >90 >60 mL/min/1.73m2   CRP inflammation    Collection Time: 01/15/23  6:02 AM   Result Value Ref Range    CRP 6.4 (H) 0.0 - <0.8 mg/dL   D dimer quantitative    Collection Time: 01/15/23  6:02 AM   Result Value Ref Range    D-Dimer Quantitative 0.47 0.00 - 0.50 ug/mL FEU   Magnesium    Collection Time: 01/15/23  6:02 AM   Result Value Ref Range    Magnesium 1.6 (L) 1.8 - 2.6 mg/dL   Phosphorus    Collection Time: 01/15/23  6:02 AM   Result Value Ref Range    Phosphorus 2.3 (L) 2.5 - 4.5 mg/dL   Glucose by meter    Collection Time: 01/15/23  8:42 AM   Result Value Ref Range    GLUCOSE BY METER POCT 91 70 - 99 mg/dL   Glucose by meter    Collection Time: 01/15/23  1:47 PM   Result Value Ref Range    GLUCOSE BY METER POCT 163 (H) 70 - 99 mg/dL   Glucose by meter    Collection Time: 01/15/23  3:47 PM   Result Value Ref Range    GLUCOSE BY METER POCT 123 (H) 70 - 99 mg/dL   Lactic Acid STAT    Collection Time: 01/15/23  4:28 PM   Result Value Ref Range    Lactic Acid 0.8 0.7 - 2.0 mmol/L   Glucose by meter    Collection Time: 01/15/23  8:29 PM   Result Value Ref Range    GLUCOSE BY METER POCT 118 (H) 70 - 99 mg/dL   Glucose by meter    Collection Time: 01/16/23  8:15 AM   Result Value Ref Range    GLUCOSE BY METER POCT 87 70 - 99 mg/dL   CBC with platelets    Collection Time: 01/16/23 10:24 AM   Result Value Ref Range    WBC Count 3.1 (L) 4.0 - 11.0 10e3/uL    RBC Count 4.06 3.80 - 5.20 10e6/uL     Hemoglobin 10.5 (L) 11.7 - 15.7 g/dL    Hematocrit 34.7 (L) 35.0 - 47.0 %    MCV 86 78 - 100 fL    MCH 25.9 (L) 26.5 - 33.0 pg    MCHC 30.3 (L) 31.5 - 36.5 g/dL    RDW 16.3 (H) 10.0 - 15.0 %    Platelet Count 114 (L) 150 - 450 10e3/uL   Basic metabolic panel    Collection Time: 01/16/23 10:24 AM   Result Value Ref Range    Sodium 141 136 - 145 mmol/L    Potassium 3.5 3.5 - 5.0 mmol/L    Chloride 111 (H) 98 - 107 mmol/L    Carbon Dioxide (CO2) 22 22 - 31 mmol/L    Anion Gap 8 5 - 18 mmol/L    Urea Nitrogen 14 8 - 28 mg/dL    Creatinine 0.59 (L) 0.60 - 1.10 mg/dL    Calcium 8.5 8.5 - 10.5 mg/dL    Glucose 150 (H) 70 - 125 mg/dL    GFR Estimate >90 >60 mL/min/1.73m2   CRP inflammation    Collection Time: 01/16/23 10:24 AM   Result Value Ref Range    CRP 3.9 (H) 0.0 - <0.8 mg/dL   D dimer quantitative    Collection Time: 01/16/23 10:24 AM   Result Value Ref Range    D-Dimer Quantitative 0.83 (H) 0.00 - 0.50 ug/mL FEU   Magnesium    Collection Time: 01/16/23 10:24 AM   Result Value Ref Range    Magnesium 1.8 1.8 - 2.6 mg/dL   Glucose by meter    Collection Time: 01/16/23 11:59 AM   Result Value Ref Range    GLUCOSE BY METER POCT 124 (H) 70 - 99 mg/dL   Lactic Acid STAT    Collection Time: 01/16/23  2:41 PM   Result Value Ref Range    Lactic Acid 0.9 0.7 - 2.0 mmol/L   Extra Purple Top Tube    Collection Time: 01/16/23  2:41 PM   Result Value Ref Range    Hold Specimen JIC    Glucose by meter    Collection Time: 01/16/23  5:18 PM   Result Value Ref Range    GLUCOSE BY METER POCT 112 (H) 70 - 99 mg/dL   Glucose by meter    Collection Time: 01/16/23  8:59 PM   Result Value Ref Range    GLUCOSE BY METER POCT 99 70 - 99 mg/dL   CBC with platelets    Collection Time: 01/17/23  4:36 AM   Result Value Ref Range    WBC Count 3.5 (L) 4.0 - 11.0 10e3/uL    RBC Count 4.17 3.80 - 5.20 10e6/uL    Hemoglobin 10.8 (L) 11.7 - 15.7 g/dL    Hematocrit 35.8 35.0 - 47.0 %    MCV 86 78 - 100 fL    MCH 25.9 (L) 26.5 - 33.0 pg    MCHC 30.2  (L) 31.5 - 36.5 g/dL    RDW 16.3 (H) 10.0 - 15.0 %    Platelet Count 122 (L) 150 - 450 10e3/uL   Basic metabolic panel    Collection Time: 01/17/23  4:36 AM   Result Value Ref Range    Sodium 144 136 - 145 mmol/L    Potassium 3.9 3.5 - 5.0 mmol/L    Chloride 113 (H) 98 - 107 mmol/L    Carbon Dioxide (CO2) 23 22 - 31 mmol/L    Anion Gap 8 5 - 18 mmol/L    Urea Nitrogen 13 8 - 28 mg/dL    Creatinine 0.55 (L) 0.60 - 1.10 mg/dL    Calcium 8.7 8.5 - 10.5 mg/dL    Glucose 100 70 - 125 mg/dL    GFR Estimate >90 >60 mL/min/1.73m2   CRP inflammation    Collection Time: 01/17/23  4:36 AM   Result Value Ref Range    CRP 2.8 (H) 0.0 - <0.8 mg/dL   D dimer quantitative    Collection Time: 01/17/23  4:36 AM   Result Value Ref Range    D-Dimer Quantitative 0.91 (H) 0.00 - 0.50 ug/mL FEU   Magnesium    Collection Time: 01/17/23  4:36 AM   Result Value Ref Range    Magnesium 1.8 1.8 - 2.6 mg/dL   Glucose by meter    Collection Time: 01/17/23  8:11 AM   Result Value Ref Range    GLUCOSE BY METER POCT 99 70 - 99 mg/dL             Imaging Results     MR Brain w/o Contrast    Result Date: 1/14/2023  EXAM: MR BRAIN W/O CONTRAST LOCATION: [facility] DATE/TIME: 1/14/2023 3:23 AM INDICATION: Altered mental status. COMPARISON: 1/13/2023 TECHNIQUE: Routine multiplanar multisequence head MRI without intravenous contrast. FINDINGS: INTRACRANIAL CONTENTS: Tiny 3 mm focus of diffusion hyperintensity in the right corona radiata and periventricular white matter, too small to characterize on ADC but favored to reflect a recent infarct. Chronic infarcts in the right basal ganglia. No mass, acute hemorrhage, or extra-axial fluid collections. Normal brain parenchymal signal. Mild generalized cerebral atrophy. No hydrocephalus. Moderate cerebellar atrophy. SELLA: No abnormality accounting for technique. OSSEOUS STRUCTURES/SOFT TISSUES: Normal marrow signal. The major intracranial vascular flow voids are maintained. ORBITS: No  abnormality accounting for technique. SINUSES/MASTOIDS: Mild mucosal thickening scattered about the paranasal sinuses. No middle ear or mastoid effusion.     IMPRESSION: 1.  Tiny focus of diffusion hyperintensity in the right corona radiata and periventricular white matter, too small to characterize on ADC, but favored to reflect a recent infarct. 2.  Generalized volume loss and sequelae of chronic microvascular ischemic disease, as described. 3.  Chronic infarcts in the right basal ganglia. Findings were discussed with Dr. Baca at 3:37 AM on 2023.    Echocardiogram Complete    Result Date: 2023  [id number]^[address] [address]  Name: JENNY CRUZ MRN: [id number] : 1950 Study Date: 2023 08:28 AM Age: 72 yrs Gender: Female Patient Location: [facility] Reason For Study: Hypertension (HTN) Ordering Physician: MAINOR BACA Performed By: ANJU  BSA: 1.7 m2 Height: 62 in Weight: 147 lb ______________________________________________________________________________ Procedure Complete Portable Echo Adult. ______________________________________________________________________________ Interpretation Summary  1. Normal left ventricular size and systolic performance with a visually estimated ejection fraction of 60%. 2. There is moderate calcific mitral stenosis 3. Borderline right ventricular enlargement with probable normal right ventricular systolic performance (the right ventricle is suboptimally visualized). 4. There is severe left atrial enlargement. There is mild to moderate right atrial enlargement. ______________________________________________________________________________ Left ventricle: Normal left ventricular size and systolic performance with a visually estimated ejection fraction of 60%. There is normal regional wall motion. Left ventricular wall thickness is normal.  Assessment of LV Diastolic  Function: Patient appears to be in atrial fibrillation which limits assessment of diastolic filling.  Right ventricle: Borderline right ventricular enlargement with probable normal right ventricular systolic performance (the right ventricle is suboptimally visualized).  Left atrium: There is severe left atrial enlargement.  Right atrium: There is mild-moderate right atrial enlargement.  IVC: The IVC is not well-visualized.  Aortic valve: The aortic valve is not well visualized, but suspected to be comprised of three cusps. The aortic valve appears somewhat calcified. On two-dimensional imaging, there is reduced cusp separation no spectral Doppler analysis suggests against significant stenosis. There is no significant aortic insufficiency.  Mitral valve: There is severe mitral annular calcification. There is mild mitral annular calcification. There is moderate calcific mitral stenosis. Parenthetically, right ventricular systolic pressure cannot be directly estimated from TR velocities due to insufficient tricuspid insufficiency. There is mild mitral insufficiency.  Tricuspid valve: The tricuspid valve is grossly morphologically normal. There is trace tricuspid insufficiency.  Pulmonic valve: The pulmonic valve is grossly morphologically normal.  Thoracic aorta: The aortic root and proximal ascending aorta are of normal dimension.  Pericardium: There is no significant pericardial effusion. ______________________________________________________________________________ ______________________________________________________________________________ MMode/2D Measurements & Calculations IVSd: 0.96 cm LVIDd: 5.2 cm LVIDs: 3.9 cm LVPWd: 0.93 cm FS: 25.5 % LV mass(C)d: 182.4 grams LV mass(C)dI: 108.7 grams/m2 Ao root diam: 2.8 cm LA dimension: 4.5 cm LA/Ao: 1.6 LVOT diam: 2.0 cm LVOT area: 3.1 cm2  LA Volume Indexed (AL/bp): 39.9 ml/m2 RWT: 0.36  Time Measurements MM HR: 95.0 BPM  Doppler Measurements & Calculations MV E max  garry: 183.3 cm/sec MV max PG: 15.7 mmHg MV mean P.8 mmHg MV V2 VTI: 47.8 cm MVA(VTI): 0.77 cm2 MV dec time: 0.22 sec Ao V2 max: 147.3 cm/sec Ao max P.0 mmHg Ao V2 mean: 117.9 cm/sec Ao mean P.3 mmHg Ao V2 VTI: 32.0 cm ORVILLE(I,D): 1.1 cm2 ORVILLE(V,D): 1.4 cm2 LV V1 max P.7 mmHg LV V1 max: 65.8 cm/sec LV V1 VTI: 11.9 cm SV(LVOT): 36.7 ml SI(LVOT): 21.9 ml/m2 PA acc time: 0.08 sec AV Garry Ratio (DI): 0.45 ORVILLE Index (cm2/m2): 0.68  ______________________________________________________________________________ Report approved by: Victor Manuel Daily 2023 10:38 AM       MRA Brain ([facility] [place]) wo Contrast    Result Date: 2023  EXAM: MRA BRAIN ([place] [facility] [place]) W/O CONTRAST LOCATION: [facility] DATE/TIME: 2023 5:09 AM INDICATION: 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 COMPARISON: 2022 TECHNIQUE: 3D time-of-flight head MRA without intravenous contrast. FINDINGS: ANTERIOR CIRCULATION: No stenosis/occlusion, aneurysm, or high flow vascular malformation. Standard [place] of [place] anatomy. POSTERIOR CIRCULATION: No stenosis/occlusion, aneurysm, or high flow vascular malformation. Dominant right and smaller left vertebral artery contribute to a normal basilar artery.     IMPRESSION: 1.  No flow-limiting stenosis or large vessel occlusion.    MRA Neck (Carotids) wo & w Contrast    Result Date: 2023  EXAM: MRA NECK (CAROTIDS) W/O and W CONTRAST LOCATION: [facility] DATE/TIME: 2023 5:32 AM INDICATION: Altered mental status with a an acute to subacute infarct in the left corona radiata. COMPARISON: None. CONTRAST: 10ml of Gadavist TECHNIQUE: Neck MRA without and with IV contrast. Stenosis measurements made according to NASCET criteria unless otherwise specified. FINDINGS: RIGHT CAROTID: No measurable stenosis or dissection. LEFT CAROTID: No measurable stenosis or dissection. VERTEBRAL ARTERIES: No focal stenosis or dissection.  Dominant right and smaller left vertebral arteries. AORTIC ARCH: Classic aortic arch anatomy with no significant stenosis at the origin of the great vessels.     IMPRESSION: 1.  No flow-limiting stenosis of the cervical arterial vasculature.    CT Chest Pulmonary Embolism w Contrast    Result Date: 1/13/2023  EXAM: CT CHEST PULMONARY EMBOLISM W CONTRAST LOCATION: [facility] DATE/TIME: 1/13/2023 11:16 PM INDICATION: Hypoxia. Syncope. Shortness of breath. COVID-19. COMPARISON: None. TECHNIQUE: CT chest pulmonary angiogram during arterial phase injection of IV contrast. Multiplanar reformats and MIP reconstructions were performed. Dose reduction techniques were used. CONTRAST: 75 mL Isovue-[id number] IV. FINDINGS: ANGIOGRAM CHEST: Apparent linear filling defects in the pulmonary arteries downstream of the bifurcation, more on the left, most likely representing mixing phenomena. No convincing pulmonary emboli on either side. Mildly atherosclerotic thoracic aorta. Ectatic ascending segment measuring 3.8 x 3.8 cm (image 128, series 6). No CT evidence of right heart strain. LUNGS AND PLEURA: Diffuse thickening of the bronchi with foci of endobronchial mucus, more apparent in the left lower lobe. A few calcified granulomas in the lungs. Plate-like atelectasis in the lower lungs, more apparent involving the left lower lobe. No cavitation or pleural effusion on either side. MEDIASTINUM/AXILLAE: Cardiac enlargement. Dense mitral annulus and coronary artery calcifications. Small pericardial effusion. No suspicious adenopathy. Trachea is midline. Hypodense thyroid nodules can be better evaluated with dedicated ultrasound. CORONARY ARTERY CALCIFICATION: Severe. UPPER ABDOMEN: Vascular calcifications. MUSCULOSKELETAL: Degenerative changes both shoulders and the thoracic spine.     IMPRESSION: 1.  Apparent linear filling defects in the pulmonary arteries, more on the left, most likely representing mixing  phenomena. No convincing pulmonary emboli on either side. Diffuse thickening of the bronchi with foci of endobronchial mucus, more apparent in the left lower lobe. Evidence of remote granulomatous disease. Plate-like atelectasis in the lower lungs, more on the left. 2.  Ectatic ascending thoracic aorta. Cardiac enlargement. Dense mitral annulus and coronary artery calcifications. Small pericardial effusion. 3.  Hypodense thyroid nodules can be better evaluated with dedicated ultrasound.    Head CT w/o contrast    Result Date: 1/13/2023  EXAM: CT HEAD W/O CONTRAST LOCATION: [facility] DATE/TIME: 1/13/2023 11:14 PM INDICATION: Altered mental status COMPARISON: 11/29/2021 TECHNIQUE: Routine CT Head without IV contrast. Multiplanar reformats. Dose reduction techniques were used. FINDINGS: INTRACRANIAL CONTENTS: No intracranial hemorrhage, extraaxial collection, or mass effect.  No CT evidence of acute infarct. Moderate presumed chronic small vessel ischemic changes. Chronic infarcts right corpus stratum. Moderate generalized volume loss. No hydrocephalus. VISUALIZED ORBITS/SINUSES/MASTOIDS: No intraorbital abnormality. Mild mucosal thickening scattered about the paranasal sinuses. No middle ear or mastoid effusion. BONES/SOFT TISSUES: No acute abnormality.     IMPRESSION: 1.  No acute intracranial process.           Electronically signed by    Ginny Johnson MD                             Sincerely,        OPAL Galarza

## 2023-01-18 NOTE — PROGRESS NOTES
[facility] GERIATRIC SERVICES       Patient Angela Beckham  MRN: [id number]        Reason for Visit     Chief Complaint   Patient presents with     Hospital F/U       Code Status     CPR/Full code     Assessment     Acute ischemic CVA involving the right cardioembolic CVA  Chronic right basal ganglia infarct  Chronic microvascular ischemia  Community-acquired pneumonia  Active COVID infection  Acute encephalopathy  Urinary retention requiring Gambino catheter placement  History of left below the knee amputation  Generalized weakness  Type 2 diabetes    Plan     Patient is currently readmitted back to long-term care  She was admitted with acute CVA  Evaluated by stroke neurology.  Started on a statin  Started on aspirin daily  She is on eliquis for a fib  PT and OT recommended  Also has acute COVID infection for which she was treated with remdesivir  She is afebrile and not hypoxic  Minimal if any cough reported by patient  Also had community-acquired pneumonia which was treated.  She had some tachycardia in the hospital so she is on a higher dose of metoprolol.  Monitor heart rates and blood pressures.  Had urinary retention and has Gambino catheter placement done.  She will need a voiding trial as ordered versus outpatient follow-up with urology  Dm control good with hga1c of 6.1  Recheck labs  Continue with PT/OT-at baseline she is bedbound and does not wear her prosthesis due to which she does not use her wheelchair much either   she is now agreeable to using a chair and sitting up at least for some part of the day.  Unfortunately does not tolerate sitting up very long she will continue with her PT for now    History     Patient is a very pleasant 72 year old female who is readmitted in long-term care  Patient was admitted to the hospital with altered mental status  MRI imaging of the brain however did show acute right corona radiata and periventricular white matter cardioembolic CVA  She was evaluated by neurology  and started on aspirin as well as atorvastatin  She also was treated for community-acquired pneumonia and was noted to have COVID infection which was treated with remdesivir  While in the hospital some tachycardia noted treated with a higher dose of metoprolol      Past Medical & Surgical History     PAST MEDICAL HISTORY:   Past Medical History:   Diagnosis Date     Diabetic neuropathy (H)      HTN      Type 2 diabetes mellitus (H)           Past Social History     Reviewed,  reports that she has an unknown smoking status. She has never used smokeless tobacco. She reports that she does not drink alcohol and does not use drugs.    Family History     Reviewed, and family history includes Alzheimer Disease in her father; Cerebrovascular Disease in her maternal grandmother and mother; Diabetes in her paternal grandfather; Hypertension in her brother and mother; Obesity in her brother; Respiratory in her father.    Medication List   Post Discharge Medication Reconciliation Status: Post Discharge Medication Reconciliation Status: discharge medications reconciled, continue medications without change.  Current Outpatient Medications   Medication     acetaminophen (TYLENOL) 325 MG tablet     ammonium lactate (AMLACTIN) 12 % external cream     apixaban ANTICOAGULANT (ELIQUIS) 5 MG tablet     aspirin (ASA) 81 MG EC tablet     atorvastatin (LIPITOR) 20 MG tablet     camphor-menthol-methyl sal 4-10-30 % CREA     cyanocobalamin (VITAMIN B-12) 1000 MCG tablet     docusate sodium (COLACE) 100 MG capsule     metFORMIN (GLUCOPHAGE) 1000 MG tablet     metFORMIN (GLUCOPHAGE) 500 MG tablet     metoprolol succinate ER (TOPROL XL) 25 MG 24 hr tablet     miconazole (MICATIN) 2 % cream     vitamin B-Complex     No current facility-administered medications for this visit.          Allergies     Allergies   Allergen Reactions     Amoxicillin Unknown     Oxycodone Other (See Comments)     Does not remember     Latex Rash       Review of  "Systems   A comprehensive review of 14 systems was done. Pertinent findings noted here and in history of present illness. All the rest negative.  Constitutional: Negative.  Negative for fever, chills, she has  activity change, appetite change and fatigue.   HENT: Negative for congestion and facial swelling.    Eyes: Negative for photophobia, redness and visual disturbance.   Respiratory: Negative for cough and chest tightness.    Cardiovascular: Negative for chest pain, palpitations and leg swelling.   Was tachycardic in the hospital  Gastrointestinal: Negative for nausea, diarrhea, constipation, blood in stool and abdominal distention.   Genitourinary: Negative.  Has an indwelling Gambino catheter placed  Musculoskeletal: Does not wear her prosthesis generally but agreed to wear one today.  Cannot tolerate sitting up very long as she likes to lay flat in bed most of the time  Has chronic back pain issues  Skin: Negative.    Neurological: Negative for dizziness, tremors, syncope, weakness, light-headedness and headaches.   Hematological: Does not bruise/bleed easily.   Psychiatric/Behavioral: Negative.        Physical Exam   /76   Pulse 89   Temp 98  F (36.7  C)   Resp 18   Ht 1.575 m (5' 2\")   Wt 68.9 kg (152 lb)   SpO2 98%   BMI 27.80 kg/m       Constitutional: Oriented to person, place,  and appears well-developed.   HEENT:  Normocephalic and atraumatic.  Eyes: Conjunctivae and EOM are normal. Pupils are equal, round, and reactive to light. No discharge.  No scleral icterus. Nose normal. Mouth/Throat: Oropharynx is clear and moist. No oropharyngeal exudate.    NECK: Normal range of motion. Neck supple. No JVD present. No tracheal deviation present. No thyromegaly present.   CARDIOVASCULAR: Normal rate, regular rhythm and intact distal pulses.  Exam reveals no gallop and no friction rub.  Systolic murmur present.  PULMONARY: Effort normal and breath sounds normal. No respiratory distress.No Wheezing or " rales.  ABDOMEN: Soft. Bowel sounds are normal. No distension and no mass.  There is no tenderness. There is no rebound and no guarding. No HSM.  MUSCULOSKELETAL: Normal range of motion. No edema and no tenderness. Mild kyphosis, no tenderness.  Has left BKA  LYMPH NODES: Has no cervical, supraclavicular, axillary and groin adenopathy.   NEUROLOGICAL: Alert and oriented to person, place, and time. No cranial nerve deficit.  Normal muscle tone. Coordination normal.   GENITOURINARY: Deferred exam.  Indwelling Gambino catheter  SKIN: Skin is warm and dry. No rash noted. No erythema. No pallor.   EXTREMITIES: No cyanosis, no clubbing, no edema. No Deformity.  PSYCHIATRIC: Normal mood, affect and behavior.      Lab Results     Recent Results (from the past 240 hour(s))   ECG 12-LEAD WITH MUSE ([facility])    Collection Time: 01/13/23  8:31 PM   Result Value Ref Range    Systolic Blood Pressure  mmHg    Diastolic Blood Pressure  mmHg    Ventricular Rate 133 BPM    Atrial Rate 98 BPM    AK Interval  ms    QRS Duration 74 ms     ms    QTc 482 ms    P Axis  degrees    R AXIS 127 degrees    T Axis 31 degrees    Interpretation ECG       Undetermined rhythm  Right axis deviation  Septal infarct , age undetermined  Abnormal ECG  No previous ECGs available  Confirmed by SEE ED PROVIDER NOTE FOR, ECG INTERPRETATION ([id number]),  VIMAL MANNING ([id number]) on 1/16/2023 8:09:23 AM     Lactic Acid STAT    Collection Time: 01/13/23  8:58 PM   Result Value Ref Range    Lactic Acid 1.2 0.7 - 2.0 mmol/L   Basic metabolic panel    Collection Time: 01/13/23  8:58 PM   Result Value Ref Range    Sodium 138 136 - 145 mmol/L    Potassium 4.2 3.5 - 5.0 mmol/L    Chloride 101 98 - 107 mmol/L    Carbon Dioxide (CO2) 24 22 - 31 mmol/L    Anion Gap 13 5 - 18 mmol/L    Urea Nitrogen 22 8 - 28 mg/dL    Creatinine 0.68 0.60 - 1.10 mg/dL    Calcium 9.5 8.5 - 10.5 mg/dL    Glucose 131 (H) 70 - 125 mg/dL    GFR Estimate >90 >60 mL/min/1.73m2   Magnesium     Collection Time: 01/13/23  8:58 PM   Result Value Ref Range    Magnesium 1.5 (L) 1.8 - 2.6 mg/dL   Blood gas venous    Collection Time: 01/13/23  8:58 PM   Result Value Ref Range    pH Venous 7.45 7.35 - 7.45    pCO2 Venous 41 35 - 50 mm Hg    pO2 Venous 42 25 - 47 mm Hg    Bicarbonate Venous 29 24 - 30 mmol/L    Base Excess/Deficit (+/-) 4.8   mmol/L    Oxyhemoglobin Venous 78.3 (H) 70.0 - 75.0 %    O2 Sat, Venous 79.3 (H) 70.0 - 75.0 %   CBC with platelets and differential    Collection Time: 01/13/23  8:58 PM   Result Value Ref Range    WBC Count 6.4 4.0 - 11.0 10e3/uL    RBC Count 4.80 3.80 - 5.20 10e6/uL    Hemoglobin 12.7 11.7 - 15.7 g/dL    Hematocrit 37.9 35.0 - 47.0 %    MCV 79 78 - 100 fL    MCH 26.5 26.5 - 33.0 pg    MCHC 33.5 31.5 - 36.5 g/dL    RDW 16.2 (H) 10.0 - 15.0 %    Platelet Count 160 150 - 450 10e3/uL    % Neutrophils 79 %    % Lymphocytes 10 %    % Monocytes 10 %    % Eosinophils 0 %    % Basophils 0 %    % Immature Granulocytes 1 %    NRBCs per 100 WBC 0 <1 /100    Absolute Neutrophils 5.1 1.6 - 8.3 10e3/uL    Absolute Lymphocytes 0.6 (L) 0.8 - 5.3 10e3/uL    Absolute Monocytes 0.6 0.0 - 1.3 10e3/uL    Absolute Eosinophils 0.0 0.0 - 0.7 10e3/uL    Absolute Basophils 0.0 0.0 - 0.2 10e3/uL    Absolute Immature Granulocytes 0.0 <=0.4 10e3/uL    Absolute NRBCs 0.0 10e3/uL   Hepatic panel    Collection Time: 01/13/23  8:58 PM   Result Value Ref Range    Bilirubin Total 0.5 0.0 - 1.0 mg/dL    Bilirubin Direct 0.3 <=0.5 mg/dL    Protein Total 6.9 6.0 - 8.0 g/dL    Albumin 3.8 3.5 - 5.0 g/dL    Alkaline Phosphatase 67 45 - 120 U/L    AST 22 0 - 40 U/L    ALT 18 0 - 45 U/L   Cortisol    Collection Time: 01/13/23  8:58 PM   Result Value Ref Range    Cortisol 32.2   ug/dL   Hemoglobin A1c    Collection Time: 01/13/23  8:58 PM   Result Value Ref Range    Hemoglobin A1C 6.1 (H) <5.7 %   Symptomatic Influenza A/B & SARS-CoV2 (COVID-19) Virus PCR Multiplex Nasopharyngeal    Collection Time: 01/13/23  9:44  PM    Specimen: Nasopharyngeal; Swab   Result Value Ref Range    Influenza A PCR Negative Negative    Influenza B PCR Negative Negative    RSV PCR Negative Negative    SARS CoV2 PCR Positive (A) Negative   Blood Culture Peripheral Blood    Collection Time: 01/13/23  9:45 PM    Specimen: Peripheral Blood   Result Value Ref Range    Culture No growth after 4 days    Blood Culture Peripheral Blood    Collection Time: 01/13/23  9:45 PM    Specimen: Peripheral Blood   Result Value Ref Range    Culture No growth after 4 days    UA with Microscopic reflex to Culture    Collection Time: 01/13/23 10:31 PM    Specimen: Urine, Gambino Catheter   Result Value Ref Range    Color Urine Yellow Colorless, Straw, Light Yellow, Yellow    Appearance Urine Clear Clear    Glucose Urine Negative Negative mg/dL    Bilirubin Urine Negative Negative    Ketones Urine Negative Negative mg/dL    Specific Gravity Urine 1.021 1.001 - 1.030    Blood Urine Negative Negative    pH Urine 5.0 5.0 - 7.0    Protein Albumin Urine 50 (A) Negative mg/dL    Urobilinogen Urine <2.0 <2.0 mg/dL    Nitrite Urine Negative Negative    Leukocyte Esterase Urine Negative Negative    Mucus Urine Present (A) None Seen /LPF    RBC Urine 1 <=2 /HPF    WBC Urine 1 <=5 /HPF    Hyaline Casts Urine 1 <=2 /LPF   ECG 12-LEAD WITH MUSE ([facility])    Collection Time: 01/13/23 11:20 PM   Result Value Ref Range    Systolic Blood Pressure 107 mmHg    Diastolic Blood Pressure 66 mmHg    Ventricular Rate 119 BPM    Atrial Rate 120 BPM    ID Interval  ms    QRS Duration 84 ms     ms    QTc 486 ms    P Axis  degrees    R AXIS 105 degrees    T Axis -13 degrees    Interpretation ECG       Atrial fibrillation with rapid ventricular response with premature ventricular or aberrantly conducted complexes  Rightward axis  Low voltage QRS  Abnormal QRS-T angle, consider primary T wave abnormality  Abnormal ECG  When compared with ECG of 13-JAN-2023 20:31,  Previous ECG has undetermined rhythm,  needs review  Nonspecific T wave abnormality, worse in Inferior leads     Magnesium Lab    Collection Time: 01/14/23  4:26 AM   Result Value Ref Range    Magnesium 2.4 1.8 - 2.6 mg/dL   CRP inflammation    Collection Time: 01/14/23  4:26 AM   Result Value Ref Range    CRP 3.8 (H) 0.0 - <0.8 mg/dL   Procalcitonin    Collection Time: 01/14/23  4:26 AM   Result Value Ref Range    Procalcitonin 0.06 0.00 - 0.49 ng/mL   Lipid panel reflex to direct LDL: Non-fasting    Collection Time: 01/14/23  4:26 AM   Result Value Ref Range    Cholesterol 61 <=199 mg/dL    Triglycerides 59 <=149 mg/dL    Direct Measure HDL 25 (L) >=50 mg/dL    LDL Cholesterol Calculated 24 <=129 mg/dL   Troponin I    Collection Time: 01/14/23  4:26 AM   Result Value Ref Range    Troponin I 0.03 0.00 - 0.29 ng/mL   Glucose by meter    Collection Time: 01/14/23 12:02 PM   Result Value Ref Range    GLUCOSE BY METER POCT 103 (H) 70 - 99 mg/dL   Glucose by meter    Collection Time: 01/14/23  5:15 PM   Result Value Ref Range    GLUCOSE BY METER POCT 100 (H) 70 - 99 mg/dL   Glucose by meter    Collection Time: 01/14/23 10:27 PM   Result Value Ref Range    GLUCOSE BY METER POCT 101 (H) 70 - 99 mg/dL   Legionella pneumophila antigen urine    Collection Time: 01/15/23 12:24 AM    Specimen: Urine, Gambino Catheter   Result Value Ref Range    Legionella pneumophila serogroup 1 urinary antigen Negative Negative   Streptococcus pneumoniae antigen    Collection Time: 01/15/23 12:24 AM    Specimen: Urine, Gambino Catheter   Result Value Ref Range    Streptococcus pneumoniae antigen Negative Negative   CBC with platelets    Collection Time: 01/15/23  6:02 AM   Result Value Ref Range    WBC Count 4.3 4.0 - 11.0 10e3/uL    RBC Count 3.95 3.80 - 5.20 10e6/uL    Hemoglobin 10.5 (L) 11.7 - 15.7 g/dL    Hematocrit 32.1 (L) 35.0 - 47.0 %    MCV 81 78 - 100 fL    MCH 26.6 26.5 - 33.0 pg    MCHC 32.7 31.5 - 36.5 g/dL    RDW 16.4 (H) 10.0 - 15.0 %    Platelet Count 109 (L) 150 -  450 10e3/uL   Basic metabolic panel    Collection Time: 01/15/23  6:02 AM   Result Value Ref Range    Sodium 141 136 - 145 mmol/L    Potassium 3.5 3.5 - 5.0 mmol/L    Chloride 112 (H) 98 - 107 mmol/L    Carbon Dioxide (CO2) 22 22 - 31 mmol/L    Anion Gap 7 5 - 18 mmol/L    Urea Nitrogen 16 8 - 28 mg/dL    Creatinine 0.57 (L) 0.60 - 1.10 mg/dL    Calcium 8.5 8.5 - 10.5 mg/dL    Glucose 100 70 - 125 mg/dL    GFR Estimate >90 >60 mL/min/1.73m2   CRP inflammation    Collection Time: 01/15/23  6:02 AM   Result Value Ref Range    CRP 6.4 (H) 0.0 - <0.8 mg/dL   D dimer quantitative    Collection Time: 01/15/23  6:02 AM   Result Value Ref Range    D-Dimer Quantitative 0.47 0.00 - 0.50 ug/mL FEU   Magnesium    Collection Time: 01/15/23  6:02 AM   Result Value Ref Range    Magnesium 1.6 (L) 1.8 - 2.6 mg/dL   Phosphorus    Collection Time: 01/15/23  6:02 AM   Result Value Ref Range    Phosphorus 2.3 (L) 2.5 - 4.5 mg/dL   Glucose by meter    Collection Time: 01/15/23  8:42 AM   Result Value Ref Range    GLUCOSE BY METER POCT 91 70 - 99 mg/dL   Glucose by meter    Collection Time: 01/15/23  1:47 PM   Result Value Ref Range    GLUCOSE BY METER POCT 163 (H) 70 - 99 mg/dL   Glucose by meter    Collection Time: 01/15/23  3:47 PM   Result Value Ref Range    GLUCOSE BY METER POCT 123 (H) 70 - 99 mg/dL   Lactic Acid STAT    Collection Time: 01/15/23  4:28 PM   Result Value Ref Range    Lactic Acid 0.8 0.7 - 2.0 mmol/L   Glucose by meter    Collection Time: 01/15/23  8:29 PM   Result Value Ref Range    GLUCOSE BY METER POCT 118 (H) 70 - 99 mg/dL   Glucose by meter    Collection Time: 01/16/23  8:15 AM   Result Value Ref Range    GLUCOSE BY METER POCT 87 70 - 99 mg/dL   CBC with platelets    Collection Time: 01/16/23 10:24 AM   Result Value Ref Range    WBC Count 3.1 (L) 4.0 - 11.0 10e3/uL    RBC Count 4.06 3.80 - 5.20 10e6/uL    Hemoglobin 10.5 (L) 11.7 - 15.7 g/dL    Hematocrit 34.7 (L) 35.0 - 47.0 %    MCV 86 78 - 100 fL    MCH 25.9  (L) 26.5 - 33.0 pg    MCHC 30.3 (L) 31.5 - 36.5 g/dL    RDW 16.3 (H) 10.0 - 15.0 %    Platelet Count 114 (L) 150 - 450 10e3/uL   Basic metabolic panel    Collection Time: 01/16/23 10:24 AM   Result Value Ref Range    Sodium 141 136 - 145 mmol/L    Potassium 3.5 3.5 - 5.0 mmol/L    Chloride 111 (H) 98 - 107 mmol/L    Carbon Dioxide (CO2) 22 22 - 31 mmol/L    Anion Gap 8 5 - 18 mmol/L    Urea Nitrogen 14 8 - 28 mg/dL    Creatinine 0.59 (L) 0.60 - 1.10 mg/dL    Calcium 8.5 8.5 - 10.5 mg/dL    Glucose 150 (H) 70 - 125 mg/dL    GFR Estimate >90 >60 mL/min/1.73m2   CRP inflammation    Collection Time: 01/16/23 10:24 AM   Result Value Ref Range    CRP 3.9 (H) 0.0 - <0.8 mg/dL   D dimer quantitative    Collection Time: 01/16/23 10:24 AM   Result Value Ref Range    D-Dimer Quantitative 0.83 (H) 0.00 - 0.50 ug/mL FEU   Magnesium    Collection Time: 01/16/23 10:24 AM   Result Value Ref Range    Magnesium 1.8 1.8 - 2.6 mg/dL   Glucose by meter    Collection Time: 01/16/23 11:59 AM   Result Value Ref Range    GLUCOSE BY METER POCT 124 (H) 70 - 99 mg/dL   Lactic Acid STAT    Collection Time: 01/16/23  2:41 PM   Result Value Ref Range    Lactic Acid 0.9 0.7 - 2.0 mmol/L   Extra Purple Top Tube    Collection Time: 01/16/23  2:41 PM   Result Value Ref Range    Hold Specimen JIC    Glucose by meter    Collection Time: 01/16/23  5:18 PM   Result Value Ref Range    GLUCOSE BY METER POCT 112 (H) 70 - 99 mg/dL   Glucose by meter    Collection Time: 01/16/23  8:59 PM   Result Value Ref Range    GLUCOSE BY METER POCT 99 70 - 99 mg/dL   CBC with platelets    Collection Time: 01/17/23  4:36 AM   Result Value Ref Range    WBC Count 3.5 (L) 4.0 - 11.0 10e3/uL    RBC Count 4.17 3.80 - 5.20 10e6/uL    Hemoglobin 10.8 (L) 11.7 - 15.7 g/dL    Hematocrit 35.8 35.0 - 47.0 %    MCV 86 78 - 100 fL    MCH 25.9 (L) 26.5 - 33.0 pg    MCHC 30.2 (L) 31.5 - 36.5 g/dL    RDW 16.3 (H) 10.0 - 15.0 %    Platelet Count 122 (L) 150 - 450 10e3/uL   Basic metabolic  panel    Collection Time: 01/17/23  4:36 AM   Result Value Ref Range    Sodium 144 136 - 145 mmol/L    Potassium 3.9 3.5 - 5.0 mmol/L    Chloride 113 (H) 98 - 107 mmol/L    Carbon Dioxide (CO2) 23 22 - 31 mmol/L    Anion Gap 8 5 - 18 mmol/L    Urea Nitrogen 13 8 - 28 mg/dL    Creatinine 0.55 (L) 0.60 - 1.10 mg/dL    Calcium 8.7 8.5 - 10.5 mg/dL    Glucose 100 70 - 125 mg/dL    GFR Estimate >90 >60 mL/min/1.73m2   CRP inflammation    Collection Time: 01/17/23  4:36 AM   Result Value Ref Range    CRP 2.8 (H) 0.0 - <0.8 mg/dL   D dimer quantitative    Collection Time: 01/17/23  4:36 AM   Result Value Ref Range    D-Dimer Quantitative 0.91 (H) 0.00 - 0.50 ug/mL FEU   Magnesium    Collection Time: 01/17/23  4:36 AM   Result Value Ref Range    Magnesium 1.8 1.8 - 2.6 mg/dL   Glucose by meter    Collection Time: 01/17/23  8:11 AM   Result Value Ref Range    GLUCOSE BY METER POCT 99 70 - 99 mg/dL             Imaging Results     MR Brain w/o Contrast    Result Date: 1/14/2023  EXAM: MR BRAIN W/O CONTRAST LOCATION: [facility] DATE/TIME: 1/14/2023 3:23 AM INDICATION: Altered mental status. COMPARISON: 1/13/2023 TECHNIQUE: Routine multiplanar multisequence head MRI without intravenous contrast. FINDINGS: INTRACRANIAL CONTENTS: Tiny 3 mm focus of diffusion hyperintensity in the right corona radiata and periventricular white matter, too small to characterize on ADC but favored to reflect a recent infarct. Chronic infarcts in the right basal ganglia. No mass, acute hemorrhage, or extra-axial fluid collections. Normal brain parenchymal signal. Mild generalized cerebral atrophy. No hydrocephalus. Moderate cerebellar atrophy. SELLA: No abnormality accounting for technique. OSSEOUS STRUCTURES/SOFT TISSUES: Normal marrow signal. The major intracranial vascular flow voids are maintained. ORBITS: No abnormality accounting for technique. SINUSES/MASTOIDS: Mild mucosal thickening scattered about the paranasal  sinuses. No middle ear or mastoid effusion.     IMPRESSION: 1.  Tiny focus of diffusion hyperintensity in the right corona radiata and periventricular white matter, too small to characterize on ADC, but favored to reflect a recent infarct. 2.  Generalized volume loss and sequelae of chronic microvascular ischemic disease, as described. 3.  Chronic infarcts in the right basal ganglia. Findings were discussed with Dr. Baca at 3:37 AM on 2023.    Echocardiogram Complete    Result Date: 2023  [id number]^KEVEN^[place] [address]  Name: JENNY CRUZ MRN: [id number] : 1950 Study Date: 2023 08:28 AM Age: 72 yrs Gender: Female Patient Location: [facility] Reason For Study: Hypertension (HTN) Ordering Physician: MAINOR BACA Performed By: ANJU  BSA: 1.7 m2 Height: 62 in Weight: 147 lb ______________________________________________________________________________ Procedure Complete Portable Echo Adult. ______________________________________________________________________________ Interpretation Summary  1. Normal left ventricular size and systolic performance with a visually estimated ejection fraction of 60%. 2. There is moderate calcific mitral stenosis 3. Borderline right ventricular enlargement with probable normal right ventricular systolic performance (the right ventricle is suboptimally visualized). 4. There is severe left atrial enlargement. There is mild to moderate right atrial enlargement. ______________________________________________________________________________ Left ventricle: Normal left ventricular size and systolic performance with a visually estimated ejection fraction of 60%. There is normal regional wall motion. Left ventricular wall thickness is normal.  Assessment of LV Diastolic Function: Patient appears to be in atrial fibrillation which limits assessment of diastolic filling.  Right ventricle:  Borderline right ventricular enlargement with probable normal right ventricular systolic performance (the right ventricle is suboptimally visualized).  Left atrium: There is severe left atrial enlargement.  Right atrium: There is mild-moderate right atrial enlargement.  IVC: The IVC is not well-visualized.  Aortic valve: The aortic valve is not well visualized, but suspected to be comprised of three cusps. The aortic valve appears somewhat calcified. On two-dimensional imaging, there is reduced cusp separation no spectral Doppler analysis suggests against significant stenosis. There is no significant aortic insufficiency.  Mitral valve: There is severe mitral annular calcification. There is mild mitral annular calcification. There is moderate calcific mitral stenosis. Parenthetically, right ventricular systolic pressure cannot be directly estimated from TR velocities due to insufficient tricuspid insufficiency. There is mild mitral insufficiency.  Tricuspid valve: The tricuspid valve is grossly morphologically normal. There is trace tricuspid insufficiency.  Pulmonic valve: The pulmonic valve is grossly morphologically normal.  Thoracic aorta: The aortic root and proximal ascending aorta are of normal dimension.  Pericardium: There is no significant pericardial effusion. ______________________________________________________________________________ ______________________________________________________________________________ MMode/2D Measurements & Calculations IVSd: 0.96 cm LVIDd: 5.2 cm LVIDs: 3.9 cm LVPWd: 0.93 cm FS: 25.5 % LV mass(C)d: 182.4 grams LV mass(C)dI: 108.7 grams/m2 Ao root diam: 2.8 cm LA dimension: 4.5 cm LA/Ao: 1.6 LVOT diam: 2.0 cm LVOT area: 3.1 cm2  LA Volume Indexed (AL/bp): 39.9 ml/m2 RWT: 0.36  Time Measurements MM HR: 95.0 BPM  Doppler Measurements & Calculations MV E max basia: 183.3 cm/sec MV max PG: 15.7 mmHg MV mean P.8 mmHg MV V2 VTI: 47.8 cm MVA(VTI): 0.77 cm2 MV dec time: 0.22 sec  Ao V2 max: 147.3 cm/sec Ao max P.0 mmHg Ao V2 mean: 117.9 cm/sec Ao mean P.3 mmHg Ao V2 VTI: 32.0 cm ORVILLE(I,D): 1.1 cm2 ORVILLE(V,D): 1.4 cm2 LV V1 max P.7 mmHg LV V1 max: 65.8 cm/sec LV V1 VTI: 11.9 cm SV(LVOT): 36.7 ml SI(LVOT): 21.9 ml/m2 PA acc time: 0.08 sec AV Garry Ratio (DI): 0.45 ORVILLE Index (cm2/m2): 0.68  ______________________________________________________________________________ Report approved by: Victor Manuel Daily 2023 10:38 AM       MRA Brain ([facility]) wo Contrast    Result Date: 2023  EXAM: MRA BRAIN ([facility]) W/O CONTRAST LOCATION: [facility] DATE/TIME: 2023 5:09 AM INDICATION: 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 COMPARISON: 2022 TECHNIQUE: 3D time-of-flight head MRA without intravenous contrast. FINDINGS: ANTERIOR CIRCULATION: No stenosis/occlusion, aneurysm, or high flow vascular malformation. Standard [place] anatomy. POSTERIOR CIRCULATION: No stenosis/occlusion, aneurysm, or high flow vascular malformation. Dominant right and smaller left vertebral artery contribute to a normal basilar artery.     IMPRESSION: 1.  No flow-limiting stenosis or large vessel occlusion.    MRA Neck (Carotids) wo & w Contrast    Result Date: 2023  EXAM: MRA NECK (CAROTIDS) W/O and W CONTRAST LOCATION: [facility] DATE/TIME: 2023 5:32 AM INDICATION: Altered mental status with a an acute to subacute infarct in the left corona radiata. COMPARISON: None. CONTRAST: 10ml of Gadavist TECHNIQUE: Neck MRA without and with IV contrast. Stenosis measurements made according to NASCET criteria unless otherwise specified. FINDINGS: RIGHT CAROTID: No measurable stenosis or dissection. LEFT CAROTID: No measurable stenosis or dissection. VERTEBRAL ARTERIES: No focal stenosis or dissection. Dominant right and smaller left vertebral arteries. AORTIC ARCH: Classic aortic arch anatomy with no significant stenosis  at the origin of the great vessels.     IMPRESSION: 1.  No flow-limiting stenosis of the cervical arterial vasculature.    CT Chest Pulmonary Embolism w Contrast    Result Date: 1/13/2023  EXAM: CT CHEST PULMONARY EMBOLISM W CONTRAST LOCATION: [facility] DATE/TIME: 1/13/2023 11:16 PM INDICATION: Hypoxia. Syncope. Shortness of breath. COVID-19. COMPARISON: None. TECHNIQUE: CT chest pulmonary angiogram during arterial phase injection of IV contrast. Multiplanar reformats and MIP reconstructions were performed. Dose reduction techniques were used. CONTRAST: 75 mL Isovue-370 IV. FINDINGS: ANGIOGRAM CHEST: Apparent linear filling defects in the pulmonary arteries downstream of the bifurcation, more on the left, most likely representing mixing phenomena. No convincing pulmonary emboli on either side. Mildly atherosclerotic thoracic aorta. Ectatic ascending segment measuring 3.8 x 3.8 cm (image 128, series 6). No CT evidence of right heart strain. LUNGS AND PLEURA: Diffuse thickening of the bronchi with foci of endobronchial mucus, more apparent in the left lower lobe. A few calcified granulomas in the lungs. Plate-like atelectasis in the lower lungs, more apparent involving the left lower lobe. No cavitation or pleural effusion on either side. MEDIASTINUM/AXILLAE: Cardiac enlargement. Dense mitral annulus and coronary artery calcifications. Small pericardial effusion. No suspicious adenopathy. Trachea is midline. Hypodense thyroid nodules can be better evaluated with dedicated ultrasound. CORONARY ARTERY CALCIFICATION: Severe. UPPER ABDOMEN: Vascular calcifications. MUSCULOSKELETAL: Degenerative changes both shoulders and the thoracic spine.     IMPRESSION: 1.  Apparent linear filling defects in the pulmonary arteries, more on the left, most likely representing mixing phenomena. No convincing pulmonary emboli on either side. Diffuse thickening of the bronchi with foci of endobronchial mucus, more  apparent in the left lower lobe. Evidence of remote granulomatous disease. Plate-like atelectasis in the lower lungs, more on the left. 2.  Ectatic ascending thoracic aorta. Cardiac enlargement. Dense mitral annulus and coronary artery calcifications. Small pericardial effusion. 3.  Hypodense thyroid nodules can be better evaluated with dedicated ultrasound.    Head CT w/o contrast    Result Date: 1/13/2023  EXAM: CT HEAD W/O CONTRAST LOCATION: [facility] DATE/TIME: 1/13/2023 11:14 PM INDICATION: Altered mental status COMPARISON: 11/29/2021 TECHNIQUE: Routine CT Head without IV contrast. Multiplanar reformats. Dose reduction techniques were used. FINDINGS: INTRACRANIAL CONTENTS: No intracranial hemorrhage, extraaxial collection, or mass effect.  No CT evidence of acute infarct. Moderate presumed chronic small vessel ischemic changes. Chronic infarcts right corpus stratum. Moderate generalized volume loss. No hydrocephalus. VISUALIZED ORBITS/SINUSES/MASTOIDS: No intraorbital abnormality. Mild mucosal thickening scattered about the paranasal sinuses. No middle ear or mastoid effusion. BONES/SOFT TISSUES: No acute abnormality.     IMPRESSION: 1.  No acute intracranial process.           Electronically signed by    Ginny Johnson MD

## 2023-01-19 LAB
BACTERIA BLD CULT: NO GROWTH
BACTERIA BLD CULT: NO GROWTH

## 2023-01-24 ENCOUNTER — NURSING HOME VISIT (OUTPATIENT)
Dept: GERIATRICS | Facility: CLINIC | Age: 73
End: 2023-01-24
Payer: COMMERCIAL

## 2023-01-24 VITALS
SYSTOLIC BLOOD PRESSURE: 128 MMHG | HEART RATE: 89 BPM | OXYGEN SATURATION: 98 % | HEIGHT: 63 IN | WEIGHT: 150 LBS | RESPIRATION RATE: 18 BRPM | DIASTOLIC BLOOD PRESSURE: 76 MMHG | BODY MASS INDEX: 26.58 KG/M2 | TEMPERATURE: 98 F

## 2023-01-24 DIAGNOSIS — I10 PRIMARY HYPERTENSION: ICD-10-CM

## 2023-01-24 DIAGNOSIS — M25.511 ACUTE PAIN OF RIGHT SHOULDER: ICD-10-CM

## 2023-01-24 DIAGNOSIS — R33.9 URINARY RETENTION: ICD-10-CM

## 2023-01-24 DIAGNOSIS — E11.59 TYPE 2 DIABETES MELLITUS WITH OTHER CIRCULATORY COMPLICATION, WITHOUT LONG-TERM CURRENT USE OF INSULIN (H): ICD-10-CM

## 2023-01-24 DIAGNOSIS — I63.9 ACUTE CVA (CEREBROVASCULAR ACCIDENT) (H): ICD-10-CM

## 2023-01-24 DIAGNOSIS — I48.20 ATRIAL FIBRILLATION, CHRONIC (H): ICD-10-CM

## 2023-01-24 DIAGNOSIS — R41.89 COGNITIVE IMPAIRMENT: Primary | ICD-10-CM

## 2023-01-24 DIAGNOSIS — F01.53 VASCULAR DEMENTIA WITH DEPRESSED MOOD (H): ICD-10-CM

## 2023-01-24 PROCEDURE — 99309 SBSQ NF CARE MODERATE MDM 30: CPT | Performed by: NURSE PRACTITIONER

## 2023-01-24 NOTE — LETTER
"    1/24/2023        RE: Angela Beckham  [facility]  [address] [address]        [facility] GERIATRICS  Chief Complaint   Patient presents with     Hospital F/U     Nursing Home Regulatory     [place] Medical Record Number:  [id number]  Place of Service where encounter took place:  [facility] () [[id number]]     Hospital Admission: 1/13/2023   Hospital discharge: 1/17/2023    HPI:    Angela Beckham  is 72 year old (1950), who is being seen today for a federally mandated E/M visit. She has a past medical history for DM2, left BKA, HTN, peripheral edema, CVA, mitral stenosis and atrial fibrillation.  She was recently hospitalized for altered mental status and found to have acute ischemic cardioembolic stroke with right corona radiata and periventricular white matter. Workup for etiology was thought to be related to atrial fibrillation and she was started on asa by neurology.  HR was elevated so her metoprolol was increased. She had encephalopathy and neurology did not feel the stroke was contributory.      She was positive for COVID 19 on 1/13 and found to have CAP so she was treated with remdesivir and abx.    She also had urinary retention and so a rosa was was place.      She had pancytopenia also which was felt to be 2/2 illness/COVID with marrow suppression.      Today, she clearly has worsening cognitive impairment.  She complains of feeling uncomfortable due to sitting on her rosa catheter however she states the tube is \"coming out of my back\".  She complain of left shoulder pain and states this has been going on for 2 months.      ALLERGIES:Amoxicillin, Oxycodone, and Latex  PAST MEDICAL HISTORY:   Past Medical History:   Diagnosis Date     Diabetic neuropathy (H)      HTN      Type 2 diabetes mellitus (H)      PAST SURGICAL HISTORY:   has no past surgical history on file.  FAMILY HISTORY: family history includes Alzheimer Disease in her father; " Cerebrovascular Disease in her maternal grandmother and mother; Diabetes in her paternal grandfather; Hypertension in her brother and mother; Obesity in her brother; Respiratory in her father.  SOCIAL HISTORY:  reports that she has an unknown smoking status. She has never used smokeless tobacco. She reports that she does not drink alcohol and does not use drugs.    MEDICATIONS:           Review of your medicines          Accurate as of January 24, 2023 12:12 PM. If you have any questions, ask your nurse or doctor.            CONTINUE these medicines which have NOT CHANGED      Dose / Directions   acetaminophen 325 MG tablet  Commonly known as: TYLENOL      Dose: 650 mg  Take 650 mg by mouth every 4 hours as needed for mild pain  Refills: 0     ammonium lactate 12 % external cream  Commonly known as: AMLACTIN      Dose: 0.25 inch  Apply 0.25 inches topically 2 times daily To feet  Refills: 0     apixaban ANTICOAGULANT 5 MG tablet  Commonly known as: ELIQUIS      Dose: 5 mg  Take 5 mg by mouth 2 times daily  Refills: 0     aspirin 81 MG EC tablet  Commonly known as: ASA  Used for: Cerebrovascular accident (CVA), unspecified mechanism (H)      Dose: 81 mg  Take 1 tablet (81 mg) by mouth daily  Quantity: 30 tablet  Refills: 0     atorvastatin 20 MG tablet  Commonly known as: LIPITOR      Dose: 20 mg  Take 20 mg by mouth At Bedtime  Refills: 0     camphor-menthol-methyl sal 4-10-30 % Crea      Dose: 1 g  Externally apply 1 g topically 2 times daily Apply to thoracic paraspinal muscles for muscle spasm  Refills: 0     cyanocobalamin 1000 MCG tablet  Commonly known as: VITAMIN B-12      Dose: 1,000 mcg  Take 1,000 mcg by mouth daily  Refills: 0     docusate sodium 100 MG capsule  Commonly known as: COLACE      Dose: 100 mg  Take 100 mg by mouth daily as needed  Refills: 0     * metFORMIN 1000 MG tablet  Commonly known as: GLUCOPHAGE      Dose: 1,000 mg  Take 1,000 mg by mouth daily (with breakfast)  Refills: 0     *  "metFORMIN 500 MG tablet  Commonly known as: GLUCOPHAGE      Dose: 500 mg  Take 500 mg by mouth daily (with dinner)  Refills: 0     metoprolol succinate ER 25 MG 24 hr tablet  Commonly known as: TOPROL XL  Used for: Chronic atrial fibrillation with RVR (H)      Dose: 25 mg  Take 1 tablet (25 mg) by mouth daily  Quantity: 30 tablet  Refills: 0     miconazole 2 % cream  Commonly known as: MICATIN      Dose: 1 applicator  1 applicator 2 times daily Apply to skin folds  Refills: 0     vitamin B-Complex      Dose: 1 tablet  Take 1 tablet by mouth daily  Refills: 0         * This list has 2 medication(s) that are the same as other medications prescribed for you. Read the directions carefully, and ask your doctor or other care provider to review them with you.                ROS:  Patient denies fever, chills, headache, lightheadedness, dizziness, rhinorrhea, cough, congestion, shortness of breath, chest pain, palpitations, abdominal pain, n/v, diarrhea, constipation, change in appetite, change in sleep pattern, dysuria, frequency, burning or pain with urination.  Other than stated in HPI all other review of systems is negative.       Exam:  Vital signs:/76   Pulse 89   Temp 98  F (36.7  C)   Resp 18   Ht 1.588 m (5' 2.5\")   Wt 68 kg (150 lb)   SpO2 98%   BMI 27.00 kg/m     GENERAL APPEARANCE: weak elderly female,  in no acute distress.  HEENT: normocephalic, atraumatic  sclerae anicteric, conjunctivae clear and moist, EOM intact  LUNGS: Lung sounds CTA, no adventitious sounds, respiratory effort normal.  CARD: RRR, S1, S2, without murmurs, gallops, rubs  ABD: Soft, nondistended and nontender with normal bowel sounds.   EXTREMITIES: No cyanosis, clubbing or edema.  NEURO: Alert with cognitive impairment,  Face is symmetric.  SKIN: Inspection of the skin reveals no rashes, ulcerations or petechiae.  PSYCH: euthymic          Lab/Diagnostic data:   Last Comprehensive Metabolic Panel:  Sodium   Date Value Ref Range " Status   01/17/2023 144 136 - 145 mmol/L Final     Potassium   Date Value Ref Range Status   01/17/2023 3.9 3.5 - 5.0 mmol/L Final     Chloride   Date Value Ref Range Status   01/17/2023 113 (H) 98 - 107 mmol/L Final     Carbon Dioxide (CO2)   Date Value Ref Range Status   01/17/2023 23 22 - 31 mmol/L Final     Anion Gap   Date Value Ref Range Status   01/17/2023 8 5 - 18 mmol/L Final     Glucose   Date Value Ref Range Status   01/17/2023 100 70 - 125 mg/dL Final     GLUCOSE BY METER POCT   Date Value Ref Range Status   01/17/2023 99 70 - 99 mg/dL Final     Urea Nitrogen   Date Value Ref Range Status   01/17/2023 13 8 - 28 mg/dL Final     Creatinine   Date Value Ref Range Status   01/17/2023 0.55 (L) 0.60 - 1.10 mg/dL Final   10/06/2009 0.81 0.52 - 1.04 mg/dL Final     Comment:     New IDMS-traceable calibration  beginning 5/1/08     GFR Estimate   Date Value Ref Range Status   01/17/2023 >90 >60 mL/min/1.73m2 Final     Comment:     Effective December 21, 2021 eGFRcr in adults is calculated using the 2021 CKD-EPI creatinine equation which includes age and gender (Moe et al., NE, DOI: 10.1056/YUVLkl7745504)   10/06/2009 72 >60 mL/min/1.7m2 Final     Calcium   Date Value Ref Range Status   01/17/2023 8.7 8.5 - 10.5 mg/dL Final     Lab Results   Component Value Date    WBC 3.5 01/17/2023    WBC 6.9 10/06/2009     Lab Results   Component Value Date    RBC 4.17 01/17/2023    RBC 4.29 10/06/2009     Lab Results   Component Value Date    HGB 10.8 01/17/2023    HGB 11.8 10/06/2009     Lab Results   Component Value Date    HCT 35.8 01/17/2023    HCT 34.5 10/06/2009     Lab Results   Component Value Date    MCV 86 01/17/2023    MCV 81 10/06/2009     Lab Results   Component Value Date    MCH 25.9 01/17/2023    MCH 27.4 10/06/2009     Lab Results   Component Value Date    MCHC 30.2 01/17/2023    MCHC 34.0 10/06/2009     Lab Results   Component Value Date    RDW 16.3 01/17/2023    RDW 12.9 10/06/2009     Lab Results    Component Value Date     01/17/2023     10/06/2009         ASSESSMENT/PLAN  Acute CVA (cerebrovascular accident) (H)  Bps stable, continue with atorvasatin and ASA.  Follow up with neurology as needed.  PT/OT eval and treat for generalized weakness.     Cognitive impairment  I am not sure if this is acute or worsening cognitive status 2/2 CVA.  She is her own decision maker.  neuropsych eval to determine competency to make her own decisions and to help with goals of care discussions.     Urinary retention  abx finished, pull rosa and PVRs.      Primary hypertension  Controlled, continue on higher dose of metoprolol.     Type 2 diabetes mellitus with other circulatory complication, without long-term current use of insulin (H)  A1c 1 year ago was 6.1, continue on current metformin.  Will need to check A1c in the next month.     Atrial fibrillation, chronic (H)  Rate controlled with metoprolol.  AC is Eliquis    Acute pain of right shoulder  Add diclofenac TID and has prn apap.  May need to consider steroid injection if no improvement with conservative management.       35 total minutes spent with 20 minutes spent with patient, nursing and SW in coordinating above plan of care.     Electronically signed by:  Berenice Nolen NP              Sincerely,        Berenice Nolen NP

## 2023-01-24 NOTE — PROGRESS NOTES
"[facility] GERIATRICS  Chief Complaint   Patient presents with     Hospital F/U     Nursing Home Regulatory     [place] Medical Record Number:  [id number]  Place of Service where encounter took place:  [facility] () [[id number]]     Hospital Admission: 1/13/2023   Hospital discharge: 1/17/2023    HPI:    Angela Beckham  is 72 year old (1950), who is being seen today for a federally mandated E/M visit. She has a past medical history for DM2, left BKA, HTN, peripheral edema, CVA, mitral stenosis and atrial fibrillation.  She was recently hospitalized for altered mental status and found to have acute ischemic cardioembolic stroke with right corona radiata and periventricular white matter. Workup for etiology was thought to be related to atrial fibrillation and she was started on asa by neurology.  HR was elevated so her metoprolol was increased. She had encephalopathy and neurology did not feel the stroke was contributory.      She was positive for COVID 19 on 1/13 and found to have CAP so she was treated with remdesivir and abx.    She also had urinary retention and so a rosa was was place.      She had pancytopenia also which was felt to be 2/2 illness/COVID with marrow suppression.      Today, she clearly has worsening cognitive impairment.  She complains of feeling uncomfortable due to sitting on her rosa catheter however she states the tube is \"coming out of my back\".  She complain of left shoulder pain and states this has been going on for 2 months.      ALLERGIES:Amoxicillin, Oxycodone, and Latex  PAST MEDICAL HISTORY:   Past Medical History:   Diagnosis Date     Diabetic neuropathy (H)      HTN      Type 2 diabetes mellitus (H)      PAST SURGICAL HISTORY:   has no past surgical history on file.  FAMILY HISTORY: family history includes Alzheimer Disease in her father; Cerebrovascular Disease in her maternal grandmother and mother; Diabetes in her paternal grandfather; Hypertension " in her brother and mother; Obesity in her brother; Respiratory in her father.  SOCIAL HISTORY:  reports that she has an unknown smoking status. She has never used smokeless tobacco. She reports that she does not drink alcohol and does not use drugs.    MEDICATIONS:           Review of your medicines          Accurate as of January 24, 2023 12:12 PM. If you have any questions, ask your nurse or doctor.            CONTINUE these medicines which have NOT CHANGED      Dose / Directions   acetaminophen 325 MG tablet  Commonly known as: TYLENOL      Dose: 650 mg  Take 650 mg by mouth every 4 hours as needed for mild pain  Refills: 0     ammonium lactate 12 % external cream  Commonly known as: AMLACTIN      Dose: 0.25 inch  Apply 0.25 inches topically 2 times daily To feet  Refills: 0     apixaban ANTICOAGULANT 5 MG tablet  Commonly known as: ELIQUIS      Dose: 5 mg  Take 5 mg by mouth 2 times daily  Refills: 0     aspirin 81 MG EC tablet  Commonly known as: ASA  Used for: Cerebrovascular accident (CVA), unspecified mechanism (H)      Dose: 81 mg  Take 1 tablet (81 mg) by mouth daily  Quantity: 30 tablet  Refills: 0     atorvastatin 20 MG tablet  Commonly known as: LIPITOR      Dose: 20 mg  Take 20 mg by mouth At Bedtime  Refills: 0     camphor-menthol-methyl sal 4-10-30 % Crea      Dose: 1 g  Externally apply 1 g topically 2 times daily Apply to thoracic paraspinal muscles for muscle spasm  Refills: 0     cyanocobalamin 1000 MCG tablet  Commonly known as: VITAMIN B-12      Dose: 1,000 mcg  Take 1,000 mcg by mouth daily  Refills: 0     docusate sodium 100 MG capsule  Commonly known as: COLACE      Dose: 100 mg  Take 100 mg by mouth daily as needed  Refills: 0     * metFORMIN 1000 MG tablet  Commonly known as: GLUCOPHAGE      Dose: 1,000 mg  Take 1,000 mg by mouth daily (with breakfast)  Refills: 0     * metFORMIN 500 MG tablet  Commonly known as: GLUCOPHAGE      Dose: 500 mg  Take 500 mg by mouth daily (with  "dinner)  Refills: 0     metoprolol succinate ER 25 MG 24 hr tablet  Commonly known as: TOPROL XL  Used for: Chronic atrial fibrillation with RVR (H)      Dose: 25 mg  Take 1 tablet (25 mg) by mouth daily  Quantity: 30 tablet  Refills: 0     miconazole 2 % cream  Commonly known as: MICATIN      Dose: 1 applicator  1 applicator 2 times daily Apply to skin folds  Refills: 0     vitamin B-Complex      Dose: 1 tablet  Take 1 tablet by mouth daily  Refills: 0         * This list has 2 medication(s) that are the same as other medications prescribed for you. Read the directions carefully, and ask your doctor or other care provider to review them with you.                ROS:  Patient denies fever, chills, headache, lightheadedness, dizziness, rhinorrhea, cough, congestion, shortness of breath, chest pain, palpitations, abdominal pain, n/v, diarrhea, constipation, change in appetite, change in sleep pattern, dysuria, frequency, burning or pain with urination.  Other than stated in HPI all other review of systems is negative.       Exam:  Vital signs:/76   Pulse 89   Temp 98  F (36.7  C)   Resp 18   Ht 1.588 m (5' 2.5\")   Wt 68 kg (150 lb)   SpO2 98%   BMI 27.00 kg/m     GENERAL APPEARANCE: weak elderly female,  in no acute distress.  HEENT: normocephalic, atraumatic  sclerae anicteric, conjunctivae clear and moist, EOM intact  LUNGS: Lung sounds CTA, no adventitious sounds, respiratory effort normal.  CARD: RRR, S1, S2, without murmurs, gallops, rubs  ABD: Soft, nondistended and nontender with normal bowel sounds.   EXTREMITIES: No cyanosis, clubbing or edema.  NEURO: Alert with cognitive impairment,  Face is symmetric.  SKIN: Inspection of the skin reveals no rashes, ulcerations or petechiae.  PSYCH: euthymic          Lab/Diagnostic data:   Last Comprehensive Metabolic Panel:  Sodium   Date Value Ref Range Status   01/17/2023 144 136 - 145 mmol/L Final     Potassium   Date Value Ref Range Status   01/17/2023 " 3.9 3.5 - 5.0 mmol/L Final     Chloride   Date Value Ref Range Status   01/17/2023 113 (H) 98 - 107 mmol/L Final     Carbon Dioxide (CO2)   Date Value Ref Range Status   01/17/2023 23 22 - 31 mmol/L Final     Anion Gap   Date Value Ref Range Status   01/17/2023 8 5 - 18 mmol/L Final     Glucose   Date Value Ref Range Status   01/17/2023 100 70 - 125 mg/dL Final     GLUCOSE BY METER POCT   Date Value Ref Range Status   01/17/2023 99 70 - 99 mg/dL Final     Urea Nitrogen   Date Value Ref Range Status   01/17/2023 13 8 - 28 mg/dL Final     Creatinine   Date Value Ref Range Status   01/17/2023 0.55 (L) 0.60 - 1.10 mg/dL Final   10/06/2009 0.81 0.52 - 1.04 mg/dL Final     Comment:     New IDMS-traceable calibration  beginning 5/1/08     GFR Estimate   Date Value Ref Range Status   01/17/2023 >90 >60 mL/min/1.73m2 Final     Comment:     Effective December 21, 2021 eGFRcr in adults is calculated using the 2021 CKD-EPI creatinine equation which includes age and gender (Moe et al., NE, DOI: 10.1056/XMVDxs4816933)   10/06/2009 72 >60 mL/min/1.7m2 Final     Calcium   Date Value Ref Range Status   01/17/2023 8.7 8.5 - 10.5 mg/dL Final     Lab Results   Component Value Date    WBC 3.5 01/17/2023    WBC 6.9 10/06/2009     Lab Results   Component Value Date    RBC 4.17 01/17/2023    RBC 4.29 10/06/2009     Lab Results   Component Value Date    HGB 10.8 01/17/2023    HGB 11.8 10/06/2009     Lab Results   Component Value Date    HCT 35.8 01/17/2023    HCT 34.5 10/06/2009     Lab Results   Component Value Date    MCV 86 01/17/2023    MCV 81 10/06/2009     Lab Results   Component Value Date    MCH 25.9 01/17/2023    MCH 27.4 10/06/2009     Lab Results   Component Value Date    MCHC 30.2 01/17/2023    MCHC 34.0 10/06/2009     Lab Results   Component Value Date    RDW 16.3 01/17/2023    RDW 12.9 10/06/2009     Lab Results   Component Value Date     01/17/2023     10/06/2009         ASSESSMENT/PLAN  Acute CVA  (cerebrovascular accident) (H)  Bps stable, continue with atorvasatin and ASA.  Follow up with neurology as needed.  PT/OT eval and treat for generalized weakness.     Cognitive impairment  I am not sure if this is acute or worsening cognitive status 2/2 CVA.  She is her own decision maker.  neuropsych eval to determine competency to make her own decisions and to help with goals of care discussions.     Urinary retention  abx finished, pull rosa and PVRs.      Primary hypertension  Controlled, continue on higher dose of metoprolol.     Type 2 diabetes mellitus with other circulatory complication, without long-term current use of insulin (H)  A1c 1 year ago was 6.1, continue on current metformin.  Will need to check A1c in the next month.     Atrial fibrillation, chronic (H)  Rate controlled with metoprolol.  AC is Eliquis    Acute pain of right shoulder  Add diclofenac TID and has prn apap.  May need to consider steroid injection if no improvement with conservative management.       35 total minutes spent with 20 minutes spent with patient, nursing and SW in coordinating above plan of care.     Electronically signed by:  Berenice Nolen NP

## 2023-02-02 LAB
ATRIAL RATE - MUSE: 120 BPM
DIASTOLIC BLOOD PRESSURE - MUSE: 66 MMHG
INTERPRETATION ECG - MUSE: NORMAL
P AXIS - MUSE: NORMAL DEGREES
PR INTERVAL - MUSE: NORMAL MS
QRS DURATION - MUSE: 84 MS
QT - MUSE: 346 MS
QTC - MUSE: 486 MS
R AXIS - MUSE: 105 DEGREES
SYSTOLIC BLOOD PRESSURE - MUSE: 107 MMHG
T AXIS - MUSE: -13 DEGREES
VENTRICULAR RATE- MUSE: 119 BPM

## 2023-02-16 ENCOUNTER — PATIENT OUTREACH (OUTPATIENT)
Dept: GERIATRIC MEDICINE | Facility: CLINIC | Age: 73
End: 2023-02-16
Payer: COMMERCIAL

## 2023-02-16 NOTE — PROGRESS NOTES
Encounter opened due to Regulatory [employer] Update to close FVP Program.    Tonia Silverio  Care Management Specialist Manager  [facility]  [phone]

## 2023-02-16 NOTE — PROGRESS NOTES
Encounter opened due to Regulatory [employer] Update to open FVP Program.    Tonia Silverio  Care Management Specialist Manager  [facility]  [phone]

## 2023-03-07 ENCOUNTER — PATIENT OUTREACH (OUTPATIENT)
Dept: GERIATRIC MEDICINE | Facility: CLINIC | Age: 73
End: 2023-03-07
Payer: COMMERCIAL

## 2023-03-07 NOTE — PROGRESS NOTES
[facility] Care Coordination Communication    Email sent to Facility  at [facility]  to  schedule annual assessment. Assessment scheduled for 03/13/23.     Yohana Melendez RN, PHN  Care Coordinator [facility]  [phone]

## 2023-03-13 ENCOUNTER — PATIENT OUTREACH (OUTPATIENT)
Dept: GERIATRIC MEDICINE | Facility: CLINIC | Age: 73
End: 2023-03-13
Payer: COMMERCIAL

## 2023-03-13 ASSESSMENT — ACTIVITIES OF DAILY LIVING (ADL)
DEPENDENT_IADLS:: CLEANING;COOKING;LAUNDRY;SHOPPING;MEAL PREPARATION;MEDICATION MANAGEMENT;MONEY MANAGEMENT;TRANSPORTATION;INCONTINENCE

## 2023-03-13 ASSESSMENT — PATIENT HEALTH QUESTIONNAIRE - PHQ9: SUM OF ALL RESPONSES TO PHQ QUESTIONS 1-9: 14
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
## 2023-03-14 VITALS
HEART RATE: 89 BPM | RESPIRATION RATE: 18 BRPM | TEMPERATURE: 98 F | WEIGHT: 146 LBS | SYSTOLIC BLOOD PRESSURE: 128 MMHG | DIASTOLIC BLOOD PRESSURE: 76 MMHG | HEIGHT: 63 IN | OXYGEN SATURATION: 98 % | BODY MASS INDEX: 25.87 KG/M2

## 2023-03-14 NOTE — PROGRESS NOTES
[facility] GERIATRIC SERVICES       Patient Angela Beckham  MRN: [id number]        Reason for Visit     Chief Complaint   Patient presents with     FPC Regulatory       Code Status     CPR/Full code     Assessment     H/o  ischemic CVA involving the right cardioembolic CVA  Chronic right basal ganglia infarct  Chronic microvascular ischemia  Urinary retention requiring Gambino catheter placement  History of left below the knee amputation  Generalized weakness  Type 2 diabetes    Plan     Patient is a resident of long-term care  Currently she is bedbound  She is not using her prosthesis much and rarely gets out of bed  OT consult requested because of pain with repositioning or prolonged wheelchair use that is reported by patient.  Diabetic control felt to be adequate  Blood sugar checks have been discontinued  Has a history of CVA and is on Lipitor as well as apixaban  Recheck routine labs for monitoring  Not very happy with her current situation and wants to still desires going home  No longer losing weight and is on supplementation    History     Patient is a very pleasant 72 year who is a resident of long-term care  Patient was recently admitted to the hospital with altered mental status  MRI imaging of the brain however did show acute right corona radiata and periventricular white matter cardioembolic CVA  She was evaluated by neurology and started on aspirin as well as atorvastatin  She has been discharged on atorvastatin as well as Eliquis  She has dm controlled with metformin  Remains bed bound and desires going home      Past Medical & Surgical History     PAST MEDICAL HISTORY:   Past Medical History:   Diagnosis Date     Diabetic neuropathy (H)      HTN      Type 2 diabetes mellitus (H)           Past Social History     Reviewed,  reports that she quit smoking about 2 years ago. Her smoking use included cigarettes. She smoked an average of 1 pack per day. She has never used smokeless tobacco. She  reports that she does not drink alcohol and does not use drugs.    Family History     Reviewed, and family history includes Alzheimer Disease in her father; Cerebrovascular Disease in her maternal grandmother and mother; Diabetes in her paternal grandfather; Hypertension in her brother and mother; Obesity in her brother; Respiratory in her father.    Medication List   Post Discharge Medication Reconciliation Status: Post Discharge Medication Reconciliation Status: discharge medications reconciled, continue medications without change.  Current Outpatient Medications   Medication     acetaminophen (TYLENOL) 325 MG tablet     ammonium lactate (AMLACTIN) 12 % external cream     apixaban ANTICOAGULANT (ELIQUIS) 5 MG tablet     aspirin (ASA) 81 MG EC tablet     atorvastatin (LIPITOR) 20 MG tablet     camphor-menthol-methyl sal 4-10-30 % CREA     cyanocobalamin (VITAMIN B-12) 1000 MCG tablet     docusate sodium (COLACE) 100 MG capsule     metFORMIN (GLUCOPHAGE) 1000 MG tablet     metFORMIN (GLUCOPHAGE) 500 MG tablet     metoprolol succinate ER (TOPROL XL) 25 MG 24 hr tablet     miconazole (MICATIN) 2 % cream     vitamin B-Complex     diclofenac (VOLTAREN) 1 % topical gel     No current facility-administered medications for this visit.          Allergies     Allergies   Allergen Reactions     Amoxicillin Unknown     Oxycodone Other (See Comments)     Does not remember     Latex Rash       Review of Systems   A comprehensive review of 14 systems was done. Pertinent findings noted here and in history of present illness. All the rest negative.  Constitutional: Negative.  Negative for fever, chills, she has  activity change, appetite change and fatigue.  Oral intake is 25 to 100% of the offered meal  She is on supplementation and now eating better  HENT: Negative for congestion and facial swelling.    Eyes: Negative for photophobia, redness and visual disturbance.   Respiratory: Negative for cough and chest tightness.   "  Cardiovascular: Negative for chest pain, palpitations and leg swelling.   Gastrointestinal: Negative for nausea, diarrhea, constipation, blood in stool and abdominal distention.   Genitourinary: Negative.    Musculoskeletal: Does not wear her prosthesis   Cannot tolerate sitting up very long as she likes to lay flat in bed most of the time  Has chronic back pain issues but none at rest  Skin: Negative.    Neurological: Negative for dizziness, tremors, syncope, weakness, light-headedness and headaches.   Hematological: Does not bruise/bleed easily.   Psychiatric/Behavioral: Negative.  Wants to go home      Physical Exam   /76   Pulse 89   Temp 98  F (36.7  C)   Resp 18   Ht 1.588 m (5' 2.5\")   Wt 66.2 kg (146 lb)   SpO2 98%   BMI 26.28 kg/m       Constitutional: Oriented to person, place,  and appears well-developed.   HEENT:  Normocephalic and atraumatic.  Eyes: Conjunctivae and EOM are normal. Pupils are equal, round, and reactive to light. No discharge.  No scleral icterus. Nose normal. Mouth/Throat: Oropharynx is clear and moist. No oropharyngeal exudate.    NECK: Normal range of motion. Neck supple. No JVD present. No tracheal deviation present. No thyromegaly present.   CARDIOVASCULAR: Normal rate, regular rhythm and intact distal pulses.  Exam reveals no gallop and no friction rub.  Systolic murmur present.  PULMONARY: Effort normal and breath sounds normal. No respiratory distress.No Wheezing or rales.  ABDOMEN: Soft. Bowel sounds are normal. No distension and no mass.  There is no tenderness. There is no rebound and no guarding. No HSM.  MUSCULOSKELETAL: Normal range of motion. No edema and no tenderness. Mild kyphosis, no tenderness.  Has left BKA  LYMPH NODES: Has no cervical, supraclavicular, axillary and groin adenopathy.   NEUROLOGICAL: Alert and oriented to person, place, and time. No cranial nerve deficit.  Normal muscle tone. Coordination normal.   GENITOURINARY: Deferred exam.   SKIN: " Skin is warm and dry. No rash noted. No erythema. No pallor.   EXTREMITIES: No cyanosis, no clubbing, no edema. No Deformity.  PSYCHIATRIC: Normal mood, affect and behavior.      Electronically signed by    Ginny Johnson MD

## 2023-03-15 ENCOUNTER — LAB REQUISITION (OUTPATIENT)
Dept: LAB | Facility: CLINIC | Age: 73
End: 2023-03-15
Payer: COMMERCIAL

## 2023-03-15 ENCOUNTER — NURSING HOME VISIT (OUTPATIENT)
Dept: GERIATRICS | Facility: CLINIC | Age: 73
End: 2023-03-15
Payer: COMMERCIAL

## 2023-03-15 DIAGNOSIS — I27.21 PULMONARY ARTERY HYPERTENSION (H): ICD-10-CM

## 2023-03-15 DIAGNOSIS — R33.9 URINARY RETENTION: ICD-10-CM

## 2023-03-15 DIAGNOSIS — I48.20 ATRIAL FIBRILLATION, CHRONIC (H): ICD-10-CM

## 2023-03-15 DIAGNOSIS — E11.610 TYPE 2 DIABETES MELLITUS WITH DIABETIC NEUROPATHIC ARTHROPATHY (H): ICD-10-CM

## 2023-03-15 DIAGNOSIS — E78.5 HYPERLIPIDEMIA, UNSPECIFIED: ICD-10-CM

## 2023-03-15 DIAGNOSIS — I63.9 ACUTE CVA (CEREBROVASCULAR ACCIDENT) (H): Primary | ICD-10-CM

## 2023-03-15 DIAGNOSIS — E11.59 TYPE 2 DIABETES MELLITUS WITH OTHER CIRCULATORY COMPLICATION, WITHOUT LONG-TERM CURRENT USE OF INSULIN (H): ICD-10-CM

## 2023-03-15 DIAGNOSIS — F01.53 VASCULAR DEMENTIA WITH DEPRESSED MOOD (H): ICD-10-CM

## 2023-03-15 DIAGNOSIS — R41.89 COGNITIVE IMPAIRMENT: ICD-10-CM

## 2023-03-15 PROCEDURE — 99309 SBSQ NF CARE MODERATE MDM 30: CPT | Performed by: FAMILY MEDICINE

## 2023-03-15 NOTE — LETTER
3/15/2023        RE: Angela Beckham  [facility]  [address] [address]        [facility]       Patient Angela Beckham  MRN: [id number]        Reason for Visit     Chief Complaint   Patient presents with     senior care Regulatory       Code Status     CPR/Full code     Assessment     H/o  ischemic CVA involving the right cardioembolic CVA  Chronic right basal ganglia infarct  Chronic microvascular ischemia  Urinary retention requiring Gambino catheter placement  History of left below the knee amputation  Generalized weakness  Type 2 diabetes    Plan     Patient is a resident of long-term care  Currently she is bedbound  She is not using her prosthesis much and rarely gets out of bed  OT consult requested because of pain with repositioning or prolonged wheelchair use that is reported by patient.  Diabetic control felt to be adequate  Blood sugar checks have been discontinued  Has a history of CVA and is on Lipitor as well as apixaban  Recheck routine labs for monitoring  Not very happy with her current situation and wants to still desires going home  No longer losing weight and is on supplementation    History     Patient is a very pleasant 72 year who is a resident of long-term care  Patient was recently admitted to the hospital with altered mental status  MRI imaging of the brain however did show acute right corona radiata and periventricular white matter cardioembolic CVA  She was evaluated by neurology and started on aspirin as well as atorvastatin  She has been discharged on atorvastatin as well as Eliquis  She has dm controlled with metformin  Remains bed bound and desires going home      Past Medical & Surgical History     PAST MEDICAL HISTORY:   Past Medical History:   Diagnosis Date     Diabetic neuropathy (H)      HTN      Type 2 diabetes mellitus (H)           Past Social History     Reviewed,  reports that she quit smoking about 2 years ago. Her smoking  use included cigarettes. She smoked an average of 1 pack per day. She has never used smokeless tobacco. She reports that she does not drink alcohol and does not use drugs.    Family History     Reviewed, and family history includes Alzheimer Disease in her father; Cerebrovascular Disease in her maternal grandmother and mother; Diabetes in her paternal grandfather; Hypertension in her brother and mother; Obesity in her brother; Respiratory in her father.    Medication List   Post Discharge Medication Reconciliation Status: Post Discharge Medication Reconciliation Status: discharge medications reconciled, continue medications without change.  Current Outpatient Medications   Medication     acetaminophen (TYLENOL) 325 MG tablet     ammonium lactate (AMLACTIN) 12 % external cream     apixaban ANTICOAGULANT (ELIQUIS) 5 MG tablet     aspirin (ASA) 81 MG EC tablet     atorvastatin (LIPITOR) 20 MG tablet     camphor-menthol-methyl sal 4-10-30 % CREA     cyanocobalamin (VITAMIN B-12) 1000 MCG tablet     docusate sodium (COLACE) 100 MG capsule     metFORMIN (GLUCOPHAGE) 1000 MG tablet     metFORMIN (GLUCOPHAGE) 500 MG tablet     metoprolol succinate ER (TOPROL XL) 25 MG 24 hr tablet     miconazole (MICATIN) 2 % cream     vitamin B-Complex     diclofenac (VOLTAREN) 1 % topical gel     No current facility-administered medications for this visit.          Allergies     Allergies   Allergen Reactions     Amoxicillin Unknown     Oxycodone Other (See Comments)     Does not remember     Latex Rash       Review of Systems   A comprehensive review of 14 systems was done. Pertinent findings noted here and in history of present illness. All the rest negative.  Constitutional: Negative.  Negative for fever, chills, she has  activity change, appetite change and fatigue.  Oral intake is 25 to 100% of the offered meal  She is on supplementation and now eating better  HENT: Negative for congestion and facial swelling.    Eyes: Negative for  "photophobia, redness and visual disturbance.   Respiratory: Negative for cough and chest tightness.    Cardiovascular: Negative for chest pain, palpitations and leg swelling.   Gastrointestinal: Negative for nausea, diarrhea, constipation, blood in stool and abdominal distention.   Genitourinary: Negative.    Musculoskeletal: Does not wear her prosthesis   Cannot tolerate sitting up very long as she likes to lay flat in bed most of the time  Has chronic back pain issues but none at rest  Skin: Negative.    Neurological: Negative for dizziness, tremors, syncope, weakness, light-headedness and headaches.   Hematological: Does not bruise/bleed easily.   Psychiatric/Behavioral: Negative.  Wants to go home      Physical Exam   /76   Pulse 89   Temp 98  F (36.7  C)   Resp 18   Ht 1.588 m (5' 2.5\")   Wt 66.2 kg (146 lb)   SpO2 98%   BMI 26.28 kg/m       Constitutional: Oriented to person, place,  and appears well-developed.   HEENT:  Normocephalic and atraumatic.  Eyes: Conjunctivae and EOM are normal. Pupils are equal, round, and reactive to light. No discharge.  No scleral icterus. Nose normal. Mouth/Throat: Oropharynx is clear and moist. No oropharyngeal exudate.    NECK: Normal range of motion. Neck supple. No JVD present. No tracheal deviation present. No thyromegaly present.   CARDIOVASCULAR: Normal rate, regular rhythm and intact distal pulses.  Exam reveals no gallop and no friction rub.  Systolic murmur present.  PULMONARY: Effort normal and breath sounds normal. No respiratory distress.No Wheezing or rales.  ABDOMEN: Soft. Bowel sounds are normal. No distension and no mass.  There is no tenderness. There is no rebound and no guarding. No HSM.  MUSCULOSKELETAL: Normal range of motion. No edema and no tenderness. Mild kyphosis, no tenderness.  Has left BKA  LYMPH NODES: Has no cervical, supraclavicular, axillary and groin adenopathy.   NEUROLOGICAL: Alert and oriented to person, place, and time. No " cranial nerve deficit.  Normal muscle tone. Coordination normal.   GENITOURINARY: Deferred exam.   SKIN: Skin is warm and dry. No rash noted. No erythema. No pallor.   EXTREMITIES: No cyanosis, no clubbing, no edema. No Deformity.  PSYCHIATRIC: Normal mood, affect and behavior.      Electronically signed by    Ginny Johnson MD                             Sincerely,        OPAL Galarza

## 2023-03-16 ENCOUNTER — TELEPHONE (OUTPATIENT)
Dept: GERIATRICS | Facility: CLINIC | Age: 73
End: 2023-03-16

## 2023-03-16 LAB
ANION GAP SERPL CALCULATED.3IONS-SCNC: 11 MMOL/L (ref 7–15)
BUN SERPL-MCNC: 19.9 MG/DL (ref 8–23)
CALCIUM SERPL-MCNC: 9.8 MG/DL (ref 8.8–10.2)
CHLORIDE SERPL-SCNC: 106 MMOL/L (ref 98–107)
CREAT SERPL-MCNC: 0.54 MG/DL (ref 0.51–0.95)
DEPRECATED CALCIDIOL+CALCIFEROL SERPL-MC: 33 UG/L (ref 20–75)
DEPRECATED HCO3 PLAS-SCNC: 24 MMOL/L (ref 22–29)
GFR SERPL CREATININE-BSD FRML MDRD: >90 ML/MIN/1.73M2
GLUCOSE SERPL-MCNC: 89 MG/DL (ref 70–99)
HBA1C MFR BLD: 6 %
POTASSIUM SERPL-SCNC: 4.5 MMOL/L (ref 3.4–5.3)
SODIUM SERPL-SCNC: 141 MMOL/L (ref 136–145)
TSH SERPL DL<=0.005 MIU/L-ACNC: 2.82 UIU/ML (ref 0.3–4.2)
VIT B12 SERPL-MCNC: 2266 PG/ML (ref 232–1245)

## 2023-03-16 PROCEDURE — 82607 VITAMIN B-12: CPT | Mod: ORL | Performed by: FAMILY MEDICINE

## 2023-03-16 PROCEDURE — 36415 COLL VENOUS BLD VENIPUNCTURE: CPT | Mod: ORL | Performed by: FAMILY MEDICINE

## 2023-03-16 PROCEDURE — 83036 HEMOGLOBIN GLYCOSYLATED A1C: CPT | Mod: ORL | Performed by: FAMILY MEDICINE

## 2023-03-16 PROCEDURE — 82306 VITAMIN D 25 HYDROXY: CPT | Mod: ORL | Performed by: FAMILY MEDICINE

## 2023-03-16 PROCEDURE — P9603 ONE-WAY ALLOW PRORATED MILES: HCPCS | Mod: ORL | Performed by: FAMILY MEDICINE

## 2023-03-16 PROCEDURE — 84443 ASSAY THYROID STIM HORMONE: CPT | Mod: ORL | Performed by: FAMILY MEDICINE

## 2023-03-16 PROCEDURE — 80048 BASIC METABOLIC PNL TOTAL CA: CPT | Mod: ORL | Performed by: FAMILY MEDICINE

## 2023-03-16 NOTE — TELEPHONE ENCOUNTER
[facility] Note     Provider: CEDRIC Ortiz  Facility: [facility]) Facility Type:  TCU    Allergies   Allergen Reactions     Amoxicillin Unknown     Oxycodone Other (See Comments)     Does not remember     Latex Rash       Labs Reviewed by provider: B12, TSH A1c and BMP     Verbal Order/Direction given by Provider:   Discontinue cyanocobalamin and Metformin.  Recheck Vitamin B12 and A1c in 3 months.       Provider giving Order:  CEDRIC Ortiz    Verbal Order given to: Matilde Price RN

## 2023-03-21 ENCOUNTER — PATIENT OUTREACH (OUTPATIENT)
Dept: GERIATRIC MEDICINE | Facility: CLINIC | Age: 73
End: 2023-03-21
Payer: COMMERCIAL
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
## 2023-05-10 ENCOUNTER — NURSING HOME VISIT (OUTPATIENT)
Dept: GERIATRICS | Facility: CLINIC | Age: 73
End: 2023-05-10
Payer: COMMERCIAL

## 2023-05-10 VITALS
HEART RATE: 84 BPM | OXYGEN SATURATION: 93 % | WEIGHT: 143 LBS | SYSTOLIC BLOOD PRESSURE: 109 MMHG | DIASTOLIC BLOOD PRESSURE: 72 MMHG | RESPIRATION RATE: 18 BRPM | TEMPERATURE: 97.2 F | BODY MASS INDEX: 25.34 KG/M2 | HEIGHT: 63 IN

## 2023-05-10 DIAGNOSIS — Z89.512 STATUS POST BELOW-KNEE AMPUTATION OF LEFT LOWER EXTREMITY (H): ICD-10-CM

## 2023-05-10 DIAGNOSIS — F01.53 VASCULAR DEMENTIA WITH DEPRESSED MOOD (H): ICD-10-CM

## 2023-05-10 DIAGNOSIS — Z86.73 HISTORY OF CVA (CEREBROVASCULAR ACCIDENT): Primary | ICD-10-CM

## 2023-05-10 DIAGNOSIS — I10 PRIMARY HYPERTENSION: ICD-10-CM

## 2023-05-10 DIAGNOSIS — E11.59 TYPE 2 DIABETES MELLITUS WITH OTHER CIRCULATORY COMPLICATION, WITHOUT LONG-TERM CURRENT USE OF INSULIN (H): ICD-10-CM

## 2023-05-10 DIAGNOSIS — I48.20 ATRIAL FIBRILLATION, CHRONIC (H): ICD-10-CM

## 2023-05-10 PROBLEM — E66.01 MORBID OBESITY (H): Status: RESOLVED | Noted: 2021-12-09 | Resolved: 2023-05-10

## 2023-05-10 PROCEDURE — 99309 SBSQ NF CARE MODERATE MDM 30: CPT | Performed by: NURSE PRACTITIONER

## 2023-05-10 NOTE — LETTER
5/10/2023        RE: Angela Beckham  [place]  [address] [facility] [address]        [facility] GERIATRICS  Chief Complaint   Patient presents with     Annual Comprehensive Nursing Home     [place] Medical Record Number:  [id number]  Place of Service where encounter took place:  [facility] () [[id number]]    HPI:    Angela Beckham  is 72 year old (1950), who is being seen today for a federally mandated E/M visit. She has a past medical history for DM2, left BKA, HTN, peripheral edema, CVA, mitral stenosis and atrial fibrillation. New CVA in the last 6 months with more cognitive decline.  SW working on guardianship.      Today, she denies any issues other than sacral soreness from lying in bed.  Nursing reports she is attempting to get up in wheelchair more often.  She is not wearing her prosthesis anymore.  Bottom is free of erythema or open areas.  Right heel with soft nonerythematic skin.  Counseled to offload pressure as much as problem.     She has had no illness or issues since my last visit.  Her weight has fluctuated the past 5 months but is stable now 145lbs.     For her diabetes all medications have been discontinued and her last A1c was 6.0.  She will be due for a recheck in June 2023.       ALLERGIES:Amoxicillin, Oxycodone, and Latex  PAST MEDICAL HISTORY:   Past Medical History:   Diagnosis Date     Diabetic neuropathy (H)      HTN      Type 2 diabetes mellitus (H)      PAST SURGICAL HISTORY:   has no past surgical history on file.  FAMILY HISTORY: family history includes Alzheimer Disease in her father; Cerebrovascular Disease in her maternal grandmother and mother; Diabetes in her paternal grandfather; Hypertension in her brother and mother; Obesity in her brother; Respiratory in her father.  SOCIAL HISTORY:  reports that she quit smoking about 2 years ago. Her smoking use included cigarettes. She smoked an average of 1 pack per day. She has never  used smokeless tobacco. She reports that she does not drink alcohol and does not use drugs.       Review of your medicines          Accurate as of May 10, 2023 12:32 PM. If you have any questions, ask your nurse or doctor.            CONTINUE these medicines which have NOT CHANGED      Dose / Directions   ammonium lactate 12 % external cream  Commonly known as: AMLACTIN      Dose: 0.25 inch  Apply 0.25 inches topically 2 times daily To feet  Refills: 0     apixaban ANTICOAGULANT 5 MG tablet  Commonly known as: ELIQUIS      Dose: 5 mg  Take 5 mg by mouth 2 times daily  Refills: 0     aspirin 81 MG EC tablet  Commonly known as: ASA  Used for: Cerebrovascular accident (CVA), unspecified mechanism (H)      Dose: 81 mg  Take 1 tablet (81 mg) by mouth daily  Quantity: 30 tablet  Refills: 0     atorvastatin 20 MG tablet  Commonly known as: LIPITOR      Dose: 20 mg  Take 20 mg by mouth At Bedtime  Refills: 0     camphor-menthol-methyl sal 4-10-30 % Crea      Dose: 1 g  Externally apply 1 g topically 2 times daily Apply to thoracic paraspinal muscles for muscle spasm  Refills: 0     metoprolol succinate ER 25 MG 24 hr tablet  Commonly known as: TOPROL XL  Used for: Chronic atrial fibrillation with RVR (H)      Dose: 25 mg  Take 1 tablet (25 mg) by mouth daily  Quantity: 30 tablet  Refills: 0     miconazole 2 % cream  Commonly known as: MICATIN      Dose: 1 applicator  1 applicator 2 times daily Apply to skin folds  Refills: 0        STOP taking    acetaminophen 325 MG tablet  Commonly known as: TYLENOL  Stopped by: Berenice Nolen NP        diclofenac 1 % topical gel  Commonly known as: VOLTAREN  Stopped by: Berenice Nolen NP        docusate sodium 100 MG capsule  Commonly known as: COLACE  Stopped by: Berenice Nolen NP        vitamin B-Complex  Stopped by: Berenice Nolen NP               ROS:  Patient denies fever, chills, headache, lightheadedness, dizziness, rhinorrhea, cough, congestion, shortness of breath, chest  "pain, palpitations, abdominal pain, n/v, diarrhea, constipation, change in appetite, change in sleep pattern, dysuria, frequency, burning or pain with urination.  Other than stated in HPI all other review of systems is negative.       Exam:  Vital signs:/72   Pulse 84   Temp 97.2  F (36.2  C)   Resp 18   Ht 1.588 m (5' 2.5\")   Wt 64.9 kg (143 lb)   SpO2 93%   BMI 25.74 kg/m     GENERAL APPEARANCE: elderly female,  in no acute distress.  HEENT: normocephalic, atraumatic  sclerae anicteric, conjunctivae clear and moist, EOM intact  LUNGS: Lung sounds CTA, no adventitious sounds, respiratory effort normal.  CARD: RRR, S1, S2, without murmurs, gallops, rubs  ABD: Soft, nondistended and nontender with normal bowel sounds.   MSK: Muscle strength and tone were equal bilaterally. Moves all extremities easily and intentionally.   EXTREMITIES: left AKA without any skin issues.   NEURO: Alert with cognitive impairment.Face is symmetric.  SKIN: right heel without erythema but soft tissue and high risk for skin issues.   PSYCH: euthymic        Lab/Diagnostic data:   Last Comprehensive Metabolic Panel:  Lab Results   Component Value Date     03/16/2023    POTASSIUM 4.5 03/16/2023    CHLORIDE 106 03/16/2023    CO2 24 03/16/2023    ANIONGAP 11 03/16/2023    GLC 89 03/16/2023    BUN 19.9 03/16/2023    CR 0.54 03/16/2023    GFRESTIMATED >90 03/16/2023    SUSIE 9.8 03/16/2023       Lab Results   Component Value Date    WBC 3.5 01/17/2023    WBC 6.9 10/06/2009     Lab Results   Component Value Date    RBC 4.17 01/17/2023    RBC 4.29 10/06/2009     Lab Results   Component Value Date    HGB 10.8 01/17/2023    HGB 11.8 10/06/2009     Lab Results   Component Value Date    HCT 35.8 01/17/2023    HCT 34.5 10/06/2009     Lab Results   Component Value Date    MCV 86 01/17/2023    MCV 81 10/06/2009     Lab Results   Component Value Date    MCH 25.9 01/17/2023    MCH 27.4 10/06/2009     Lab Results   Component Value Date    [facility] " 30.2 01/17/2023    [facility]     Lab Results   Component Value Date    RDW 16.3 01/17/2023    RDW 12.9 10/06/2009     Lab Results   Component Value Date     01/17/2023     10/06/2009     Lab Results   Component Value Date    A1C 6.0 03/16/2023    A1C 6.1 01/13/2023    A1C 6.4 09/15/2022    A1C 6.6 04/15/2022           ASSESSMENT/PLAN  History of CVA (cerebrovascular accident)  Stable, continue with asa and atorvastatin.     Vascular dementia with depressed mood (H)  Progressive, mood is stable on no medications.     Type 2 diabetes mellitus with other circulatory complication, without long-term current use of insulin (H)  Medications have been dc'd.  A1c was last 6.0.  Will be checking again in June.  If still stable without medications would plan to only check annually.     Atrial fibrillation, chronic (H)  Rate controlled, continue with metoprolol and apixaban.      Primary hypertension  Controlled, hold parameters for metoprolol for low Bps.      Status post below-knee amputation of left lower extremity (H)  Counseled patient on the importance of getting up more often and changing positions.          Electronically signed by:  Berenice Nolen NP                    Sincerely,        Berenice Nolen NP

## 2023-05-10 NOTE — PROGRESS NOTES
[facility] GERIATRICS  Chief Complaint   Patient presents with     Annual Comprehensive Nursing Home     [place] Medical Record Number:  [id number]  Place of Service where encounter took place:  [facility] () [[id number]]    HPI:    Angela Beckham  is 72 year old (1950), who is being seen today for a federally mandated E/M visit. She has a past medical history for DM2, left BKA, HTN, peripheral edema, CVA, mitral stenosis and atrial fibrillation. New CVA in the last 6 months with more cognitive decline.  SW working on guardianship.      Today, she denies any issues other than sacral soreness from lying in bed.  Nursing reports she is attempting to get up in wheelchair more often.  She is not wearing her prosthesis anymore.  Bottom is free of erythema or open areas.  Right heel with soft nonerythematic skin.  Counseled to offload pressure as much as problem.     She has had no illness or issues since my last visit.  Her weight has fluctuated the past 5 months but is stable now 145lbs.     For her diabetes all medications have been discontinued and her last A1c was 6.0.  She will be due for a recheck in June 2023.       ALLERGIES:Amoxicillin, Oxycodone, and Latex  PAST MEDICAL HISTORY:   Past Medical History:   Diagnosis Date     Diabetic neuropathy (H)      HTN      Type 2 diabetes mellitus (H)      PAST SURGICAL HISTORY:   has no past surgical history on file.  FAMILY HISTORY: family history includes Alzheimer Disease in her father; Cerebrovascular Disease in her maternal grandmother and mother; Diabetes in her paternal grandfather; Hypertension in her brother and mother; Obesity in her brother; Respiratory in her father.  SOCIAL HISTORY:  reports that she quit smoking about 2 years ago. Her smoking use included cigarettes. She smoked an average of 1 pack per day. She has never used smokeless tobacco. She reports that she does not drink alcohol and does not use drugs.       Review of your  medicines          Accurate as of May 10, 2023 12:32 PM. If you have any questions, ask your nurse or doctor.            CONTINUE these medicines which have NOT CHANGED      Dose / Directions   ammonium lactate 12 % external cream  Commonly known as: AMLACTIN      Dose: 0.25 inch  Apply 0.25 inches topically 2 times daily To feet  Refills: 0     apixaban ANTICOAGULANT 5 MG tablet  Commonly known as: ELIQUIS      Dose: 5 mg  Take 5 mg by mouth 2 times daily  Refills: 0     aspirin 81 MG EC tablet  Commonly known as: ASA  Used for: Cerebrovascular accident (CVA), unspecified mechanism (H)      Dose: 81 mg  Take 1 tablet (81 mg) by mouth daily  Quantity: 30 tablet  Refills: 0     atorvastatin 20 MG tablet  Commonly known as: LIPITOR      Dose: 20 mg  Take 20 mg by mouth At Bedtime  Refills: 0     camphor-menthol-methyl sal 4-10-30 % Crea      Dose: 1 g  Externally apply 1 g topically 2 times daily Apply to thoracic paraspinal muscles for muscle spasm  Refills: 0     metoprolol succinate ER 25 MG 24 hr tablet  Commonly known as: TOPROL XL  Used for: Chronic atrial fibrillation with RVR (H)      Dose: 25 mg  Take 1 tablet (25 mg) by mouth daily  Quantity: 30 tablet  Refills: 0     miconazole 2 % cream  Commonly known as: MICATIN      Dose: 1 applicator  1 applicator 2 times daily Apply to skin folds  Refills: 0        STOP taking    acetaminophen 325 MG tablet  Commonly known as: TYLENOL  Stopped by: Berenice Nolen NP        diclofenac 1 % topical gel  Commonly known as: VOLTAREN  Stopped by: Berenice Nolen NP        docusate sodium 100 MG capsule  Commonly known as: COLACE  Stopped by: Berenice Nolen NP        vitamin B-Complex  Stopped by: Berenice Nolen NP               ROS:  Patient denies fever, chills, headache, lightheadedness, dizziness, rhinorrhea, cough, congestion, shortness of breath, chest pain, palpitations, abdominal pain, n/v, diarrhea, constipation, change in appetite, change in sleep pattern,  "dysuria, frequency, burning or pain with urination.  Other than stated in HPI all other review of systems is negative.       Exam:  Vital signs:/72   Pulse 84   Temp 97.2  F (36.2  C)   Resp 18   Ht 1.588 m (5' 2.5\")   Wt 64.9 kg (143 lb)   SpO2 93%   BMI 25.74 kg/m     GENERAL APPEARANCE: elderly female,  in no acute distress.  HEENT: normocephalic, atraumatic  sclerae anicteric, conjunctivae clear and moist, EOM intact  LUNGS: Lung sounds CTA, no adventitious sounds, respiratory effort normal.  CARD: RRR, S1, S2, without murmurs, gallops, rubs  ABD: Soft, nondistended and nontender with normal bowel sounds.   MSK: Muscle strength and tone were equal bilaterally. Moves all extremities easily and intentionally.   EXTREMITIES: left AKA without any skin issues.   NEURO: Alert with cognitive impairment.Face is symmetric.  SKIN: right heel without erythema but soft tissue and high risk for skin issues.   PSYCH: euthymic        Lab/Diagnostic data:   Last Comprehensive Metabolic Panel:  Lab Results   Component Value Date     03/16/2023    POTASSIUM 4.5 03/16/2023    CHLORIDE 106 03/16/2023    CO2 24 03/16/2023    ANIONGAP 11 03/16/2023    GLC 89 03/16/2023    BUN 19.9 03/16/2023    CR 0.54 03/16/2023    GFRESTIMATED >90 03/16/2023    SUSIE 9.8 03/16/2023       Lab Results   Component Value Date    WBC 3.5 01/17/2023    WBC 6.9 10/06/2009     Lab Results   Component Value Date    RBC 4.17 01/17/2023    RBC 4.29 10/06/2009     Lab Results   Component Value Date    HGB 10.8 01/17/2023    HGB 11.8 10/06/2009     Lab Results   Component Value Date    HCT 35.8 01/17/2023    HCT 34.5 10/06/2009     Lab Results   Component Value Date    MCV 86 01/17/2023    MCV 81 10/06/2009     Lab Results   Component Value Date    MCH 25.9 01/17/2023    MCH 27.4 10/06/2009     Lab Results   Component Value Date    MCHC 30.2 01/17/2023    MCHC 34.0 10/06/2009     Lab Results   Component Value Date    RDW 16.3 01/17/2023    RDW " 12.9 10/06/2009     Lab Results   Component Value Date     01/17/2023     10/06/2009     Lab Results   Component Value Date    A1C 6.0 03/16/2023    A1C 6.1 01/13/2023    A1C 6.4 09/15/2022    A1C 6.6 04/15/2022           ASSESSMENT/PLAN  History of CVA (cerebrovascular accident)  Stable, continue with asa and atorvastatin.     Vascular dementia with depressed mood (H)  Progressive, mood is stable on no medications.     Type 2 diabetes mellitus with other circulatory complication, without long-term current use of insulin (H)  Medications have been dc'd.  A1c was last 6.0.  Will be checking again in June.  If still stable without medications would plan to only check annually.     Atrial fibrillation, chronic (H)  Rate controlled, continue with metoprolol and apixaban.      Primary hypertension  Controlled, hold parameters for metoprolol for low Bps.      Status post below-knee amputation of left lower extremity (H)  Counseled patient on the importance of getting up more often and changing positions.          Electronically signed by:  Berenice Nolen NP

## 2023-05-22 ENCOUNTER — PATIENT OUTREACH (OUTPATIENT)
Dept: GERIATRIC MEDICINE | Facility: CLINIC | Age: 73
End: 2023-05-22
Payer: COMMERCIAL

## 2023-05-22 NOTE — PROGRESS NOTES
[facility] Care Coordination Contact   CHW, notes Mbr is due for mammogram, colon screening eye and wellness exams.   Sent update to  Yohana Melendez to follow up.    ARTURO Gomez  [facility]  [phone]

## 2023-05-30 ENCOUNTER — PATIENT OUTREACH (OUTPATIENT)
Dept: GERIATRIC MEDICINE | Facility: CLINIC | Age: 73
End: 2023-05-30
Payer: COMMERCIAL

## 2023-05-30 NOTE — PROGRESS NOTES
CC attended care conference for member on 23 at [facility].   Present at care conference member, this care coordinator, adult son (Andrew and Anu Lundberg), NH  (Vero BARKSDALE) and Dietitian [place].  OT Report: NA  Cognitive testing results: 15/15  PT Report:  NA  Nursing Report: signing up for in house audiology, optometry and podiatry and dental . Got new denture  Dietician Report: is on a regular diet with mechanical soft texture and regular consistency CBW: 142.8 #. Intakes vary greatly. Resident had some  concerns regarding food preferences.   Social Work Report: Member has not made pre-made  home decision but does have a cemetery plot in [facility], member spoke about missing clothing item and person who wanders around unit frequently.   CC Report:  Grand pad was ordered for member in March and states she has not received yet. Facility SW will check room to see if this arrived.    Yohana Melendez RN, PHN  Intuitional Care Coordinator [facility]  Cell [phone] Fax [phone]

## 2023-06-14 ENCOUNTER — LAB REQUISITION (OUTPATIENT)
Dept: LAB | Facility: CLINIC | Age: 73
End: 2023-06-14
Payer: COMMERCIAL

## 2023-06-14 DIAGNOSIS — E11.9 TYPE 2 DIABETES MELLITUS WITHOUT COMPLICATIONS (H): ICD-10-CM

## 2023-06-14 DIAGNOSIS — E53.8 DEFICIENCY OF OTHER SPECIFIED B GROUP VITAMINS: ICD-10-CM

## 2023-06-16 LAB
HBA1C MFR BLD: 7 %
VIT B12 SERPL-MCNC: 1269 PG/ML (ref 232–1245)

## 2023-06-16 PROCEDURE — 83036 HEMOGLOBIN GLYCOSYLATED A1C: CPT | Mod: ORL | Performed by: FAMILY MEDICINE

## 2023-06-16 PROCEDURE — 82607 VITAMIN B-12: CPT | Mod: ORL | Performed by: FAMILY MEDICINE

## 2023-06-16 PROCEDURE — 36415 COLL VENOUS BLD VENIPUNCTURE: CPT | Mod: ORL | Performed by: FAMILY MEDICINE

## 2023-06-16 PROCEDURE — P9604 ONE-WAY ALLOW PRORATED TRIP: HCPCS | Mod: ORL | Performed by: FAMILY MEDICINE

## 2023-07-12 ENCOUNTER — NURSING HOME VISIT (OUTPATIENT)
Dept: GERIATRICS | Facility: CLINIC | Age: 73
End: 2023-07-12
Payer: COMMERCIAL

## 2023-07-12 VITALS
WEIGHT: 136 LBS | TEMPERATURE: 97.4 F | HEIGHT: 63 IN | RESPIRATION RATE: 18 BRPM | HEART RATE: 93 BPM | SYSTOLIC BLOOD PRESSURE: 92 MMHG | DIASTOLIC BLOOD PRESSURE: 76 MMHG | OXYGEN SATURATION: 92 % | BODY MASS INDEX: 24.1 KG/M2

## 2023-07-12 DIAGNOSIS — G89.29 CHRONIC BILATERAL LOW BACK PAIN WITHOUT SCIATICA: ICD-10-CM

## 2023-07-12 DIAGNOSIS — F01.53 VASCULAR DEMENTIA WITH DEPRESSED MOOD (H): Primary | ICD-10-CM

## 2023-07-12 DIAGNOSIS — I10 PRIMARY HYPERTENSION: ICD-10-CM

## 2023-07-12 DIAGNOSIS — Z86.73 HISTORY OF CVA (CEREBROVASCULAR ACCIDENT): ICD-10-CM

## 2023-07-12 DIAGNOSIS — Z89.512 STATUS POST BELOW-KNEE AMPUTATION OF LEFT LOWER EXTREMITY (H): ICD-10-CM

## 2023-07-12 DIAGNOSIS — E11.59 TYPE 2 DIABETES MELLITUS WITH OTHER CIRCULATORY COMPLICATION, WITHOUT LONG-TERM CURRENT USE OF INSULIN (H): ICD-10-CM

## 2023-07-12 DIAGNOSIS — I48.20 ATRIAL FIBRILLATION, CHRONIC (H): ICD-10-CM

## 2023-07-12 DIAGNOSIS — M54.50 CHRONIC BILATERAL LOW BACK PAIN WITHOUT SCIATICA: ICD-10-CM

## 2023-07-12 PROCEDURE — 99309 SBSQ NF CARE MODERATE MDM 30: CPT | Performed by: FAMILY MEDICINE

## 2023-07-12 NOTE — PROGRESS NOTES
[facility] GERIATRIC SERVICES       Patient Angela Beckham  MRN: [id number]        Reason for Visit     Chief Complaint   Patient presents with     longterm Regulatory       Code Status     CPR/Full code     Assessment     H/o  ischemic CVA involving the right cardioembolic CVA  Chronic right basal ganglia infarct  Chronic microvascular ischemia  Urinary retention requiring Gambino catheter placement  History of left below the knee amputation  Generalized weakness  Type 2 diabetes  A fib    Plan     Patient is a resident of long-term care  Currently she is bedbound  She is not using her prosthesis much   Remains bedbound  Has a history of CVA and is on Lipitor as well as asa  Also has atrial fibrillation and continues on Eliquis.  She is stable with a low-dose of metoprolol with adequate control of heart rates noted  Recheck routine labs for monitoring-recheck A1c is 7  Not very happy with her current situation and wants to still desires going home  Has poor insight and judjement  wts stable-on magic cup bid    History     Patient is a very pleasant 72 year who is a resident of long-term care  Patient was recently admitted to the hospital with altered mental status  MRI imaging of the brain however did show acute right corona radiata and periventricular white matter cardioembolic CVA  She was evaluated by neurology and started on aspirin as well as atorvastatin  She has been discharged on atorvastatin as well as Eliquis for A-fib  She has dm controlled with metformin  Remains bed bound and desires going home  Poor insight ,      Past Medical & Surgical History     PAST MEDICAL HISTORY:   Past Medical History:   Diagnosis Date     Diabetic neuropathy (H)      HTN      Type 2 diabetes mellitus (H)           Past Social History     Reviewed,  reports that she quit smoking about 2 years ago. Her smoking use included cigarettes. She smoked an average of 1 pack per day. She has never used smokeless tobacco. She reports  that she does not drink alcohol and does not use drugs.    Family History     Reviewed, and family history includes Alzheimer Disease in her father; Cerebrovascular Disease in her maternal grandmother and mother; Diabetes in her paternal grandfather; Hypertension in her brother and mother; Obesity in her brother; Respiratory in her father.    Medication List   Post Discharge Medication Reconciliation Status: Post Discharge Medication Reconciliation Status: discharge medications reconciled, continue medications without change.  Current Outpatient Medications   Medication     ammonium lactate (AMLACTIN) 12 % external cream     apixaban ANTICOAGULANT (ELIQUIS) 5 MG tablet     aspirin (ASA) 81 MG EC tablet     atorvastatin (LIPITOR) 20 MG tablet     camphor-menthol-methyl sal 4-10-30 % CREA     metoprolol succinate ER (TOPROL XL) 25 MG 24 hr tablet     miconazole (MICATIN) 2 % cream     No current facility-administered medications for this visit.          Allergies     Allergies   Allergen Reactions     Amoxicillin Unknown     Oxycodone Other (See Comments)     Does not remember     Latex Rash       Review of Systems   A comprehensive review of 14 systems was done. Pertinent findings noted here and in history of present illness. All the rest negative.  Constitutional: Negative.  Negative for fever, chills, she has  activity change, appetite change and fatigue.  Oral intake is stable  HENT: Negative for congestion and facial swelling.    Eyes: Negative for photophobia, redness and visual disturbance.   Respiratory: Negative for cough and chest tightness.    Cardiovascular: Negative for chest pain, palpitations and leg swelling.   Gastrointestinal: Negative for nausea, diarrhea, constipation, blood in stool and abdominal distention.   Genitourinary: Negative.    Musculoskeletal: Does not wear her prosthesis   Cannot tolerate sitting up very long as she likes to lay flat in bed most of the time  Has chronic back pain  "issues but none at rest  Skin: Negative.    Neurological: Negative for dizziness, tremors, syncope, weakness, light-headedness and headaches.   Hematological: Does not bruise/bleed easily.   Psychiatric/Behavioral: Negative.  Wants to go home  Poor insight      Physical Exam   BP 92/76   Pulse 93   Temp 97.4  F (36.3  C)   Resp 18   Ht 1.588 m (5' 2.5\")   Wt 61.7 kg (136 lb)   SpO2 92%   BMI 24.48 kg/m       Constitutional: Oriented to person, place,  and appears well-developed.   HEENT:  Normocephalic and atraumatic.  Eyes: Conjunctivae and EOM are normal. Pupils are equal, round, and reactive to light. No discharge.  No scleral icterus. Nose normal. Mouth/Throat: Oropharynx is clear and moist. No oropharyngeal exudate.    NECK: Normal range of motion. Neck supple. No JVD present. No tracheal deviation present. No thyromegaly present.   CARDIOVASCULAR: Normal rate, regular rhythm and intact distal pulses.  Exam reveals no gallop and no friction rub.  Systolic murmur present.  PULMONARY: Effort normal and breath sounds normal. No respiratory distress.No Wheezing or rales.  ABDOMEN: Soft. Bowel sounds are normal. No distension and no mass.  There is no tenderness. There is no rebound and no guarding. No HSM.  MUSCULOSKELETAL: Normal range of motion. No edema and no tenderness. Mild kyphosis, no tenderness.  Has left BKA  LYMPH NODES: Has no cervical, supraclavicular, axillary and groin adenopathy.   NEUROLOGICAL: Alert and oriented to person, place, and time. No cranial nerve deficit.  Normal muscle tone. Coordination normal.   GENITOURINARY: Deferred exam.   SKIN: Skin is warm and dry. No rash noted. No erythema. No pallor.   EXTREMITIES: No cyanosis, no clubbing, no edema. No Deformity.  PSYCHIATRIC: Normal mood, affect and behavior.poor insight       Electronically signed by    Ginny Johnson MD                       "

## 2023-07-12 NOTE — LETTER
7/12/2023        RE: Angela Beckham  [facility]  [address] [address]         HEALTH GERIATRIC SERVICES       Patient Angela Beckham  MRN: [id number]        Reason for Visit     Chief Complaint   Patient presents with     senior care Regulatory       Code Status     CPR/Full code     Assessment     H/o  ischemic CVA involving the right cardioembolic CVA  Chronic right basal ganglia infarct  Chronic microvascular ischemia  Urinary retention requiring Gambino catheter placement  History of left below the knee amputation  Generalized weakness  Type 2 diabetes  A fib    Plan     Patient is a resident of long-term care  Currently she is bedbound  She is not using her prosthesis much   Remains bedbound  Has a history of CVA and is on Lipitor as well as asa  Also has atrial fibrillation and continues on Eliquis.  She is stable with a low-dose of metoprolol with adequate control of heart rates noted  Recheck routine labs for monitoring-recheck A1c is 7  Not very happy with her current situation and wants to still desires going home  Has poor insight and judjement  wts stable-on magic cup bid    History     Patient is a very pleasant 72 year who is a resident of long-term care  Patient was recently admitted to the hospital with altered mental status  MRI imaging of the brain however did show acute right corona radiata and periventricular white matter cardioembolic CVA  She was evaluated by neurology and started on aspirin as well as atorvastatin  She has been discharged on atorvastatin as well as Eliquis for A-fib  She has dm controlled with metformin  Remains bed bound and desires going home  Poor insight ,      Past Medical & Surgical History     PAST MEDICAL HISTORY:   Past Medical History:   Diagnosis Date     Diabetic neuropathy (H)      HTN      Type 2 diabetes mellitus (H)           Past Social History     Reviewed,  reports that she quit smoking about 2 years ago. Her smoking use  included cigarettes. She smoked an average of 1 pack per day. She has never used smokeless tobacco. She reports that she does not drink alcohol and does not use drugs.    Family History     Reviewed, and family history includes Alzheimer Disease in her father; Cerebrovascular Disease in her maternal grandmother and mother; Diabetes in her paternal grandfather; Hypertension in her brother and mother; Obesity in her brother; Respiratory in her father.    Medication List   Post Discharge Medication Reconciliation Status: Post Discharge Medication Reconciliation Status: discharge medications reconciled, continue medications without change.  Current Outpatient Medications   Medication     ammonium lactate (AMLACTIN) 12 % external cream     apixaban ANTICOAGULANT (ELIQUIS) 5 MG tablet     aspirin (ASA) 81 MG EC tablet     atorvastatin (LIPITOR) 20 MG tablet     camphor-menthol-methyl sal 4-10-30 % CREA     metoprolol succinate ER (TOPROL XL) 25 MG 24 hr tablet     miconazole (MICATIN) 2 % cream     No current facility-administered medications for this visit.          Allergies     Allergies   Allergen Reactions     Amoxicillin Unknown     Oxycodone Other (See Comments)     Does not remember     Latex Rash       Review of Systems   A comprehensive review of 14 systems was done. Pertinent findings noted here and in history of present illness. All the rest negative.  Constitutional: Negative.  Negative for fever, chills, she has  activity change, appetite change and fatigue.  Oral intake is stable  HENT: Negative for congestion and facial swelling.    Eyes: Negative for photophobia, redness and visual disturbance.   Respiratory: Negative for cough and chest tightness.    Cardiovascular: Negative for chest pain, palpitations and leg swelling.   Gastrointestinal: Negative for nausea, diarrhea, constipation, blood in stool and abdominal distention.   Genitourinary: Negative.    Musculoskeletal: Does not wear her prosthesis  "  Cannot tolerate sitting up very long as she likes to lay flat in bed most of the time  Has chronic back pain issues but none at rest  Skin: Negative.    Neurological: Negative for dizziness, tremors, syncope, weakness, light-headedness and headaches.   Hematological: Does not bruise/bleed easily.   Psychiatric/Behavioral: Negative.  Wants to go home  Poor insight      Physical Exam   BP 92/76   Pulse 93   Temp 97.4  F (36.3  C)   Resp 18   Ht 1.588 m (5' 2.5\")   Wt 61.7 kg (136 lb)   SpO2 92%   BMI 24.48 kg/m       Constitutional: Oriented to person, place,  and appears well-developed.   HEENT:  Normocephalic and atraumatic.  Eyes: Conjunctivae and EOM are normal. Pupils are equal, round, and reactive to light. No discharge.  No scleral icterus. Nose normal. Mouth/Throat: Oropharynx is clear and moist. No oropharyngeal exudate.    NECK: Normal range of motion. Neck supple. No JVD present. No tracheal deviation present. No thyromegaly present.   CARDIOVASCULAR: Normal rate, regular rhythm and intact distal pulses.  Exam reveals no gallop and no friction rub.  Systolic murmur present.  PULMONARY: Effort normal and breath sounds normal. No respiratory distress.No Wheezing or rales.  ABDOMEN: Soft. Bowel sounds are normal. No distension and no mass.  There is no tenderness. There is no rebound and no guarding. No HSM.  MUSCULOSKELETAL: Normal range of motion. No edema and no tenderness. Mild kyphosis, no tenderness.  Has left BKA  LYMPH NODES: Has no cervical, supraclavicular, axillary and groin adenopathy.   NEUROLOGICAL: Alert and oriented to person, place, and time. No cranial nerve deficit.  Normal muscle tone. Coordination normal.   GENITOURINARY: Deferred exam.   SKIN: Skin is warm and dry. No rash noted. No erythema. No pallor.   EXTREMITIES: No cyanosis, no clubbing, no edema. No Deformity.  PSYCHIATRIC: Normal mood, affect and behavior.poor insight       Electronically signed by    Ginny Johnson MD       "                       Sincerely,        OPAL Galarza

## 2023-09-13 PROBLEM — I51.7 LVH (LEFT VENTRICULAR HYPERTROPHY): Status: ACTIVE | Noted: 2022-01-04

## 2023-09-13 PROBLEM — I34.81 MITRAL ANNULAR CALCIFICATION: Status: ACTIVE | Noted: 2022-01-04

## 2023-09-13 PROBLEM — I63.9 CEREBROVASCULAR ACCIDENT (STROKE) (H): Status: ACTIVE | Noted: 2021-12-02

## 2023-09-13 PROBLEM — I65.23 BILATERAL CAROTID ARTERY STENOSIS: Status: ACTIVE | Noted: 2021-04-08

## 2023-09-13 PROBLEM — E55.9 VITAMIN D DEFICIENCY: Status: ACTIVE | Noted: 2018-06-07

## 2023-09-13 PROBLEM — M85.89 OSTEOPENIA OF MULTIPLE SITES: Status: ACTIVE | Noted: 2018-06-08

## 2023-09-13 PROBLEM — S88.119A BELOW KNEE AMPUTATION (H): Status: ACTIVE | Noted: 2021-12-01

## 2023-09-13 PROBLEM — I34.0 MITRAL VALVE INSUFFICIENCY: Status: ACTIVE | Noted: 2022-01-04

## 2023-09-14 ENCOUNTER — NURSING HOME VISIT (OUTPATIENT)
Dept: GERIATRICS | Facility: CLINIC | Age: 73
End: 2023-09-14
Payer: COMMERCIAL

## 2023-09-14 VITALS
WEIGHT: 137 LBS | HEART RATE: 76 BPM | SYSTOLIC BLOOD PRESSURE: 132 MMHG | RESPIRATION RATE: 18 BRPM | BODY MASS INDEX: 24.27 KG/M2 | TEMPERATURE: 98 F | OXYGEN SATURATION: 98 % | DIASTOLIC BLOOD PRESSURE: 74 MMHG | HEIGHT: 63 IN

## 2023-09-14 DIAGNOSIS — Z86.73 HISTORY OF CVA (CEREBROVASCULAR ACCIDENT): ICD-10-CM

## 2023-09-14 DIAGNOSIS — E11.59 TYPE 2 DIABETES MELLITUS WITH OTHER CIRCULATORY COMPLICATION, WITHOUT LONG-TERM CURRENT USE OF INSULIN (H): Primary | ICD-10-CM

## 2023-09-14 DIAGNOSIS — I10 PRIMARY HYPERTENSION: ICD-10-CM

## 2023-09-14 DIAGNOSIS — F01.53 VASCULAR DEMENTIA WITH DEPRESSED MOOD (H): ICD-10-CM

## 2023-09-14 DIAGNOSIS — Z89.512 STATUS POST BELOW-KNEE AMPUTATION OF LEFT LOWER EXTREMITY (H): ICD-10-CM

## 2023-09-14 DIAGNOSIS — I48.20 ATRIAL FIBRILLATION, CHRONIC (H): ICD-10-CM

## 2023-09-14 PROCEDURE — 99309 SBSQ NF CARE MODERATE MDM 30: CPT | Performed by: NURSE PRACTITIONER

## 2023-09-14 NOTE — PROGRESS NOTES
"[facility] GERIATRICS  Chief Complaint   Patient presents with    [facility] Regulatory     [place] Medical Record Number:  [id number]  Place of Service where encounter took place:  [facility] () [[id number]]    HPI:    Angela Beckham  is 73 year old (1950), who is being seen today for a federally mandated E/M visit. She has a past medical history DM2, PAD, left BKA, HTN, peripheral edema, CVA, mitral stenosis and atrial fibrillation.  In the last year, she had a new CVA and is having increased decline in cognitive impairment.  She will having a neuropsych exam next month to determine competency in efforts of obtaining guardianship.       Today, patient is upset that she is losing her house and that her social security dollars are going for her R&B at the facility.  She continues to have low back \"muscle pain\" with sciatica symptoms.  She is reporting concern with mitral stenosis problem but does not recall seeing a cardiologist recently.  She is following up with vascular clinic next month for PAD.      She has had slow weight loss from 145lbs now down to 137lbs.  She denies a poor appetite.  She spends most of the day in bed in her room and doesn't interact much with other residents.  She denies any mood or depression symptoms.  She becomes defensive with \"I'm not crazy\" when I ask if she would like to talk about her frustrations with the psychotherapist.     Last A1c 7.0 up from 6.0 after dcing all hypoglycemics due to flunctuating low BS.        ALLERGIES:Amoxicillin, Oxycodone, and Latex  PAST MEDICAL HISTORY:   Past Medical History:   Diagnosis Date    Diabetic neuropathy (H)     HTN     Type 2 diabetes mellitus (H)      PAST SURGICAL HISTORY:   has no past surgical history on file.  FAMILY HISTORY: family history includes Alzheimer Disease in her father; Cerebrovascular Disease in her maternal grandmother and mother; Diabetes in her paternal grandfather; Hypertension in her " brother and mother; Obesity in her brother; Respiratory in her father.  SOCIAL HISTORY:  reports that she quit smoking about 2 years ago. Her smoking use included cigarettes. She smoked an average of 1 pack per day. She has never used smokeless tobacco. She reports that she does not drink alcohol and does not use drugs.    MEDICATIONS:       Review of your medicines            Accurate as of September 14, 2023  3:07 PM. If you have any questions, ask your nurse or doctor.                CONTINUE these medicines which have NOT CHANGED        Dose / Directions   ammonium lactate 12 % external cream  Commonly known as: AMLACTIN      Dose: 0.25 inch  Apply 0.25 inches topically 2 times daily To feet  Refills: 0     apixaban ANTICOAGULANT 5 MG tablet  Commonly known as: ELIQUIS      Dose: 5 mg  Take 5 mg by mouth 2 times daily  Refills: 0     aspirin 81 MG EC tablet  Commonly known as: ASA  Used for: Cerebrovascular accident (CVA), unspecified mechanism (H)      Dose: 81 mg  Take 1 tablet (81 mg) by mouth daily  Quantity: 30 tablet  Refills: 0     atorvastatin 20 MG tablet  Commonly known as: LIPITOR      Dose: 20 mg  Take 20 mg by mouth At Bedtime  Refills: 0     camphor-menthol-methyl sal 4-10-30 % Crea      Dose: 1 g  Externally apply 1 g topically 2 times daily Apply to thoracic paraspinal muscles for muscle spasm  Refills: 0     metoprolol succinate ER 25 MG 24 hr tablet  Commonly known as: TOPROL XL  Used for: Chronic atrial fibrillation with RVR (H)      Dose: 25 mg  Take 1 tablet (25 mg) by mouth daily  Quantity: 30 tablet  Refills: 0     miconazole 2 % cream  Commonly known as: MICATIN      Dose: 1 applicator  1 applicator 2 times daily Apply to skin folds  Refills: 0            ROS:  Patient denies fever, chills, headache, lightheadedness, dizziness, rhinorrhea, cough, congestion, shortness of breath, chest pain, palpitations, abdominal pain, n/v, diarrhea, constipation, change in appetite, change in sleep  "pattern, dysuria, frequency, burning or pain with urination.  Other than stated in HPI all other review of systems is negative.       Exam:  Vital signs:/74   Pulse 76   Temp 98  F (36.7  C)   Resp 18   Ht 1.588 m (5' 2.5\")   Wt 62.1 kg (137 lb)   SpO2 98%   BMI 24.66 kg/m     GENERAL APPEARANCE: Well developed, well nourished, in no acute distress.  HEENT: normocephalic, atraumatic  sclerae anicteric, conjunctivae clear and moist, EOM intact  LUNGS: Lung sounds CTA, no adventitious sounds, respiratory effort normal.  CARD: RRR, S1, S2, without murmurs, gallops, rubs  ABD: Soft, nondistended and nontender with normal bowel sounds.   MSK: Muscle strength and tone were equal bilaterally. Moves all extremities easily and intentionally.   EXTREMITIES: No cyanosis, clubbing or edema. Left BKA  NEURO: Alert with cognitive impairment,  Face is symmetric.  SKIN: Inspection of the skin reveals no rashes, ulcerations or petechiae.  PSYCH: euthymic        Lab/Diagnostic data:   Last Comprehensive Metabolic Panel:  Lab Results   Component Value Date     03/16/2023    POTASSIUM 4.5 03/16/2023    CHLORIDE 106 03/16/2023    CO2 24 03/16/2023    ANIONGAP 11 03/16/2023    GLC 89 03/16/2023    BUN 19.9 03/16/2023    CR 0.54 03/16/2023    GFRESTIMATED >90 03/16/2023    SUSIE 9.8 03/16/2023     Lab Results   Component Value Date    WBC 3.5 01/17/2023    WBC 6.9 10/06/2009     Lab Results   Component Value Date    RBC 4.17 01/17/2023    RBC 4.29 10/06/2009     Lab Results   Component Value Date    HGB 10.8 01/17/2023    HGB 11.8 10/06/2009     Lab Results   Component Value Date    HCT 35.8 01/17/2023    HCT 34.5 10/06/2009     Lab Results   Component Value Date    MCV 86 01/17/2023    MCV 81 10/06/2009     Lab Results   Component Value Date    MCH 25.9 01/17/2023    MCH 27.4 10/06/2009     Lab Results   Component Value Date    MCHC 30.2 01/17/2023    [facility] 34.0 10/06/2009     Lab Results   Component Value Date    RDW " 16.3 01/17/2023    RDW 12.9 10/06/2009     Lab Results   Component Value Date     01/17/2023     10/06/2009     Lab Results   Component Value Date    A1C 7.0 06/16/2023    A1C 6.0 03/16/2023    A1C 6.1 01/13/2023    A1C 6.4 09/15/2022    A1C 6.6 04/15/2022         ASSESSMENT/PLAN  Type 2 diabetes mellitus with other circulatory complication, without long-term current use of insulin (H)  Check A1c in 3 months, no longer on medications or check ing BS.  Continue to monitor.  Tight control not necessary.      Vascular dementia with depressed mood (H)  History of CVA (cerebrovascular accident)  Neuropsych eval for competency, no medications for mood.  Monitor. Continue with asa and statin.     Atrial fibrillation, chronic (H)  Rate controlled, continue with Metoprolol. On Eliquis for AC    Primary hypertension  Controlled, continue with Metoprolol.     Status post below-knee amputation of left lower extremity (H)  Does not wear her prosthesis anymore.  WC bound.         Electronically signed by:  Berenice Nolen NP

## 2023-09-14 NOTE — LETTER
"    9/14/2023        RE: Angela Beckham  [facility]  [address] [address]        [facility] GERIATRICS  Chief Complaint   Patient presents with     [facility] Regulatory     [place] Medical Record Number:  [id number]  Place of Service where encounter took place:  [facility] () [[id number]]    HPI:    Angela Beckham  is 73 year old (1950), who is being seen today for a federally mandated E/M visit. She has a past medical history DM2, PAD, left BKA, HTN, peripheral edema, CVA, mitral stenosis and atrial fibrillation.  In the last year, she had a new CVA and is having increased decline in cognitive impairment.  She will having a neuropsych exam next month to determine competency in efforts of obtaining guardianship.       Today, patient is upset that she is losing her house and that her social security dollars are going for her R&B at the facility.  She continues to have low back \"muscle pain\" with sciatica symptoms.  She is reporting concern with mitral stenosis problem but does not recall seeing a cardiologist recently.  She is following up with vascular clinic next month for PAD.      She has had slow weight loss from 145lbs now down to 137lbs.  She denies a poor appetite.  She spends most of the day in bed in her room and doesn't interact much with other residents.  She denies any mood or depression symptoms.  She becomes defensive with \"I'm not crazy\" when I ask if she would like to talk about her frustrations with the psychotherapist.     Last A1c 7.0 up from 6.0 after dcing all hypoglycemics due to flunctuating low BS.        ALLERGIES:Amoxicillin, Oxycodone, and Latex  PAST MEDICAL HISTORY:   Past Medical History:   Diagnosis Date     Diabetic neuropathy (H)      HTN      Type 2 diabetes mellitus (H)      PAST SURGICAL HISTORY:   has no past surgical history on file.  FAMILY HISTORY: family history includes Alzheimer Disease in her father; " Cerebrovascular Disease in her maternal grandmother and mother; Diabetes in her paternal grandfather; Hypertension in her brother and mother; Obesity in her brother; Respiratory in her father.  SOCIAL HISTORY:  reports that she quit smoking about 2 years ago. Her smoking use included cigarettes. She smoked an average of 1 pack per day. She has never used smokeless tobacco. She reports that she does not drink alcohol and does not use drugs.    MEDICATIONS:       Review of your medicines            Accurate as of September 14, 2023  3:07 PM. If you have any questions, ask your nurse or doctor.                CONTINUE these medicines which have NOT CHANGED        Dose / Directions   ammonium lactate 12 % external cream  Commonly known as: AMLACTIN      Dose: 0.25 inch  Apply 0.25 inches topically 2 times daily To feet  Refills: 0     apixaban ANTICOAGULANT 5 MG tablet  Commonly known as: ELIQUIS      Dose: 5 mg  Take 5 mg by mouth 2 times daily  Refills: 0     aspirin 81 MG EC tablet  Commonly known as: ASA  Used for: Cerebrovascular accident (CVA), unspecified mechanism (H)      Dose: 81 mg  Take 1 tablet (81 mg) by mouth daily  Quantity: 30 tablet  Refills: 0     atorvastatin 20 MG tablet  Commonly known as: LIPITOR      Dose: 20 mg  Take 20 mg by mouth At Bedtime  Refills: 0     camphor-menthol-methyl sal 4-10-30 % Crea      Dose: 1 g  Externally apply 1 g topically 2 times daily Apply to thoracic paraspinal muscles for muscle spasm  Refills: 0     metoprolol succinate ER 25 MG 24 hr tablet  Commonly known as: TOPROL XL  Used for: Chronic atrial fibrillation with RVR (H)      Dose: 25 mg  Take 1 tablet (25 mg) by mouth daily  Quantity: 30 tablet  Refills: 0     miconazole 2 % cream  Commonly known as: MICATIN      Dose: 1 applicator  1 applicator 2 times daily Apply to skin folds  Refills: 0            ROS:  Patient denies fever, chills, headache, lightheadedness, dizziness, rhinorrhea, cough, congestion, shortness  "of breath, chest pain, palpitations, abdominal pain, n/v, diarrhea, constipation, change in appetite, change in sleep pattern, dysuria, frequency, burning or pain with urination.  Other than stated in HPI all other review of systems is negative.       Exam:  Vital signs:/74   Pulse 76   Temp 98  F (36.7  C)   Resp 18   Ht 1.588 m (5' 2.5\")   Wt 62.1 kg (137 lb)   SpO2 98%   BMI 24.66 kg/m     GENERAL APPEARANCE: Well developed, well nourished, in no acute distress.  HEENT: normocephalic, atraumatic  sclerae anicteric, conjunctivae clear and moist, EOM intact  LUNGS: Lung sounds CTA, no adventitious sounds, respiratory effort normal.  CARD: RRR, S1, S2, without murmurs, gallops, rubs  ABD: Soft, nondistended and nontender with normal bowel sounds.   MSK: Muscle strength and tone were equal bilaterally. Moves all extremities easily and intentionally.   EXTREMITIES: No cyanosis, clubbing or edema. Left BKA  NEURO: Alert with cognitive impairment,  Face is symmetric.  SKIN: Inspection of the skin reveals no rashes, ulcerations or petechiae.  PSYCH: euthymic        Lab/Diagnostic data:   Last Comprehensive Metabolic Panel:  Lab Results   Component Value Date     03/16/2023    POTASSIUM 4.5 03/16/2023    CHLORIDE 106 03/16/2023    CO2 24 03/16/2023    ANIONGAP 11 03/16/2023    GLC 89 03/16/2023    BUN 19.9 03/16/2023    CR 0.54 03/16/2023    GFRESTIMATED >90 03/16/2023    SUSIE 9.8 03/16/2023     Lab Results   Component Value Date    WBC 3.5 01/17/2023    WBC 6.9 10/06/2009     Lab Results   Component Value Date    RBC 4.17 01/17/2023    RBC 4.29 10/06/2009     Lab Results   Component Value Date    HGB 10.8 01/17/2023    HGB 11.8 10/06/2009     Lab Results   Component Value Date    HCT 35.8 01/17/2023    HCT 34.5 10/06/2009     Lab Results   Component Value Date    MCV 86 01/17/2023    MCV 81 10/06/2009     Lab Results   Component Value Date    MCH 25.9 01/17/2023    [facility] 27.4 10/06/2009     Lab Results "   Component Value Date    MCHC 30.2 01/17/2023    MCHC 34.0 10/06/2009     Lab Results   Component Value Date    RDW 16.3 01/17/2023    RDW 12.9 10/06/2009     Lab Results   Component Value Date     01/17/2023     10/06/2009     Lab Results   Component Value Date    A1C 7.0 06/16/2023    A1C 6.0 03/16/2023    A1C 6.1 01/13/2023    A1C 6.4 09/15/2022    A1C 6.6 04/15/2022         ASSESSMENT/PLAN  Type 2 diabetes mellitus with other circulatory complication, without long-term current use of insulin (H)  Check A1c in 3 months, no longer on medications or check ing BS.  Continue to monitor.  Tight control not necessary.      Vascular dementia with depressed mood (H)  History of CVA (cerebrovascular accident)  Neuropsych eval for competency, no medications for mood.  Monitor. Continue with asa and statin.     Atrial fibrillation, chronic (H)  Rate controlled, continue with Metoprolol. On Eliquis for AC    Primary hypertension  Controlled, continue with Metoprolol.     Status post below-knee amputation of left lower extremity (H)  Does not wear her prosthesis anymore.  WC bound.         Electronically signed by:  Berenice Nolen NP          Sincerely,        Berenice Nolen NP

## 2023-09-18 ENCOUNTER — PATIENT OUTREACH (OUTPATIENT)
Dept: GERIATRIC MEDICINE | Facility: CLINIC | Age: 73
End: 2023-09-18
Payer: COMMERCIAL

## 2023-09-18 NOTE — PROGRESS NOTES
[facility] Six-Month Assessment    6 month assessment completed on 9/18/2023 with Vero LAMBERT.    ER visits: No  Hospitalizations: No  TCU stays: No  Significant health status changes: No falls in the last 6 months. Resident has been working with PT 3x/week. This has encouraged resident to get out of bed more often. No other major changes.   Falls/Injuries: No  ADL/IADL changes: No    Reviewed Institutional Assessment and updated as needed.     Will see member in 6 months for an annual health risk assessment.   Encouraged member to call CC with any questions or concerns in the meantime.      RAHEEL VasquezN, RN, PHN, [facility]  Care Coordinator-Long Term Care  [address] [address]  [email or web address]   [email or web address]     Office: [phone]   Fax: [phone]

## 2023-10-04 ENCOUNTER — MEDICAL CORRESPONDENCE (OUTPATIENT)
Dept: HEALTH INFORMATION MANAGEMENT | Facility: CLINIC | Age: 73
End: 2023-10-04
Payer: COMMERCIAL

## 2023-10-05 ENCOUNTER — TELEPHONE (OUTPATIENT)
Dept: NEUROLOGY | Facility: CLINIC | Age: 73
End: 2023-10-05
Payer: COMMERCIAL

## 2023-10-05 ENCOUNTER — TRANSCRIBE ORDERS (OUTPATIENT)
Dept: OTHER | Age: 73
End: 2023-10-05

## 2023-10-05 DIAGNOSIS — F01.50 VASCULAR DEMENTIA, UNSPECIFIED SEVERITY, WITHOUT BEHAVIORAL DISTURBANCE, PSYCHOTIC DISTURBANCE, MOOD DISTURBANCE, AND ANXIETY (H): Primary | ICD-10-CM

## 2023-10-05 NOTE — TELEPHONE ENCOUNTER
Left Voicemail (1st Attempt) for the patient to call back and schedule the following:    Appointment type: Neuropsychology Evaluation   Provider: N/A  Return date: First Available   Specialty phone number: [phone]  Additional appointment(s) needed: N/A  Additonal Notes: Reschedule WB location.  ABIGAIL Montelongo on 10/5/2023 at 10:51 AM

## 2023-10-22 ENCOUNTER — LAB REQUISITION (OUTPATIENT)
Dept: LAB | Facility: CLINIC | Age: 73
End: 2023-10-22
Payer: COMMERCIAL

## 2023-10-22 DIAGNOSIS — I82.409 ACUTE EMBOLISM AND THROMBOSIS OF UNSPECIFIED DEEP VEINS OF UNSPECIFIED LOWER EXTREMITY (H): ICD-10-CM

## 2023-10-23 ENCOUNTER — TELEPHONE (OUTPATIENT)
Dept: GERIATRICS | Facility: CLINIC | Age: 73
End: 2023-10-23

## 2023-10-23 ENCOUNTER — LAB REQUISITION (OUTPATIENT)
Dept: LAB | Facility: CLINIC | Age: 73
End: 2023-10-23
Payer: COMMERCIAL

## 2023-10-23 DIAGNOSIS — I82.409 ACUTE EMBOLISM AND THROMBOSIS OF UNSPECIFIED DEEP VEINS OF UNSPECIFIED LOWER EXTREMITY (H): ICD-10-CM

## 2023-10-23 LAB — INR PPP: 1.86 (ref 0.85–1.15)

## 2023-10-23 PROCEDURE — 85610 PROTHROMBIN TIME: CPT | Mod: ORL | Performed by: FAMILY MEDICINE

## 2023-10-23 PROCEDURE — P9604 ONE-WAY ALLOW PRORATED TRIP: HCPCS | Mod: ORL | Performed by: FAMILY MEDICINE

## 2023-10-23 PROCEDURE — 36415 COLL VENOUS BLD VENIPUNCTURE: CPT | Mod: ORL | Performed by: FAMILY MEDICINE

## 2023-10-23 NOTE — TELEPHONE ENCOUNTER
[facility] Geriatrics Triage Nurse INR     Provider: CEDRIC Ortiz  Facility: [facility]   Facility Type:  [facility]    Caller: Segundo  Call Back Number: [phone]  Reason for call: INR  Diagnosis/Goal: A. Fib    Todays INR: 1.86  Last INR 10/22 2.0(2.5mg).      Heparin/Lovenox:  Yes; Lovenox  Currently on ABX?: No  Other interacting medication:  Cilostazol  Missed or refused doses: No    Verbal Order/Direction given by Provider: Warfarin 2.5mg today.  Continue Lovenox as ordered.  Check INR on 10/24/23.      Provider Giving Order:  CEDRIC Ortiz    Verbal Order given to: Segundo Campbell RN

## 2023-10-24 ENCOUNTER — TELEPHONE (OUTPATIENT)
Dept: GERIATRICS | Facility: CLINIC | Age: 73
End: 2023-10-24

## 2023-10-24 LAB — INR PPP: 1.38 (ref 0.85–1.15)

## 2023-10-24 PROCEDURE — P9604 ONE-WAY ALLOW PRORATED TRIP: HCPCS | Mod: ORL | Performed by: FAMILY MEDICINE

## 2023-10-24 PROCEDURE — 85610 PROTHROMBIN TIME: CPT | Mod: ORL | Performed by: FAMILY MEDICINE

## 2023-10-24 PROCEDURE — 36415 COLL VENOUS BLD VENIPUNCTURE: CPT | Mod: ORL | Performed by: FAMILY MEDICINE

## 2023-10-24 NOTE — TELEPHONE ENCOUNTER
[facility] Triage Nurse INR     Provider: CEDRIC Ortiz  Facility: [facility]   Facility Type:  [facility]    Caller: Chelsi  Call Back Number: [phone]  Reason for call: INR  Diagnosis/Goal: A. Fib    Todays INR: 1.38  Last INR 10/23 INR 1.86(2.5mg), 10/22 2.0(2.5mg)     Heparin/Lovenox:  Yes; Lovenox  Currently on ABX?: No  Other interacting medication:  Cilostazol  Missed or refused doses: No    Verbal Order/Direction given by Provider: Warfarin 5mg daily.  Continue Lovenox as ordered.  Next INR 10/27/23.      Provider Giving Order:  CEDRIC Ortiz    Verbal Order given to: Chelsi Campbell RN

## 2023-10-26 ENCOUNTER — LAB REQUISITION (OUTPATIENT)
Dept: LAB | Facility: CLINIC | Age: 73
End: 2023-10-26
Payer: COMMERCIAL

## 2023-10-26 ENCOUNTER — NURSING HOME VISIT (OUTPATIENT)
Dept: GERIATRICS | Facility: CLINIC | Age: 73
End: 2023-10-26
Payer: COMMERCIAL

## 2023-10-26 VITALS
BODY MASS INDEX: 26.31 KG/M2 | DIASTOLIC BLOOD PRESSURE: 73 MMHG | RESPIRATION RATE: 18 BRPM | HEART RATE: 70 BPM | WEIGHT: 143 LBS | HEIGHT: 62 IN | SYSTOLIC BLOOD PRESSURE: 140 MMHG | TEMPERATURE: 98.1 F | OXYGEN SATURATION: 95 %

## 2023-10-26 DIAGNOSIS — I73.9 PAD (PERIPHERAL ARTERY DISEASE) (H): ICD-10-CM

## 2023-10-26 DIAGNOSIS — Z86.73 HISTORY OF CVA (CEREBROVASCULAR ACCIDENT): ICD-10-CM

## 2023-10-26 DIAGNOSIS — E11.9 TYPE 2 DIABETES MELLITUS WITHOUT COMPLICATIONS (H): ICD-10-CM

## 2023-10-26 DIAGNOSIS — I48.20 CHRONIC ATRIAL FIBRILLATION WITH RVR (H): Primary | ICD-10-CM

## 2023-10-26 DIAGNOSIS — Z89.512 HX OF BKA, LEFT (H): ICD-10-CM

## 2023-10-26 DIAGNOSIS — I10 PRIMARY HYPERTENSION: ICD-10-CM

## 2023-10-26 DIAGNOSIS — E11.59 TYPE 2 DIABETES MELLITUS WITH OTHER CIRCULATORY COMPLICATION, WITHOUT LONG-TERM CURRENT USE OF INSULIN (H): ICD-10-CM

## 2023-10-26 DIAGNOSIS — I25.10 CORONARY ARTERY DISEASE INVOLVING NATIVE HEART WITHOUT ANGINA PECTORIS, UNSPECIFIED VESSEL OR LESION TYPE: ICD-10-CM

## 2023-10-26 DIAGNOSIS — I34.0 NONRHEUMATIC MITRAL VALVE REGURGITATION: ICD-10-CM

## 2023-10-26 DIAGNOSIS — I48.91 UNSPECIFIED ATRIAL FIBRILLATION (H): ICD-10-CM

## 2023-10-26 PROBLEM — I35.0 AORTIC VALVE STENOSIS: Status: ACTIVE | Noted: 2023-10-21

## 2023-10-26 PROBLEM — E83.42 HYPOMAGNESEMIA: Status: RESOLVED | Noted: 2023-01-14 | Resolved: 2023-10-26

## 2023-10-26 PROBLEM — J18.9 PNEUMONIA OF BOTH LUNGS DUE TO INFECTIOUS ORGANISM, UNSPECIFIED PART OF LUNG: Status: RESOLVED | Noted: 2023-01-14 | Resolved: 2023-10-26

## 2023-10-26 PROBLEM — I95.0 IDIOPATHIC HYPOTENSION: Status: ACTIVE | Noted: 2023-10-19

## 2023-10-26 PROBLEM — G93.40 ACUTE ENCEPHALOPATHY: Status: RESOLVED | Noted: 2023-01-14 | Resolved: 2023-10-26

## 2023-10-26 PROCEDURE — 99310 SBSQ NF CARE HIGH MDM 45: CPT | Performed by: NURSE PRACTITIONER

## 2023-10-26 RX ORDER — ENOXAPARIN SODIUM 100 MG/ML
80 INJECTION SUBCUTANEOUS EVERY 12 HOURS
COMMUNITY
End: 2024-01-04

## 2023-10-26 RX ORDER — METOPROLOL SUCCINATE 25 MG/1
12.5 TABLET, EXTENDED RELEASE ORAL DAILY
COMMUNITY

## 2023-10-26 RX ORDER — CILOSTAZOL 50 MG/1
50 TABLET ORAL 2 TIMES DAILY
COMMUNITY

## 2023-10-26 RX ORDER — FUROSEMIDE 20 MG
20 TABLET ORAL DAILY
COMMUNITY

## 2023-10-26 RX ORDER — DIGOXIN 125 MCG
125 TABLET ORAL DAILY
COMMUNITY
End: 2023-11-09 | Stop reason: DRUGHIGH

## 2023-10-26 NOTE — LETTER
10/26/2023        RE: Angela Beckham  [facility]  [address] [address]                                                                                      [facility]     Code Status:  DNR  Visit Type: Hospital F/U     Facility:   [facility] ([facility]) [[id number]]        History of Present Illness:   Hospital Admission Date: 10/19/2023   Hospital Discharge Date: 10/22/2023      Angela Beckham is a 73 year old female with past medical history for Afib, PAD, left BKA, CAD, hx of CVA, DM2, mitral valve insufficiency, pulmonary hypertension.  She was recently hospitalized due to hematemesis however found to be in afib with RVR. She was seen by cardiology and was started on digoxin and metoprolol was increased.  She was switched from Eliquis to warfarin. She is not a surgical candidate for Valve replacement. During hospitalization she has no further emesis or s/s of bleeding.  Asa was stopped and started on PPI.     Today,  patient denies any pain or symptoms of palpitations.  I see her in the presence of therapy and [facility] assisting with prothesis.  Therapy states patient has been more consistent with using her prothesis everyday and is more motivated to work with therapy on standing and ambulation exercises.  She denies any pain or discomforts.  Nursing reports her Bps are lower the last few days 90s/60s. BP today is 140/73.  She denies any symptoms of lightheadedness or dizziness.     Past Medical History:   Diagnosis Date     Diabetic neuropathy (H)      HTN      Type 2 diabetes mellitus (H)      History reviewed. No pertinent surgical history.  Family History   Problem Relation Age of Onset     Cerebrovascular Disease Mother      Hypertension Mother      Alzheimer Disease Father      Respiratory Father      Cerebrovascular Disease Maternal Grandmother      Diabetes Paternal Grandfather      Hypertension Brother      Obesity Brother      Social History      Socioeconomic History     Marital status: Single     Spouse name: Not on file     Number of children: 0     Years of education: Not on file     Highest education level: Not on file   Occupational History     Not on file   Tobacco Use     Smoking status: Former     Packs/day: 1     Types: Cigarettes     Quit date: 12/3/2020     Years since quittin.8     Smokeless tobacco: Never   Vaping Use     Vaping Use: Never used   Substance and Sexual Activity     Alcohol use: No     Drug use: No     Sexual activity: Not Currently     Partners: Male   Other Topics Concern     Parent/sibling w/ CABG, MI or angioplasty before 65F 55M? Not Asked      Service No     Blood Transfusions Yes     Comment:      Caffeine Concern No     Occupational Exposure No     Hobby Hazards No     Sleep Concern No     Stress Concern No     Weight Concern No     Special Diet No     Back Care No     Exercise Yes     Bike Helmet No     Seat Belt Yes     Self-Exams No   Social History Narrative     Not on file     Social Determinants of Health     Financial Resource Strain: Not on file   Food Insecurity: Not on file   Transportation Needs: Not on file   Physical Activity: Not on file   Stress: Not on file   Social Connections: Not on file   Interpersonal Safety: Not on file   Housing Stability: Not on file       Current Outpatient Medications   Medication Sig Dispense Refill     atorvastatin (LIPITOR) 20 MG tablet Take 20 mg by mouth At Bedtime       cilostazol (PLETAL) 50 MG tablet Take 50 mg by mouth 2 times daily       digoxin (LANOXIN) 125 MCG tablet Take 125 mcg by mouth daily       enoxaparin ANTICOAGULANT (LOVENOX) 80 MG/0.8ML syringe Inject 80 mg Subcutaneous every 12 hours       furosemide (LASIX) 20 MG tablet Take 20 mg by mouth daily       metoprolol succinate ER (TOPROL XL) 25 MG 24 hr tablet Take 75 mg by mouth daily       miconazole (MICATIN) 2 % cream 1 applicator 2 times daily Apply to skin folds       omeprazole  "(PRILOSEC) 20 MG DR capsule Take 20 mg by mouth daily       metoprolol succinate ER (TOPROL XL) 25 MG 24 hr tablet Take 1 tablet (25 mg) by mouth daily 30 tablet 0     WARFARIN SODIUM PO 10/24/23 INR 1.38  Take 5mg daily.  Continue Lovenox as ordered.  Next INR 10/27/23.    10/23/23 INR 1.86  Take 2.5mg on 10/23.  Continue Lovenox as ordered.  Next INR 10/24/23.       Allergies   Allergen Reactions     Amoxicillin Unknown     Oxycodone Other (See Comments)     Does not remember     Latex Rash     Immunization History   Administered Date(s) Administered     Flu, Unspecified 02/20/2013     Influenza (High Dose) 3 valent vaccine 10/02/2013, 01/08/2022     Influenza (IIV3) PF 01/22/2009, 02/20/2013     Influenza Vaccine 65+ (Fluzone HD) 10/02/2013     Influenza Vaccine >6 months (Alfuria,Fluzone) 02/20/2013     Influenza Vaccine Im 4yrs+ 4 Valent CCIIV4 10/04/2022, 09/29/2023     Influenza Vaccine, 6+MO IM (QUADRIVALENT W/PRESERVATIVES) 01/22/2009, 02/20/2013     Pneumo Conj 13-V (2010&after) 01/04/2022     Pneumococcal 23 valent 02/20/2013         Post Discharge Medication Reconciliation Status: discharge medications reconciled and changed, per note/orders.    Medications list and allergies in the facility chart have been reviewed.  Please see facility EMR for most up to date list.         Review of Systems   Patient denies fever, chills, headache, lightheadedness, dizziness, rhinorrhea, cough, congestion, shortness of breath, chest pain, palpitations, abdominal pain, n/v, diarrhea, constipation, change in appetite, change in sleep pattern, dysuria, frequency, burning or pain with urination.  Other than stated in HPI all other review of systems is negative.         Physical Exam  Vital signs:BP (!) 140/73   Pulse 70   Temp 98.1  F (36.7  C)   Resp 18   Ht 1.575 m (5' 2\")   Wt 64.9 kg (143 lb)   SpO2 95%   BMI 26.16 kg/m     GENERAL APPEARANCE: Well developed, well nourished, in no acute distress.  HEENT: " normocephalic, atraumatic  sclerae anicteric, conjunctivae clear and moist, EOM intact  LUNGS: Lung sounds CTA, no adventitious sounds, respiratory effort normal.  CARD: RRR, S1, S2, 2/6 murmur, no  gallops, rubs  ABD: Soft, nondistended and nontender with normal bowel sounds.   MSK:left BKA, with good ROM and strength equally bilateral extremities   EXTREMITIES: No cyanosis, clubbing or edema.  NEURO: Alert and oriented x 3. Face is symmetric.  SKIN: Inspection of the skin reveals no rashes, ulcerations or petechiae.  PSYCH: euthymic        Labs:    Sodium 136 136 - 145 mmol/L   10/22/2023 7:35 AM [facility]     Potassium 3.7 3.5 - 5.1 mmol/L   10/22/2023 7:35 AM [facility]     Chloride 104 98 - 109 mmol/L   10/22/2023 7:35 AM [facility]     CO2 23 20 - 29 mmol/L   10/22/2023 7:35 AM [facility]     Anion Gap 9 7 - 16 mmol/L   10/22/2023 7:35 AM [facility]     Calcium 8.1 (L) 8.4 - 10.4 mg/dL   10/22/2023 7:35 AM [facility]     BUN 22 7 - 26 mg/dL   10/22/2023 7:35 AM [facility]     Creatinine 0.75 0.55 - 1.02 mg/dL   10/22/2023 7:35 AM [facility]     Glucose 165 (H) 70 - 100 mg/dL       Pmrvmkhltc85.3 (L)12.0 - 15.5 g/dL 10/22/2023 7:10 AM [facility]           Assessment/plan:   Chronic atrial fibrillation with RVR (H)  Rate controlled, hold parameters for Metoprolol written in the facility.  Continue with Digoxin.  Warfarin for AC.  Check INR tomorrow.  Continue with Lovenox until INR 2.0.     Type 2 diabetes mellitus with other circulatory complication, without long-term current use of insulin (H)  No meds, last A1c 7.0 in June.     Primary hypertension  Running low intermittent.  Hold parameters for Metoprolol.     Nonrheumatic mitral valve regurgitation  Not surgical candidate.  Recently changed from Eliquis to Warfarin.     Coronary artery disease involving native heart without angina pectoris, unspecified vessel or lesion  type  No chest pain.  No longer on ASA due to Warfarin.  Continue with atorvastatin.     History of CVA (cerebrovascular accident)  Vascular dementia, will have neuropsych testing done to determine compentency. Continue with atorvastatin.      Hx of BKA, left (H)  Have prosthetic adjusted due to patient more compliant with device.     PAD (peripheral artery disease) (H24)  On pletal, warfarin and atorvastatin.     45 total minutes spent in reviewing medical records from [facility], assessing patient and coordinating the above plan of care.       Electronically signed by: Berenice Nolen NP      Sincerely,        Berenice Nolen, NP

## 2023-10-26 NOTE — PROGRESS NOTES
[facility]     Code Status:  DNR  Visit Type: Hospital F/U     Facility:   [facility] () [[id number]]        History of Present Illness:   Hospital Admission Date: 10/19/2023   Hospital Discharge Date: 10/22/2023      Angela Beckham is a 73 year old female with past medical history for Afib, PAD, left BKA, CAD, hx of CVA, DM2, mitral valve insufficiency, pulmonary hypertension.  She was recently hospitalized due to hematemesis however found to be in afib with RVR. She was seen by cardiology and was started on digoxin and metoprolol was increased.  She was switched from Eliquis to warfarin. She is not a surgical candidate for Valve replacement. During hospitalization she has no further emesis or s/s of bleeding.  Asa was stopped and started on PPI.     Today,  patient denies any pain or symptoms of palpitations.  I see her in the presence of therapy and [facility] assisting with prothesis.  Therapy states patient has been more consistent with using her prothesis everyday and is more motivated to work with therapy on standing and ambulation exercises.  She denies any pain or discomforts.  Nursing reports her Bps are lower the last few days 90s/60s. BP today is 140/73.  She denies any symptoms of lightheadedness or dizziness.     Past Medical History:   Diagnosis Date    Diabetic neuropathy (H)     HTN     Type 2 diabetes mellitus (H)      History reviewed. No pertinent surgical history.  Family History   Problem Relation Age of Onset    Cerebrovascular Disease Mother     Hypertension Mother     Alzheimer Disease Father     Respiratory Father     Cerebrovascular Disease Maternal Grandmother     Diabetes Paternal Grandfather     Hypertension Brother     Obesity Brother      Social History     Socioeconomic History    Marital status: Single     Spouse name: Not on file    Number of children: 0    Years of education:  Not on file    Highest education level: Not on file   Occupational History    Not on file   Tobacco Use    Smoking status: Former     Packs/day: 1     Types: Cigarettes     Quit date: 12/3/2020     Years since quittin.8    Smokeless tobacco: Never   Vaping Use    Vaping Use: Never used   Substance and Sexual Activity    Alcohol use: No    Drug use: No    Sexual activity: Not Currently     Partners: Male   Other Topics Concern    Parent/sibling w/ CABG, MI or angioplasty before 65F 55M? Not Asked     Service No    Blood Transfusions Yes     Comment: 1968    Caffeine Concern No    Occupational Exposure No    Hobby Hazards No    Sleep Concern No    Stress Concern No    Weight Concern No    Special Diet No    Back Care No    Exercise Yes    Bike Helmet No    Seat Belt Yes    Self-Exams No   Social History Narrative    Not on file     Social Determinants of Health     Financial Resource Strain: Not on file   Food Insecurity: Not on file   Transportation Needs: Not on file   Physical Activity: Not on file   Stress: Not on file   Social Connections: Not on file   Interpersonal Safety: Not on file   Housing Stability: Not on file       Current Outpatient Medications   Medication Sig Dispense Refill    atorvastatin (LIPITOR) 20 MG tablet Take 20 mg by mouth At Bedtime      cilostazol (PLETAL) 50 MG tablet Take 50 mg by mouth 2 times daily      digoxin (LANOXIN) 125 MCG tablet Take 125 mcg by mouth daily      enoxaparin ANTICOAGULANT (LOVENOX) 80 MG/0.8ML syringe Inject 80 mg Subcutaneous every 12 hours      furosemide (LASIX) 20 MG tablet Take 20 mg by mouth daily      metoprolol succinate ER (TOPROL XL) 25 MG 24 hr tablet Take 75 mg by mouth daily      miconazole (MICATIN) 2 % cream 1 applicator 2 times daily Apply to skin folds      omeprazole (PRILOSEC) 20 MG DR capsule Take 20 mg by mouth daily      metoprolol succinate ER (TOPROL XL) 25 MG 24 hr tablet Take 1 tablet (25 mg) by mouth daily 30 tablet 0     "WARFARIN SODIUM PO 10/24/23 INR 1.38  Take 5mg daily.  Continue Lovenox as ordered.  Next INR 10/27/23.    10/23/23 INR 1.86  Take 2.5mg on 10/23.  Continue Lovenox as ordered.  Next INR 10/24/23.       Allergies   Allergen Reactions    Amoxicillin Unknown    Oxycodone Other (See Comments)     Does not remember    Latex Rash     Immunization History   Administered Date(s) Administered    Flu, Unspecified 02/20/2013    Influenza (High Dose) 3 valent vaccine 10/02/2013, 01/08/2022    Influenza (IIV3) PF 01/22/2009, 02/20/2013    Influenza Vaccine 65+ (Fluzone HD) 10/02/2013    Influenza Vaccine >6 months (Alfuria,Fluzone) 02/20/2013    Influenza Vaccine Im 4yrs+ 4 Valent CCIIV4 10/04/2022, 09/29/2023    Influenza Vaccine, 6+MO IM (QUADRIVALENT W/PRESERVATIVES) 01/22/2009, 02/20/2013    Pneumo Conj 13-V (2010&after) 01/04/2022    Pneumococcal 23 valent 02/20/2013         Post Discharge Medication Reconciliation Status: discharge medications reconciled and changed, per note/orders.    Medications list and allergies in the facility chart have been reviewed.  Please see facility EMR for most up to date list.         Review of Systems   Patient denies fever, chills, headache, lightheadedness, dizziness, rhinorrhea, cough, congestion, shortness of breath, chest pain, palpitations, abdominal pain, n/v, diarrhea, constipation, change in appetite, change in sleep pattern, dysuria, frequency, burning or pain with urination.  Other than stated in HPI all other review of systems is negative.         Physical Exam  Vital signs:BP (!) 140/73   Pulse 70   Temp 98.1  F (36.7  C)   Resp 18   Ht 1.575 m (5' 2\")   Wt 64.9 kg (143 lb)   SpO2 95%   BMI 26.16 kg/m     GENERAL APPEARANCE: Well developed, well nourished, in no acute distress.  HEENT: normocephalic, atraumatic  sclerae anicteric, conjunctivae clear and moist, EOM intact  LUNGS: Lung sounds CTA, no adventitious sounds, respiratory effort normal.  CARD: RRR, S1, S2, 2/6 " murmur, no  gallops, rubs  ABD: Soft, nondistended and nontender with normal bowel sounds.   MSK:left BKA, with good ROM and strength equally bilateral extremities   EXTREMITIES: No cyanosis, clubbing or edema.  NEURO: Alert and oriented x 3. Face is symmetric.  SKIN: Inspection of the skin reveals no rashes, ulcerations or petechiae.  PSYCH: euthymic        Labs:    Sodium 136 136 - 145 mmol/L   10/22/2023 7:35 AM [facility]     Potassium 3.7 3.5 - 5.1 mmol/L   10/22/2023 7:35 AM [facility]     Chloride 104 98 - 109 mmol/L   10/22/2023 7:35 AM [facility]     CO2 23 20 - 29 mmol/L   10/22/2023 7:35 AM [facility]     Anion Gap 9 7 - 16 mmol/L   10/22/2023 7:35 AM [facility]     Calcium 8.1 (L) 8.4 - 10.4 mg/dL   10/22/2023 7:35 AM [facility]     BUN 22 7 - 26 mg/dL   10/22/2023 7:35 AM [facility]     Creatinine 0.75 0.55 - 1.02 mg/dL   10/22/2023 7:35 AM [facility]     Glucose 165 (H) 70 - 100 mg/dL       Napmmxundg21.3 (L)12.0 - 15.5 g/dL 10/22/2023 7:10 AM [facility]           Assessment/plan:   Chronic atrial fibrillation with RVR (H)  Rate controlled, hold parameters for Metoprolol written in the facility.  Continue with Digoxin.  Warfarin for AC.  Check INR tomorrow.  Continue with Lovenox until INR 2.0.     Type 2 diabetes mellitus with other circulatory complication, without long-term current use of insulin (H)  No meds, last A1c 7.0 in June.     Primary hypertension  Running low intermittent.  Hold parameters for Metoprolol.     Nonrheumatic mitral valve regurgitation  Not surgical candidate.  Recently changed from Eliquis to Warfarin.     Coronary artery disease involving native heart without angina pectoris, unspecified vessel or lesion type  No chest pain.  No longer on ASA due to Warfarin.  Continue with atorvastatin.     History of CVA (cerebrovascular accident)  Vascular dementia, will have neuropsych testing done  to determine compentency. Continue with atorvastatin.      Hx of BKA, left (H)  Have prosthetic adjusted due to patient more compliant with device.   She continue to utilize her left transtibial prosthesis and has been working with therapy to progress in her weight bearing and ambulatory skills.  The prosthesis appears appropriate and functional for use.  Due to wear however, the locking liner the patient has is torn through and is in need of replacement in order to maintain uniform pressure and protection for the skin and to serve as the interface between the skin and rigid socket.  It is further recommended that patient be supplied with single and multi-ply socks to accommodate daily volume fluctuations.  The purpose of the liner and socks is to allow patient to have a properly fitting and secure prosthesis to aid in her ADL's and for her therapeutic program.      PAD (peripheral artery disease) (H24)  On pletal, warfarin and atorvastatin.     45 total minutes spent in reviewing medical records from [facility], assessing patient and coordinating the above plan of care.       Electronically signed by: Berenice Nolen NP

## 2023-10-27 ENCOUNTER — TELEPHONE (OUTPATIENT)
Dept: GERIATRICS | Facility: CLINIC | Age: 73
End: 2023-10-27

## 2023-10-27 LAB
HBA1C MFR BLD: 6.8 %
INR PPP: 1.89 (ref 0.85–1.15)

## 2023-10-27 PROCEDURE — 85610 PROTHROMBIN TIME: CPT | Mod: ORL | Performed by: FAMILY MEDICINE

## 2023-10-27 PROCEDURE — 83036 HEMOGLOBIN GLYCOSYLATED A1C: CPT | Mod: ORL | Performed by: FAMILY MEDICINE

## 2023-10-27 PROCEDURE — P9604 ONE-WAY ALLOW PRORATED TRIP: HCPCS | Mod: ORL | Performed by: FAMILY MEDICINE

## 2023-10-27 PROCEDURE — 36415 COLL VENOUS BLD VENIPUNCTURE: CPT | Mod: ORL | Performed by: FAMILY MEDICINE

## 2023-10-27 NOTE — TELEPHONE ENCOUNTER
[facility] Message     ---- Message from Berenice Nolen NP sent at 10/27/2023 11:03 AM CDT -----  Warfarin 5mg 10/27 and 2.5mg 10/28 and 5mg 10/29.  Stop Lovenox on 10/29.  Recheck INR on 10/30.     Verbal order/direction given to: Flores  Provider Giving Order:  CEDRIC Ortiz RN

## 2023-10-29 ENCOUNTER — LAB REQUISITION (OUTPATIENT)
Dept: LAB | Facility: CLINIC | Age: 73
End: 2023-10-29
Payer: COMMERCIAL

## 2023-10-29 DIAGNOSIS — I48.91 UNSPECIFIED ATRIAL FIBRILLATION (H): ICD-10-CM

## 2023-10-30 LAB — INR PPP: 2.23 (ref 0.85–1.15)

## 2023-10-30 PROCEDURE — 85610 PROTHROMBIN TIME: CPT | Mod: ORL | Performed by: FAMILY MEDICINE

## 2023-10-30 PROCEDURE — P9604 ONE-WAY ALLOW PRORATED TRIP: HCPCS | Mod: ORL | Performed by: FAMILY MEDICINE

## 2023-10-30 PROCEDURE — 36415 COLL VENOUS BLD VENIPUNCTURE: CPT | Mod: ORL | Performed by: FAMILY MEDICINE

## 2023-11-01 ENCOUNTER — LAB REQUISITION (OUTPATIENT)
Dept: LAB | Facility: CLINIC | Age: 73
End: 2023-11-01
Payer: COMMERCIAL

## 2023-11-01 ENCOUNTER — PATIENT OUTREACH (OUTPATIENT)
Dept: GERIATRIC MEDICINE | Facility: CLINIC | Age: 73
End: 2023-11-01

## 2023-11-01 ENCOUNTER — CLINICAL UPDATE (OUTPATIENT)
Dept: PHARMACY | Facility: CLINIC | Age: 73
End: 2023-11-01
Payer: COMMERCIAL

## 2023-11-01 DIAGNOSIS — I10 BENIGN ESSENTIAL HYPERTENSION: ICD-10-CM

## 2023-11-01 DIAGNOSIS — I27.21 PULMONARY ARTERY HYPERTENSION (H): ICD-10-CM

## 2023-11-01 DIAGNOSIS — I48.91 UNSPECIFIED ATRIAL FIBRILLATION (H): ICD-10-CM

## 2023-11-01 DIAGNOSIS — I63.9 CEREBROVASCULAR ACCIDENT (STROKE) (H): ICD-10-CM

## 2023-11-01 DIAGNOSIS — I48.20 CHRONIC ATRIAL FIBRILLATION WITH RVR (H): Primary | ICD-10-CM

## 2023-11-01 PROCEDURE — 99207 PR NO CHARGE LOS: CPT | Performed by: PHARMACIST

## 2023-11-01 NOTE — PROGRESS NOTES
This patient's medication list and chart were reviewed as part of the service provided by [facility].    Assessment/Recommendations:    Patient recently started on digoxin, and doesn't appear digoxin level has been checked since initiation.  Please check digoxin level.  Continue to periodically monitor magnesium, calcium, potassium due to digoxin use; also on diuretic which increases risk of electrolyte disturbances.    Estimated Creatinine Clearance: 79.6 mL/min (based on SCr of 0.54 mg/dL).      Kellie Alvarez, Pharm.D.,[facility] [place] Certified [place] Pharmacist  Medication Therapy Management Pharmacist  [phone]

## 2023-11-01 NOTE — PROGRESS NOTES
TRANSITIONS OF CARE (SANGITA) LOG    SANGITA tasks should be completed by the CC within one (1) business day of notification of each transition. Follow up contact with member is required after return to their usual care setting.  Note:  If CC finds out about the transitions fifteen (15) days or more after the member has returned to their usual care setting, no SANGITA log is needed. However, the CC should check in with the member to discuss the transition process, any changes needed to the care plan and document it in a case note.     Member Name:  Angela Beckham [facility] Name:  Ralph O/Health Plan Member ID#: [id number]   Product: [facility] Care Coordinator Contact:  Yohana Melendez RN, PHN Agency/County/Care System: [employer]   Transition Communication Actions from Care Management Contact   Transition #1   Notification Date: 10/31/23 Transition Date:   10/19/23 Transition From: Nursing [place], [facility]     Is this the member s usual care setting?               yes Transition To: [facility], [facility]    Transition Type:  Unplanned    Documentation from conversation with the member/responsible party, provider, discharging and receiving facility:   Date: 10/31/23: Received notification of admission to hospital with dx of Hemataemesis  CC contacted Hospital /discharge planner, NA notified after discharged back to [facility]    CC reached out to  [facility] MARK Salomon  regarding transition and offered support as needed.  Reviewed and update care plan as needed.  Notified community service providers and placed services None on hold as needed.  Transition log initiated.   PCP, Berenice Nolen, notified of hospitalization via EMR.    Yohana Melendez RN, PHN  Intuitional Care Coordinator [place] [employer]  Cell [phone] Fax [phone]       Transition #2   Notification Date: 10/31/23 Transition Date:   10/22/23 Transition From: [facility], [facility]      Is this the member s usual care  setting?               no Transition To: Nursing Home, [facility]   Transition Type:  Planned    Documentation from conversation with the member/responsible party, provider, discharging and receiving facility:   Date: 10/22/23: Received notification of transition to home.  CC contacted  Vero LAMBERT   and reviewed discharge summary.  Member has a follow-up appointment with PCP in 7 days: Yes: scheduled on seen by NP Berenice Nolen on 10/26/23 at [facility]     Member has had a change in condition: No  Home visit needed: No  Care plan reviewed and updated.  The following home based services None were resumed.  New referrals placed: No  Transition log completed.   PCP, Berenice Nolen, notified of transition back to home via EMR.    Yohana Melendez RN, PHN  [facility] Care Coordinator [facility]  Cell [phone] Fax [phone]         *RETURN TO USUAL CARE SETTING: *Complete tasks below when the member is discharging TO their usual care setting within one (1) business day of notification..      For situations where the Care Coordinator is notified of the discharge prior to the date of discharge, the Care Coordinator must follow up with the member or designated representative to confirm that discharge actually occurred and discuss required SANGITA tasks as outlined in the SANGITA Instructions.  (This includes situations where it may be a  new  usual care setting for the member. (i.e., a community member who decides upon permanent nursing home placement following hospitalization and rehab).    Discuss with Member/Responsible Party:    Check  Yes  - if the member, family member and/or SNF/facility staff manages the following:    If  No  provide explanation in the comments section.          Date completed: 10/31/23 Communicated with member or their designated representative about the following:  care transition process; about changes to the member s health status; plan of care updates; education about  transitions and how to prevent unplanned transitions/readmissions    Four Pillars for Optimal Transition:    Check  Yes  - if the member, family member and/or SNF/facility staff manages the following:    If  No  provide explanation in the comments section.          [x]  Yes     []  No Does the member have a follow-up appointment scheduled with primary care or specialist? (Mental health hospitalizations--the appt. should be w/in 7 days)              For mental health hospitalizations:  []  Yes     []  No     Does the member have a follow-up appointment scheduled with a mental health practitioner within 7 days of discharge?  [x]  Yes     []  No     Has a medication review been completed with member? If no, refer to PCP, home care nurse, MTM, pharmacist  [x]  Yes     []  No     Can the member manage their medications or is there a system in place to manage medications (e.g. home care set-up)?         [x]  Yes     []  No     Can the member verbalize warning signs and symptoms to watch for and how to respond?  [x]  Yes     []  No     Does the member have a copy of and understand their discharge instructions?  If no, assist to obtain copy of discharge instructions, review discharge instructions, and assist to contact PCP to discuss questions about their recent hospitalization.  [x]  Yes     []  No     Does the member have adequate food, housing and transportation?  If no, add goal and discuss additional supports available to the member                                                                                                                                                                                 [x]  Yes     []  No     Is the member safe in their home?  If no, document needs and support provided                                                                                                                                                                          []  Yes     [x]  No     Are there any concerns  of vulnerability, abuse, or neglect?  If yes, document concerns and actions taken by Care Coordinator as a mandated                                                                                                                                                                              [x]  Yes     []  No     Does the member use a Personal Health Care Record?  Check  Yes  if visit summary, discharge summary, and/or healthcare summary are being used as a PHR.                                                                                                                                                                                  [x]  Yes     []  No     Have you reviewed the discharge summary with the member? If  No  provide explanation in comments.  [x]  Yes     []  No     Have you updated the member s care plan/support plan? Add new diagnosis, medications, treatments, goals & interventions, as applicable. If No, provide explanation in comments.    Comments:   Member is able to direct own care and denies any concerns at this time. Facility SW denies any concerns about facility ability to manage care      Yohana Melendez RN, PHN  [facility] Care Coordinator [facility] [phone] Fax [phone]

## 2023-11-02 ENCOUNTER — TELEPHONE (OUTPATIENT)
Dept: GERIATRICS | Facility: CLINIC | Age: 73
End: 2023-11-02

## 2023-11-02 LAB — INR PPP: 2.2 (ref 0.85–1.15)

## 2023-11-02 PROCEDURE — 85610 PROTHROMBIN TIME: CPT | Mod: ORL | Performed by: FAMILY MEDICINE

## 2023-11-02 PROCEDURE — P9604 ONE-WAY ALLOW PRORATED TRIP: HCPCS | Mod: ORL | Performed by: FAMILY MEDICINE

## 2023-11-02 PROCEDURE — 36415 COLL VENOUS BLD VENIPUNCTURE: CPT | Mod: ORL | Performed by: FAMILY MEDICINE

## 2023-11-02 NOTE — TELEPHONE ENCOUNTER
[facility]     Provider: CEDRIC Ortiz  Facility: [facility]   Facility Type:  [facility]    Caller: Matilde  Call Back Number: [phone]  Reason for call: INR  Diagnosis/Goal: A. Fib    Todays INR: 2.20  Last INR 10/30 2.23(2.5mg daily), 10/27 1.89(5mg on 10/27, 2.5mg 10/28, 5mg on 10/29---stop Lovenox as of 10/29), 10/24 1.38(5mg daily + Lovenox)    Heparin/Lovenox:  No  Currently on ABX?: No  Other interacting medication:  Cilostazol  Missed or refused doses: No    Verbal Order/Direction given by Provider: Warfarin 5mg Tuesdays and Thursdays and 2.5mg all other days.  Next INR 11/9/23.      Provider Giving Order:  CEDRIC Ortiz    Verbal Order given to: Matilde Campbell RN

## 2023-11-05 NOTE — PROGRESS NOTES
[facility] GERIATRIC SERVICES       Patient Angela Beckham  MRN: [id number]        Reason for Visit     Chief Complaint   Patient presents with    [facility] Regulatory       Code Status     CPR/Full code     Assessment     Recent hospitalization related to A-fib with rapid ventricular response  Hypertension with low blood pressures  H/o  ischemic CVA involving the right cardioembolic CVA  Chronic right basal ganglia infarct  Chronic microvascular ischemia  Urinary retention requiring Gambino catheter placement  History of left below the knee amputation  Generalized weakness  Type 2 diabetes  Aortic valve stenosis     Plan     Patient is a resident of long-term [facility]  Pt admitted with Afib and RVR  Seen by cardiology  On metoprolol for rate control  Started on digoxin  Due to persistent low blood pressure she has been started on a lower dose of metoprolol now at 50 mg  Heart rates have been stable and less than 70  Was on eliquis which was discontinued  Continue warfarin-monitor INR-2.2 at last check  Cardiology saw her for severe aortic stenosis and MS-told she is not a surgical candidate  Taken off asa due to suspected blood loss  R/c HG   Cont PPI  DM managed with diet alone-reports not eating well  Last Aic was 6.8  Recheck another A1c in 6 weeks.  If it is elevated will consider starting an oral agent for management of her diabetes  Has vascular dementia and mood is stab;e  Does not wear prostheis  Cont care plan    History     Patient is a very pleasant 72 year who is a resident of [facility]  Patient was recently admitted to the hospital with A FIB with RVR  Seen by cardiology and on metoprolol for rate control warfarin has been continued.  Not felt to be a candidate for any surgical intervention for her severe AS and MS.  She has dm controlled with metformin  Remains bed bound and desires going home  Poor insight ,      Past Medical & Surgical History     PAST MEDICAL HISTORY:   Past Medical History:    Diagnosis Date    Diabetic neuropathy (H)     HTN     Type 2 diabetes mellitus (H)           Past Social History     Reviewed,  reports that she quit smoking about 2 years ago. Her smoking use included cigarettes. She smoked an average of 1 pack per day. She has never used smokeless tobacco. She reports that she does not drink alcohol and does not use drugs.    Family History     Reviewed, and family history includes Alzheimer Disease in her father; Cerebrovascular Disease in her maternal grandmother and mother; Diabetes in her paternal grandfather; Hypertension in her brother and mother; Obesity in her brother; Respiratory in her father.    Medication List   Post Discharge Medication Reconciliation Status: Post Discharge Medication Reconciliation Status: discharge medications reconciled, continue medications without change.  Current Outpatient Medications   Medication    atorvastatin (LIPITOR) 20 MG tablet    cilostazol (PLETAL) 50 MG tablet    digoxin (LANOXIN) 125 MCG tablet    enoxaparin ANTICOAGULANT (LOVENOX) 80 MG/0.8ML syringe    furosemide (LASIX) 20 MG tablet    metoprolol succinate ER (TOPROL XL) 25 MG 24 hr tablet    miconazole (MICATIN) 2 % cream    omeprazole (PRILOSEC) 20 MG DR capsule    WARFARIN SODIUM PO     No current facility-administered medications for this visit.          Allergies     Allergies   Allergen Reactions    Amoxicillin Unknown    Oxycodone Other (See Comments)     Does not remember    Latex Rash       Review of Systems   A comprehensive review of 14 systems was done. Pertinent findings noted here and in history of present illness. All the rest negative.  Constitutional: Negative.  Negative for fever, chills, she has  activity change, appetite change and fatigue.  Oral intake is stable  HENT: Negative for congestion and facial swelling.    Eyes: Negative for photophobia, redness and visual disturbance.   Respiratory: Negative for cough and chest tightness.    Cardiovascular:  "Negative for chest pain, palpitations and leg swelling.   Gastrointestinal: Negative for nausea, diarrhea, constipation, blood in stool and abdominal distention.   Genitourinary: Negative.    Musculoskeletal: Does not wear her prosthesis   Cannot tolerate sitting up very long as she likes to lay flat in bed most of the time  Has chronic back pain issues but none at rest  Skin: Negative.    Neurological: Negative for dizziness, tremors, syncope, weakness, light-headedness and headaches.   Hematological: Does not bruise/bleed easily.   Psychiatric/Behavioral: Negative.  Wants to go home  Poor insight      Physical Exam   BP 95/64   Pulse 77   Temp 98.2  F (36.8  C)   Resp 20   Ht 1.588 m (5' 2.5\")   Wt 66.7 kg (147 lb)   SpO2 96%   BMI 26.46 kg/m       Constitutional: Oriented to person, place,  and appears well-developed.   HEENT:  Normocephalic and atraumatic.  Eyes: Conjunctivae and EOM are normal. Pupils are equal, round, and reactive to light. No discharge.  No scleral icterus. Nose normal. Mouth/Throat: Oropharynx is clear and moist. No oropharyngeal exudate.    NECK: Normal range of motion. Neck supple. No JVD present. No tracheal deviation present. No thyromegaly present.   CARDIOVASCULAR: Normal rate, regular rhythm and intact distal pulses.  Exam reveals no gallop and no friction rub.  Systolic murmur present.  PULMONARY: Effort normal and breath sounds normal. No respiratory distress.No Wheezing or rales.  ABDOMEN: Soft. Bowel sounds are normal. No distension and no mass.  There is no tenderness. There is no rebound and no guarding. No HSM.  MUSCULOSKELETAL: Normal range of motion. No edema and no tenderness. Mild kyphosis, no tenderness.  Has left BKA  LYMPH NODES: Has no cervical, supraclavicular, axillary and groin adenopathy.   NEUROLOGICAL: Alert and oriented to person, place, and time. No cranial nerve deficit.  Normal muscle tone. Coordination normal.   GENITOURINARY: Deferred exam.   SKIN: " Skin is warm and dry. No rash noted. No erythema. No pallor.   EXTREMITIES: No cyanosis, no clubbing, no edema. No Deformity.  PSYCHIATRIC: Normal mood, affect and behavior.poor insight     Labs  Last Comprehensive Metabolic Panel:  Lab Results   Component Value Date     03/16/2023    POTASSIUM 4.5 03/16/2023    CHLORIDE 106 03/16/2023    CO2 24 03/16/2023    ANIONGAP 11 03/16/2023    GLC 89 03/16/2023    BUN 19.9 03/16/2023    CR 0.54 03/16/2023    GFRESTIMATED >90 03/16/2023    SUSIE 9.8 03/16/2023        Electronically signed by    Ginny Johnson MD

## 2023-11-06 ENCOUNTER — NURSING HOME VISIT (OUTPATIENT)
Dept: GERIATRICS | Facility: CLINIC | Age: 73
End: 2023-11-06
Payer: COMMERCIAL

## 2023-11-06 VITALS
WEIGHT: 147 LBS | TEMPERATURE: 98.2 F | RESPIRATION RATE: 20 BRPM | DIASTOLIC BLOOD PRESSURE: 64 MMHG | SYSTOLIC BLOOD PRESSURE: 95 MMHG | OXYGEN SATURATION: 96 % | HEIGHT: 63 IN | BODY MASS INDEX: 26.05 KG/M2 | HEART RATE: 77 BPM

## 2023-11-06 DIAGNOSIS — G89.29 CHRONIC RIGHT-SIDED LOW BACK PAIN WITHOUT SCIATICA: ICD-10-CM

## 2023-11-06 DIAGNOSIS — Z79.01 LONG TERM CURRENT USE OF ANTICOAGULANT THERAPY: ICD-10-CM

## 2023-11-06 DIAGNOSIS — R41.89 COGNITIVE IMPAIRMENT: ICD-10-CM

## 2023-11-06 DIAGNOSIS — I73.9 PAD (PERIPHERAL ARTERY DISEASE) (H): ICD-10-CM

## 2023-11-06 DIAGNOSIS — Z89.512 STATUS POST BELOW-KNEE AMPUTATION OF LEFT LOWER EXTREMITY (H): ICD-10-CM

## 2023-11-06 DIAGNOSIS — I10 PRIMARY HYPERTENSION: ICD-10-CM

## 2023-11-06 DIAGNOSIS — E66.01 MORBID OBESITY (H): ICD-10-CM

## 2023-11-06 DIAGNOSIS — M54.50 CHRONIC RIGHT-SIDED LOW BACK PAIN WITHOUT SCIATICA: ICD-10-CM

## 2023-11-06 DIAGNOSIS — F01.53 VASCULAR DEMENTIA WITH DEPRESSED MOOD (H): ICD-10-CM

## 2023-11-06 DIAGNOSIS — Z86.73 HISTORY OF CVA (CEREBROVASCULAR ACCIDENT): ICD-10-CM

## 2023-11-06 DIAGNOSIS — I48.20 CHRONIC ATRIAL FIBRILLATION WITH RVR (H): Primary | ICD-10-CM

## 2023-11-06 DIAGNOSIS — E11.59 TYPE 2 DIABETES MELLITUS WITH OTHER CIRCULATORY COMPLICATION, WITHOUT LONG-TERM CURRENT USE OF INSULIN (H): ICD-10-CM

## 2023-11-06 PROCEDURE — 99305 1ST NF CARE MODERATE MDM 35: CPT | Performed by: FAMILY MEDICINE

## 2023-11-06 NOTE — LETTER
11/6/2023        RE: Angela Beckham  [facility]  [address] [address]        [facility]       Patient Angela Beckham  MRN: [id number]        Reason for Visit     Chief Complaint   Patient presents with     custodial Regulatory       Code Status     CPR/Full code     Assessment     Recent hospitalization related to A-fib with rapid ventricular response  Hypertension with low blood pressures  H/o  ischemic CVA involving the right cardioembolic CVA  Chronic right basal ganglia infarct  Chronic microvascular ischemia  Urinary retention requiring Gambino catheter placement  History of left below the knee amputation  Generalized weakness  Type 2 diabetes  Aortic valve stenosis     Plan     Patient is a resident of [facility]  Pt admitted with Afib and RVR  Seen by cardiology  On metoprolol for rate control  Started on digoxin  Due to persistent low blood pressure she has been started on a lower dose of metoprolol now at 50 mg  Heart rates have been stable and less than 70  Was on eliquis which was discontinued  Continue warfarin-monitor INR-2.2 at last check  Cardiology saw her for severe aortic stenosis and MS-told she is not a surgical candidate  Taken off asa due to suspected blood loss  R/c HG   Cont PPI  DM managed with diet alone-reports not eating well  Last Aic was 6.8  Recheck another A1c in 6 weeks.  If it is elevated will consider starting an oral agent for management of her diabetes  Has vascular dementia and mood is stab;e  Does not wear prostheis  Cont care plan    History     Patient is a very pleasant 72 year who is a resident of [facility]  Patient was recently admitted to the hospital with A FIB with RVR  Seen by cardiology and on metoprolol for rate control warfarin has been continued.  Not felt to be a candidate for any surgical intervention for her severe AS and MS.  She has dm controlled with metformin  Remains bed bound and desires going  home  Poor insight ,      Past Medical & Surgical History     PAST MEDICAL HISTORY:   Past Medical History:   Diagnosis Date     Diabetic neuropathy (H)      HTN      Type 2 diabetes mellitus (H)           Past Social History     Reviewed,  reports that she quit smoking about 2 years ago. Her smoking use included cigarettes. She smoked an average of 1 pack per day. She has never used smokeless tobacco. She reports that she does not drink alcohol and does not use drugs.    Family History     Reviewed, and family history includes Alzheimer Disease in her father; Cerebrovascular Disease in her maternal grandmother and mother; Diabetes in her paternal grandfather; Hypertension in her brother and mother; Obesity in her brother; Respiratory in her father.    Medication List   Post Discharge Medication Reconciliation Status: Post Discharge Medication Reconciliation Status: discharge medications reconciled, continue medications without change.  Current Outpatient Medications   Medication     atorvastatin (LIPITOR) 20 MG tablet     cilostazol (PLETAL) 50 MG tablet     digoxin (LANOXIN) 125 MCG tablet     enoxaparin ANTICOAGULANT (LOVENOX) 80 MG/0.8ML syringe     furosemide (LASIX) 20 MG tablet     metoprolol succinate ER (TOPROL XL) 25 MG 24 hr tablet     miconazole (MICATIN) 2 % cream     omeprazole (PRILOSEC) 20 MG DR capsule     WARFARIN SODIUM PO     No current facility-administered medications for this visit.          Allergies     Allergies   Allergen Reactions     Amoxicillin Unknown     Oxycodone Other (See Comments)     Does not remember     Latex Rash       Review of Systems   A comprehensive review of 14 systems was done. Pertinent findings noted here and in history of present illness. All the rest negative.  Constitutional: Negative.  Negative for fever, chills, she has  activity change, appetite change and fatigue.  Oral intake is stable  HENT: Negative for congestion and facial swelling.    Eyes: Negative for  "photophobia, redness and visual disturbance.   Respiratory: Negative for cough and chest tightness.    Cardiovascular: Negative for chest pain, palpitations and leg swelling.   Gastrointestinal: Negative for nausea, diarrhea, constipation, blood in stool and abdominal distention.   Genitourinary: Negative.    Musculoskeletal: Does not wear her prosthesis   Cannot tolerate sitting up very long as she likes to lay flat in bed most of the time  Has chronic back pain issues but none at rest  Skin: Negative.    Neurological: Negative for dizziness, tremors, syncope, weakness, light-headedness and headaches.   Hematological: Does not bruise/bleed easily.   Psychiatric/Behavioral: Negative.  Wants to go home  Poor insight      Physical Exam   BP 95/64   Pulse 77   Temp 98.2  F (36.8  C)   Resp 20   Ht 1.588 m (5' 2.5\")   Wt 66.7 kg (147 lb)   SpO2 96%   BMI 26.46 kg/m       Constitutional: Oriented to person, place,  and appears well-developed.   HEENT:  Normocephalic and atraumatic.  Eyes: Conjunctivae and EOM are normal. Pupils are equal, round, and reactive to light. No discharge.  No scleral icterus. Nose normal. Mouth/Throat: Oropharynx is clear and moist. No oropharyngeal exudate.    NECK: Normal range of motion. Neck supple. No JVD present. No tracheal deviation present. No thyromegaly present.   CARDIOVASCULAR: Normal rate, regular rhythm and intact distal pulses.  Exam reveals no gallop and no friction rub.  Systolic murmur present.  PULMONARY: Effort normal and breath sounds normal. No respiratory distress.No Wheezing or rales.  ABDOMEN: Soft. Bowel sounds are normal. No distension and no mass.  There is no tenderness. There is no rebound and no guarding. No HSM.  MUSCULOSKELETAL: Normal range of motion. No edema and no tenderness. Mild kyphosis, no tenderness.  Has left BKA  LYMPH NODES: Has no cervical, supraclavicular, axillary and groin adenopathy.   NEUROLOGICAL: Alert and oriented to person, place, " and time. No cranial nerve deficit.  Normal muscle tone. Coordination normal.   GENITOURINARY: Deferred exam.   SKIN: Skin is warm and dry. No rash noted. No erythema. No pallor.   EXTREMITIES: No cyanosis, no clubbing, no edema. No Deformity.  PSYCHIATRIC: Normal mood, affect and behavior.poor insight     Labs  Last Comprehensive Metabolic Panel:  Lab Results   Component Value Date     03/16/2023    POTASSIUM 4.5 03/16/2023    CHLORIDE 106 03/16/2023    CO2 24 03/16/2023    ANIONGAP 11 03/16/2023    GLC 89 03/16/2023    BUN 19.9 03/16/2023    CR 0.54 03/16/2023    GFRESTIMATED >90 03/16/2023    SUSIE 9.8 03/16/2023        Electronically signed by    Ginny Johnson MD                           Sincerely,        OPAL Galarza

## 2023-11-08 ENCOUNTER — LAB REQUISITION (OUTPATIENT)
Dept: LAB | Facility: CLINIC | Age: 73
End: 2023-11-08
Payer: COMMERCIAL

## 2023-11-08 DIAGNOSIS — I48.91 UNSPECIFIED ATRIAL FIBRILLATION (H): ICD-10-CM

## 2023-11-08 DIAGNOSIS — Z51.81 ENCOUNTER FOR THERAPEUTIC DRUG LEVEL MONITORING: ICD-10-CM

## 2023-11-09 ENCOUNTER — TELEPHONE (OUTPATIENT)
Dept: GERIATRICS | Facility: CLINIC | Age: 73
End: 2023-11-09

## 2023-11-09 LAB
ANION GAP SERPL CALCULATED.3IONS-SCNC: 13 MMOL/L (ref 7–15)
BUN SERPL-MCNC: 21.1 MG/DL (ref 8–23)
CALCIUM SERPL-MCNC: 9.4 MG/DL (ref 8.8–10.2)
CHLORIDE SERPL-SCNC: 101 MMOL/L (ref 98–107)
CREAT SERPL-MCNC: 0.9 MG/DL (ref 0.51–0.95)
DEPRECATED HCO3 PLAS-SCNC: 27 MMOL/L (ref 22–29)
DIGOXIN SERPL-MCNC: 2.3 NG/ML (ref 0.6–2)
EGFRCR SERPLBLD CKD-EPI 2021: 66 ML/MIN/1.73M2
ERYTHROCYTE [DISTWIDTH] IN BLOOD BY AUTOMATED COUNT: 14.6 % (ref 10–15)
GLUCOSE SERPL-MCNC: 124 MG/DL (ref 70–99)
HCT VFR BLD AUTO: 39.2 % (ref 35–47)
HGB BLD-MCNC: 12 G/DL (ref 11.7–15.7)
INR PPP: 1.83 (ref 0.85–1.15)
MAGNESIUM SERPL-MCNC: 1.8 MG/DL (ref 1.7–2.3)
MCH RBC QN AUTO: 26.3 PG (ref 26.5–33)
MCHC RBC AUTO-ENTMCNC: 30.6 G/DL (ref 31.5–36.5)
MCV RBC AUTO: 86 FL (ref 78–100)
PLATELET # BLD AUTO: 224 10E3/UL (ref 150–450)
POTASSIUM SERPL-SCNC: 3.9 MMOL/L (ref 3.4–5.3)
RBC # BLD AUTO: 4.56 10E6/UL (ref 3.8–5.2)
SODIUM SERPL-SCNC: 141 MMOL/L (ref 135–145)
WBC # BLD AUTO: 5.8 10E3/UL (ref 4–11)

## 2023-11-09 PROCEDURE — P9604 ONE-WAY ALLOW PRORATED TRIP: HCPCS | Mod: ORL | Performed by: FAMILY MEDICINE

## 2023-11-09 PROCEDURE — 83735 ASSAY OF MAGNESIUM: CPT | Mod: ORL | Performed by: FAMILY MEDICINE

## 2023-11-09 PROCEDURE — 85027 COMPLETE CBC AUTOMATED: CPT | Mod: ORL | Performed by: FAMILY MEDICINE

## 2023-11-09 PROCEDURE — 85610 PROTHROMBIN TIME: CPT | Mod: ORL | Performed by: FAMILY MEDICINE

## 2023-11-09 PROCEDURE — 80048 BASIC METABOLIC PNL TOTAL CA: CPT | Mod: ORL | Performed by: FAMILY MEDICINE

## 2023-11-09 PROCEDURE — 80162 ASSAY OF DIGOXIN TOTAL: CPT | Mod: ORL | Performed by: FAMILY MEDICINE

## 2023-11-09 PROCEDURE — 36415 COLL VENOUS BLD VENIPUNCTURE: CPT | Mod: ORL | Performed by: FAMILY MEDICINE

## 2023-11-09 RX ORDER — DIGOXIN 0.06 MG/1
62.5 TABLET ORAL DAILY
COMMUNITY
Start: 2023-11-09

## 2023-11-09 NOTE — TELEPHONE ENCOUNTER
[facility] Geriatrics Triage Nurse INR     Provider: CEDRIC Ortiz  Facility: [facility]   Facility Type:  [facility]    Caller: Claudia  Call Back Number: [phone]  Reason for call: INR  Diagnosis/Goal: A. Fib    Todays INR: 1.83  Last INR 11/2 2.20(5mg Tues and Thurs and 2.5mg AOD), 10/30 2.23(2.5mg daily), 10/27 1.89(5mg on 10/27, 2.5mg 10/28, 5mg on 10/29---stop Lovenox as of 10/29), 10/24 1.38(5mg daily + Lovenox)     Heparin/Lovenox:  No  Currently on ABX?: No  Other interacting medication:  Cilostazol  Missed or refused doses: No      Nurse is also reporting Heme 2, BMP, Mg, and Digoxin levels.        Verbal Order/Direction given by Provider: Warfarin 5mg Q Thursday and 2.5mg all other days.  Next INR 11/27/23.  Decrease Digoxin to 62.5mcg daily.  Check HR daily x 7 days and update provider.      Provider Giving Order:  CEDRIC Ortiz    Verbal Order given to: Claudia Campbell RN

## 2023-11-23 ENCOUNTER — LAB REQUISITION (OUTPATIENT)
Dept: LAB | Facility: CLINIC | Age: 73
End: 2023-11-23
Payer: COMMERCIAL

## 2023-11-23 DIAGNOSIS — Z79.01 LONG TERM (CURRENT) USE OF ANTICOAGULANTS: ICD-10-CM

## 2023-11-27 ENCOUNTER — TELEPHONE (OUTPATIENT)
Dept: GERIATRICS | Facility: CLINIC | Age: 73
End: 2023-11-27

## 2023-11-27 LAB — INR PPP: 1.86 (ref 0.85–1.15)

## 2023-11-27 PROCEDURE — 85610 PROTHROMBIN TIME: CPT | Mod: ORL | Performed by: FAMILY MEDICINE

## 2023-11-27 PROCEDURE — P9604 ONE-WAY ALLOW PRORATED TRIP: HCPCS | Mod: ORL | Performed by: FAMILY MEDICINE

## 2023-11-27 PROCEDURE — 36415 COLL VENOUS BLD VENIPUNCTURE: CPT | Mod: ORL | Performed by: FAMILY MEDICINE

## 2023-11-27 NOTE — TELEPHONE ENCOUNTER
[facility] Geriatrics Triage Nurse INR     Provider: CEDRIC Ortiz  Facility: [facility]   Facility Type:  [facility]    Caller: Abimbola  Call Back Number: [phone]  Reason for call: INR  Diagnosis/Goal: A. Fib    Todays INR: 1.86  Last INR 11/9 1.87(5mg Q Thurs and 2.5mg AOD), 11/2 2.20(5mg Tues and Thurs and 2.5mg AOD), 10/30 2.23(2.5mg daily), 10/27 1.89(5mg on 10/27, 2.5mg 10/28, 5mg on 10/29---stop Lovenox as of 10/29), 10/24 1.38(5mg daily + Lovenox)      Heparin/Lovenox:  No  Currently on ABX?: No  Other interacting medication:  Cilostazol  Missed or refused doses: No    Verbal Order/Direction given by Provider: Warfarin 5mg Mondays and Thursdays and 2.5mg all other days.  Next INR 12/4/23.      Provider Giving Order:  CEDRIC Ortiz    Verbal Order given to: Abimbola Campbell RN

## 2023-12-03 ENCOUNTER — LAB REQUISITION (OUTPATIENT)
Dept: LAB | Facility: CLINIC | Age: 73
End: 2023-12-03
Payer: COMMERCIAL

## 2023-12-03 DIAGNOSIS — R79.1 ABNORMAL COAGULATION PROFILE: ICD-10-CM

## 2023-12-03 DIAGNOSIS — I48.0 PAROXYSMAL ATRIAL FIBRILLATION (H): ICD-10-CM

## 2023-12-04 ENCOUNTER — TELEPHONE (OUTPATIENT)
Dept: GERIATRICS | Facility: CLINIC | Age: 73
End: 2023-12-04

## 2023-12-04 LAB — INR PPP: 1.71 (ref 0.85–1.15)

## 2023-12-04 PROCEDURE — P9604 ONE-WAY ALLOW PRORATED TRIP: HCPCS | Mod: ORL | Performed by: FAMILY MEDICINE

## 2023-12-04 PROCEDURE — 85610 PROTHROMBIN TIME: CPT | Mod: ORL | Performed by: FAMILY MEDICINE

## 2023-12-04 PROCEDURE — 36415 COLL VENOUS BLD VENIPUNCTURE: CPT | Mod: ORL | Performed by: FAMILY MEDICINE

## 2023-12-04 NOTE — RESULT ENCOUNTER NOTE
Warfarin 7.5mg 12/4, 2.5mg 12/5, 5mg 12/6, 2.5mg 12/7 and recheck INR 12/8.     Plan is to go to 5mg MWF and 2.5mg AOD

## 2023-12-04 NOTE — TELEPHONE ENCOUNTER
[facility] Triage Nurse INR     Provider: CEDRIC Ortiz  Facility: [facility]   Facility Type:  [facility]    Caller: Matilde  Call Back Number: [phone]  Reason for call: INR  Diagnosis/Goal: A. Fib    Todays INR: 1.71  Last INR 11/27 1.86(5mg Mon and Thurs and 2.5mg AOD), 11/9 1.87(5mg Q Thurs and 2.5mg AOD), 11/2 2.20(5mg Tues and Thurs and 2.5mg AOD), 10/30 2.23(2.5mg daily), 10/27 1.89(5mg on 10/27, 2.5mg 10/28, 5mg on 10/29---stop Lovenox as of 10/29), 10/24 1.38(5mg daily + Lovenox)       Heparin/Lovenox:  No  Currently on ABX?: No  Other interacting medication:  Cilostazol  Missed or refused doses: No    Verbal Order/Direction given by Provider: Warfarin 7.5mg 12/4, 2.5mg 12/5, 5mg 12/6, 2.5mg 12/7 and recheck INR 12/8/23.         Provider Giving Order:  CEDRIC Ortiz    Verbal Order given to: Matilde Price, RN on 12/4/2023 at 2:18 PM        Willie Campbell RN

## 2023-12-07 ENCOUNTER — LAB REQUISITION (OUTPATIENT)
Dept: LAB | Facility: CLINIC | Age: 73
End: 2023-12-07
Payer: COMMERCIAL

## 2023-12-07 DIAGNOSIS — Z79.01 LONG TERM (CURRENT) USE OF ANTICOAGULANTS: ICD-10-CM

## 2023-12-08 ENCOUNTER — TELEPHONE (OUTPATIENT)
Dept: GERIATRICS | Facility: CLINIC | Age: 73
End: 2023-12-08

## 2023-12-08 LAB — INR PPP: 2.09 (ref 0.85–1.15)

## 2023-12-08 PROCEDURE — 36415 COLL VENOUS BLD VENIPUNCTURE: CPT | Mod: ORL | Performed by: FAMILY MEDICINE

## 2023-12-08 PROCEDURE — 85610 PROTHROMBIN TIME: CPT | Mod: ORL | Performed by: FAMILY MEDICINE

## 2023-12-08 PROCEDURE — P9604 ONE-WAY ALLOW PRORATED TRIP: HCPCS | Mod: ORL | Performed by: FAMILY MEDICINE

## 2023-12-08 NOTE — TELEPHONE ENCOUNTER
[facility]     Provider: CEDRIC Ortiz  Facility: [facility]   Facility Type:  [facility]    Caller: Flores  Call Back Number: [phone]  Reason for call: INR  Diagnosis/Goal: A. Fib    Todays INR: 2.09  Last INR 12/4 1.71(7.5mg on 12/4, 2.5mg on 12/5, 5mg on 12/6, 2.5mg on 12/7), 11/27 1.86(5mg Mon and Thurs and 2.5mg AOD), 11/9 1.87(5mg Q Thurs and 2.5mg AOD), 11/2 2.20(5mg Tues and Thurs and 2.5mg AOD), 10/30 2.23(2.5mg daily), 10/27 1.89(5mg on 10/27, 2.5mg 10/28, 5mg on 10/29---stop Lovenox as of 10/29), 10/24 1.38(5mg daily + Lovenox)       Heparin/Lovenox:  No  Currently on ABX?: No  Other interacting medication:  Cilostazol  Missed or refused doses: No    Verbal Order/Direction given by Provider: Warfarin 5mg M-W-F and 2.5mg all other days.  Next INR 12/14/23.      Provider Giving Order:  CEDRIC Ortiz    Verbal Order given to: Flores Campbell RN

## 2023-12-13 ENCOUNTER — LAB REQUISITION (OUTPATIENT)
Dept: LAB | Facility: CLINIC | Age: 73
End: 2023-12-13
Payer: COMMERCIAL

## 2023-12-13 DIAGNOSIS — I48.91 UNSPECIFIED ATRIAL FIBRILLATION (H): ICD-10-CM

## 2023-12-14 ENCOUNTER — TELEPHONE (OUTPATIENT)
Dept: GERIATRICS | Facility: CLINIC | Age: 73
End: 2023-12-14

## 2023-12-14 LAB — INR PPP: 2.41 (ref 0.85–1.15)

## 2023-12-14 PROCEDURE — 36415 COLL VENOUS BLD VENIPUNCTURE: CPT | Mod: ORL | Performed by: FAMILY MEDICINE

## 2023-12-14 PROCEDURE — 85610 PROTHROMBIN TIME: CPT | Mod: ORL | Performed by: FAMILY MEDICINE

## 2023-12-14 PROCEDURE — P9603 ONE-WAY ALLOW PRORATED MILES: HCPCS | Mod: ORL | Performed by: FAMILY MEDICINE

## 2023-12-14 NOTE — TELEPHONE ENCOUNTER
[facility] Triage Nurse INR     Provider: CEDRIC Ortiz  Facility: [facility]   Facility Type:  [facility]    Caller: Julio C  Call Back Number: [phone]  Reason for call: INR  Diagnosis/Goal: A. Fib    Todays INR: 2.41  Last INR 12/8 2.09(5mg M-W-F and 2.5mg AOD),  12/4 1.71(7.5mg on 12/4, 2.5mg on 12/5, 5mg on 12/6, 2.5mg on 12/7), 11/27 1.86(5mg Mon and Thurs and 2.5mg AOD), 11/9 1.87(5mg Q Thurs and 2.5mg AOD), 11/2 2.20(5mg Tues and Thurs and 2.5mg AOD), 10/30 2.23(2.5mg daily), 10/27 1.89(5mg on 10/27, 2.5mg 10/28, 5mg on 10/29---stop Lovenox as of 10/29), 10/24 1.38(5mg daily + Lovenox)      Heparin/Lovenox:  No  Currently on ABX?: No  Other interacting medication:  Cilostazol  Missed or refused doses: No    Verbal Order/Direction given by Provider: Continue Warfarin 5mg M-W-F and 2.5mg all other days.  Next INR 12/28/23.      Provider Giving Order:  CEDRIC Ortiz    Verbal Order given to: Julio C Campbell RN

## 2023-12-19 ENCOUNTER — LAB REQUISITION (OUTPATIENT)
Dept: LAB | Facility: CLINIC | Age: 73
End: 2023-12-19
Payer: COMMERCIAL

## 2023-12-19 DIAGNOSIS — Z51.81 ENCOUNTER FOR THERAPEUTIC DRUG LEVEL MONITORING: ICD-10-CM

## 2023-12-20 ENCOUNTER — TELEPHONE (OUTPATIENT)
Dept: GERIATRICS | Facility: CLINIC | Age: 73
End: 2023-12-20

## 2023-12-20 ENCOUNTER — DOCUMENTATION ONLY (OUTPATIENT)
Dept: GERIATRICS | Facility: CLINIC | Age: 73
End: 2023-12-20
Payer: COMMERCIAL

## 2023-12-20 LAB
HBA1C MFR BLD: 7.4 %
TSH SERPL DL<=0.005 MIU/L-ACNC: 2.77 UIU/ML (ref 0.3–4.2)
VIT B12 SERPL-MCNC: 745 PG/ML (ref 232–1245)
VIT D+METAB SERPL-MCNC: 23 NG/ML (ref 20–50)

## 2023-12-20 PROCEDURE — P9604 ONE-WAY ALLOW PRORATED TRIP: HCPCS | Mod: ORL | Performed by: FAMILY MEDICINE

## 2023-12-20 PROCEDURE — 82607 VITAMIN B-12: CPT | Mod: ORL | Performed by: FAMILY MEDICINE

## 2023-12-20 PROCEDURE — 36415 COLL VENOUS BLD VENIPUNCTURE: CPT | Mod: ORL | Performed by: FAMILY MEDICINE

## 2023-12-20 PROCEDURE — 84443 ASSAY THYROID STIM HORMONE: CPT | Mod: ORL | Performed by: FAMILY MEDICINE

## 2023-12-20 PROCEDURE — 82306 VITAMIN D 25 HYDROXY: CPT | Mod: ORL | Performed by: FAMILY MEDICINE

## 2023-12-20 PROCEDURE — 83036 HEMOGLOBIN GLYCOSYLATED A1C: CPT | Mod: ORL | Performed by: FAMILY MEDICINE

## 2023-12-20 NOTE — TELEPHONE ENCOUNTER
[facility] Lab Note     Provider: CEDRIC Ortiz  Facility: [facility]  Facility Type:  LT    Allergies   Allergen Reactions    Amoxicillin Unknown    Oxycodone Other (See Comments)     Does not remember    Latex Rash       Labs Reviewed by provider: TSH, B12, HgbA1c, Vitamin D     Verbal Order/Direction given by Provider: Vitamin D3---1000iu daily.    Provider giving Order:  CEDRIC Ortiz    Verbal Order given to: Claudia([phone])    Willie Campbell RN

## 2023-12-27 ENCOUNTER — LAB REQUISITION (OUTPATIENT)
Dept: LAB | Facility: CLINIC | Age: 73
End: 2023-12-27
Payer: COMMERCIAL

## 2023-12-27 DIAGNOSIS — I48.91 UNSPECIFIED ATRIAL FIBRILLATION (H): ICD-10-CM

## 2023-12-28 ENCOUNTER — TELEPHONE (OUTPATIENT)
Dept: GERIATRICS | Facility: CLINIC | Age: 73
End: 2023-12-28

## 2023-12-28 LAB — INR PPP: 2.26 (ref 0.85–1.15)

## 2023-12-28 PROCEDURE — 85610 PROTHROMBIN TIME: CPT | Mod: ORL | Performed by: FAMILY MEDICINE

## 2023-12-28 PROCEDURE — 36415 COLL VENOUS BLD VENIPUNCTURE: CPT | Mod: ORL | Performed by: FAMILY MEDICINE

## 2023-12-28 PROCEDURE — P9604 ONE-WAY ALLOW PRORATED TRIP: HCPCS | Mod: ORL | Performed by: FAMILY MEDICINE

## 2023-12-28 NOTE — TELEPHONE ENCOUNTER
[facility] Triage Nurse INR     Provider: CEDRIC Ortiz  Facility: [facility]   Facility Type:  [facility]    Caller: Claudia  Call Back Number: [phone]  Reason for call: INR  Diagnosis/Goal: A. Fib    Todays INR: 2.26  Last INR 12/14 2.41(5mg M-W-F and 2.5mg AOD), 12/8 2.09(5mg M-W-F and 2.5mg AOD),  12/4 1.71(7.5mg on 12/4, 2.5mg on 12/5, 5mg on 12/6, 2.5mg on 12/7), 11/27 1.86(5mg Mon and Thurs and 2.5mg AOD), 11/9 1.87(5mg Q Thurs and 2.5mg AOD), 11/2 2.20(5mg Tues and Thurs and 2.5mg AOD), 10/30 2.23(2.5mg daily), 10/27 1.89(5mg on 10/27, 2.5mg 10/28, 5mg on 10/29---stop Lovenox as of 10/29), 10/24 1.38(5mg daily + Lovenox)      Heparin/Lovenox:  No  Currently on ABX?: No  Other interacting medication:  Cilostazol  Missed or refused doses: No    Verbal Order/Direction given by Provider: Warfarin 5mg M-W-F and 2.5mg all other days.  Next INR 1/18/24.      Provider Giving Order:  CEDRIC Ortiz    Verbal Order given to: Claudia Campbell RN

## 2024-01-04 ENCOUNTER — NURSING HOME VISIT (OUTPATIENT)
Dept: GERIATRICS | Facility: CLINIC | Age: 74
End: 2024-01-04
Payer: COMMERCIAL

## 2024-01-04 VITALS
DIASTOLIC BLOOD PRESSURE: 63 MMHG | WEIGHT: 139 LBS | HEIGHT: 63 IN | SYSTOLIC BLOOD PRESSURE: 94 MMHG | HEART RATE: 83 BPM | TEMPERATURE: 98.1 F | OXYGEN SATURATION: 94 % | RESPIRATION RATE: 18 BRPM | BODY MASS INDEX: 24.63 KG/M2

## 2024-01-04 DIAGNOSIS — E73.9 DIETARY LACTOSE INTOLERANCE: ICD-10-CM

## 2024-01-04 DIAGNOSIS — F01.53 VASCULAR DEMENTIA WITH DEPRESSED MOOD (H): ICD-10-CM

## 2024-01-04 DIAGNOSIS — I73.9 PAD (PERIPHERAL ARTERY DISEASE) (H): ICD-10-CM

## 2024-01-04 DIAGNOSIS — E11.59 TYPE 2 DIABETES MELLITUS WITH OTHER CIRCULATORY COMPLICATION, WITHOUT LONG-TERM CURRENT USE OF INSULIN (H): Primary | ICD-10-CM

## 2024-01-04 DIAGNOSIS — Z86.73 HISTORY OF CVA (CEREBROVASCULAR ACCIDENT): ICD-10-CM

## 2024-01-04 DIAGNOSIS — I48.20 CHRONIC ATRIAL FIBRILLATION WITH RVR (H): ICD-10-CM

## 2024-01-04 PROCEDURE — 99309 SBSQ NF CARE MODERATE MDM 30: CPT | Performed by: NURSE PRACTITIONER

## 2024-01-04 RX ORDER — METFORMIN HCL 500 MG
500 TABLET, EXTENDED RELEASE 24 HR ORAL
COMMUNITY

## 2024-01-04 NOTE — LETTER
1/4/2024        RE: Angela Beckham  [facility]  [address] [address]              [facility] GERIATRICS  Chief Complaint   Patient presents with     skilled nursing Regulatory     [place] Medical Record Number:  [id number]  Place of Service where encounter took place:  [facility] () [[id number]]    HPI:    Angela Beckham  is 73 year old (1950), who is being seen today for a federally mandated E/M visit. She has a past medical history for Afib, PAD, left BKA, CAD, hx of CVA, DM2, mitral valve insufficiency, pulmonary hypertension.       Recent labs showed A1c 7.4.  She was previously on Metformin however about 6 months ago she was having low BS and this was discontinued.  Vitamin D was low also and so she was started on supplement.      She had neuropsych eval done to determine competency and was noted she should not be making medical or financial decisions.  SW is trying to obtain guardianship.  I do note cognition is declining in patient.      She has a new liner for her prosthesis however I am told she does not get up on it daily.  She has a follow up with vascular surgery on 1/9.      Metoprolol was decreased in the past 2 months due to hypotension.  She continues to have hypotension however denies any symptoms.     Her biggest complaint today is GI symptoms with dairy products.  She is willing to try lactose enzymes.        ALLERGIES:Amoxicillin, Oxycodone, and Latex  PAST MEDICAL HISTORY:   Past Medical History:   Diagnosis Date     Diabetic neuropathy (H)      HTN      Type 2 diabetes mellitus (H)      PAST SURGICAL HISTORY:   has no past surgical history on file.  FAMILY HISTORY: family history includes Alzheimer Disease in her father; Cerebrovascular Disease in her maternal grandmother and mother; Diabetes in her paternal grandfather; Hypertension in her brother and mother; Obesity in her brother; Respiratory in her father.  SOCIAL HISTORY:  reports  that she quit smoking about 3 years ago. Her smoking use included cigarettes. She smoked an average of 1 pack per day. She has never used smokeless tobacco. She reports that she does not drink alcohol and does not use drugs.    MEDICATIONS:         Review of your medicines            Accurate as of January 4, 2024  1:04 PM. If you have any questions, ask your nurse or doctor.                CONTINUE these medicines which have NOT CHANGED        Dose / Directions   atorvastatin 20 MG tablet  Commonly known as: LIPITOR      Dose: 20 mg  Take 20 mg by mouth At Bedtime  Refills: 0     cholecalciferol 25 mcg (1000 units) capsule  Commonly known as: VITAMIN D3  Indication: Vitamin D Deficiency      Dose: 1 capsule  Take 1 capsule by mouth daily  Refills: 0     cilostazol 50 MG tablet  Commonly known as: PLETAL      Dose: 50 mg  Take 50 mg by mouth 2 times daily  Refills: 0     digoxin 62.5 MCG tablet  Commonly known as: LANOXIN  Indication: Atrial Fibrillation      Dose: 62.5 mcg  Take 62.5 mcg by mouth daily  Refills: 0     enoxaparin ANTICOAGULANT 80 MG/0.8ML syringe  Commonly known as: LOVENOX      Dose: 80 mg  Inject 80 mg Subcutaneous every 12 hours  Refills: 0     furosemide 20 MG tablet  Commonly known as: LASIX      Dose: 20 mg  Take 20 mg by mouth daily  Refills: 0     metoprolol succinate ER 25 MG 24 hr tablet  Commonly known as: TOPROL XL      Dose: 75 mg  Take 75 mg by mouth daily  Refills: 0     miconazole 2 % cream  Commonly known as: MICATIN      Dose: 1 applicator  1 applicator 2 times daily Apply to skin folds  Refills: 0     omeprazole 20 MG DR capsule  Commonly known as: PriLOSEC      Dose: 20 mg  Take 20 mg by mouth daily  Refills: 0     WARFARIN SODIUM PO      12/28/23 INR 2.26  Cont 5mg M-W-F and 2.5mg AOD.  Next INR 1/18/24.    12/14/23 INR 2.41  Cont 5mg M-W-F and 2.5mg AOD.  Next INR 12/28/23.    12/8/23 INR 2.09  Take 5mg M-W-F and 2.5mg AOD.  Next INR 12/14/23.    12/4/23 INR 1.71  Take 7.5mg on  "12/4, 2.5mg on 12/5, 5mg on 12/6, 2.5mg on 12/7.  Next INR 12/8/23.    11/27/23 INR 1.86  Take 5mg Mon and Thurs and 2.5mg AOD.  Next INR 12/4/23.    11/9/23 INR 1.83  Take 5mg Q Thurs and 2.5mg AOD.  Next INR 11/27/23.    11/2/23 INR 2.20  Take 5mg Tues and Thurs and 2.5mg AOD.  Next INR 11/9/23.  10/30/23 INR 2.23  Take 2.5mg daily.  Next INR 11/2/23.  10/27/23 INR 1.89  Take 5mg on 10/27, 2.5mg on 10/28, 5mg on 10/29.  Discontinue Lovenox on 10/29.  Next INR 10/30/23.  10/24/23 INR 1.38  Take 5mg daily.  Continue Lovenox as ordered.  Next INR 10/27/23.    10/23/23 INR 1.86  Take 2.5mg on 10/23.  Continue Lovenox as ordered.  Next INR 10/24/23.  Refills: 0              ROS:  Patient denies fever, chills, headache, lightheadedness, dizziness, rhinorrhea, cough, congestion, shortness of breath, chest pain, palpitations, abdominal pain, n/v, diarrhea, constipation, , change in sleep pattern, dysuria, frequency, burning or pain with urination.  Other than stated in HPI all other review of systems is negative.       Exam:  Vital signs:BP 94/63   Pulse 83   Temp 98.1  F (36.7  C)   Resp 18   Ht 1.588 m (5' 2.5\")   Wt 63 kg (139 lb)   SpO2 94%   BMI 25.02 kg/m     GENERAL APPEARANCE: Well developed, well nourished, in no acute distress.  HEENT: normocephalic, atraumatic  sclerae anicteric, conjunctivae clear and moist, EOM intact  LUNGS: Lung sounds CTA, no adventitious sounds, respiratory effort normal.  CARD: RRR, S1, S2, without murmurs, gallops, rubs  MSK: left BKA  EXTREMITIES: No cyanosis, clubbing or edema.  NEURO: Alert with cognitive impairment, Face is symmetric.  SKIN: Inspection of the skin reveals no rashes, ulcerations or petechiae.  PSYCH: euthymic        Lab/Diagnostic data:   Last Comprehensive Metabolic Panel:  Lab Results   Component Value Date     11/09/2023    POTASSIUM 3.9 11/09/2023    CHLORIDE 101 11/09/2023    CO2 27 11/09/2023    ANIONGAP 13 11/09/2023     (H) 11/09/2023    " BUN 21.1 11/09/2023    CR 0.90 11/09/2023    GFRESTIMATED 66 11/09/2023    SUSIE 9.4 11/09/2023       Lab Results   Component Value Date    WBC 5.8 11/09/2023    WBC 6.9 10/06/2009     Lab Results   Component Value Date    RBC 4.56 11/09/2023    RBC 4.29 10/06/2009     Lab Results   Component Value Date    HGB 12.0 11/09/2023    HGB 11.8 10/06/2009     Lab Results   Component Value Date    HCT 39.2 11/09/2023    HCT 34.5 10/06/2009     Lab Results   Component Value Date    MCV 86 11/09/2023    MCV 81 10/06/2009     Lab Results   Component Value Date    MCH 26.3 11/09/2023    MCH 27.4 10/06/2009     Lab Results   Component Value Date    MCHC 30.6 11/09/2023    MCHC 34.0 10/06/2009     Lab Results   Component Value Date    RDW 14.6 11/09/2023    RDW 12.9 10/06/2009     Lab Results   Component Value Date     11/09/2023     10/06/2009         ASSESSMENT/PLAN  Type 2 diabetes mellitus with other circulatory complication, without long-term current use of insulin (H)  Start on Metformin XL 500mg daily.  Keep on XL to hopefully avoid GI upset.  Check A1c in 3 months.      Chronic atrial fibrillation with RVR (H)  Rate controlled with digoxin and metoprolol.  Check Dig level and BMP.  Will not decrease metoprolol for hypotension as she is mostly recumbent and asymptomatic.     PAD (peripheral artery disease) (H24)  Follow up with Vascular surgery on 1/9.  Continue with Pletal, Lipitor    History of CVA (cerebrovascular accident)  Vascular dementia with depressed mood (H)  Decompensating, needing guardianship.   Continue with pletal and lipitor.      Lactose sensitivity  Add lactase for dairy foods.     Electronically signed by:  Berenice Nolen NP          Sincerely,        Beernice Nolen NP

## 2024-01-04 NOTE — PROGRESS NOTES
[facility] GERIATRICS  Chief Complaint   Patient presents with    skilled nursing Regulatory     [place] Medical Record Number:  [id number]  Place of Service where encounter took place:  [facility] () [[id number]]    HPI:    Angela Beckham  is 73 year old (1950), who is being seen today for a federally mandated E/M visit. She has a past medical history for Afib, PAD, left BKA, CAD, hx of CVA, DM2, mitral valve insufficiency, pulmonary hypertension.       Recent labs showed A1c 7.4.  She was previously on Metformin however about 6 months ago she was having low BS and this was discontinued.  Vitamin D was low also and so she was started on supplement.      She had neuropsych eval done to determine competency and was noted she should not be making medical or financial decisions.  SW is trying to obtain guardianship.  I do note cognition is declining in patient.      She has a new liner for her prosthesis however I am told she does not get up on it daily.  She has a follow up with vascular surgery on 1/9.      Metoprolol was decreased in the past 2 months due to hypotension.  She continues to have hypotension however denies any symptoms.     Her biggest complaint today is GI symptoms with dairy products.  She is willing to try lactose enzymes.        ALLERGIES:Amoxicillin, Oxycodone, and Latex  PAST MEDICAL HISTORY:   Past Medical History:   Diagnosis Date    Diabetic neuropathy (H)     HTN     Type 2 diabetes mellitus (H)      PAST SURGICAL HISTORY:   has no past surgical history on file.  FAMILY HISTORY: family history includes Alzheimer Disease in her father; Cerebrovascular Disease in her maternal grandmother and mother; Diabetes in her paternal grandfather; Hypertension in her brother and mother; Obesity in her brother; Respiratory in her father.  SOCIAL HISTORY:  reports that she quit smoking about 3 years ago. Her smoking use included cigarettes. She smoked an average of 1 pack per  day. She has never used smokeless tobacco. She reports that she does not drink alcohol and does not use drugs.    MEDICATIONS:         Review of your medicines            Accurate as of January 4, 2024  1:04 PM. If you have any questions, ask your nurse or doctor.                CONTINUE these medicines which have NOT CHANGED        Dose / Directions   atorvastatin 20 MG tablet  Commonly known as: LIPITOR      Dose: 20 mg  Take 20 mg by mouth At Bedtime  Refills: 0     cholecalciferol 25 mcg (1000 units) capsule  Commonly known as: VITAMIN D3  Indication: Vitamin D Deficiency      Dose: 1 capsule  Take 1 capsule by mouth daily  Refills: 0     cilostazol 50 MG tablet  Commonly known as: PLETAL      Dose: 50 mg  Take 50 mg by mouth 2 times daily  Refills: 0     digoxin 62.5 MCG tablet  Commonly known as: LANOXIN  Indication: Atrial Fibrillation      Dose: 62.5 mcg  Take 62.5 mcg by mouth daily  Refills: 0     enoxaparin ANTICOAGULANT 80 MG/0.8ML syringe  Commonly known as: LOVENOX      Dose: 80 mg  Inject 80 mg Subcutaneous every 12 hours  Refills: 0     furosemide 20 MG tablet  Commonly known as: LASIX      Dose: 20 mg  Take 20 mg by mouth daily  Refills: 0     metoprolol succinate ER 25 MG 24 hr tablet  Commonly known as: TOPROL XL      Dose: 75 mg  Take 75 mg by mouth daily  Refills: 0     miconazole 2 % cream  Commonly known as: MICATIN      Dose: 1 applicator  1 applicator 2 times daily Apply to skin folds  Refills: 0     omeprazole 20 MG DR capsule  Commonly known as: PriLOSEC      Dose: 20 mg  Take 20 mg by mouth daily  Refills: 0     WARFARIN SODIUM PO      12/28/23 INR 2.26  Cont 5mg M-W-F and 2.5mg AOD.  Next INR 1/18/24.    12/14/23 INR 2.41  Cont 5mg M-W-F and 2.5mg AOD.  Next INR 12/28/23.    12/8/23 INR 2.09  Take 5mg M-W-F and 2.5mg AOD.  Next INR 12/14/23.    12/4/23 INR 1.71  Take 7.5mg on 12/4, 2.5mg on 12/5, 5mg on 12/6, 2.5mg on 12/7.  Next INR 12/8/23.    11/27/23 INR 1.86  Take 5mg Mon and Thurs  "and 2.5mg AOD.  Next INR 12/4/23.    11/9/23 INR 1.83  Take 5mg Q Thurs and 2.5mg AOD.  Next INR 11/27/23.    11/2/23 INR 2.20  Take 5mg Tues and Thurs and 2.5mg AOD.  Next INR 11/9/23.  10/30/23 INR 2.23  Take 2.5mg daily.  Next INR 11/2/23.  10/27/23 INR 1.89  Take 5mg on 10/27, 2.5mg on 10/28, 5mg on 10/29.  Discontinue Lovenox on 10/29.  Next INR 10/30/23.  10/24/23 INR 1.38  Take 5mg daily.  Continue Lovenox as ordered.  Next INR 10/27/23.    10/23/23 INR 1.86  Take 2.5mg on 10/23.  Continue Lovenox as ordered.  Next INR 10/24/23.  Refills: 0              ROS:  Patient denies fever, chills, headache, lightheadedness, dizziness, rhinorrhea, cough, congestion, shortness of breath, chest pain, palpitations, abdominal pain, n/v, diarrhea, constipation, , change in sleep pattern, dysuria, frequency, burning or pain with urination.  Other than stated in HPI all other review of systems is negative.       Exam:  Vital signs:BP 94/63   Pulse 83   Temp 98.1  F (36.7  C)   Resp 18   Ht 1.588 m (5' 2.5\")   Wt 63 kg (139 lb)   SpO2 94%   BMI 25.02 kg/m     GENERAL APPEARANCE: Well developed, well nourished, in no acute distress.  HEENT: normocephalic, atraumatic  sclerae anicteric, conjunctivae clear and moist, EOM intact  LUNGS: Lung sounds CTA, no adventitious sounds, respiratory effort normal.  CARD: RRR, S1, S2, without murmurs, gallops, rubs  MSK: left BKA  EXTREMITIES: No cyanosis, clubbing or edema.  NEURO: Alert with cognitive impairment, Face is symmetric.  SKIN: Inspection of the skin reveals no rashes, ulcerations or petechiae.  PSYCH: euthymic        Lab/Diagnostic data:   Last Comprehensive Metabolic Panel:  Lab Results   Component Value Date     11/09/2023    POTASSIUM 3.9 11/09/2023    CHLORIDE 101 11/09/2023    CO2 27 11/09/2023    ANIONGAP 13 11/09/2023     (H) 11/09/2023    BUN 21.1 11/09/2023    CR 0.90 11/09/2023    GFRESTIMATED 66 11/09/2023    SUSIE 9.4 11/09/2023       Lab Results "   Component Value Date    WBC 5.8 11/09/2023    WBC 6.9 10/06/2009     Lab Results   Component Value Date    RBC 4.56 11/09/2023    RBC 4.29 10/06/2009     Lab Results   Component Value Date    HGB 12.0 11/09/2023    HGB 11.8 10/06/2009     Lab Results   Component Value Date    HCT 39.2 11/09/2023    HCT 34.5 10/06/2009     Lab Results   Component Value Date    MCV 86 11/09/2023    MCV 81 10/06/2009     Lab Results   Component Value Date    MCH 26.3 11/09/2023    MCH 27.4 10/06/2009     Lab Results   Component Value Date    MCHC 30.6 11/09/2023    MCHC 34.0 10/06/2009     Lab Results   Component Value Date    RDW 14.6 11/09/2023    RDW 12.9 10/06/2009     Lab Results   Component Value Date     11/09/2023     10/06/2009         ASSESSMENT/PLAN  Type 2 diabetes mellitus with other circulatory complication, without long-term current use of insulin (H)  Start on Metformin XL 500mg daily.  Keep on XL to hopefully avoid GI upset.  Check A1c in 3 months.      Chronic atrial fibrillation with RVR (H)  Rate controlled with digoxin and metoprolol.  Check Dig level and BMP.  Will not decrease metoprolol for hypotension as she is mostly recumbent and asymptomatic.     PAD (peripheral artery disease) (H24)  Follow up with Vascular surgery on 1/9.  Continue with Pletal, Lipitor    History of CVA (cerebrovascular accident)  Vascular dementia with depressed mood (H)  Decompensating, needing guardianship.   Continue with pletal and lipitor.      Lactose sensitivity  Add lactase for dairy foods.     Electronically signed by:  Berenice Nolen NP

## 2024-01-08 ENCOUNTER — LAB REQUISITION (OUTPATIENT)
Dept: LAB | Facility: CLINIC | Age: 74
End: 2024-01-08
Payer: COMMERCIAL

## 2024-01-08 DIAGNOSIS — I50.9 HEART FAILURE, UNSPECIFIED (H): ICD-10-CM

## 2024-01-08 DIAGNOSIS — R53.1 WEAKNESS: ICD-10-CM

## 2024-01-08 DIAGNOSIS — I48.91 UNSPECIFIED ATRIAL FIBRILLATION (H): ICD-10-CM

## 2024-01-09 ENCOUNTER — TELEPHONE (OUTPATIENT)
Dept: GERIATRICS | Facility: CLINIC | Age: 74
End: 2024-01-09

## 2024-01-09 LAB
ANION GAP SERPL CALCULATED.3IONS-SCNC: 12 MMOL/L (ref 7–15)
BUN SERPL-MCNC: 20.7 MG/DL (ref 8–23)
CALCIUM SERPL-MCNC: 9.4 MG/DL (ref 8.8–10.2)
CHLORIDE SERPL-SCNC: 99 MMOL/L (ref 98–107)
CREAT SERPL-MCNC: 0.65 MG/DL (ref 0.51–0.95)
DEPRECATED HCO3 PLAS-SCNC: 26 MMOL/L (ref 22–29)
DIGOXIN SERPL-MCNC: 0.6 NG/ML (ref 0.6–2)
EGFRCR SERPLBLD CKD-EPI 2021: >90 ML/MIN/1.73M2
GLUCOSE SERPL-MCNC: 140 MG/DL (ref 70–99)
POTASSIUM SERPL-SCNC: 4.2 MMOL/L (ref 3.4–5.3)
SODIUM SERPL-SCNC: 137 MMOL/L (ref 135–145)

## 2024-01-09 PROCEDURE — P9604 ONE-WAY ALLOW PRORATED TRIP: HCPCS | Mod: ORL | Performed by: FAMILY MEDICINE

## 2024-01-09 PROCEDURE — 80162 ASSAY OF DIGOXIN TOTAL: CPT | Mod: ORL | Performed by: FAMILY MEDICINE

## 2024-01-09 PROCEDURE — 36415 COLL VENOUS BLD VENIPUNCTURE: CPT | Mod: ORL | Performed by: FAMILY MEDICINE

## 2024-01-09 PROCEDURE — 80048 BASIC METABOLIC PNL TOTAL CA: CPT | Mod: ORL | Performed by: FAMILY MEDICINE

## 2024-01-09 NOTE — TELEPHONE ENCOUNTER
[facility] [place] [facility] Lab Note     Provider: CEDRIC Ortiz  Facility: [facility]  Facility Type:  LT    Allergies   Allergen Reactions    Amoxicillin Unknown    Oxycodone Other (See Comments)     Does not remember    Latex Rash       Labs Reviewed by provider: BMP, Digoxin     Verbal Order/Direction given by Provider: No new orders.      Provider giving Order:  CEDRIC Ortiz    Verbal Order given to: Claudia([phone])    Willie Campbell RN

## 2024-01-17 ENCOUNTER — LAB REQUISITION (OUTPATIENT)
Dept: LAB | Facility: CLINIC | Age: 74
End: 2024-01-17
Payer: COMMERCIAL

## 2024-01-17 DIAGNOSIS — I48.91 UNSPECIFIED ATRIAL FIBRILLATION (H): ICD-10-CM

## 2024-01-18 ENCOUNTER — TELEPHONE (OUTPATIENT)
Dept: GERIATRICS | Facility: CLINIC | Age: 74
End: 2024-01-18

## 2024-01-18 LAB — INR PPP: 3.1 (ref 0.85–1.15)

## 2024-01-18 PROCEDURE — P9604 ONE-WAY ALLOW PRORATED TRIP: HCPCS | Mod: ORL | Performed by: FAMILY MEDICINE

## 2024-01-18 PROCEDURE — 36415 COLL VENOUS BLD VENIPUNCTURE: CPT | Mod: ORL | Performed by: FAMILY MEDICINE

## 2024-01-18 PROCEDURE — 85610 PROTHROMBIN TIME: CPT | Mod: ORL | Performed by: FAMILY MEDICINE

## 2024-01-18 NOTE — TELEPHONE ENCOUNTER
[facility] Triage Nurse INR     Provider: JOE Borjas CNP  Facility: [facility]   Facility Type:  [facility]    Caller: Flores   Call Back Number: [phone]  Reason for call: INR  Diagnosis/Goal: A. Fib    Todays INR: 3.10  Last INR 12/28/23 2.26(5mg M-W-F and 2.5mg AOD),   12/14 2.41(5mg M-W-F and 2.5mg AOD),   12/8 2.09(5mg M-W-F and 2.5mg AOD),    12/4 1.71(7.5mg on 12/4, 2.5mg on 12/5, 5mg on 12/6, 2.5mg on 12/7),   11/27 1.86(5mg Mon and Thurs and 2.5mg AOD),   11/9 1.87(5mg Q Thurs and 2.5mg AOD),   11/2 2.20(5mg Tues and Thurs and 2.5mg AOD),   10/30 2.23(2.5mg daily),   10/27 1.89(5mg on 10/27, 2.5mg 10/28, 5mg on 10/29---stop Lovenox as of 10/29),   10/24 1.38(5mg daily + Lovenox)       Heparin/Lovenox:  No  Currently on ABX?: No  Other interacting medication:  Cilostazol  Missed or refused doses: No    Verbal Order/Direction given by Provider: Warfarin 5mg Wednesdays and Saturdays and 2.5mg all other days.  Next INR 1/25/24.      Provider Giving Order:  JOE Borjas CNP    Verbal Order given to: Flores Price, RN on 1/18/2024 at 3:03 PM        Willie Campbell RN

## 2024-01-24 ENCOUNTER — LAB REQUISITION (OUTPATIENT)
Dept: LAB | Facility: CLINIC | Age: 74
End: 2024-01-24
Payer: COMMERCIAL

## 2024-01-24 DIAGNOSIS — Z79.01 LONG TERM (CURRENT) USE OF ANTICOAGULANTS: ICD-10-CM

## 2024-01-25 ENCOUNTER — TELEPHONE (OUTPATIENT)
Dept: GERIATRICS | Facility: CLINIC | Age: 74
End: 2024-01-25

## 2024-01-25 LAB — INR PPP: 2.58 (ref 0.85–1.15)

## 2024-01-25 PROCEDURE — 36415 COLL VENOUS BLD VENIPUNCTURE: CPT | Mod: ORL | Performed by: FAMILY MEDICINE

## 2024-01-25 PROCEDURE — P9604 ONE-WAY ALLOW PRORATED TRIP: HCPCS | Mod: ORL | Performed by: FAMILY MEDICINE

## 2024-01-25 PROCEDURE — 85610 PROTHROMBIN TIME: CPT | Mod: ORL | Performed by: FAMILY MEDICINE

## 2024-01-25 NOTE — TELEPHONE ENCOUNTER
[facility] Triage Nurse INR     Provider: ROSETTA Ovalles  Facility: [facility]   Facility Type:  [facility]    Caller: Claudia  Call Back Number: [phone]  Reason for call: INR  Diagnosis/Goal: A. Fib    Todays INR: 2.58  Last INR 1/18 3.10(5mg Wed and Sat and 2.5mg AOD), 12/28/23 2.26(5mg M-W-F and 2.5mg AOD),   12/14 2.41(5mg M-W-F and 2.5mg AOD),   12/8 2.09(5mg M-W-F and 2.5mg AOD),    12/4 1.71(7.5mg on 12/4, 2.5mg on 12/5, 5mg on 12/6, 2.5mg on 12/7),   11/27 1.86(5mg Mon and Thurs and 2.5mg AOD),   11/9 1.87(5mg Q Thurs and 2.5mg AOD),   11/2 2.20(5mg Tues and Thurs and 2.5mg AOD),   10/30 2.23(2.5mg daily),   10/27 1.89(5mg on 10/27, 2.5mg 10/28, 5mg on 10/29---stop Lovenox as of 10/29),   10/24 1.38(5mg daily + Lovenox)      Heparin/Lovenox:  No  Currently on ABX?: No  Other interacting medication:  Cilostazol  Missed or refused doses: No    Verbal Order/Direction given by Provider: Continue Warfarin 5mg Wed and Sat and 2.5mg all other days.  Next INR 2/1/24.      Provider Giving Order:  ROSETTA Ovalles    Verbal Order given to: Claudia Campbell RN

## 2024-01-31 ENCOUNTER — LAB REQUISITION (OUTPATIENT)
Dept: LAB | Facility: CLINIC | Age: 74
End: 2024-01-31
Payer: COMMERCIAL

## 2024-01-31 DIAGNOSIS — Z79.01 LONG TERM (CURRENT) USE OF ANTICOAGULANTS: ICD-10-CM

## 2024-02-01 ENCOUNTER — TELEPHONE (OUTPATIENT)
Dept: GERIATRICS | Facility: CLINIC | Age: 74
End: 2024-02-01

## 2024-02-01 ENCOUNTER — PATIENT OUTREACH (OUTPATIENT)
Dept: GERIATRIC MEDICINE | Facility: CLINIC | Age: 74
End: 2024-02-01

## 2024-02-01 LAB — INR PPP: 2.17 (ref 0.85–1.15)

## 2024-02-01 PROCEDURE — 36415 COLL VENOUS BLD VENIPUNCTURE: CPT | Mod: ORL | Performed by: FAMILY MEDICINE

## 2024-02-01 PROCEDURE — 85610 PROTHROMBIN TIME: CPT | Mod: ORL | Performed by: FAMILY MEDICINE

## 2024-02-01 PROCEDURE — P9604 ONE-WAY ALLOW PRORATED TRIP: HCPCS | Mod: ORL | Performed by: FAMILY MEDICINE

## 2024-02-01 NOTE — TELEPHONE ENCOUNTER
[facility] Triage Nurse INR     Provider: ROSETTA Ovalles  Facility: [facility]   Facility Type:  [facility]    Caller: Chelsi  Call Back Number: [phone]  Reason for call: INR  Diagnosis/Goal: A. Fib    Todays INR: 2.17  Last INR 1/25 2.58(5mg Wed and Sat and 2.5mg AOD), 1/18 3.10(5mg Wed and Sat and 2.5mg AOD), 12/28/23 2.26(5mg M-W-F and 2.5mg AOD),   12/14 2.41(5mg M-W-F and 2.5mg AOD),   12/8 2.09(5mg M-W-F and 2.5mg AOD),    12/4 1.71(7.5mg on 12/4, 2.5mg on 12/5, 5mg on 12/6, 2.5mg on 12/7),   11/27 1.86(5mg Mon and Thurs and 2.5mg AOD),   11/9 1.87(5mg Q Thurs and 2.5mg AOD),   11/2 2.20(5mg Tues and Thurs and 2.5mg AOD),   10/30 2.23(2.5mg daily),   10/27 1.89(5mg on 10/27, 2.5mg 10/28, 5mg on 10/29---stop Lovenox as of 10/29),   10/24 1.38(5mg daily + Lovenox)     Heparin/Lovenox:  No  Currently on ABX?: No  Other interacting medication:  Cilostazol  Missed or refused doses: No    Verbal Order/Direction given by Provider: Warfarin 5mg M-W-F and 2.5mg all other days.  Next INR 2/8/24.      Provider Giving Order:  ROSETTA Ovalles    Verbal Order given to: Chelsi Campbell RN

## 2024-02-01 NOTE — PROGRESS NOTES
[facility] Care Coordination Contact  Received a notification from [facility] Dania Brown that [place] medical [place] an [place] per [place].  I will place to [facility] to check on status of [place] renewal documents.  Per [facility] they have not received renewal documents.  Care coordinator was given financial worker Malick Rehan phone number which is [phone].  Call was placed to Vero Cooley  at [facility] to update and notify of inactive status.  Vero states that Angela has a tendency to shaina and ignore her mail.  Vero will make a visit today to Angela to try to find the renewal documentation and to assist Angela to complete and return documentation to [facility] ASA.  Will continue to monitor.    Yohana PICKERINGN/ALYSA [facility]  Long Term Care/ Care Coordinator  [address] [place] [address]   [address]  [place] [address]  [email or web address]   [email or web address]     Cell: [phone]  Office: [phone]  Fax: [phone]  '

## 2024-02-07 ENCOUNTER — LAB REQUISITION (OUTPATIENT)
Dept: LAB | Facility: CLINIC | Age: 74
End: 2024-02-07
Payer: COMMERCIAL

## 2024-02-07 DIAGNOSIS — Z79.01 LONG TERM (CURRENT) USE OF ANTICOAGULANTS: ICD-10-CM

## 2024-02-08 ENCOUNTER — TELEPHONE (OUTPATIENT)
Dept: GERIATRICS | Facility: CLINIC | Age: 74
End: 2024-02-08

## 2024-02-08 LAB — INR PPP: 3.25 (ref 0.85–1.15)

## 2024-02-08 PROCEDURE — 85610 PROTHROMBIN TIME: CPT | Mod: ORL | Performed by: FAMILY MEDICINE

## 2024-02-08 PROCEDURE — 36415 COLL VENOUS BLD VENIPUNCTURE: CPT | Mod: ORL | Performed by: FAMILY MEDICINE

## 2024-02-08 PROCEDURE — P9603 ONE-WAY ALLOW PRORATED MILES: HCPCS | Mod: ORL | Performed by: FAMILY MEDICINE

## 2024-02-08 NOTE — TELEPHONE ENCOUNTER
[facility] Geriatrics Triage Nurse INR     Provider: ROSETTA Ovalles  Facility: [facility]   Facility Type:  [facility]    Caller: Claudia  Call Back Number: [phone]  Reason for call: INR  Diagnosis/Goal: A. Fib    Todays INR: 3.25  Last INR 2/1 2.17(5mg M-W-F and 2.5mg AOD), 1/25 2.58(5mg Wed and Sat and 2.5mg AOD), 1/18 3.10(5mg Wed and Sat and 2.5mg AOD), 12/28/23 2.26(5mg M-W-F and 2.5mg AOD),   12/14 2.41(5mg M-W-F and 2.5mg AOD),   12/8 2.09(5mg M-W-F and 2.5mg AOD),    12/4 1.71(7.5mg on 12/4, 2.5mg on 12/5, 5mg on 12/6, 2.5mg on 12/7),   11/27 1.86(5mg Mon and Thurs and 2.5mg AOD),   11/9 1.87(5mg Q Thurs and 2.5mg AOD),   11/2 2.20(5mg Tues and Thurs and 2.5mg AOD),   10/30 2.23(2.5mg daily),   10/27 1.89(5mg on 10/27, 2.5mg 10/28, 5mg on 10/29---stop Lovenox as of 10/29),   10/24 1.38(5mg daily + Lovenox)     Heparin/Lovenox:  No  Currently on ABX?: No  Other interacting medication:  Cilostazol  Missed or refused doses: No    Verbal Order/Direction given by Provider: Warfarin 5mg Mondays and Fridays and 2.5mg all other days.  Next INR 2/14/24.      Provider Giving Order:  ROSETTA Ovalles    Verbal Order given to: Claudia Campbell RN

## 2024-02-09 ENCOUNTER — PATIENT OUTREACH (OUTPATIENT)
Dept: GERIATRIC MEDICINE | Facility: CLINIC | Age: 74
End: 2024-02-09
Payer: COMMERCIAL

## 2024-02-09 NOTE — PROGRESS NOTES
[facility] Care Coordination Communication    Email sent to [facility]ero [facility]  at [facility] to  schedule annual assessment. For member. Assessment  is scheduled for 02/12/24.. CC will complete chart review and reach out to family or primary contact as part of the assessment.     Yohana Melendez RN, PHN  Care Coordinator [facility]  [phone]

## 2024-02-12 ENCOUNTER — PATIENT OUTREACH (OUTPATIENT)
Dept: GERIATRIC MEDICINE | Facility: CLINIC | Age: 74
End: 2024-02-12
Payer: COMMERCIAL

## 2024-02-12 ASSESSMENT — PATIENT HEALTH QUESTIONNAIRE - PHQ9: SUM OF ALL RESPONSES TO PHQ QUESTIONS 1-9: 4

## 2024-02-12 NOTE — Clinical Note
Dr Alex Miller was seen for her annual health plan assessment on 2/12/2024.  No significant changes or concerns identified during this assessment.  Talked about moving home but after she and I talked a little bit more he realized that this would not be very realistic that she will be unable to care for herself.  I have ordered her in an electronic dog to help with anxiety and depression.  She would like to get her Tdap and shingles vaccines.  Please let me know if I can be of any assistance in her care.  Yohana PICKERINGN/PHN [facility] Long Term Care/ Care Coordinator [address] [place] [address]   [address]  [place] [address] [email or web address]   [email or web address]    Cell: [phone] Office: [phone] Fax: [phone]

## 2024-02-13 ENCOUNTER — LAB REQUISITION (OUTPATIENT)
Dept: LAB | Facility: CLINIC | Age: 74
End: 2024-02-13
Payer: COMMERCIAL

## 2024-02-13 DIAGNOSIS — I48.91 UNSPECIFIED ATRIAL FIBRILLATION (H): ICD-10-CM

## 2024-02-13 NOTE — PROGRESS NOTES
[facility] Care Coordination Contact  [facility] Institutional Assessment     Institutional Assessment for Health Risk Assessment with Angela Beckham completed on February 12, 2024 at [facility]    Type of residence:: Nursing home  Current living arrangement:: I live in a nursing home     Assessment completed with:: Patient, Care Team Member, Family    Mental/Behavioral Health   Depression Screening:   PHQ-2 Total Score (Adult) - Positive if 3 or more points; Administer PHQ-9 if positive: 2  PHQ-9 Total Score: 4    Mental health DX:: Yes   Mental health DX how managed:: In Home Counseling    Falls Assessment:   Fallen 2 or more times in the past year?: No   Any fall with injury in the past year?: No    ADL/IADL Dependencies:   Dependent ADLs:: Bathing, Dressing, Grooming, Incontinence, Positioning, Transfers, Wheelchair-with assist, Toileting  Dependent IADLs:: Cleaning, Cooking, Laundry, Shopping, Meal Preparation, Medication Management, Money Management, Transportation, Incontinence      Care Plan & Recommendations: Angela is a 73-year-old female who lives in long-term care at [facility].  She has a primary medical diagnosis of cerebral ischemia and secondary diagnoses that include type 2 diabetes, below the knee amputation of the left leg, atrial fibs, right Charcot's foot, hypertension, vascular dementia and cataracts.  Angela has a history of stroke and a  mental health diagnosis of adjustment disorder.  He is behind on multiple immunizations including Tdap, shingles and COVID.  She is agreeable to the Tdap and shingles vaccine but has declined the COVID and RSV vaccines.  Ancillary exams were reviewed and Angela has not had any exams in the past year.  She is agreeable to all 3 hearing vision and dental exams and would like to be seen by podiatry ongoing.  She is seen in the facility by psychologist from [facility] and has a neuropsych eval set up for September 2024.  Angela  denies pain.  Her most recent PHQ-9 is 4/27 with triggers for feeling down depressed and hopeless, trouble falling asleep and staying asleep, feeling tired and having little energy.  CODE STATUS was reviewed and per POL is DNR/DNI/selective interventions. Discussed options/opportunities for transitions.    See Institutional Care Plan for detailed assessment information.    Obtained a copy of the facility care plan and MDS from facility electronic records. Requested of senior living social worker to put this care coordinator on care conference attendee list.    Placed the Health Plan facility face sheet in the member's facility chart.    Follow-Up Plan: Member informed of future contact, plan to f/u with member with a 6 month assessment, attend 1 care conference annually, and will follow any hospitalizations or transitions. Care Coordinator contact information shared with member/family and facility, and encouraged to call this care coordinator with any questions or concerns at any time.     [place] care continuum providers: Please see Snapshot and Care Management Flowsheets for Specific details of care plan.    This CC note routed to PCP, Ginny Johnson.    Yohana Melendez BSN/PHN [facility]  Long Term Care/ Care Coordinator  [address]   [address]  [place] [address]  [email or web address]   [email or web address]     Cell: [phone]  Office: [phone]  Fax: [phone]

## 2024-02-14 ENCOUNTER — TELEPHONE (OUTPATIENT)
Dept: GERIATRICS | Facility: CLINIC | Age: 74
End: 2024-02-14

## 2024-02-14 LAB — INR PPP: 3.3 (ref 0.85–1.15)

## 2024-02-14 PROCEDURE — 36415 COLL VENOUS BLD VENIPUNCTURE: CPT | Mod: ORL | Performed by: FAMILY MEDICINE

## 2024-02-14 PROCEDURE — 85610 PROTHROMBIN TIME: CPT | Mod: ORL | Performed by: FAMILY MEDICINE

## 2024-02-14 PROCEDURE — P9604 ONE-WAY ALLOW PRORATED TRIP: HCPCS | Mod: ORL | Performed by: FAMILY MEDICINE

## 2024-02-14 NOTE — TELEPHONE ENCOUNTER
[facility] Geriatrics Triage Nurse INR     Provider: ROSETTA Ovalles  Facility: [facility]   Facility Type:  [facility]    Caller: Natalie  Call Back Number: [phone]  Reason for call: INR  Diagnosis/Goal: A. Fib    Todays INR: 3.30  Last INR 2/8 3.25(5mg Mon and Fri and 2.5mg AOD), 2/1 2.17(5mg M-W-F and 2.5mg AOD), 1/25 2.58(5mg Wed and Sat and 2.5mg AOD), 1/18 3.10(5mg Wed and Sat and 2.5mg AOD), 12/28/23 2.26(5mg M-W-F and 2.5mg AOD),   12/14 2.41(5mg M-W-F and 2.5mg AOD),   12/8 2.09(5mg M-W-F and 2.5mg AOD),    12/4 1.71(7.5mg on 12/4, 2.5mg on 12/5, 5mg on 12/6, 2.5mg on 12/7),   11/27 1.86(5mg Mon and Thurs and 2.5mg AOD),   11/9 1.87(5mg Q Thurs and 2.5mg AOD),   11/2 2.20(5mg Tues and Thurs and 2.5mg AOD),   10/30 2.23(2.5mg daily),   10/27 1.89(5mg on 10/27, 2.5mg 10/28, 5mg on 10/29---stop Lovenox as of 10/29),   10/24 1.38(5mg daily + Lovenox)     Heparin/Lovenox:  No  Currently on ABX?: No  Other interacting medication:  Cilostazol  Missed or refused doses: No    Verbal Order/Direction given by Provider: Warfarin 2.5mg daily.  Next INR 2/19/24.    Provider Giving Order:  ROSETTA Ovalles    Verbal Order given to: Natalie Campbell RN

## 2024-02-17 ENCOUNTER — LAB REQUISITION (OUTPATIENT)
Dept: LAB | Facility: CLINIC | Age: 74
End: 2024-02-17
Payer: COMMERCIAL

## 2024-02-17 DIAGNOSIS — I63.40 CEREBRAL INFARCTION DUE TO EMBOLISM OF UNSPECIFIED CEREBRAL ARTERY (H): ICD-10-CM

## 2024-02-19 ENCOUNTER — TELEPHONE (OUTPATIENT)
Dept: GERIATRICS | Facility: CLINIC | Age: 74
End: 2024-02-19

## 2024-02-19 LAB — INR PPP: 2.83 (ref 0.85–1.15)

## 2024-02-19 PROCEDURE — P9604 ONE-WAY ALLOW PRORATED TRIP: HCPCS | Mod: ORL | Performed by: FAMILY MEDICINE

## 2024-02-19 PROCEDURE — 36415 COLL VENOUS BLD VENIPUNCTURE: CPT | Mod: ORL | Performed by: FAMILY MEDICINE

## 2024-02-19 PROCEDURE — 85610 PROTHROMBIN TIME: CPT | Mod: ORL | Performed by: FAMILY MEDICINE

## 2024-02-19 NOTE — TELEPHONE ENCOUNTER
[facility] Geriatrics Triage Nurse INR     Provider: ROSETTA vOalles  Facility: [facility]   Facility Type:  [facility]    Caller: Marino  Call Back Number: [phone]  Reason for call: INR  Diagnosis/Goal: A. Fib    Todays INR: 2.83  Last INR 2/14 3.30(2.5mg daily), 2/8 3.25(5mg Mon and Fri and 2.5mg AOD), 2/1 2.17(5mg M-W-F and 2.5mg AOD), 1/25 2.58(5mg Wed and Sat and 2.5mg AOD), 1/18 3.10(5mg Wed and Sat and 2.5mg AOD), 12/28/23 2.26(5mg M-W-F and 2.5mg AOD),   12/14 2.41(5mg M-W-F and 2.5mg AOD),   12/8 2.09(5mg M-W-F and 2.5mg AOD),    12/4 1.71(7.5mg on 12/4, 2.5mg on 12/5, 5mg on 12/6, 2.5mg on 12/7),   11/27 1.86(5mg Mon and Thurs and 2.5mg AOD),   11/9 1.87(5mg Q Thurs and 2.5mg AOD),   11/2 2.20(5mg Tues and Thurs and 2.5mg AOD),   10/30 2.23(2.5mg daily),   10/27 1.89(5mg on 10/27, 2.5mg 10/28, 5mg on 10/29---stop Lovenox as of 10/29),   10/24 1.38(5mg daily + Lovenox)     Heparin/Lovenox:  No  Currently on ABX?: No  Other interacting medication:  Cilostazol  Missed or refused doses: No    Verbal Order/Direction given by Provider: Continue Warfarin 2.5mg daily.  Next INR 2/27/24.      Provider Giving Order:  ROSETTA Ovalles    Verbal Order given to: Corinne Campbell RN

## 2024-02-27 ENCOUNTER — PATIENT OUTREACH (OUTPATIENT)
Dept: GERIATRIC MEDICINE | Facility: CLINIC | Age: 74
End: 2024-02-27
Payer: COMMERCIAL

## 2024-02-27 ENCOUNTER — DOCUMENTATION ONLY (OUTPATIENT)
Dept: OTHER | Facility: CLINIC | Age: 74
End: 2024-02-27
Payer: COMMERCIAL

## 2024-02-27 ENCOUNTER — TELEPHONE (OUTPATIENT)
Dept: GERIATRICS | Facility: CLINIC | Age: 74
End: 2024-02-27
Payer: COMMERCIAL

## 2024-02-27 ENCOUNTER — LAB REQUISITION (OUTPATIENT)
Dept: LAB | Facility: CLINIC | Age: 74
End: 2024-02-27
Payer: COMMERCIAL

## 2024-02-27 DIAGNOSIS — Z79.01 LONG TERM (CURRENT) USE OF ANTICOAGULANTS: ICD-10-CM

## 2024-02-27 NOTE — PROGRESS NOTES
[facility] Care Coordination Contact    Received after visit chart from care coordinator.  Completed following tasks: Mailed [facility] POC Letter to member/rep with Support Plan & Signature Page    Dania Brown  Care Management Specialist  [facility]  [phone]

## 2024-02-27 NOTE — TELEPHONE ENCOUNTER
[facility] Geriatrics Triage Nurse Telephone Encounter    Provider: ROSETTA Ovalles  Facility: [facility]  Facility Type:  [facility]    Caller: Chelsi  Call Back Number: [phone]    Allergies:    Allergies   Allergen Reactions    Amoxicillin Unknown    Oxycodone Other (See Comments)     Does not remember    Latex Rash        Reason for call: Nurse is reporting that patient's INR was missed today.      Verbal Order/Direction given by Provider: Give Warfarin 2.5mg today.  Check INR 2/28/24.      Provider giving Order:  ROSETTA Ovalles    Verbal Order given to: Chelsi Campbell RN

## 2024-02-27 NOTE — LETTER
February 27, 2024      ANGELA CRUZ  [facility]  [address] [address]      Dear Angela:    At [facility], we re dedicated to improving your health and wellness. Enclosed is the Care Plan developed with you on 2/12/2024. Please review the Care Plan carefully.    As a reminder, during your visit we talked about:  Ways to manage your physical and mental health  Using health care to maintain and improve your health   Your preventive care needs     Remember to contact your care coordinator if you:  Are hospitalized, or plan to be hospitalized   Have a fall    Have a change in your physical or mental health  Need help finding support or services    If you have questions, or don t agree with your Care Plan, call me at [phone]. You can also call me if your needs change. TTY users, call the [place] [employer] at (583) or 1-368.939.1690 (nwgkbq-xf-zhtodt relay service).    Sincerely,    Yohana Melendez RN, PHN  [phone]  [email or web address] ([facility]) is a health plan that contracts with both Medicare and the [place] Medical Assistance (Medicaid) program to provide benefits of both programs to enrollees. Enrollment in [facility] depends on contract renewal.    [id number] accepted    [id number] (07/2022)

## 2024-02-28 ENCOUNTER — TELEPHONE (OUTPATIENT)
Dept: GERIATRICS | Facility: CLINIC | Age: 74
End: 2024-02-28

## 2024-02-28 LAB — INR PPP: 1.56 (ref 0.85–1.15)

## 2024-02-28 PROCEDURE — 36415 COLL VENOUS BLD VENIPUNCTURE: CPT | Mod: ORL | Performed by: FAMILY MEDICINE

## 2024-02-28 PROCEDURE — P9604 ONE-WAY ALLOW PRORATED TRIP: HCPCS | Mod: ORL | Performed by: FAMILY MEDICINE

## 2024-02-28 PROCEDURE — 85610 PROTHROMBIN TIME: CPT | Mod: ORL | Performed by: FAMILY MEDICINE

## 2024-02-28 NOTE — TELEPHONE ENCOUNTER
[facility] Geriatrics Triage Nurse INR     Provider: ROSETTA Ovalles  Facility: [facility]   Facility Type:  [facility]    Caller: Chelsi  Call Back Number: [phone]  Reason for call: INR  Diagnosis/Goal: A. Fib    Todays INR: 1.56  Last INR 2/19 2.83(2.5mg daily), 2/14 3.30(2.5mg daily), 2/8 3.25(5mg Mon and Fri and 2.5mg AOD), 2/1 2.17(5mg M-W-F and 2.5mg AOD), 1/25 2.58(5mg Wed and Sat and 2.5mg AOD), 1/18 3.10(5mg Wed and Sat and 2.5mg AOD), 12/28/23 2.26(5mg M-W-F and 2.5mg AOD),   12/14 2.41(5mg M-W-F and 2.5mg AOD),   12/8 2.09(5mg M-W-F and 2.5mg AOD),    12/4 1.71(7.5mg on 12/4, 2.5mg on 12/5, 5mg on 12/6, 2.5mg on 12/7),   11/27 1.86(5mg Mon and Thurs and 2.5mg AOD),   11/9 1.87(5mg Q Thurs and 2.5mg AOD),   11/2 2.20(5mg Tues and Thurs and 2.5mg AOD),   10/30 2.23(2.5mg daily),   10/27 1.89(5mg on 10/27, 2.5mg 10/28, 5mg on 10/29---stop Lovenox as of 10/29),   10/24 1.38(5mg daily + Lovenox)    Heparin/Lovenox:  No  Currently on ABX?: No  Other interacting medication:  Cilostazol  Missed or refused doses: No    Verbal Order/Direction given by Provider: Warfarin 5mg on 2/28 and 2.5mg on 2/29.  Next INR 3/1/24.      Provider Giving Order:  Akosua Mary, FNP    Verbal Order given to: Chelsi Campbell RN

## 2024-02-29 ENCOUNTER — LAB REQUISITION (OUTPATIENT)
Dept: LAB | Facility: CLINIC | Age: 74
End: 2024-02-29
Payer: COMMERCIAL

## 2024-02-29 DIAGNOSIS — I48.91 UNSPECIFIED ATRIAL FIBRILLATION (H): ICD-10-CM

## 2024-03-01 ENCOUNTER — TELEPHONE (OUTPATIENT)
Dept: GERIATRICS | Facility: CLINIC | Age: 74
End: 2024-03-01

## 2024-03-01 LAB — INR PPP: 1.38 (ref 0.85–1.15)

## 2024-03-01 PROCEDURE — P9604 ONE-WAY ALLOW PRORATED TRIP: HCPCS | Mod: ORL | Performed by: FAMILY MEDICINE

## 2024-03-01 PROCEDURE — 36415 COLL VENOUS BLD VENIPUNCTURE: CPT | Mod: ORL | Performed by: FAMILY MEDICINE

## 2024-03-01 PROCEDURE — 85610 PROTHROMBIN TIME: CPT | Mod: ORL | Performed by: FAMILY MEDICINE

## 2024-03-01 NOTE — TELEPHONE ENCOUNTER
[facility] [place] Geriatrics Triage Nurse INR     Provider: ROSETTA Ovalles  Facility: [facility]   Facility Type:  [facility]    Caller: Yolanda  Call Back Number: [phone]  Reason for call: INR  Diagnosis/Goal: A. Fib    Todays INR: 1.38  Last INR 2/28 1.56(5mg on 2/28 and 2.5mg on 2/29), 2/19 2.83(2.5mg daily), 2/14 3.30(2.5mg daily), 2/8 3.25(5mg Mon and Fri and 2.5mg AOD), 2/1 2.17(5mg M-W-F and 2.5mg AOD), 1/25 2.58(5mg Wed and Sat and 2.5mg AOD), 1/18 3.10(5mg Wed and Sat and 2.5mg AOD), 12/28/23 2.26(5mg M-W-F and 2.5mg AOD),   12/14 2.41(5mg M-W-F and 2.5mg AOD),   12/8 2.09(5mg M-W-F and 2.5mg AOD),    12/4 1.71(7.5mg on 12/4, 2.5mg on 12/5, 5mg on 12/6, 2.5mg on 12/7),   11/27 1.86(5mg Mon and Thurs and 2.5mg AOD),   11/9 1.87(5mg Q Thurs and 2.5mg AOD),   11/2 2.20(5mg Tues and Thurs and 2.5mg AOD),   10/30 2.23(2.5mg daily),   10/27 1.89(5mg on 10/27, 2.5mg 10/28, 5mg on 10/29---stop Lovenox as of 10/29),   10/24 1.38(5mg daily + Lovenox)    Heparin/Lovenox:  No  Currently on ABX?: No  Other interacting medication:  Cilostazol  Missed or refused doses: No    Verbal Order/Direction given by Provider: Warfarin 5mg daily.  Next INR 3/4/24.      Provider Giving Order:  ROSETTA Ovalles    Verbal Order given to: Yolanda Campbell RN

## 2024-03-02 ENCOUNTER — LAB REQUISITION (OUTPATIENT)
Dept: LAB | Facility: CLINIC | Age: 74
End: 2024-03-02
Payer: COMMERCIAL

## 2024-03-02 DIAGNOSIS — I48.91 UNSPECIFIED ATRIAL FIBRILLATION (H): ICD-10-CM

## 2024-03-04 ENCOUNTER — TELEPHONE (OUTPATIENT)
Dept: GERIATRICS | Facility: CLINIC | Age: 74
End: 2024-03-04

## 2024-03-04 LAB — INR PPP: 1.45 (ref 0.85–1.15)

## 2024-03-04 PROCEDURE — 36415 COLL VENOUS BLD VENIPUNCTURE: CPT | Mod: ORL | Performed by: FAMILY MEDICINE

## 2024-03-04 PROCEDURE — 85610 PROTHROMBIN TIME: CPT | Mod: ORL | Performed by: FAMILY MEDICINE

## 2024-03-04 NOTE — TELEPHONE ENCOUNTER
[facility] Geriatrics Triage Nurse INR     Provider: ROSETTA Ovalles  Facility: [facility]   Facility Type:  [facility]    Caller: Mavis  Call Back Number: [phone]  Reason for call: INR  Diagnosis/Goal: A. Fib    Todays INR: 1.45  Last INR 3/1 1.38(5mg daily), 2/28 1.56(5mg on 2/28 and 2.5mg on 2/29), 2/19 2.83(2.5mg daily), 2/14 3.30(2.5mg daily), 2/8 3.25(5mg Mon and Fri and 2.5mg AOD), 2/1 2.17(5mg M-W-F and 2.5mg AOD), 1/25 2.58(5mg Wed and Sat and 2.5mg AOD), 1/18 3.10(5mg Wed and Sat and 2.5mg AOD), 12/28/23 2.26(5mg M-W-F and 2.5mg AOD),   12/14 2.41(5mg M-W-F and 2.5mg AOD),   12/8 2.09(5mg M-W-F and 2.5mg AOD),    12/4 1.71(7.5mg on 12/4, 2.5mg on 12/5, 5mg on 12/6, 2.5mg on 12/7),   11/27 1.86(5mg Mon and Thurs and 2.5mg AOD),   11/9 1.87(5mg Q Thurs and 2.5mg AOD),   11/2 2.20(5mg Tues and Thurs and 2.5mg AOD),   10/30 2.23(2.5mg daily),   10/27 1.89(5mg on 10/27, 2.5mg 10/28, 5mg on 10/29---stop Lovenox as of 10/29),   10/24 1.38(5mg daily + Lovenox)    Heparin/Lovenox:  No  Currently on ABX?: No  Other interacting medication:  Cilostazol  Missed or refused doses: No    Verbal Order/Direction given by Provider: Warfarin 7.5mg on 3/4 then take 5mg daily thereafter.  Next INR 3/7/24.      Provider Giving Order:  ROSETTA Ovalles    Verbal Order given to: Mavis by Irina Saucedo RN on 3/4/24      Willie Campbell RN

## 2024-03-05 ENCOUNTER — TELEPHONE (OUTPATIENT)
Dept: GERIATRICS | Facility: CLINIC | Age: 74
End: 2024-03-05
Payer: COMMERCIAL

## 2024-03-06 ENCOUNTER — LAB REQUISITION (OUTPATIENT)
Dept: LAB | Facility: CLINIC | Age: 74
End: 2024-03-06
Payer: COMMERCIAL

## 2024-03-06 DIAGNOSIS — I48.91 UNSPECIFIED ATRIAL FIBRILLATION (H): ICD-10-CM

## 2024-03-06 NOTE — TELEPHONE ENCOUNTER
CC and HPI:  Nurse reports that pt C/O pain behind her left knee. She has not done this in the past, that this nurse knows. Pt is an old Left BKA. Exam showed no redness or abnormalities.    VS: stable    ASSESSMENT and PLAN:    Pt on pletal and coumadin  If pt wishes to go to hospital, may but don't think needed at this time.  Nurses to callback  if sob, cp or  any exam changes occur  Order US doppler in the am but check with PCP when ordering.    Call if further concerns as well as update patient's  provider/NP.

## 2024-03-06 NOTE — PROGRESS NOTES
[facility] GERIATRIC SERVICES       Patient Angela Beckham  MRN: [id number]        Reason for Visit     Chief Complaint   Patient presents with    jail Regulatory       Code Status     CPR/Full code     Assessment     Recent hospitalization related to A-fib with rapid ventricular response  Chronic anticoagulation with Coumadin INR 1.89 today  Hypertension with low blood pressures which has been persistent  Ongoing weight loss  H/o  ischemic CVA involving the right cardioembolic CVA  Chronic right basal ganglia infarct  Chronic microvascular ischemia  Urinary retention requiring Gambino catheter placement  History of left below the knee amputation  Generalized weakness  Type 2 diabetes  Aortic valve stenosis     Plan     Patient is a resident of long-term care  Patient was recently readmitted with atrial fibrillation with rapid ventricular rate  Heart rates have since been stable and she is managed on digoxin as well as metoprolol and a low-dose  Ability to tolerate high doses of metoprolol limited by persistent hypotension due to which her medication gets held frequently.  INR subtherapeutic at 1.89 and dose was adjusted.  Advise follow-up with cardiology  She has been persistently hypotensive since readmission  Ongoing weight loss noted  Reports she lost her dentures on questioning however she was on a puréed diet due to that but that has been upgraded now  Per staff this is a recurrent issue when she manages to use her dentures  DM managed with metformin  Last Aic was 6.8  Has vascular dementia and mood is stab;e  Does not wear prosthesis-bed bound  Start on vit d 2000 u due to low levels noted  Cont care plan    History     Patient is a very pleasant 72 year who is a resident of long-term care  Patient was recently admitted to the hospital with A FIB with RVR  Seen by cardiology and on metoprolol for rate control   warfarin has been continued.  Not felt to be a candidate for any surgical intervention for her  severe AS and MS.  She has dm controlled with metformin  Remains bed bound and desires going home  Poor insight ,  Losing wt -was on pureed diet due to lost dentures  States was not waiting well due to this      Past Medical & Surgical History     PAST MEDICAL HISTORY:   Past Medical History:   Diagnosis Date    Diabetic neuropathy (H)     HTN     Type 2 diabetes mellitus (H)           Past Social History     Reviewed,  reports that she quit smoking about 3 years ago. Her smoking use included cigarettes. She smoked an average of 1 pack per day. She has never used smokeless tobacco. She reports that she does not drink alcohol and does not use drugs.    Family History     Reviewed, and family history includes Alzheimer Disease in her father; Cerebrovascular Disease in her maternal grandmother and mother; Diabetes in her paternal grandfather; Hypertension in her brother and mother; Obesity in her brother; Respiratory in her father.    Medication List   Post Discharge Medication Reconciliation Status: Post Discharge Medication Reconciliation Status: discharge medications reconciled, continue medications without change.  Current Outpatient Medications   Medication    atorvastatin (LIPITOR) 20 MG tablet    cilostazol (PLETAL) 50 MG tablet    digoxin (LANOXIN) 62.5 MCG tablet    furosemide (LASIX) 20 MG tablet    lactase (LACTAID) 9000 units TABS tablet    metFORMIN (GLUCOPHAGE XR) 500 MG 24 hr tablet    metoprolol succinate ER (TOPROL XL) 25 MG 24 hr tablet    miconazole (MICATIN) 2 % cream    omeprazole (PRILOSEC) 20 MG DR capsule    WARFARIN SODIUM PO     No current facility-administered medications for this visit.          Allergies     Allergies   Allergen Reactions    Amoxicillin Unknown    Oxycodone Other (See Comments)     Does not remember    Latex Rash       Review of Systems   A comprehensive review of 14 systems was done. Pertinent findings noted here and in history of present illness. All the rest  "negative.  Constitutional: Negative.  Negative for fever, chills, she has  activity change, appetite change and fatigue.  Oral intake is stable and improved ; upgraded from pureed diet  HENT: Negative for congestion and facial swelling.    Eyes: Negative for photophobia, redness and visual disturbance.   Respiratory: Negative for cough and chest tightness.    Cardiovascular: Negative for chest pain, palpitations and leg swelling.   Gastrointestinal: Negative for nausea, diarrhea, constipation, blood in stool and abdominal distention.   Genitourinary: Negative.    Musculoskeletal: Does not wear her prosthesis   Cannot tolerate sitting up very long as she likes to lay flat in bed most of the time  Has chronic back pain issues but none at rest  Skin: Negative.    Neurological: Negative for dizziness, tremors, syncope, weakness, light-headedness and headaches.   Hematological: Does not bruise/bleed easily.   Psychiatric/Behavioral: Negative.  Wants to go home  Poor insight      Physical Exam   BP 95/58   Pulse 85   Temp 97.3  F (36.3  C)   Resp 19   Ht 1.575 m (5' 2\")   Wt 62.1 kg (137 lb)   SpO2 96%   BMI 25.06 kg/m     GENERAL: no acute distress. Cooperative in conversation.   HEENT: pupils are equal, round and reactive. Oral mucosa is moist and intact.  Missing teeth  RESP:Chest symmetric. Regular respiratory rate. No stridor.  CVS S 1 S2  ABD: Nondistended, soft.  EXTREMITIES: No lower extremity edema.   Has a BKA  NEURO: non focal. Alert and oriented x3.   PSYCH: within normal limits. No depression or anxiety.  SKIN: warm dry intact    Labs  Last Comprehensive Metabolic Panel:  Lab Results   Component Value Date     01/09/2024    POTASSIUM 4.2 01/09/2024    CHLORIDE 99 01/09/2024    CO2 26 01/09/2024    ANIONGAP 12 01/09/2024     (H) 01/09/2024    BUN 20.7 01/09/2024    CR 0.65 01/09/2024    GFRESTIMATED >90 01/09/2024    SUSIE 9.4 01/09/2024        Electronically signed by    Ginny Johnson MD       "

## 2024-03-07 ENCOUNTER — TELEPHONE (OUTPATIENT)
Dept: GERIATRICS | Facility: CLINIC | Age: 74
End: 2024-03-07

## 2024-03-07 ENCOUNTER — NURSING HOME VISIT (OUTPATIENT)
Dept: GERIATRICS | Facility: CLINIC | Age: 74
End: 2024-03-07
Payer: COMMERCIAL

## 2024-03-07 VITALS
HEART RATE: 85 BPM | RESPIRATION RATE: 19 BRPM | HEIGHT: 62 IN | TEMPERATURE: 97.3 F | BODY MASS INDEX: 25.21 KG/M2 | SYSTOLIC BLOOD PRESSURE: 95 MMHG | DIASTOLIC BLOOD PRESSURE: 58 MMHG | OXYGEN SATURATION: 96 % | WEIGHT: 137 LBS

## 2024-03-07 DIAGNOSIS — I10 PRIMARY HYPERTENSION: ICD-10-CM

## 2024-03-07 DIAGNOSIS — F01.53 VASCULAR DEMENTIA WITH DEPRESSED MOOD (H): ICD-10-CM

## 2024-03-07 DIAGNOSIS — Z89.512 STATUS POST BELOW-KNEE AMPUTATION OF LEFT LOWER EXTREMITY (H): ICD-10-CM

## 2024-03-07 DIAGNOSIS — Z86.73 HISTORY OF CVA (CEREBROVASCULAR ACCIDENT): ICD-10-CM

## 2024-03-07 DIAGNOSIS — Z89.512 HX OF BKA, LEFT (H): ICD-10-CM

## 2024-03-07 DIAGNOSIS — I48.20 CHRONIC ATRIAL FIBRILLATION WITH RVR (H): ICD-10-CM

## 2024-03-07 DIAGNOSIS — I27.21 PULMONARY ARTERY HYPERTENSION (H): ICD-10-CM

## 2024-03-07 DIAGNOSIS — M54.50 CHRONIC RIGHT-SIDED LOW BACK PAIN WITHOUT SCIATICA: ICD-10-CM

## 2024-03-07 DIAGNOSIS — R41.89 COGNITIVE IMPAIRMENT: Primary | ICD-10-CM

## 2024-03-07 DIAGNOSIS — G89.29 CHRONIC RIGHT-SIDED LOW BACK PAIN WITHOUT SCIATICA: ICD-10-CM

## 2024-03-07 LAB — INR PPP: 1.89 (ref 0.85–1.15)

## 2024-03-07 PROCEDURE — 85610 PROTHROMBIN TIME: CPT | Mod: ORL | Performed by: FAMILY MEDICINE

## 2024-03-07 PROCEDURE — 36415 COLL VENOUS BLD VENIPUNCTURE: CPT | Mod: ORL | Performed by: FAMILY MEDICINE

## 2024-03-07 PROCEDURE — 99309 SBSQ NF CARE MODERATE MDM 30: CPT | Performed by: FAMILY MEDICINE

## 2024-03-07 PROCEDURE — P9604 ONE-WAY ALLOW PRORATED TRIP: HCPCS | Mod: ORL | Performed by: FAMILY MEDICINE

## 2024-03-07 RX ORDER — VITAMIN B COMPLEX
TABLET ORAL DAILY
COMMUNITY

## 2024-03-07 NOTE — TELEPHONE ENCOUNTER
[facility] Triage Nurse INR     Provider: ROSETTA Ovalles  Facility: [facility]   Facility Type:  [facility]      Call Back Number: [phone]  Reason for call: INR  Diagnosis/Goal: A. Fib    Todays INR: 1.89  Last INR 3/4 1.45(7.5mg on 3/4 then 5mg daily thereafter), 3/1 1.38(5mg daily), 2/28 1.56(5mg on 2/28 and 2.5mg on 2/29), 2/19 2.83(2.5mg daily), 2/14 3.30(2.5mg daily), 2/8 3.25(5mg Mon and Fri and 2.5mg AOD), 2/1 2.17(5mg M-W-F and 2.5mg AOD), 1/25 2.58(5mg Wed and Sat and 2.5mg AOD), 1/18 3.10(5mg Wed and Sat and 2.5mg AOD), 12/28/23 2.26(5mg M-W-F and 2.5mg AOD),   12/14 2.41(5mg M-W-F and 2.5mg AOD),   12/8 2.09(5mg M-W-F and 2.5mg AOD),    12/4 1.71(7.5mg on 12/4, 2.5mg on 12/5, 5mg on 12/6, 2.5mg on 12/7),   11/27 1.86(5mg Mon and Thurs and 2.5mg AOD),   11/9 1.87(5mg Q Thurs and 2.5mg AOD),   11/2 2.20(5mg Tues and Thurs and 2.5mg AOD),   10/30 2.23(2.5mg daily),   10/27 1.89(5mg on 10/27, 2.5mg 10/28, 5mg on 10/29---stop Lovenox as of 10/29),   10/24 1.38(5mg daily + Lovenox)    Heparin/Lovenox:  No  Currently on ABX?: No  Other interacting medication:  Cilostazol  Missed or refused doses: No    Verbal Order/Direction given by Provider: Warfarin 5mg daily.  Next INR 3/14/24.  Orders were written by the provider at the facility today.      Provider Giving Order:  Ginny Campbell RN

## 2024-03-07 NOTE — LETTER
3/7/2024        RE: Angela Beckham  [facility]  [address] [address]         [facility]       Patient Angela Beckham  MRN: [id number]        Reason for Visit     Chief Complaint   Patient presents with     assisted Regulatory       Code Status     CPR/Full code     Assessment     Recent hospitalization related to A-fib with rapid ventricular response  Chronic anticoagulation with Coumadin INR 1.89 today  Hypertension with low blood pressures which has been persistent  Ongoing weight loss  H/o  ischemic CVA involving the right cardioembolic CVA  Chronic right basal ganglia infarct  Chronic microvascular ischemia  Urinary retention requiring Gambino catheter placement  History of left below the knee amputation  Generalized weakness  Type 2 diabetes  Aortic valve stenosis     Plan     Patient is a resident of long-term care  Patient was recently readmitted with atrial fibrillation with rapid ventricular rate  Heart rates have since been stable and she is managed on digoxin as well as metoprolol and a low-dose  Ability to tolerate high doses of metoprolol limited by persistent hypotension due to which her medication gets held frequently.  INR subtherapeutic at 1.89 and dose was adjusted.  Advise follow-up with cardiology  She has been persistently hypotensive since readmission  Ongoing weight loss noted  Reports she lost her dentures on questioning however she was on a puréed diet due to that but that has been upgraded now  Per staff this is a recurrent issue when she manages to use her dentures  DM managed with metformin  Last Aic was 6.8  Has vascular dementia and mood is stab;e  Does not wear prosthesis-bed bound  Start on vit d 2000 u due to low levels noted  Cont care plan    History     Patient is a very pleasant 72 year who is a resident of long-term care  Patient was recently admitted to the hospital with A FIB with RVR  Seen by cardiology and on metoprolol for  rate control   warfarin has been continued.  Not felt to be a candidate for any surgical intervention for her severe AS and MS.  She has dm controlled with metformin  Remains bed bound and desires going home  Poor insight ,  Losing wt -was on pureed diet due to lost dentures  States was not waiting well due to this      Past Medical & Surgical History     PAST MEDICAL HISTORY:   Past Medical History:   Diagnosis Date     Diabetic neuropathy (H)      HTN      Type 2 diabetes mellitus (H)           Past Social History     Reviewed,  reports that she quit smoking about 3 years ago. Her smoking use included cigarettes. She smoked an average of 1 pack per day. She has never used smokeless tobacco. She reports that she does not drink alcohol and does not use drugs.    Family History     Reviewed, and family history includes Alzheimer Disease in her father; Cerebrovascular Disease in her maternal grandmother and mother; Diabetes in her paternal grandfather; Hypertension in her brother and mother; Obesity in her brother; Respiratory in her father.    Medication List   Post Discharge Medication Reconciliation Status: Post Discharge Medication Reconciliation Status: discharge medications reconciled, continue medications without change.  Current Outpatient Medications   Medication     atorvastatin (LIPITOR) 20 MG tablet     cilostazol (PLETAL) 50 MG tablet     digoxin (LANOXIN) 62.5 MCG tablet     furosemide (LASIX) 20 MG tablet     lactase (LACTAID) 9000 units TABS tablet     metFORMIN (GLUCOPHAGE XR) 500 MG 24 hr tablet     metoprolol succinate ER (TOPROL XL) 25 MG 24 hr tablet     miconazole (MICATIN) 2 % cream     omeprazole (PRILOSEC) 20 MG DR capsule     WARFARIN SODIUM PO     No current facility-administered medications for this visit.          Allergies     Allergies   Allergen Reactions     Amoxicillin Unknown     Oxycodone Other (See Comments)     Does not remember     Latex Rash       Review of Systems   A  "comprehensive review of 14 systems was done. Pertinent findings noted here and in history of present illness. All the rest negative.  Constitutional: Negative.  Negative for fever, chills, she has  activity change, appetite change and fatigue.  Oral intake is stable and improved ; upgraded from pureed diet  HENT: Negative for congestion and facial swelling.    Eyes: Negative for photophobia, redness and visual disturbance.   Respiratory: Negative for cough and chest tightness.    Cardiovascular: Negative for chest pain, palpitations and leg swelling.   Gastrointestinal: Negative for nausea, diarrhea, constipation, blood in stool and abdominal distention.   Genitourinary: Negative.    Musculoskeletal: Does not wear her prosthesis   Cannot tolerate sitting up very long as she likes to lay flat in bed most of the time  Has chronic back pain issues but none at rest  Skin: Negative.    Neurological: Negative for dizziness, tremors, syncope, weakness, light-headedness and headaches.   Hematological: Does not bruise/bleed easily.   Psychiatric/Behavioral: Negative.  Wants to go home  Poor insight      Physical Exam   BP 95/58   Pulse 85   Temp 97.3  F (36.3  C)   Resp 19   Ht 1.575 m (5' 2\")   Wt 62.1 kg (137 lb)   SpO2 96%   BMI 25.06 kg/m     GENERAL: no acute distress. Cooperative in conversation.   HEENT: pupils are equal, round and reactive. Oral mucosa is moist and intact.  Missing teeth  RESP:Chest symmetric. Regular respiratory rate. No stridor.  CVS S 1 S2  ABD: Nondistended, soft.  EXTREMITIES: No lower extremity edema.   Has a BKA  NEURO: non focal. Alert and oriented x3.   PSYCH: within normal limits. No depression or anxiety.  SKIN: warm dry intact    Labs  Last Comprehensive Metabolic Panel:  Lab Results   Component Value Date     01/09/2024    POTASSIUM 4.2 01/09/2024    CHLORIDE 99 01/09/2024    CO2 26 01/09/2024    ANIONGAP 12 01/09/2024     (H) 01/09/2024    BUN 20.7 01/09/2024    CR " 0.65 01/09/2024    GFRESTIMATED >90 01/09/2024    SUSIE 9.4 01/09/2024        Electronically signed by    Ginny Johnson MD                           Sincerely,        OPAL Galarza

## 2024-03-13 ENCOUNTER — LAB REQUISITION (OUTPATIENT)
Dept: LAB | Facility: CLINIC | Age: 74
End: 2024-03-13
Payer: COMMERCIAL

## 2024-03-13 DIAGNOSIS — I48.91 UNSPECIFIED ATRIAL FIBRILLATION (H): ICD-10-CM

## 2024-03-14 ENCOUNTER — TELEPHONE (OUTPATIENT)
Dept: GERIATRICS | Facility: CLINIC | Age: 74
End: 2024-03-14

## 2024-03-14 ENCOUNTER — LAB REQUISITION (OUTPATIENT)
Dept: LAB | Facility: CLINIC | Age: 74
End: 2024-03-14
Payer: COMMERCIAL

## 2024-03-14 DIAGNOSIS — R79.1 ABNORMAL COAGULATION PROFILE: ICD-10-CM

## 2024-03-14 LAB — INR PPP: 4.14 (ref 0.85–1.15)

## 2024-03-14 PROCEDURE — 36415 COLL VENOUS BLD VENIPUNCTURE: CPT | Mod: ORL | Performed by: FAMILY MEDICINE

## 2024-03-14 PROCEDURE — P9604 ONE-WAY ALLOW PRORATED TRIP: HCPCS | Mod: ORL | Performed by: FAMILY MEDICINE

## 2024-03-14 PROCEDURE — 85610 PROTHROMBIN TIME: CPT | Mod: ORL | Performed by: FAMILY MEDICINE

## 2024-03-14 NOTE — TELEPHONE ENCOUNTER
[facility] Triage Nurse INR     Provider: Ginny Johnson MD  Facility: [facility]   Facility Type:  [facility]    Caller: Mavis  Call Back Number: [phone]  Reason for call: INR  Diagnosis/Goal: A. Fib    Todays INR: 4.14  Last INR 3/7 1.89(5mg daily), 3/4 1.45(7.5mg on 3/4 then 5mg daily thereafter), 3/1 1.38(5mg daily), 2/28 1.56(5mg on 2/28 and 2.5mg on 2/29), 2/19 2.83(2.5mg daily), 2/14 3.30(2.5mg daily), 2/8 3.25(5mg Mon and Fri and 2.5mg AOD), 2/1 2.17(5mg M-W-F and 2.5mg AOD), 1/25 2.58(5mg Wed and Sat and 2.5mg AOD), 1/18 3.10(5mg Wed and Sat and 2.5mg AOD), 12/28/23 2.26(5mg M-W-F and 2.5mg AOD),   12/14 2.41(5mg M-W-F and 2.5mg AOD),   12/8 2.09(5mg M-W-F and 2.5mg AOD),    12/4 1.71(7.5mg on 12/4, 2.5mg on 12/5, 5mg on 12/6, 2.5mg on 12/7),   11/27 1.86(5mg Mon and Thurs and 2.5mg AOD)    Heparin/Lovenox:  No  Currently on ABX?: No  Other interacting medication:  Cilostazol  Missed or refused doses: No    Verbal Order/Direction given by Provider: Hold Warfarin on 3/14.  Next INR 3/15/24.      Provider Giving Order:  Ginny Johnson MD    Verbal Order given to: Mavis by Irina Saucedo, RN on 3/14/24      Willie Campbell, RN

## 2024-03-15 ENCOUNTER — TELEPHONE (OUTPATIENT)
Dept: GERIATRICS | Facility: CLINIC | Age: 74
End: 2024-03-15

## 2024-03-15 LAB — INR PPP: 3.68 (ref 0.85–1.15)

## 2024-03-15 PROCEDURE — P9603 ONE-WAY ALLOW PRORATED MILES: HCPCS | Mod: ORL | Performed by: FAMILY MEDICINE

## 2024-03-15 PROCEDURE — 85610 PROTHROMBIN TIME: CPT | Mod: ORL | Performed by: FAMILY MEDICINE

## 2024-03-15 PROCEDURE — 36415 COLL VENOUS BLD VENIPUNCTURE: CPT | Mod: ORL | Performed by: FAMILY MEDICINE

## 2024-03-16 ENCOUNTER — LAB REQUISITION (OUTPATIENT)
Dept: LAB | Facility: CLINIC | Age: 74
End: 2024-03-16
Payer: COMMERCIAL

## 2024-03-16 DIAGNOSIS — I48.91 UNSPECIFIED ATRIAL FIBRILLATION (H): ICD-10-CM

## 2024-03-17 NOTE — TELEPHONE ENCOUNTER
[facility]   2024     Name: Angela Beckham   : 1950     Background:  Today, INR 3.68. Previous INR 4.14. Warfarin held 3/14/24. Further Warfarin dosing and INR under medication management    Orders:  Warfarin 3 mg PO every day   Recheck INR 3/18/24    Electronically signed by JOE Brooks CNP

## 2024-03-18 ENCOUNTER — TELEPHONE (OUTPATIENT)
Dept: GERIATRICS | Facility: CLINIC | Age: 74
End: 2024-03-18

## 2024-03-18 LAB — INR PPP: 3.53 (ref 0.85–1.15)

## 2024-03-18 PROCEDURE — P9604 ONE-WAY ALLOW PRORATED TRIP: HCPCS | Mod: ORL | Performed by: FAMILY MEDICINE

## 2024-03-18 PROCEDURE — 85610 PROTHROMBIN TIME: CPT | Mod: ORL | Performed by: FAMILY MEDICINE

## 2024-03-18 PROCEDURE — 36415 COLL VENOUS BLD VENIPUNCTURE: CPT | Mod: ORL | Performed by: FAMILY MEDICINE

## 2024-03-18 NOTE — TELEPHONE ENCOUNTER
[facility] Ger[facility] Triage Nurse INR     Provider: ROSETTA Ovalles  Facility: [facility]   Facility Type:  [facility]    Caller: [place]  Call Back Number: [phone]  Reason for call: INR  Diagnosis/Goal: A. Fib    Todays INR: 3.53  Last INR 3/15 3.68(3mg daily), 3/14 4.14(hold), 3/7 1.89(5mg daily), 3/4 1.45(7.5mg on 3/4 then 5mg daily thereafter), 3/1 1.38(5mg daily), 2/28 1.56(5mg on 2/28 and 2.5mg on 2/29), 2/19 2.83(2.5mg daily), 2/14 3.30(2.5mg daily), 2/8 3.25(5mg Mon and Fri and 2.5mg AOD), 2/1 2.17(5mg M-W-F and 2.5mg AOD), 1/25 2.58(5mg Wed and Sat and 2.5mg AOD), 1/18 3.10(5mg Wed and Sat and 2.5mg AOD), 12/28/23 2.26(5mg M-W-F and 2.5mg AOD),   12/14 2.41(5mg M-W-F and 2.5mg AOD),   12/8 2.09(5mg M-W-F and 2.5mg AOD),    12/4 1.71(7.5mg on 12/4, 2.5mg on 12/5, 5mg on 12/6, 2.5mg on 12/7),   11/27 1.86(5mg Mon and Thurs and 2.5mg AOD)    Heparin/Lovenox:  No  Currently on ABX?: No  Other interacting medication:  Cilostazol  Missed or refused doses: No    Verbal Order/Direction given by Provider: Warfarin 2.5mg daily.  Next INR 3/22/24.      Provider Giving Order:  ROSETTA Ovalles    Verbal Order given to: Evelin Campbell RN

## 2024-03-21 ENCOUNTER — LAB REQUISITION (OUTPATIENT)
Dept: LAB | Facility: CLINIC | Age: 74
End: 2024-03-21
Payer: COMMERCIAL

## 2024-03-21 DIAGNOSIS — I48.91 UNSPECIFIED ATRIAL FIBRILLATION (H): ICD-10-CM

## 2024-03-22 ENCOUNTER — TELEPHONE (OUTPATIENT)
Dept: GERIATRICS | Facility: CLINIC | Age: 74
End: 2024-03-22

## 2024-03-22 LAB — INR PPP: 2.02 (ref 0.85–1.15)

## 2024-03-22 PROCEDURE — 85610 PROTHROMBIN TIME: CPT | Mod: ORL | Performed by: FAMILY MEDICINE

## 2024-03-22 PROCEDURE — 36415 COLL VENOUS BLD VENIPUNCTURE: CPT | Mod: ORL | Performed by: FAMILY MEDICINE

## 2024-03-22 PROCEDURE — P9604 ONE-WAY ALLOW PRORATED TRIP: HCPCS | Mod: ORL | Performed by: FAMILY MEDICINE

## 2024-03-23 ENCOUNTER — LAB REQUISITION (OUTPATIENT)
Dept: LAB | Facility: CLINIC | Age: 74
End: 2024-03-23
Payer: COMMERCIAL

## 2024-03-23 DIAGNOSIS — I48.91 UNSPECIFIED ATRIAL FIBRILLATION (H): ICD-10-CM

## 2024-03-23 NOTE — TELEPHONE ENCOUNTER
[facility] TELEPHONE ENCOUNTER    Chief Complaint   Patient presents with    INR RESULTS       Angela Beckham is a 73 year old  (1950),Nurse called today to report: INR results were drawn this AM, however results didn't complete until almost 10pm. Results today 2.02. Previous INR 3.53 on 3/18. INR goal 2-3    ASSESSMENT/PLAN    Continue coumadin 2.5mg daily  Recheck INR Monday 3/25    Electronically signed by:   JOE Stuart CNP

## 2024-03-25 ENCOUNTER — TELEPHONE (OUTPATIENT)
Dept: GERIATRICS | Facility: CLINIC | Age: 74
End: 2024-03-25

## 2024-03-25 LAB — INR PPP: 2.3 (ref 0.85–1.15)

## 2024-03-25 PROCEDURE — P9604 ONE-WAY ALLOW PRORATED TRIP: HCPCS | Mod: ORL | Performed by: FAMILY MEDICINE

## 2024-03-25 PROCEDURE — 85610 PROTHROMBIN TIME: CPT | Mod: ORL | Performed by: FAMILY MEDICINE

## 2024-03-25 PROCEDURE — 36415 COLL VENOUS BLD VENIPUNCTURE: CPT | Mod: ORL | Performed by: FAMILY MEDICINE

## 2024-03-25 NOTE — TELEPHONE ENCOUNTER
[facility] Triage Nurse INR     Provider: ROSETTA Ovalles  Facility: [facility]   Facility Type:  [facility]    Caller: [place]  Call Back Number: [phone]  Reason for call: INR  Diagnosis/Goal: A. Fib    Todays INR: 2.30  Last INR 3/22 2.02(2.5mg daily), 3/18 3.53(2.5mg daily), 3/15 3.68(3mg daily), 3/14 4.14(hold), 3/7 1.89(5mg daily), 3/4 1.45(7.5mg on 3/4 then 5mg daily thereafter), 3/1 1.38(5mg daily), 2/28 1.56(5mg on 2/28 and 2.5mg on 2/29), 2/19 2.83(2.5mg daily), 2/14 3.30(2.5mg daily), 2/8 3.25(5mg Mon and Fri and 2.5mg AOD), 2/1 2.17(5mg M-W-F and 2.5mg AOD), 1/25 2.58(5mg Wed and Sat and 2.5mg AOD), 1/18 3.10(5mg Wed and Sat and 2.5mg AOD), 12/28/23 2.26(5mg M-W-F and 2.5mg AOD),   12/14 2.41(5mg M-W-F and 2.5mg AOD),   12/8 2.09(5mg M-W-F and 2.5mg AOD),    12/4 1.71(7.5mg on 12/4, 2.5mg on 12/5, 5mg on 12/6, 2.5mg on 12/7),   11/27 1.86(5mg Mon and Thurs and 2.5mg AOD)    Heparin/Lovenox:  No  Currently on ABX?: No  Other interacting medication:  Cilostazol  Missed or refused doses: No    Verbal Order/Direction given by Provider: Continue Warfarin 2.5mg daily.  Next INR 4/1/24.      Provider Giving Order:  ROSETTA Ovalles    Verbal Order given to: [place] by Irina Saucedo RN on 3/25/24      Willie Campbell RN

## 2024-03-29 ENCOUNTER — LAB REQUISITION (OUTPATIENT)
Dept: LAB | Facility: CLINIC | Age: 74
End: 2024-03-29
Payer: COMMERCIAL

## 2024-03-29 DIAGNOSIS — I48.0 PAROXYSMAL ATRIAL FIBRILLATION (H): ICD-10-CM

## 2024-04-01 ENCOUNTER — TELEPHONE (OUTPATIENT)
Dept: GERIATRICS | Facility: CLINIC | Age: 74
End: 2024-04-01

## 2024-04-01 LAB — INR PPP: 2.24 (ref 0.85–1.15)

## 2024-04-01 PROCEDURE — 36415 COLL VENOUS BLD VENIPUNCTURE: CPT | Mod: ORL | Performed by: FAMILY MEDICINE

## 2024-04-01 PROCEDURE — P9604 ONE-WAY ALLOW PRORATED TRIP: HCPCS | Mod: ORL | Performed by: FAMILY MEDICINE

## 2024-04-01 PROCEDURE — 85610 PROTHROMBIN TIME: CPT | Mod: ORL | Performed by: FAMILY MEDICINE

## 2024-04-01 NOTE — TELEPHONE ENCOUNTER
[facility] Triage Nurse INR     Provider: ROSETTA Ovalles  Facility: [facility]   Facility Type:  [facility]    Caller: Mavis  Call Back Number: [phone]  Reason for call: INR  Diagnosis/Goal: A. Fib    Todays INR: 2.24  Last INR 3/25 2.30(2.5mg daily), 3/22 2.02(2.5mg daily), 3/18 3.53(2.5mg daily), 3/15 3.68(3mg daily), 3/14 4.14(hold), 3/7 1.89(5mg daily), 3/4 1.45(7.5mg on 3/4 then 5mg daily thereafter), 3/1 1.38(5mg daily), 2/28 1.56(5mg on 2/28 and 2.5mg on 2/29), 2/19 2.83(2.5mg daily), 2/14 3.30(2.5mg daily), 2/8 3.25(5mg Mon and Fri and 2.5mg AOD), 2/1 2.17(5mg M-W-F and 2.5mg AOD), 1/25 2.58(5mg Wed and Sat and 2.5mg AOD), 1/18 3.10(5mg Wed and Sat and 2.5mg AOD), 12/28/23 2.26(5mg M-W-F and 2.5mg AOD),   12/14 2.41(5mg M-W-F and 2.5mg AOD),   12/8 2.09(5mg M-W-F and 2.5mg AOD),    12/4 1.71(7.5mg on 12/4, 2.5mg on 12/5, 5mg on 12/6, 2.5mg on 12/7),   11/27 1.86(5mg Mon and Thurs and 2.5mg AOD)    Heparin/Lovenox:  No  Currently on ABX?: No  Other interacting medication:  Cilostazol  Missed or refused doses: No    Verbal Order/Direction given by Provider: Continue Warfarin 2.5mg daily.  Next INR 4/11/24.      Provider Giving Order:  ROSETTA Ovalles    Verbal Order given to: Mavis Campbell RN

## 2024-04-10 ENCOUNTER — LAB REQUISITION (OUTPATIENT)
Dept: LAB | Facility: CLINIC | Age: 74
End: 2024-04-10
Payer: COMMERCIAL

## 2024-04-10 DIAGNOSIS — R79.1 ABNORMAL COAGULATION PROFILE: ICD-10-CM

## 2024-04-11 ENCOUNTER — TELEPHONE (OUTPATIENT)
Dept: GERIATRICS | Facility: CLINIC | Age: 74
End: 2024-04-11

## 2024-04-11 LAB — INR PPP: 1.8 (ref 0.85–1.15)

## 2024-04-11 PROCEDURE — P9604 ONE-WAY ALLOW PRORATED TRIP: HCPCS | Mod: ORL | Performed by: FAMILY MEDICINE

## 2024-04-11 PROCEDURE — 36415 COLL VENOUS BLD VENIPUNCTURE: CPT | Mod: ORL | Performed by: FAMILY MEDICINE

## 2024-04-11 PROCEDURE — 85610 PROTHROMBIN TIME: CPT | Mod: ORL | Performed by: FAMILY MEDICINE

## 2024-04-11 NOTE — TELEPHONE ENCOUNTER
[facility] Geriatrics Triage Nurse INR     Provider: ROSETTA Ovalles  Facility: [facility]   Facility Type:  [facility]    Caller: [place]  Call Back Number: [phone]  Reason for call: INR  Diagnosis/Goal: A. Fib    Todays INR: 1.80  Last INR 2.24 on 4/1 - 2.5 mg daily    Heparin/Lovenox:  No  Currently on ABX?: No  Other interacting medication:  None  Missed or refused doses: No    Verbal Order/Direction given by Provider: Coumadin 3 mg PO tonight. Then Coumadin 2.5 mg AOD. Check INR on 4/18/24    Provider Giving Order:  ROSETTA Ovalles    Verbal Order given to: Gabbi Hough RN

## 2024-04-12 ENCOUNTER — TELEPHONE (OUTPATIENT)
Dept: GERIATRICS | Facility: CLINIC | Age: 74
End: 2024-04-12
Payer: COMMERCIAL

## 2024-04-12 NOTE — TELEPHONE ENCOUNTER
[facility] NON-EMERGENT ENCOUNTER    Angela Beckham is a 73 year old  (1950)    Disposition  Pt reporting intermittent pain in area above amputation site (not new site).    QUAN provided to Juve CARDENAS/SNF from Dr. Johnson:  Icy Hot gel to painful area TID PRN      Electronically signed by:   Glenny Hough RN

## 2024-04-17 ENCOUNTER — LAB REQUISITION (OUTPATIENT)
Dept: LAB | Facility: CLINIC | Age: 74
End: 2024-04-17
Payer: COMMERCIAL

## 2024-04-17 DIAGNOSIS — Z79.01 LONG TERM (CURRENT) USE OF ANTICOAGULANTS: ICD-10-CM

## 2024-04-18 ENCOUNTER — TELEPHONE (OUTPATIENT)
Dept: GERIATRICS | Facility: CLINIC | Age: 74
End: 2024-04-18

## 2024-04-18 LAB — INR PPP: 1.89 (ref 0.85–1.15)

## 2024-04-18 PROCEDURE — 36415 COLL VENOUS BLD VENIPUNCTURE: CPT | Mod: ORL | Performed by: FAMILY MEDICINE

## 2024-04-18 PROCEDURE — 85610 PROTHROMBIN TIME: CPT | Mod: ORL | Performed by: FAMILY MEDICINE

## 2024-04-18 PROCEDURE — P9604 ONE-WAY ALLOW PRORATED TRIP: HCPCS | Mod: ORL | Performed by: FAMILY MEDICINE

## 2024-04-18 NOTE — TELEPHONE ENCOUNTER
[facility] Message     ----- Message -----  From: Nidia Hernandez CNP  Sent: 4/18/2024   3:17 PM CDT    3mg M/TH and 2.5 aod, recheck 1 week on 4/25/24.    Verbal order/direction given to: Julio C  Provider Giving Order:  ROSETTA Ovalles RN

## 2024-04-24 ENCOUNTER — LAB REQUISITION (OUTPATIENT)
Dept: LAB | Facility: CLINIC | Age: 74
End: 2024-04-24
Payer: COMMERCIAL

## 2024-04-24 DIAGNOSIS — I48.91 UNSPECIFIED ATRIAL FIBRILLATION (H): ICD-10-CM

## 2024-04-25 ENCOUNTER — TELEPHONE (OUTPATIENT)
Dept: GERIATRICS | Facility: CLINIC | Age: 74
End: 2024-04-25

## 2024-04-25 LAB — INR PPP: 1.77 (ref 0.85–1.15)

## 2024-04-25 PROCEDURE — 85610 PROTHROMBIN TIME: CPT | Mod: ORL | Performed by: FAMILY MEDICINE

## 2024-04-25 PROCEDURE — P9603 ONE-WAY ALLOW PRORATED MILES: HCPCS | Mod: ORL | Performed by: FAMILY MEDICINE

## 2024-04-25 PROCEDURE — 36415 COLL VENOUS BLD VENIPUNCTURE: CPT | Mod: ORL | Performed by: FAMILY MEDICINE

## 2024-04-25 NOTE — TELEPHONE ENCOUNTER
[facility] Geriatric[facility] Triage Nurse INR     Provider: JOE Borjas CNP  Facility: [facility]   Facility Type:  [facility]    Caller: Flores  Call Back Number: [phone]  Reason for call: INR  Diagnosis/Goal: A. Fib    Todays INR: 1.77  Last INR 4/18 1.89(3mg Mon and Thurs and 2.5mg AOD), 4/11 1.80(3mg x 1, then 2.5mg daily thereafter), 4/1 2.24(2.5mg daily), 3/25 2.30(2.5mg daily), 3/22 2.02(2.5mg daily), 3/18 3.53(2.5mg daily), 3/15 3.68(3mg daily), 3/14 4.14(hold), 3/7 1.89(5mg daily), 3/4 1.45(7.5mg on 3/4 then 5mg daily thereafter), 3/1 1.38(5mg daily), 2/28 1.56(5mg on 2/28 and 2.5mg on 2/29), 2/19 2.83(2.5mg daily)    Heparin/Lovenox:  No  Currently on ABX?: No  Other interacting medication:  Cilostazol  Missed or refused doses: No    Verbal Order/Direction given by Provider: Warfarin 3mg Tues-Thurs-Sat and 2.5mg all other days.  Next INR 5/2/24.      Provider Giving Order:  JOE Borjas CNP    Verbal Order given to: [place] Campbell RN

## 2024-05-01 ENCOUNTER — LAB REQUISITION (OUTPATIENT)
Dept: LAB | Facility: CLINIC | Age: 74
End: 2024-05-01
Payer: COMMERCIAL

## 2024-05-01 DIAGNOSIS — I48.91 UNSPECIFIED ATRIAL FIBRILLATION (H): ICD-10-CM

## 2024-05-02 ENCOUNTER — TELEPHONE (OUTPATIENT)
Dept: GERIATRICS | Facility: CLINIC | Age: 74
End: 2024-05-02

## 2024-05-02 LAB — INR PPP: 2.04 (ref 0.85–1.15)

## 2024-05-02 PROCEDURE — P9604 ONE-WAY ALLOW PRORATED TRIP: HCPCS | Mod: ORL | Performed by: FAMILY MEDICINE

## 2024-05-02 PROCEDURE — 36415 COLL VENOUS BLD VENIPUNCTURE: CPT | Mod: ORL | Performed by: FAMILY MEDICINE

## 2024-05-02 PROCEDURE — 85610 PROTHROMBIN TIME: CPT | Mod: ORL | Performed by: FAMILY MEDICINE

## 2024-05-02 NOTE — TELEPHONE ENCOUNTER
[facility] Geriatrics Triage Nurse INR     Provider: Ginny Johnson MD  Facility: [facility]   Facility Type:  [facility]    Caller: Julio C  Call Back Number: [phone]  Reason for call: INR  Diagnosis/Goal: A. Fib    Todays INR: 2.04  Last INR 4/25 1.77(3mg Tues-Thurs-Sat and 2.5mg AOD), 4/18 1.89(3mg Mon and Thurs and 2.5mg AOD), 4/11 1.80(3mg x 1, then 2.5mg daily thereafter), 4/1 2.24(2.5mg daily), 3/25 2.30(2.5mg daily), 3/22 2.02(2.5mg daily), 3/18 3.53(2.5mg daily), 3/15 3.68(3mg daily), 3/14 4.14(hold), 3/7 1.89(5mg daily), 3/4 1.45(7.5mg on 3/4 then 5mg daily thereafter), 3/1 1.38(5mg daily), 2/28 1.56(5mg on 2/28 and 2.5mg on 2/29), 2/19 2.83(2.5mg daily)     Heparin/Lovenox:  No  Currently on ABX?: No  Other interacting medication:  Cilostazol  Missed or refused doses: No    Verbal Order/Direction given by Provider: Continue Warfarin 3mg Tues-Thurs-Sat and 2.5mg all other days.  Next INR 5/16/24.      Provider Giving Order:  Ginny Johnson MD    Verbal Order given to: Julio C Campbell RN

## 2024-05-06 PROBLEM — S88.922A: Status: ACTIVE | Noted: 2024-03-09

## 2024-05-06 PROBLEM — R53.1 WEAKNESS: Status: ACTIVE | Noted: 2024-03-09

## 2024-05-09 ENCOUNTER — NURSING HOME VISIT (OUTPATIENT)
Dept: GERIATRICS | Facility: CLINIC | Age: 74
End: 2024-05-09
Payer: COMMERCIAL

## 2024-05-09 VITALS
OXYGEN SATURATION: 96 % | DIASTOLIC BLOOD PRESSURE: 68 MMHG | HEART RATE: 86 BPM | HEIGHT: 62 IN | WEIGHT: 132 LBS | TEMPERATURE: 98.1 F | RESPIRATION RATE: 18 BRPM | BODY MASS INDEX: 24.29 KG/M2 | SYSTOLIC BLOOD PRESSURE: 113 MMHG

## 2024-05-09 DIAGNOSIS — F01.53 VASCULAR DEMENTIA WITH DEPRESSED MOOD (H): Primary | ICD-10-CM

## 2024-05-09 DIAGNOSIS — I10 PRIMARY HYPERTENSION: ICD-10-CM

## 2024-05-09 DIAGNOSIS — E11.59 TYPE 2 DIABETES MELLITUS WITH OTHER CIRCULATORY COMPLICATION, WITHOUT LONG-TERM CURRENT USE OF INSULIN (H): ICD-10-CM

## 2024-05-09 DIAGNOSIS — I48.20 CHRONIC ATRIAL FIBRILLATION WITH RVR (H): ICD-10-CM

## 2024-05-09 DIAGNOSIS — Z86.73 HISTORY OF CVA (CEREBROVASCULAR ACCIDENT): ICD-10-CM

## 2024-05-09 DIAGNOSIS — Z89.512 STATUS POST BELOW-KNEE AMPUTATION OF LEFT LOWER EXTREMITY (H): ICD-10-CM

## 2024-05-09 PROCEDURE — 99309 SBSQ NF CARE MODERATE MDM 30: CPT | Performed by: FAMILY MEDICINE

## 2024-05-09 RX ORDER — FERROUS SULFATE 325(65) MG
325 TABLET ORAL
COMMUNITY

## 2024-05-09 NOTE — LETTER
5/9/2024        RE: Angela Beckham  [facility]  [address] [place] [address]         HEALTH GERIATRIC SERVICES       Patient Angela Beckham  MRN: [id number]        Reason for Visit     Chief Complaint   Patient presents with     halfway Regulatory       Code Status     CPR/Full code     Assessment   Severe mitral valve disease with severe mitral calcification and stenosis  Along with aortic stenosis.  Patient evaluated by cardiology and recheck visit done not felt to be a surgical candidate .  Recent hospitalization related to A-fib with rapid ventricular response  Chronic anticoagulation with Coumadin INR 2   Hypertension with low blood pressures which has been persistent  Ongoing weight loss  H/o  ischemic CVA involving the right cardioembolic CVA  Chronic right basal ganglia infarct  Chronic microvascular ischemia  Urinary retention requiring Gambino catheter placement  History of left below the knee amputation  Generalized weakness  Type 2 diabetes  Aortic valve stenosis     Plan     Patient is a resident of long-term care  Patient has a history of A-fib with recent hospitalization related to rapid ventricular response.  Currently on digoxin.  Also on metoprolol.  Ability to tolerate limited by profound hypotension.  She is also on warfarin and last INR was 2  Advise follow-up with cardiology done for evaluation of severe mitral as well as aortic stenosis.  At present on medical management and not felt to be a surgical candidate.  She will do another follow-up in 3 to 6 months  She has been persistently hypotensive since readmission  Ongoing weight loss noted  Reports she lost her dentures /however per staff she has been not compliant.    She has refused to wear her dentures, she refuses any modification in her diet  She did agree to supplementation  Staff advised to push fluids and hold parameters given for metoprolol  DM managed with metformin  Last Aic was 7.4  Recheck labs  ordered  Has vascular dementia and mood is stab;e  Does not wear prosthesis-bed bound  Start on vit d 2000 u due to low levels noted  Cont care plan-remains bedbound and poorly compliant with her cares    History     Patient is a very pleasant 72 year who is a resident of long-term care  Patient was recently admitted to the hospital with A FIB with RVR  Seen by cardiology and on metoprolol for rate control   warfarin has been continued.  Not felt to be a candidate for any surgical intervention for her severe AS and MS.  She has dm controlled with metformin  Remains bed bound and desires going home  Poor insight ,  Losing wt and various interventions have been unsuccessful she does not like the food that is being offered.  She does not want to switch down to her puréed diet and has difficulty chewing but will not get her dentures either staff had asked her to meet with a dentist      Past Medical & Surgical History     PAST MEDICAL HISTORY:   Past Medical History:   Diagnosis Date     Diabetic neuropathy (H)      HTN      Type 2 diabetes mellitus (H)           Past Social History     Reviewed,  reports that she quit smoking about 3 years ago. Her smoking use included cigarettes. She has never used smokeless tobacco. She reports that she does not drink alcohol and does not use drugs.    Family History     Reviewed, and family history includes Alzheimer Disease in her father; Cerebrovascular Disease in her maternal grandmother and mother; Diabetes in her paternal grandfather; Hypertension in her brother and mother; Obesity in her brother; Respiratory in her father.    Medication List   Post Discharge Medication Reconciliation Status: Post Discharge Medication Reconciliation Status: discharge medications reconciled, continue medications without change.  Current Outpatient Medications   Medication Sig Dispense Refill     atorvastatin (LIPITOR) 20 MG tablet Take 20 mg by mouth At Bedtime       cilostazol (PLETAL) 50 MG  tablet Take 50 mg by mouth 2 times daily       digoxin (LANOXIN) 62.5 MCG tablet Take 62.5 mcg by mouth daily       ferrous sulfate (FEROSUL) 325 (65 Fe) MG tablet Take 325 mg by mouth daily (with breakfast)       furosemide (LASIX) 20 MG tablet Take 20 mg by mouth daily       menthol, Topical Analgesic, 2.5% (BENGAY VANISHING SCENT) 2.5 % GEL topical gel Apply topically 3 times daily as needed for moderate pain       metFORMIN (GLUCOPHAGE XR) 500 MG 24 hr tablet Take 500 mg by mouth daily (with dinner)       metoprolol succinate ER (TOPROL XL) 25 MG 24 hr tablet Take 50 mg by mouth daily       miconazole (MICATIN) 2 % cream 1 applicator 2 times daily Apply to skin folds       omeprazole (PRILOSEC) 20 MG DR capsule Take 20 mg by mouth daily       Vitamin D3 (VITAMIN D, CHOLECALCIFEROL,) 25 mcg (1000 units) tablet Take by mouth daily       lactase (LACTAID) 9000 units TABS tablet Take 9,000 Units by mouth 3 times daily (with meals)       WARFARIN SODIUM PO 5/2/24 INR 2.04  Cont 3mg Tues-Thurs-Sat and 2.5mg AOD.  Next INR 5/16/24.    4/25/24 INR 1.77  Take 3mg Tues-Thurs-Sat and 2.5mg AOD.  Next INR 5/2/24.    4/18/24 INR 1.89  Take 3mg Mon and Thurs and 2.5mg AOD.  Next INR 4/25/24.  4/11/24 INR 1.80  Take 3mg x 1 then 2mg daily thereafter.  Next INR 4/18/24.  4/1/24 INR 2.24  Cont 2.5mg daily.  Next INR 4/11/24.    3/25/24 INR 2.30  Cont 2.5mg daily.  Next INR 4/1/24.    3/22/24 INR 2.02  Cont 2.5mg daily.  Next INR 3/25/24  3/18/24 INR 3.53  Take 2.5mg daily.  Next INR 3/22/24.    3/15/24 INR 3.68 Take 3 mg daily. Next INR 3/18/24  3/14/24 INR 4.14  Hold Warfarin on 3/14.  Next INR 3/15/24.    3/7/24 INR 1.89  Take 5mg daily.  Next INR 3/14/24.    3/4/24 INR 1.45  Take 7.5mg on 3/4 then 5mg daily thereafter.  Next INR 3/7/24.    3/1/24 INR 1.38  Take 5mg daily.  Next INR 3/4/24.    2/28/24 INR 1.56  Take 5mg on 2/28 and 2.5mg on 2/29.  Next INR 3/1/24.    2/19/24 INR 2.83  Cont 2.5mg daily.  Next INR 2/27/24.     2/14/24 INR 3.30  Take 2.5mg daily.  Next INR 2/19/24.    2/8/24 INR 3.25  Take 5mg Mon and Fri and 2.5mg AOD.  Next INR 2/14/24.    2/1/24 INR 2.17  Take 5mg M-W-F and 2.5mg AOD.  Next INR 2/8/24.    1/25/24 INR 2.58  Cont 5mg Wed and Sat and 2.5mg AOD.  Next INR 2/1/24 1/18/24 INR 3.10  Take 5mg Wed and Sat and 2.5mg AOD.  Next INR 1/25/24.    12/28/23 INR 2.26  Cont 5mg M-W-F and 2.5mg AOD.  Next INR 1/18/24.    12/14/23 INR 2.41  Cont 5mg M-W-F and 2.5mg AOD.  Next INR 12/28/23.    12/8/23 INR 2.09  Take 5mg M-W-F and 2.5mg AOD.  Next INR 12/14/23.    12/4/23 INR 1.71  Take 7.5mg on 12/4, 2.5mg on 12/5, 5mg on 12/6, 2.5mg on 12/7.  Next INR 12/8/23.    11/27/23 INR 1.86  Take 5mg Mon and Thurs and 2.5mg AOD.  Next INR 12/4/23.    11/9/23 INR 1.83  Take 5mg Q Thurs and 2.5mg AOD.  Next INR 11/27/23.    11/2/23 INR 2.20  Take 5mg Tues and Thurs and 2.5mg AOD.  Next INR 11/9/23.  10/30/23 INR 2.23  Take 2.5mg daily.  Next INR 11/2/23.  10/27/23 INR 1.89  Take 5mg on 10/27, 2.5mg on 10/28, 5mg on 10/29.  Discontinue Lovenox on 10/29.  Next INR 10/30/23.  10/24/23 INR 1.38  Take 5mg daily.  Continue Lovenox as ordered.  Next INR 10/27/23.    10/23/23 INR 1.86  Take 2.5mg on 10/23.  Continue Lovenox as ordered.  Next INR 10/24/23.       No current facility-administered medications for this visit.          Allergies     Allergies   Allergen Reactions     Amoxicillin Unknown     Oxycodone Other (See Comments)     Does not remember     Latex Rash       Review of Systems   A comprehensive review of 14 systems was done. Pertinent findings noted here and in history of present illness. All the rest negative.  Constitutional: Negative.  Negative for fever, chills, she has  activity change, appetite change and fatigue.  Oral intake is stable and improved ; upgraded from pureed diet at her request as she no longer wants any dietary modification.   blood pressures are low  HENT: Negative for congestion and facial swelling.   "  Eyes: Negative for photophobia, redness and visual disturbance.   Respiratory: Negative for cough and chest tightness.    Cardiovascular: Negative for chest pain, palpitations and leg swelling.   Gastrointestinal: Negative for nausea, diarrhea, constipation, blood in stool and abdominal distention.   Genitourinary: Negative.    Musculoskeletal: Does not wear her prosthesis   Cannot tolerate sitting up very long as she likes to lay flat in bed most of the time  Has chronic back pain issues but none at rest  Skin: Negative.    Neurological: Negative for dizziness, tremors, syncope, weakness, light-headedness and headaches.   Hematological: Does not bruise/bleed easily.   Psychiatric/Behavioral: Negative.  Wants to go home  Poor insight      Physical Exam   /68   Pulse 86   Temp 98.1  F (36.7  C)   Resp 18   Ht 1.575 m (5' 2\")   Wt 59.9 kg (132 lb)   SpO2 96%   BMI 24.14 kg/m     GENERAL: no acute distress. Cooperative in conversation.   HEENT: pupils are equal, round and reactive. Oral mucosa is moist and intact.  Missing teeth  RESP:Chest symmetric. Regular respiratory rate. No stridor.  CVS S 1 S2.murmer heard  ABD: Nondistended, soft.  EXTREMITIES: No lower extremity edema.   Has a BKA  NEURO: non focal. Alert and oriented x3.   PSYCH: within normal limits. No depression or anxiety.  SKIN: warm dry intact    Labs  Last Comprehensive Metabolic Panel:  Lab Results   Component Value Date     01/09/2024    POTASSIUM 4.2 01/09/2024    CHLORIDE 99 01/09/2024    CO2 26 01/09/2024    ANIONGAP 12 01/09/2024     (H) 01/09/2024    BUN 20.7 01/09/2024    CR 0.65 01/09/2024    GFRESTIMATED >90 01/09/2024    SUSIE 9.4 01/09/2024        Electronically signed by    Ginny Johnson MD                           Sincerely,        OPAL Galarza      "

## 2024-05-09 NOTE — PROGRESS NOTES
[facility] GERIATRIC SERVICES       Patient Angela Beckham  MRN: [id number]        Reason for Visit     Chief Complaint   Patient presents with    half-way Regulatory       Code Status     CPR/Full code     Assessment   Severe mitral valve disease with severe mitral calcification and stenosis  Along with aortic stenosis.  Patient evaluated by cardiology and recheck visit done not felt to be a surgical candidate .  Recent hospitalization related to A-fib with rapid ventricular response  Chronic anticoagulation with Coumadin INR 2   Hypertension with low blood pressures which has been persistent  Ongoing weight loss  H/o  ischemic CVA involving the right cardioembolic CVA  Chronic right basal ganglia infarct  Chronic microvascular ischemia  Urinary retention requiring Gambino catheter placement  History of left below the knee amputation  Generalized weakness  Type 2 diabetes  Aortic valve stenosis     Plan     Patient is a resident of long-term care  Patient has a history of A-fib with recent hospitalization related to rapid ventricular response.  Currently on digoxin.  Also on metoprolol.  Ability to tolerate limited by profound hypotension.  She is also on warfarin and last INR was 2  Advise follow-up with cardiology done for evaluation of severe mitral as well as aortic stenosis.  At present on medical management and not felt to be a surgical candidate.  She will do another follow-up in 3 to 6 months  She has been persistently hypotensive since readmission  Ongoing weight loss noted  Reports she lost her dentures /however per staff she has been not compliant.    She has refused to wear her dentures, she refuses any modification in her diet  She did agree to supplementation  Staff advised to push fluids and hold parameters given for metoprolol  DM managed with metformin  Last Aic was 7.4  Recheck labs ordered  Has vascular dementia and mood is stab;e  Does not wear prosthesis-bed bound  Start on vit d 2000 u due to  low levels noted  Cont care plan-remains bedbound and poorly compliant with her cares    History     Patient is a very pleasant 72 year who is a resident of long-term care  Patient was recently admitted to the hospital with A FIB with RVR  Seen by cardiology and on metoprolol for rate control   warfarin has been continued.  Not felt to be a candidate for any surgical intervention for her severe AS and MS.  She has dm controlled with metformin  Remains bed bound and desires going home  Poor insight ,  Losing wt and various interventions have been unsuccessful she does not like the food that is being offered.  She does not want to switch down to her puréed diet and has difficulty chewing but will not get her dentures either staff had asked her to meet with a dentist      Past Medical & Surgical History     PAST MEDICAL HISTORY:   Past Medical History:   Diagnosis Date    Diabetic neuropathy (H)     HTN     Type 2 diabetes mellitus (H)           Past Social History     Reviewed,  reports that she quit smoking about 3 years ago. Her smoking use included cigarettes. She has never used smokeless tobacco. She reports that she does not drink alcohol and does not use drugs.    Family History     Reviewed, and family history includes Alzheimer Disease in her father; Cerebrovascular Disease in her maternal grandmother and mother; Diabetes in her paternal grandfather; Hypertension in her brother and mother; Obesity in her brother; Respiratory in her father.    Medication List   Post Discharge Medication Reconciliation Status: Post Discharge Medication Reconciliation Status: discharge medications reconciled, continue medications without change.  Current Outpatient Medications   Medication Sig Dispense Refill    atorvastatin (LIPITOR) 20 MG tablet Take 20 mg by mouth At Bedtime      cilostazol (PLETAL) 50 MG tablet Take 50 mg by mouth 2 times daily      digoxin (LANOXIN) 62.5 MCG tablet Take 62.5 mcg by mouth daily      ferrous  sulfate (FEROSUL) 325 (65 Fe) MG tablet Take 325 mg by mouth daily (with breakfast)      furosemide (LASIX) 20 MG tablet Take 20 mg by mouth daily      menthol, Topical Analgesic, 2.5% (BENGAY VANISHING SCENT) 2.5 % GEL topical gel Apply topically 3 times daily as needed for moderate pain      metFORMIN (GLUCOPHAGE XR) 500 MG 24 hr tablet Take 500 mg by mouth daily (with dinner)      metoprolol succinate ER (TOPROL XL) 25 MG 24 hr tablet Take 50 mg by mouth daily      miconazole (MICATIN) 2 % cream 1 applicator 2 times daily Apply to skin folds      omeprazole (PRILOSEC) 20 MG DR capsule Take 20 mg by mouth daily      Vitamin D3 (VITAMIN D, CHOLECALCIFEROL,) 25 mcg (1000 units) tablet Take by mouth daily      lactase (LACTAID) 9000 units TABS tablet Take 9,000 Units by mouth 3 times daily (with meals)      WARFARIN SODIUM PO 5/2/24 INR 2.04  Cont 3mg Tues-Thurs-Sat and 2.5mg AOD.  Next INR 5/16/24.    4/25/24 INR 1.77  Take 3mg Tues-Thurs-Sat and 2.5mg AOD.  Next INR 5/2/24.    4/18/24 INR 1.89  Take 3mg Mon and Thurs and 2.5mg AOD.  Next INR 4/25/24.  4/11/24 INR 1.80  Take 3mg x 1 then 2mg daily thereafter.  Next INR 4/18/24.  4/1/24 INR 2.24  Cont 2.5mg daily.  Next INR 4/11/24.    3/25/24 INR 2.30  Cont 2.5mg daily.  Next INR 4/1/24.    3/22/24 INR 2.02  Cont 2.5mg daily.  Next INR 3/25/24  3/18/24 INR 3.53  Take 2.5mg daily.  Next INR 3/22/24.    3/15/24 INR 3.68 Take 3 mg daily. Next INR 3/18/24  3/14/24 INR 4.14  Hold Warfarin on 3/14.  Next INR 3/15/24.    3/7/24 INR 1.89  Take 5mg daily.  Next INR 3/14/24.    3/4/24 INR 1.45  Take 7.5mg on 3/4 then 5mg daily thereafter.  Next INR 3/7/24.    3/1/24 INR 1.38  Take 5mg daily.  Next INR 3/4/24.    2/28/24 INR 1.56  Take 5mg on 2/28 and 2.5mg on 2/29.  Next INR 3/1/24.    2/19/24 INR 2.83  Cont 2.5mg daily.  Next INR 2/27/24.    2/14/24 INR 3.30  Take 2.5mg daily.  Next INR 2/19/24.    2/8/24 INR 3.25  Take 5mg Mon and Fri and 2.5mg AOD.  Next INR 2/14/24.     2/1/24 INR 2.17  Take 5mg M-W-F and 2.5mg AOD.  Next INR 2/8/24.    1/25/24 INR 2.58  Cont 5mg Wed and Sat and 2.5mg AOD.  Next INR 2/1/24 1/18/24 INR 3.10  Take 5mg Wed and Sat and 2.5mg AOD.  Next INR 1/25/24.    12/28/23 INR 2.26  Cont 5mg M-W-F and 2.5mg AOD.  Next INR 1/18/24.    12/14/23 INR 2.41  Cont 5mg M-W-F and 2.5mg AOD.  Next INR 12/28/23.    12/8/23 INR 2.09  Take 5mg M-W-F and 2.5mg AOD.  Next INR 12/14/23.    12/4/23 INR 1.71  Take 7.5mg on 12/4, 2.5mg on 12/5, 5mg on 12/6, 2.5mg on 12/7.  Next INR 12/8/23.    11/27/23 INR 1.86  Take 5mg Mon and Thurs and 2.5mg AOD.  Next INR 12/4/23.    11/9/23 INR 1.83  Take 5mg Q Thurs and 2.5mg AOD.  Next INR 11/27/23.    11/2/23 INR 2.20  Take 5mg Tues and Thurs and 2.5mg AOD.  Next INR 11/9/23.  10/30/23 INR 2.23  Take 2.5mg daily.  Next INR 11/2/23.  10/27/23 INR 1.89  Take 5mg on 10/27, 2.5mg on 10/28, 5mg on 10/29.  Discontinue Lovenox on 10/29.  Next INR 10/30/23.  10/24/23 INR 1.38  Take 5mg daily.  Continue Lovenox as ordered.  Next INR 10/27/23.    10/23/23 INR 1.86  Take 2.5mg on 10/23.  Continue Lovenox as ordered.  Next INR 10/24/23.       No current facility-administered medications for this visit.          Allergies     Allergies   Allergen Reactions    Amoxicillin Unknown    Oxycodone Other (See Comments)     Does not remember    Latex Rash       Review of Systems   A comprehensive review of 14 systems was done. Pertinent findings noted here and in history of present illness. All the rest negative.  Constitutional: Negative.  Negative for fever, chills, she has  activity change, appetite change and fatigue.  Oral intake is stable and improved ; upgraded from pureed diet at her request as she no longer wants any dietary modification.   blood pressures are low  HENT: Negative for congestion and facial swelling.    Eyes: Negative for photophobia, redness and visual disturbance.   Respiratory: Negative for cough and chest tightness.   "  Cardiovascular: Negative for chest pain, palpitations and leg swelling.   Gastrointestinal: Negative for nausea, diarrhea, constipation, blood in stool and abdominal distention.   Genitourinary: Negative.    Musculoskeletal: Does not wear her prosthesis   Cannot tolerate sitting up very long as she likes to lay flat in bed most of the time  Has chronic back pain issues but none at rest  Skin: Negative.    Neurological: Negative for dizziness, tremors, syncope, weakness, light-headedness and headaches.   Hematological: Does not bruise/bleed easily.   Psychiatric/Behavioral: Negative.  Wants to go home  Poor insight      Physical Exam   /68   Pulse 86   Temp 98.1  F (36.7  C)   Resp 18   Ht 1.575 m (5' 2\")   Wt 59.9 kg (132 lb)   SpO2 96%   BMI 24.14 kg/m     GENERAL: no acute distress. Cooperative in conversation.   HEENT: pupils are equal, round and reactive. Oral mucosa is moist and intact.  Missing teeth  RESP:Chest symmetric. Regular respiratory rate. No stridor.  CVS S 1 S2.murmer heard  ABD: Nondistended, soft.  EXTREMITIES: No lower extremity edema.   Has a BKA  NEURO: non focal. Alert and oriented x3.   PSYCH: within normal limits. No depression or anxiety.  SKIN: warm dry intact    Labs  Last Comprehensive Metabolic Panel:  Lab Results   Component Value Date     01/09/2024    POTASSIUM 4.2 01/09/2024    CHLORIDE 99 01/09/2024    CO2 26 01/09/2024    ANIONGAP 12 01/09/2024     (H) 01/09/2024    BUN 20.7 01/09/2024    CR 0.65 01/09/2024    GFRESTIMATED >90 01/09/2024    SUSIE 9.4 01/09/2024        Electronically signed by    Ginny Johnson MD                       "

## 2024-05-15 ENCOUNTER — LAB REQUISITION (OUTPATIENT)
Dept: LAB | Facility: CLINIC | Age: 74
End: 2024-05-15
Payer: COMMERCIAL

## 2024-05-15 DIAGNOSIS — I48.91 UNSPECIFIED ATRIAL FIBRILLATION (H): ICD-10-CM

## 2024-05-16 ENCOUNTER — TELEPHONE (OUTPATIENT)
Dept: GERIATRICS | Facility: CLINIC | Age: 74
End: 2024-05-16

## 2024-05-16 LAB — INR PPP: 1.56 (ref 0.85–1.15)

## 2024-05-16 PROCEDURE — 36415 COLL VENOUS BLD VENIPUNCTURE: CPT | Mod: ORL | Performed by: FAMILY MEDICINE

## 2024-05-16 PROCEDURE — 85610 PROTHROMBIN TIME: CPT | Mod: ORL | Performed by: FAMILY MEDICINE

## 2024-05-16 PROCEDURE — P9604 ONE-WAY ALLOW PRORATED TRIP: HCPCS | Mod: ORL | Performed by: FAMILY MEDICINE

## 2024-05-16 NOTE — TELEPHONE ENCOUNTER
[facility] Geriatrics Triage Nurse INR     Provider: Ginny Johnson MD  Facility: [facility]   Facility Type:  [facility]    Caller: Marianne  Call Back Number: [phone]  Reason for call: INR  Diagnosis/Goal: A. Fib    Date INR New Dose/Orders   4/18 1.89 3mg M/TH and 2.5 mg AOD   4/25 1.77 3 mg T/TH/Sat and 2.5 mg AOD   5/2 2.04 3 mg T/TH/Sat and 2.5 mg AOD   5/16 1.56 3 mg daily        Heparin/Lovenox:  No  Currently on ABX?: No  Other interacting medication:  None  Missed or refused doses: No    Verbal Order/Direction given by Provider: Coumadin 3 mg PO daily. Check INR on 5/20/24.    Provider Giving Order:  Ginny Johnson MD    Verbal Order given to: Marianne Hough RN

## 2024-05-17 ENCOUNTER — LAB REQUISITION (OUTPATIENT)
Dept: LAB | Facility: CLINIC | Age: 74
End: 2024-05-17
Payer: COMMERCIAL

## 2024-05-17 DIAGNOSIS — I48.91 UNSPECIFIED ATRIAL FIBRILLATION (H): ICD-10-CM

## 2024-05-20 ENCOUNTER — TELEPHONE (OUTPATIENT)
Dept: GERIATRICS | Facility: CLINIC | Age: 74
End: 2024-05-20

## 2024-05-20 LAB — INR PPP: 1.51 (ref 0.85–1.15)

## 2024-05-20 PROCEDURE — 85610 PROTHROMBIN TIME: CPT | Mod: ORL | Performed by: FAMILY MEDICINE

## 2024-05-20 PROCEDURE — P9604 ONE-WAY ALLOW PRORATED TRIP: HCPCS | Mod: ORL | Performed by: FAMILY MEDICINE

## 2024-05-20 PROCEDURE — 36415 COLL VENOUS BLD VENIPUNCTURE: CPT | Mod: ORL | Performed by: FAMILY MEDICINE

## 2024-05-20 NOTE — TELEPHONE ENCOUNTER
[facility]     ----- Message from OPAL Galarza sent at 5/20/2024  3:39 PM CDT -----  Give warfarin 5 mg daily  Check INR in 3 days on 5/23/24    Verbal order/direction given to: [place]  Provider Giving Order:  Alex CUNNINGHAM, Ginny Saucedo, RN

## 2024-05-22 ENCOUNTER — LAB REQUISITION (OUTPATIENT)
Dept: LAB | Facility: CLINIC | Age: 74
End: 2024-05-22
Payer: COMMERCIAL

## 2024-05-22 DIAGNOSIS — I48.91 UNSPECIFIED ATRIAL FIBRILLATION (H): ICD-10-CM

## 2024-05-23 ENCOUNTER — TELEPHONE (OUTPATIENT)
Dept: GERIATRICS | Facility: CLINIC | Age: 74
End: 2024-05-23

## 2024-05-23 LAB — INR PPP: 2.09 (ref 0.85–1.15)

## 2024-05-23 PROCEDURE — 36415 COLL VENOUS BLD VENIPUNCTURE: CPT | Mod: ORL | Performed by: FAMILY MEDICINE

## 2024-05-23 PROCEDURE — P9604 ONE-WAY ALLOW PRORATED TRIP: HCPCS | Mod: ORL | Performed by: FAMILY MEDICINE

## 2024-05-23 PROCEDURE — 85610 PROTHROMBIN TIME: CPT | Mod: ORL | Performed by: FAMILY MEDICINE

## 2024-05-23 NOTE — TELEPHONE ENCOUNTER
[facility] Geriatrics Triage Nurse INR     Provider: Ginny Johnson MD  Facility: [facility]   Facility Type:  [facility]    Caller: [place]  Reason for call: INR  Diagnosis/Goal: A. Fib    Date INR New Dose/Orders   4/18 1.89 3mg M/TH and 2.5 mg AOD   4/25 1.77 3 mg T/TH/Sat and 2.5 mg AOD   5/2 2.04 3 mg T/TH/Sat and 2.5 mg AOD   5/16 1.56 3 mg daily   5/20 1.51 5mg every day    5/23 2.09 3mg T,Th,Sat and 2.5mg AOD       Heparin/Lovenox:  No  Currently on ABX?: No  Other interacting medication:  None  Missed or refused doses: No    Verbal Order/Direction given by Provider:   3 mg T/TH/Sat and 2.5 mg AOD, Next INR 5/28    Provider Giving Order:  JOE Rayo CNP    Verbal Order given to: [place] by Irina Saucedo RN on 5/23/24      Rachel Price RN

## 2024-05-26 ENCOUNTER — LAB REQUISITION (OUTPATIENT)
Dept: LAB | Facility: CLINIC | Age: 74
End: 2024-05-26
Payer: COMMERCIAL

## 2024-05-26 DIAGNOSIS — I48.91 UNSPECIFIED ATRIAL FIBRILLATION (H): ICD-10-CM

## 2024-05-28 ENCOUNTER — TELEPHONE (OUTPATIENT)
Dept: GERIATRICS | Facility: CLINIC | Age: 74
End: 2024-05-28

## 2024-05-28 LAB — INR PPP: 1.77 (ref 0.85–1.15)

## 2024-05-28 PROCEDURE — 36415 COLL VENOUS BLD VENIPUNCTURE: CPT | Mod: ORL | Performed by: FAMILY MEDICINE

## 2024-05-28 PROCEDURE — 85610 PROTHROMBIN TIME: CPT | Mod: ORL | Performed by: FAMILY MEDICINE

## 2024-05-28 PROCEDURE — P9604 ONE-WAY ALLOW PRORATED TRIP: HCPCS | Mod: ORL | Performed by: FAMILY MEDICINE

## 2024-05-28 NOTE — TELEPHONE ENCOUNTER
Telephone order given to nurse Marianne Price, THERESA on 5/28/2024 at 1:40 PM    ----- Message from Martha Nixon CNP sent at 5/28/2024  1:07 PM CDT -----  Please continue same dose Coumadin : 3mg Tue-Thur-Sat and 2.5mg AOD for Afib. Recheck INR Monday Mikala 3

## 2024-05-31 ENCOUNTER — LAB REQUISITION (OUTPATIENT)
Dept: LAB | Facility: CLINIC | Age: 74
End: 2024-05-31
Payer: COMMERCIAL

## 2024-05-31 DIAGNOSIS — I48.91 UNSPECIFIED ATRIAL FIBRILLATION (H): ICD-10-CM

## 2024-06-03 ENCOUNTER — TELEPHONE (OUTPATIENT)
Dept: GERIATRICS | Facility: CLINIC | Age: 74
End: 2024-06-03

## 2024-06-03 LAB — INR PPP: 1.86 (ref 0.85–1.15)

## 2024-06-03 PROCEDURE — 36415 COLL VENOUS BLD VENIPUNCTURE: CPT | Mod: ORL | Performed by: FAMILY MEDICINE

## 2024-06-03 PROCEDURE — P9604 ONE-WAY ALLOW PRORATED TRIP: HCPCS | Mod: ORL | Performed by: FAMILY MEDICINE

## 2024-06-03 PROCEDURE — 85610 PROTHROMBIN TIME: CPT | Mod: ORL | Performed by: FAMILY MEDICINE

## 2024-06-03 NOTE — TELEPHONE ENCOUNTER
[facility] Geriatrics Triage Nurse INR     Provider: Ginny Johnson MD  Facility: [facility]   Facility Type:  [facility]    Reason for call: INR  Diagnosis/Goal: A. Fib    Date INR New Dose/Orders   5/20 1.51 5 mg daily   5/23 2.09 3 mg T/TH/Sat and 2.5 mg AOD   5/28 1.77 3mg Tue-Thur-Sat and 2.5mg AOD    6/3 1.86 2.5 mg W/F/Sun and 3 mg AOD        Heparin/Lovenox:  No  Currently on ABX?: No  Other interacting medication:  None  Missed or refused doses: No    Verbal Order/Direction given by Provider: Coumadin 2.5 mg PO W/F/Sun and 3 mg AOD. Check INR on 6/10/24.    Provider Giving Order:  JOE Rayo CNP    Verbal Order given to: Chelsi Hough RN

## 2024-06-04 ENCOUNTER — LAB REQUISITION (OUTPATIENT)
Dept: LAB | Facility: CLINIC | Age: 74
End: 2024-06-04
Payer: COMMERCIAL

## 2024-06-04 DIAGNOSIS — Z51.81 ENCOUNTER FOR THERAPEUTIC DRUG LEVEL MONITORING: ICD-10-CM

## 2024-06-06 ENCOUNTER — TRANSFERRED RECORDS (OUTPATIENT)
Dept: HEALTH INFORMATION MANAGEMENT | Facility: CLINIC | Age: 74
End: 2024-06-06

## 2024-06-06 ENCOUNTER — LAB REQUISITION (OUTPATIENT)
Dept: LAB | Facility: CLINIC | Age: 74
End: 2024-06-06
Payer: COMMERCIAL

## 2024-06-06 DIAGNOSIS — E11.610 TYPE 2 DIABETES MELLITUS WITH DIABETIC NEUROPATHIC ARTHROPATHY (H): ICD-10-CM

## 2024-06-06 LAB
ERYTHROCYTE [DISTWIDTH] IN BLOOD BY AUTOMATED COUNT: 17.2 % (ref 10–15)
HBA1C MFR BLD: 6.5 %
HCT VFR BLD AUTO: 39.5 % (ref 35–47)
HGB BLD-MCNC: 13 G/DL (ref 11.7–15.7)
MCH RBC QN AUTO: 27 PG (ref 26.5–33)
MCHC RBC AUTO-ENTMCNC: 32.9 G/DL (ref 31.5–36.5)
MCV RBC AUTO: 82 FL (ref 78–100)
PLATELET # BLD AUTO: 182 10E3/UL (ref 150–450)
RBC # BLD AUTO: 4.82 10E6/UL (ref 3.8–5.2)
WBC # BLD AUTO: 7.5 10E3/UL (ref 4–11)

## 2024-06-06 PROCEDURE — 82607 VITAMIN B-12: CPT | Mod: ORL | Performed by: FAMILY MEDICINE

## 2024-06-06 PROCEDURE — 84443 ASSAY THYROID STIM HORMONE: CPT | Mod: ORL | Performed by: FAMILY MEDICINE

## 2024-06-06 PROCEDURE — 85027 COMPLETE CBC AUTOMATED: CPT | Mod: ORL | Performed by: FAMILY MEDICINE

## 2024-06-06 PROCEDURE — P9604 ONE-WAY ALLOW PRORATED TRIP: HCPCS | Mod: ORL | Performed by: FAMILY MEDICINE

## 2024-06-06 PROCEDURE — 83036 HEMOGLOBIN GLYCOSYLATED A1C: CPT | Mod: ORL | Performed by: FAMILY MEDICINE

## 2024-06-06 PROCEDURE — 36415 COLL VENOUS BLD VENIPUNCTURE: CPT | Mod: ORL | Performed by: FAMILY MEDICINE

## 2024-06-06 PROCEDURE — 80053 COMPREHEN METABOLIC PANEL: CPT | Mod: ORL | Performed by: FAMILY MEDICINE

## 2024-06-06 PROCEDURE — 82306 VITAMIN D 25 HYDROXY: CPT | Mod: ORL | Performed by: FAMILY MEDICINE

## 2024-06-07 LAB
ALBUMIN SERPL BCG-MCNC: 3.7 G/DL (ref 3.5–5.2)
ALP SERPL-CCNC: 66 U/L (ref 40–150)
ALT SERPL W P-5'-P-CCNC: 8 U/L (ref 0–50)
ANION GAP SERPL CALCULATED.3IONS-SCNC: 15 MMOL/L (ref 7–15)
AST SERPL W P-5'-P-CCNC: 20 U/L (ref 0–45)
BILIRUB SERPL-MCNC: 0.5 MG/DL
BUN SERPL-MCNC: 22.8 MG/DL (ref 8–23)
CALCIUM SERPL-MCNC: 9.8 MG/DL (ref 8.8–10.2)
CHLORIDE SERPL-SCNC: 102 MMOL/L (ref 98–107)
CREAT SERPL-MCNC: 0.67 MG/DL (ref 0.51–0.95)
DEPRECATED HCO3 PLAS-SCNC: 23 MMOL/L (ref 22–29)
EGFRCR SERPLBLD CKD-EPI 2021: >90 ML/MIN/1.73M2
GLUCOSE SERPL-MCNC: 105 MG/DL (ref 70–99)
POTASSIUM SERPL-SCNC: 4.1 MMOL/L (ref 3.4–5.3)
PROT SERPL-MCNC: 6.1 G/DL (ref 6.4–8.3)
SODIUM SERPL-SCNC: 140 MMOL/L (ref 135–145)
TSH SERPL DL<=0.005 MIU/L-ACNC: 2.87 UIU/ML (ref 0.3–4.2)
VIT B12 SERPL-MCNC: 798 PG/ML (ref 232–1245)
VIT D+METAB SERPL-MCNC: 51 NG/ML (ref 20–50)

## 2024-06-08 ENCOUNTER — LAB REQUISITION (OUTPATIENT)
Dept: LAB | Facility: CLINIC | Age: 74
End: 2024-06-08
Payer: COMMERCIAL

## 2024-06-08 DIAGNOSIS — I48.91 UNSPECIFIED ATRIAL FIBRILLATION (H): ICD-10-CM

## 2024-06-10 ENCOUNTER — LAB REQUISITION (OUTPATIENT)
Dept: LAB | Facility: CLINIC | Age: 74
End: 2024-06-10
Payer: COMMERCIAL

## 2024-06-10 DIAGNOSIS — I48.91 UNSPECIFIED ATRIAL FIBRILLATION (H): ICD-10-CM

## 2024-06-10 LAB — INR PPP: 1.55 (ref 0.85–1.15)

## 2024-06-10 PROCEDURE — 36415 COLL VENOUS BLD VENIPUNCTURE: CPT | Mod: ORL | Performed by: FAMILY MEDICINE

## 2024-06-10 PROCEDURE — P9604 ONE-WAY ALLOW PRORATED TRIP: HCPCS | Mod: ORL | Performed by: FAMILY MEDICINE

## 2024-06-10 PROCEDURE — 85610 PROTHROMBIN TIME: CPT | Mod: ORL | Performed by: FAMILY MEDICINE

## 2024-06-11 ENCOUNTER — TELEPHONE (OUTPATIENT)
Dept: GERIATRICS | Facility: CLINIC | Age: 74
End: 2024-06-11

## 2024-06-11 LAB — INR PPP: 1.69 (ref 0.85–1.15)

## 2024-06-11 PROCEDURE — P9604 ONE-WAY ALLOW PRORATED TRIP: HCPCS | Mod: ORL | Performed by: FAMILY MEDICINE

## 2024-06-11 PROCEDURE — 36415 COLL VENOUS BLD VENIPUNCTURE: CPT | Mod: ORL | Performed by: FAMILY MEDICINE

## 2024-06-11 PROCEDURE — 85610 PROTHROMBIN TIME: CPT | Mod: ORL | Performed by: FAMILY MEDICINE

## 2024-06-11 NOTE — TELEPHONE ENCOUNTER
[facility] Geriatrics Triage Nurse INR     Provider: Ginny Johnson MD  Facility: [facility]   Facility Type:  [facility]    Reason for call: INR  Diagnosis/Goal: A. Fib    Date INR New Dose/Orders   5/28 1.77 3mg Tue-Thur-Sat and 2.5mg AOD    6/3 1.86 2.5 mg W/F/Sun and 3 mg AOD    6/10 1.55 5 mg   6/11 1.69 3.5 mg        Heparin/Lovenox:  No  Currently on ABX?: No  Other interacting medication:  None  Missed or refused doses: No    Verbal Order/Direction given by Provider: Coumadin 3.5 mg PO daily. Check INR on 6/17/24.    Provider Giving Order:  Ginny Johnson MD    Verbal Order given to: Marianne Hough RN

## 2024-06-15 ENCOUNTER — LAB REQUISITION (OUTPATIENT)
Dept: LAB | Facility: CLINIC | Age: 74
End: 2024-06-15
Payer: COMMERCIAL

## 2024-06-15 DIAGNOSIS — I48.91 UNSPECIFIED ATRIAL FIBRILLATION (H): ICD-10-CM

## 2024-06-17 ENCOUNTER — TELEPHONE (OUTPATIENT)
Dept: GERIATRICS | Facility: CLINIC | Age: 74
End: 2024-06-17
Payer: COMMERCIAL

## 2024-06-17 LAB — INR PPP: 1.8 (ref 0.85–1.15)

## 2024-06-17 PROCEDURE — 36415 COLL VENOUS BLD VENIPUNCTURE: CPT | Mod: ORL | Performed by: FAMILY MEDICINE

## 2024-06-17 PROCEDURE — 85610 PROTHROMBIN TIME: CPT | Mod: ORL | Performed by: FAMILY MEDICINE

## 2024-06-17 PROCEDURE — P9604 ONE-WAY ALLOW PRORATED TRIP: HCPCS | Mod: ORL | Performed by: FAMILY MEDICINE

## 2024-06-17 NOTE — TELEPHONE ENCOUNTER
[facility] Geriatrics Triage Nurse INR     Provider: Ginny Johnson MD  Facility: [facility]   Facility Type:  [facility]    Caller: [place]    Reason for call: INR  Diagnosis/Goal: A. Fib    Date INR New Dose/Orders   6/3 1.86 2.5 mg W/F/Sun and 3 mg AOD    6/10 1.55 5 mg   6/11 1.69 3.5 mg   6/17/24 unknown 3.5        Verbal Order/Direction given by Provider:   INR result did not come through in time from lab. Coumadin 3.5 mg PO tonight. Call on 6/18 for INR orders due to late results from lab    Provider Giving Order:  Ginny Johnson MD    Verbal Order given to: Evelin Hough RN

## 2024-06-18 ENCOUNTER — TELEPHONE (OUTPATIENT)
Dept: GERIATRICS | Facility: CLINIC | Age: 74
End: 2024-06-18
Payer: COMMERCIAL

## 2024-06-18 NOTE — TELEPHONE ENCOUNTER
Orders relayed to facility nurseChelsi.     ----- Message from OPAL Galarza sent at 6/18/2024  9:56 AM CDT -----  Coumadin 3mg alternated with 3.5mg   Give 3.5 mg today  R/c INR x 1week

## 2024-06-22 ENCOUNTER — LAB REQUISITION (OUTPATIENT)
Dept: LAB | Facility: CLINIC | Age: 74
End: 2024-06-22
Payer: COMMERCIAL

## 2024-06-22 DIAGNOSIS — I48.91 UNSPECIFIED ATRIAL FIBRILLATION (H): ICD-10-CM

## 2024-06-25 ENCOUNTER — TELEPHONE (OUTPATIENT)
Dept: GERIATRICS | Facility: CLINIC | Age: 74
End: 2024-06-25

## 2024-06-25 LAB — INR PPP: 1.68 (ref 0.85–1.15)

## 2024-06-25 PROCEDURE — P9604 ONE-WAY ALLOW PRORATED TRIP: HCPCS | Mod: ORL | Performed by: FAMILY MEDICINE

## 2024-06-25 PROCEDURE — 85610 PROTHROMBIN TIME: CPT | Mod: ORL | Performed by: FAMILY MEDICINE

## 2024-06-25 PROCEDURE — 36415 COLL VENOUS BLD VENIPUNCTURE: CPT | Mod: ORL | Performed by: FAMILY MEDICINE

## 2024-06-25 NOTE — TELEPHONE ENCOUNTER
Telephone order given to nurse Marianne Price RN on 6/25/2024 at 2:32 PM     ----- Message from Ginny Johnson sent at 6/25/2024  9:42 AM CDT -----  Coumadin 4mg daily  R/c in 1 week

## 2024-06-30 ENCOUNTER — LAB REQUISITION (OUTPATIENT)
Dept: LAB | Facility: CLINIC | Age: 74
End: 2024-06-30
Payer: COMMERCIAL

## 2024-06-30 DIAGNOSIS — I48.91 UNSPECIFIED ATRIAL FIBRILLATION (H): ICD-10-CM

## 2024-07-01 ENCOUNTER — NURSING HOME VISIT (OUTPATIENT)
Dept: GERIATRICS | Facility: CLINIC | Age: 74
End: 2024-07-01
Payer: COMMERCIAL

## 2024-07-01 VITALS
HEIGHT: 63 IN | SYSTOLIC BLOOD PRESSURE: 114 MMHG | DIASTOLIC BLOOD PRESSURE: 76 MMHG | WEIGHT: 128 LBS | RESPIRATION RATE: 18 BRPM | BODY MASS INDEX: 22.68 KG/M2 | OXYGEN SATURATION: 96 % | TEMPERATURE: 97.3 F | HEART RATE: 98 BPM

## 2024-07-01 DIAGNOSIS — E11.59 TYPE 2 DIABETES MELLITUS WITH OTHER CIRCULATORY COMPLICATION, WITHOUT LONG-TERM CURRENT USE OF INSULIN (H): ICD-10-CM

## 2024-07-01 DIAGNOSIS — Z89.512 STATUS POST BELOW-KNEE AMPUTATION OF LEFT LOWER EXTREMITY (H): ICD-10-CM

## 2024-07-01 DIAGNOSIS — Z86.73 HISTORY OF CVA (CEREBROVASCULAR ACCIDENT): ICD-10-CM

## 2024-07-01 DIAGNOSIS — I10 PRIMARY HYPERTENSION: ICD-10-CM

## 2024-07-01 DIAGNOSIS — F01.53 VASCULAR DEMENTIA WITH DEPRESSED MOOD (H): Primary | ICD-10-CM

## 2024-07-01 DIAGNOSIS — I48.20 CHRONIC ATRIAL FIBRILLATION WITH RVR (H): ICD-10-CM

## 2024-07-01 PROCEDURE — 99310 SBSQ NF CARE HIGH MDM 45: CPT | Performed by: FAMILY MEDICINE

## 2024-07-01 NOTE — PROGRESS NOTES
[facility] GERIATRIC SERVICES       Patient Angela Beckham  MRN: [id number]        Reason for Visit     Chief Complaint   Patient presents with    [facility] Regulatory       Code Status     DNR/ selective treatment    Assessment     Failure to thrive with patient having more than 50 pound weight loss in the last 6 months or so    Severe mitral valve disease with severe mitral calcification and stenosis  Along with aortic stenosis.  Not felt to be a surgical candidate and is on medical management as per cardiology  Follow-up visit notes reviewed    Recent hospitalization related to A-fib with rapid ventricular response  Chronic anticoagulation with Coumadin INR 2   Hypertension with low blood pressures which has been persistent  Ongoing weight loss  H/o  ischemic CVA involving the right cardioembolic CVA  Chronic right basal ganglia infarct  Chronic microvascular ischemia  Urinary retention requiring Gambino catheter placement  History of left below the knee amputation  Generalized weakness  Type 2 diabetes  Aortic valve stenosis     Plan     Patient is a resident of long-term care  Seen today for follow-up visit due to progressive weight loss of greater than 60 pound  Patient reports she is eating and lacks the insight.  Care plan reviewed with dietitian nursing as well as   She is missing dentures but refusing to eat frequently.  Supplementation provided has been refused  Remeron 7.5 at bedtime ordered.  Increase dose to 50 mg at bedtime as an appetite stimulant.  ACP consult requested.  Dietary consult requested to see if any other intervention can help patient to start eating more also noted to be self isolating refusing to get out of bed and refusing to participate in any activity or interact with her  Surroundings.  ACP consult requested.  Also reviewed with nursing that she is scheduled for a neuropsych testing soon in October in the hospital and wondering if this could be done in the nursing home  itself    Also OT consultation sought for cognitive testing for this patient  Blood pressures have been very low  Suspect this is due to hypotension induced by dehydration  Advise gentle hydration for this patient  She is on metoprolol for A-fib.  Unfortunately heart rates are elevated  Will increase digoxin if heart rates continue to be high and blood pressure is low for management of A-fib    She is on metformin for her diabetes  Last A1c done in June 24 was 6.5.  Monitor trends and DC metformin if patient continues to not eat  INR subtherapeutic at 1.6 on last check and Coumadin dose has been adjusted  Time spent is 50 minutes in this visit including reviewing care concerns with nursing as well as with .  Patient continues to lack cognitive insight and continues to desire discharge home  Also does not have much family advocating for her    History     Patient is a very pleasant 73 year who is a resident of long-term care  Patient was recently admitted to the hospital with A FIB with RVR  Seen by cardiology and on metoprolol for rate control   warfarin has been continued.  Not felt to be a candidate for any surgical intervention for her severe AS and MS.  Medication is frequently held due to profound hypotension noted.  Suspect she is not eating and drinking well contributing to low blood pressures  She is also on digoxin  She has dm controlled with metformin  A1c 6.5 on recheck  Remains bed bound and desires going home    Poor insight ,  Unfortunately progressive weight loss noted and a reweigh was requested for her.  Weight is currently down to 128 which is quite a decline for her.  Discussion done with nursing and there are days when she absolutely refuses to eat anything on questioning patient does not have appropriate recall and reports that she is eating everything.  She is scheduled for neuropsych testing soon      Past Medical & Surgical History     PAST MEDICAL HISTORY:   Past Medical  History:   Diagnosis Date    Diabetic neuropathy (H)     HTN     Type 2 diabetes mellitus (H)           Past Social History     Reviewed,  reports that she quit smoking about 3 years ago. Her smoking use included cigarettes. She has never used smokeless tobacco. She reports that she does not drink alcohol and does not use drugs.    Family History     Reviewed, and family history includes Alzheimer Disease in her father; Cerebrovascular Disease in her maternal grandmother and mother; Diabetes in her paternal grandfather; Hypertension in her brother and mother; Obesity in her brother; Respiratory in her father.    Medication List   Post Discharge Medication Reconciliation Status: Post Discharge Medication Reconciliation Status: discharge medications reconciled, continue medications without change.  Current Outpatient Medications   Medication Sig Dispense Refill    atorvastatin (LIPITOR) 20 MG tablet Take 20 mg by mouth At Bedtime      cilostazol (PLETAL) 50 MG tablet Take 50 mg by mouth 2 times daily      digoxin (LANOXIN) 62.5 MCG tablet Take 62.5 mcg by mouth daily      ferrous sulfate (FEROSUL) 325 (65 Fe) MG tablet Take 325 mg by mouth daily (with breakfast)      furosemide (LASIX) 20 MG tablet Take 20 mg by mouth daily      lactase (LACTAID) 9000 units TABS tablet Take 9,000 Units by mouth 3 times daily (with meals)      menthol, Topical Analgesic, 2.5% (BENGAY VANISHING SCENT) 2.5 % GEL topical gel Apply topically 3 times daily as needed for moderate pain      metFORMIN (GLUCOPHAGE XR) 500 MG 24 hr tablet Take 500 mg by mouth daily (with dinner)      metoprolol succinate ER (TOPROL XL) 25 MG 24 hr tablet Take 50 mg by mouth daily      miconazole (MICATIN) 2 % cream 1 applicator 2 times daily Apply to skin folds      omeprazole (PRILOSEC) 20 MG DR capsule Take 20 mg by mouth daily      Vitamin D3 (VITAMIN D, CHOLECALCIFEROL,) 25 mcg (1000 units) tablet Take by mouth daily      WARFARIN SODIUM PO 5/2/24 INR 2.04   Cont 3mg Tues-Thurs-Sat and 2.5mg AOD.  Next INR 5/16/24.    4/25/24 INR 1.77  Take 3mg Tues-Thurs-Sat and 2.5mg AOD.  Next INR 5/2/24.    4/18/24 INR 1.89  Take 3mg Mon and Thurs and 2.5mg AOD.  Next INR 4/25/24.  4/11/24 INR 1.80  Take 3mg x 1 then 2mg daily thereafter.  Next INR 4/18/24.  4/1/24 INR 2.24  Cont 2.5mg daily.  Next INR 4/11/24.    3/25/24 INR 2.30  Cont 2.5mg daily.  Next INR 4/1/24.    3/22/24 INR 2.02  Cont 2.5mg daily.  Next INR 3/25/24  3/18/24 INR 3.53  Take 2.5mg daily.  Next INR 3/22/24.    3/15/24 INR 3.68 Take 3 mg daily. Next INR 3/18/24  3/14/24 INR 4.14  Hold Warfarin on 3/14.  Next INR 3/15/24.    3/7/24 INR 1.89  Take 5mg daily.  Next INR 3/14/24.    3/4/24 INR 1.45  Take 7.5mg on 3/4 then 5mg daily thereafter.  Next INR 3/7/24.    3/1/24 INR 1.38  Take 5mg daily.  Next INR 3/4/24.    2/28/24 INR 1.56  Take 5mg on 2/28 and 2.5mg on 2/29.  Next INR 3/1/24.    2/19/24 INR 2.83  Cont 2.5mg daily.  Next INR 2/27/24.    2/14/24 INR 3.30  Take 2.5mg daily.  Next INR 2/19/24.    2/8/24 INR 3.25  Take 5mg Mon and Fri and 2.5mg AOD.  Next INR 2/14/24.    2/1/24 INR 2.17  Take 5mg M-W-F and 2.5mg AOD.  Next INR 2/8/24.    1/25/24 INR 2.58  Cont 5mg Wed and Sat and 2.5mg AOD.  Next INR 2/1/24 1/18/24 INR 3.10  Take 5mg Wed and Sat and 2.5mg AOD.  Next INR 1/25/24.    12/28/23 INR 2.26  Cont 5mg M-W-F and 2.5mg AOD.  Next INR 1/18/24.    12/14/23 INR 2.41  Cont 5mg M-W-F and 2.5mg AOD.  Next INR 12/28/23.    12/8/23 INR 2.09  Take 5mg M-W-F and 2.5mg AOD.  Next INR 12/14/23.    12/4/23 INR 1.71  Take 7.5mg on 12/4, 2.5mg on 12/5, 5mg on 12/6, 2.5mg on 12/7.  Next INR 12/8/23.    11/27/23 INR 1.86  Take 5mg Mon and Thurs and 2.5mg AOD.  Next INR 12/4/23.    11/9/23 INR 1.83  Take 5mg Q Thurs and 2.5mg AOD.  Next INR 11/27/23.    11/2/23 INR 2.20  Take 5mg Tues and Thurs and 2.5mg AOD.  Next INR 11/9/23.  10/30/23 INR 2.23  Take 2.5mg daily.  Next INR 11/2/23.  10/27/23 INR 1.89  Take 5mg on 10/27,  2.5mg on 10/28, 5mg on 10/29.  Discontinue Lovenox on 10/29.  Next INR 10/30/23.  10/24/23 INR 1.38  Take 5mg daily.  Continue Lovenox as ordered.  Next INR 10/27/23.    10/23/23 INR 1.86  Take 2.5mg on 10/23.  Continue Lovenox as ordered.  Next INR 10/24/23.       No current facility-administered medications for this visit.          Allergies     Allergies   Allergen Reactions    Amoxicillin Unknown    Oxycodone Other (See Comments)     Does not remember    Latex Rash       Review of Systems   A comprehensive review of 14 systems was done. Pertinent findings noted here and in history of present illness. All the rest negative.  Constitutional: Negative.  Negative for fever, chills, she has  activity change, appetite change and fatigue.  Oral intake is stable and improved ; upgraded from pureed diet at her request as she no longer wants any dietary modification.   blood pressures are low  HENT: Negative for congestion and facial swelling.    Missing dentures and staff have reported she has refused to use her own dentures  Eyes: Negative for photophobia, redness and visual disturbance.   Respiratory: Negative for cough and chest tightness.    Cardiovascular: Negative for chest pain, palpitations and leg swelling.   Gastrointestinal: Negative for nausea, diarrhea, constipation, blood in stool and abdominal distention.   Genitourinary: Negative.    Musculoskeletal: Does not wear her prosthesis   Cannot tolerate sitting up very long as she likes to lay flat in bed most of the time  Has chronic back pain issues but none at rest  Skin: Negative.    Neurological: Negative for dizziness, tremors, syncope, weakness, light-headedness and headaches.   Hematological: Does not bruise/bleed easily.   Psychiatric/Behavioral: Negative.  Wants to go home  Poor insight' recall is significantly impaired and patient could not tell me if she is not eating and why  Continues to self isolate herself in her room      Physical Exam   BP  "114/76   Pulse 98   Temp 97.3  F (36.3  C)   Resp 18   Ht 1.588 m (5' 2.5\")   Wt 58.1 kg (128 lb)   SpO2 96%   BMI 23.04 kg/m     GENERAL: no acute distress. Cooperative in conversation.   HEENT: pupils are equal, round and reactive. Oral mucosa is moist and intact.  Missing teeth  RESP:Chest symmetric. Regular respiratory rate. No stridor.  CVS S 1 S2.murmer heard  ABD: Nondistended, soft.  EXTREMITIES: No lower extremity edema.   Has a BKA  NEURO: non focal. Alert and oriented x3.   PSYCH: within normal limits. No depression or anxiety.  SKIN: warm dry intact      Labs  Last Comprehensive Metabolic Panel:  Lab Results   Component Value Date     06/06/2024    POTASSIUM 4.1 06/06/2024    CHLORIDE 102 06/06/2024    CO2 23 06/06/2024    ANIONGAP 15 06/06/2024     (H) 06/06/2024    BUN 22.8 06/06/2024    CR 0.67 06/06/2024    GFRESTIMATED >90 06/06/2024    SUSIE 9.8 06/06/2024   Hemoglobin A1c 6.5     Electronically signed by    Ginny Johnson MD                       "

## 2024-07-01 NOTE — LETTER
7/1/2024      Angela Beckham  [facility]  [address] [address]         HEALTH GERIATRIC SERVICES       Patient Angela Beckham  MRN: [id number]        Reason for Visit     Chief Complaint   Patient presents with     [facility] Regulatory       Code Status     DNR/ selective treatment    Assessment     Failure to thrive with patient having more than 50 pound weight loss in the last 6 months or so    Severe mitral valve disease with severe mitral calcification and stenosis  Along with aortic stenosis.  Not felt to be a surgical candidate and is on medical management as per cardiology  Follow-up visit notes reviewed    Recent hospitalization related to A-fib with rapid ventricular response  Chronic anticoagulation with Coumadin INR 2   Hypertension with low blood pressures which has been persistent  Ongoing weight loss  H/o  ischemic CVA involving the right cardioembolic CVA  Chronic right basal ganglia infarct  Chronic microvascular ischemia  Urinary retention requiring Gambino catheter placement  History of left below the knee amputation  Generalized weakness  Type 2 diabetes  Aortic valve stenosis     Plan     Patient is a resident of long-term care  Seen today for follow-up visit due to progressive weight loss of greater than 60 pound  Patient reports she is eating and lacks the insight.  Care plan reviewed with dietitian nursing as well as   She is missing dentures but refusing to eat frequently.  Supplementation provided has been refused  Remeron 7.5 at bedtime ordered.  Increase dose to 50 mg at bedtime as an appetite stimulant.  ACP consult requested.  Dietary consult requested to see if any other intervention can help patient to start eating more also noted to be self isolating refusing to get out of bed and refusing to participate in any activity or interact with her  Surroundings.  ACP consult requested.  Also reviewed with nursing that she is scheduled for a  neuropsych testing soon in October in the hospital and wondering if this could be done in the nursing home itself    Also OT consultation sought for cognitive testing for this patient  Blood pressures have been very low  Suspect this is due to hypotension induced by dehydration  Advise gentle hydration for this patient  She is on metoprolol for A-fib.  Unfortunately heart rates are elevated  Will increase digoxin if heart rates continue to be high and blood pressure is low for management of A-fib    She is on metformin for her diabetes  Last A1c done in June 24 was 6.5.  Monitor trends and DC metformin if patient continues to not eat  INR subtherapeutic at 1.6 on last check and Coumadin dose has been adjusted  Time spent is 50 minutes in this visit including reviewing care concerns with nursing as well as with .  Patient continues to lack cognitive insight and continues to desire discharge home  Also does not have much family advocating for her    History     Patient is a very pleasant 73 year who is a resident of long-term care  Patient was recently admitted to the hospital with A FIB with RVR  Seen by cardiology and on metoprolol for rate control   warfarin has been continued.  Not felt to be a candidate for any surgical intervention for her severe AS and MS.  Medication is frequently held due to profound hypotension noted.  Suspect she is not eating and drinking well contributing to low blood pressures  She is also on digoxin  She has dm controlled with metformin  A1c 6.5 on recheck  Remains bed bound and desires going home    Poor insight ,  Unfortunately progressive weight loss noted and a reweigh was requested for her.  Weight is currently down to 128 which is quite a decline for her.  Discussion done with nursing and there are days when she absolutely refuses to eat anything on questioning patient does not have appropriate recall and reports that she is eating everything.  She is scheduled for  neuropsych testing soon      Past Medical & Surgical History     PAST MEDICAL HISTORY:   Past Medical History:   Diagnosis Date     Diabetic neuropathy (H)      HTN      Type 2 diabetes mellitus (H)           Past Social History     Reviewed,  reports that she quit smoking about 3 years ago. Her smoking use included cigarettes. She has never used smokeless tobacco. She reports that she does not drink alcohol and does not use drugs.    Family History     Reviewed, and family history includes Alzheimer Disease in her father; Cerebrovascular Disease in her maternal grandmother and mother; Diabetes in her paternal grandfather; Hypertension in her brother and mother; Obesity in her brother; Respiratory in her father.    Medication List   Post Discharge Medication Reconciliation Status: Post Discharge Medication Reconciliation Status: discharge medications reconciled, continue medications without change.  Current Outpatient Medications   Medication Sig Dispense Refill     atorvastatin (LIPITOR) 20 MG tablet Take 20 mg by mouth At Bedtime       cilostazol (PLETAL) 50 MG tablet Take 50 mg by mouth 2 times daily       digoxin (LANOXIN) 62.5 MCG tablet Take 62.5 mcg by mouth daily       ferrous sulfate (FEROSUL) 325 (65 Fe) MG tablet Take 325 mg by mouth daily (with breakfast)       furosemide (LASIX) 20 MG tablet Take 20 mg by mouth daily       lactase (LACTAID) 9000 units TABS tablet Take 9,000 Units by mouth 3 times daily (with meals)       menthol, Topical Analgesic, 2.5% (BENGAY VANISHING SCENT) 2.5 % GEL topical gel Apply topically 3 times daily as needed for moderate pain       metFORMIN (GLUCOPHAGE XR) 500 MG 24 hr tablet Take 500 mg by mouth daily (with dinner)       metoprolol succinate ER (TOPROL XL) 25 MG 24 hr tablet Take 50 mg by mouth daily       miconazole (MICATIN) 2 % cream 1 applicator 2 times daily Apply to skin folds       omeprazole (PRILOSEC) 20 MG DR capsule Take 20 mg by mouth daily       Vitamin D3  (VITAMIN D, CHOLECALCIFEROL,) 25 mcg (1000 units) tablet Take by mouth daily       WARFARIN SODIUM PO 5/2/24 INR 2.04  Cont 3mg Tues-Thurs-Sat and 2.5mg AOD.  Next INR 5/16/24.    4/25/24 INR 1.77  Take 3mg Tues-Thurs-Sat and 2.5mg AOD.  Next INR 5/2/24.    4/18/24 INR 1.89  Take 3mg Mon and Thurs and 2.5mg AOD.  Next INR 4/25/24.  4/11/24 INR 1.80  Take 3mg x 1 then 2mg daily thereafter.  Next INR 4/18/24.  4/1/24 INR 2.24  Cont 2.5mg daily.  Next INR 4/11/24.    3/25/24 INR 2.30  Cont 2.5mg daily.  Next INR 4/1/24.    3/22/24 INR 2.02  Cont 2.5mg daily.  Next INR 3/25/24  3/18/24 INR 3.53  Take 2.5mg daily.  Next INR 3/22/24.    3/15/24 INR 3.68 Take 3 mg daily. Next INR 3/18/24  3/14/24 INR 4.14  Hold Warfarin on 3/14.  Next INR 3/15/24.    3/7/24 INR 1.89  Take 5mg daily.  Next INR 3/14/24.    3/4/24 INR 1.45  Take 7.5mg on 3/4 then 5mg daily thereafter.  Next INR 3/7/24.    3/1/24 INR 1.38  Take 5mg daily.  Next INR 3/4/24.    2/28/24 INR 1.56  Take 5mg on 2/28 and 2.5mg on 2/29.  Next INR 3/1/24.    2/19/24 INR 2.83  Cont 2.5mg daily.  Next INR 2/27/24.    2/14/24 INR 3.30  Take 2.5mg daily.  Next INR 2/19/24.    2/8/24 INR 3.25  Take 5mg Mon and Fri and 2.5mg AOD.  Next INR 2/14/24.    2/1/24 INR 2.17  Take 5mg M-W-F and 2.5mg AOD.  Next INR 2/8/24.    1/25/24 INR 2.58  Cont 5mg Wed and Sat and 2.5mg AOD.  Next INR 2/1/24 1/18/24 INR 3.10  Take 5mg Wed and Sat and 2.5mg AOD.  Next INR 1/25/24.    12/28/23 INR 2.26  Cont 5mg M-W-F and 2.5mg AOD.  Next INR 1/18/24.    12/14/23 INR 2.41  Cont 5mg M-W-F and 2.5mg AOD.  Next INR 12/28/23.    12/8/23 INR 2.09  Take 5mg M-W-F and 2.5mg AOD.  Next INR 12/14/23.    12/4/23 INR 1.71  Take 7.5mg on 12/4, 2.5mg on 12/5, 5mg on 12/6, 2.5mg on 12/7.  Next INR 12/8/23.    11/27/23 INR 1.86  Take 5mg Mon and Thurs and 2.5mg AOD.  Next INR 12/4/23.    11/9/23 INR 1.83  Take 5mg Q Thurs and 2.5mg AOD.  Next INR 11/27/23.    11/2/23 INR 2.20  Take 5mg Tues and Thurs and 2.5mg  AOD.  Next INR 11/9/23.  10/30/23 INR 2.23  Take 2.5mg daily.  Next INR 11/2/23.  10/27/23 INR 1.89  Take 5mg on 10/27, 2.5mg on 10/28, 5mg on 10/29.  Discontinue Lovenox on 10/29.  Next INR 10/30/23.  10/24/23 INR 1.38  Take 5mg daily.  Continue Lovenox as ordered.  Next INR 10/27/23.    10/23/23 INR 1.86  Take 2.5mg on 10/23.  Continue Lovenox as ordered.  Next INR 10/24/23.       No current facility-administered medications for this visit.          Allergies     Allergies   Allergen Reactions     Amoxicillin Unknown     Oxycodone Other (See Comments)     Does not remember     Latex Rash       Review of Systems   A comprehensive review of 14 systems was done. Pertinent findings noted here and in history of present illness. All the rest negative.  Constitutional: Negative.  Negative for fever, chills, she has  activity change, appetite change and fatigue.  Oral intake is stable and improved ; upgraded from pureed diet at her request as she no longer wants any dietary modification.   blood pressures are low  HENT: Negative for congestion and facial swelling.    Missing dentures and staff have reported she has refused to use her own dentures  Eyes: Negative for photophobia, redness and visual disturbance.   Respiratory: Negative for cough and chest tightness.    Cardiovascular: Negative for chest pain, palpitations and leg swelling.   Gastrointestinal: Negative for nausea, diarrhea, constipation, blood in stool and abdominal distention.   Genitourinary: Negative.    Musculoskeletal: Does not wear her prosthesis   Cannot tolerate sitting up very long as she likes to lay flat in bed most of the time  Has chronic back pain issues but none at rest  Skin: Negative.    Neurological: Negative for dizziness, tremors, syncope, weakness, light-headedness and headaches.   Hematological: Does not bruise/bleed easily.   Psychiatric/Behavioral: Negative.  Wants to go home  Poor insight' recall is significantly impaired and patient  "could not tell me if she is not eating and why  Continues to self isolate herself in her room      Physical Exam   /76   Pulse 98   Temp 97.3  F (36.3  C)   Resp 18   Ht 1.588 m (5' 2.5\")   Wt 58.1 kg (128 lb)   SpO2 96%   BMI 23.04 kg/m     GENERAL: no acute distress. Cooperative in conversation.   HEENT: pupils are equal, round and reactive. Oral mucosa is moist and intact.  Missing teeth  RESP:Chest symmetric. Regular respiratory rate. No stridor.  CVS S 1 S2.murmer heard  ABD: Nondistended, soft.  EXTREMITIES: No lower extremity edema.   Has a BKA  NEURO: non focal. Alert and oriented x3.   PSYCH: within normal limits. No depression or anxiety.  SKIN: warm dry intact      Labs  Last Comprehensive Metabolic Panel:  Lab Results   Component Value Date     06/06/2024    POTASSIUM 4.1 06/06/2024    CHLORIDE 102 06/06/2024    CO2 23 06/06/2024    ANIONGAP 15 06/06/2024     (H) 06/06/2024    BUN 22.8 06/06/2024    CR 0.67 06/06/2024    GFRESTIMATED >90 06/06/2024    SUSIE 9.8 06/06/2024   Hemoglobin A1c 6.5     Electronically signed by    Ginny Johnson MD                           Sincerely,        OPAL Galarza      "

## 2024-07-02 ENCOUNTER — TELEPHONE (OUTPATIENT)
Dept: GERIATRICS | Facility: CLINIC | Age: 74
End: 2024-07-02

## 2024-07-02 LAB — INR PPP: 1.99 (ref 0.85–1.15)

## 2024-07-02 PROCEDURE — 85610 PROTHROMBIN TIME: CPT | Mod: ORL | Performed by: FAMILY MEDICINE

## 2024-07-02 PROCEDURE — P9604 ONE-WAY ALLOW PRORATED TRIP: HCPCS | Mod: ORL | Performed by: FAMILY MEDICINE

## 2024-07-02 PROCEDURE — 36415 COLL VENOUS BLD VENIPUNCTURE: CPT | Mod: ORL | Performed by: FAMILY MEDICINE

## 2024-07-02 NOTE — TELEPHONE ENCOUNTER
[facility] Geriatrics Triage Nurse INR     Provider: Ginny Johnson MD  Facility: [facility]   Facility Type:  [facility]    Reason for call: INR  Diagnosis/Goal: A. Fib    Date INR New Dose/Orders   6/11 1.69 3.5 mg   6/17 1.80 3.5 mg   6/25 1.68 4 mg   7/2 1.99 3 mg        Heparin/Lovenox:  No  Currently on ABX?: No  Other interacting medication:  None  Missed or refused doses: No    Verbal Order/Direction given by Provider: Coumadin 3 mg PO daily. Check INR on 7/9/24.    Provider Giving Order:  Ginny Johnson MD    Verbal Order given to: Gabbi Hough RN

## 2024-07-05 ENCOUNTER — LAB REQUISITION (OUTPATIENT)
Dept: LAB | Facility: CLINIC | Age: 74
End: 2024-07-05
Payer: COMMERCIAL

## 2024-07-05 DIAGNOSIS — I48.91 UNSPECIFIED ATRIAL FIBRILLATION (H): ICD-10-CM

## 2024-07-09 ENCOUNTER — TELEPHONE (OUTPATIENT)
Dept: GERIATRICS | Facility: CLINIC | Age: 74
End: 2024-07-09

## 2024-07-09 LAB — INR PPP: 1.77 (ref 0.85–1.15)

## 2024-07-09 PROCEDURE — P9604 ONE-WAY ALLOW PRORATED TRIP: HCPCS | Mod: ORL | Performed by: FAMILY MEDICINE

## 2024-07-09 PROCEDURE — 85610 PROTHROMBIN TIME: CPT | Mod: ORL | Performed by: FAMILY MEDICINE

## 2024-07-09 PROCEDURE — 36415 COLL VENOUS BLD VENIPUNCTURE: CPT | Mod: ORL | Performed by: FAMILY MEDICINE

## 2024-07-09 NOTE — TELEPHONE ENCOUNTER
[facility] Geriatrics Triage Nurse INR     Provider: Ginny Johnson MD  Facility: [facility]   Facility Type:  [facility]    Caller: Marianne  Call Back Number: [phone]  Reason for call: INR  Diagnosis/Goal: A. Fib    Date INR New Dose/Orders   6/11/24 1.69 Take 3.5mg daily.  Next INR 6/17/24.     6/17/24 1.80 Cont 3.5mg daily.  Next INR 6/25/24.   6/25/24 1.68 Take 4mg daily.  Next INR 7/2/24.   7/2/24 1.99 Take 3mg daily.  Next INR 7/9/24.     7/9/24 1.77 Take 4mg daily.  Next INR 7/15/24.        Heparin/Lovenox:  No  Currently on ABX?: No  Other interacting medication:  None  Missed or refused doses: No    Verbal Order/Direction given by Provider: Warfarin 4mg daily.  Next INR 7/15/24.      Provider Giving Order:  JOE Rayo CNP    Verbal Order given to: Marianne Campbell RN

## 2024-07-14 ENCOUNTER — LAB REQUISITION (OUTPATIENT)
Dept: LAB | Facility: CLINIC | Age: 74
End: 2024-07-14
Payer: COMMERCIAL

## 2024-07-14 DIAGNOSIS — I48.91 UNSPECIFIED ATRIAL FIBRILLATION (H): ICD-10-CM

## 2024-07-15 ENCOUNTER — TELEPHONE (OUTPATIENT)
Dept: GERIATRICS | Facility: CLINIC | Age: 74
End: 2024-07-15

## 2024-07-15 LAB — INR PPP: 1.49 (ref 0.85–1.15)

## 2024-07-15 PROCEDURE — 36415 COLL VENOUS BLD VENIPUNCTURE: CPT | Mod: ORL | Performed by: FAMILY MEDICINE

## 2024-07-15 PROCEDURE — 85610 PROTHROMBIN TIME: CPT | Mod: ORL | Performed by: FAMILY MEDICINE

## 2024-07-15 PROCEDURE — P9604 ONE-WAY ALLOW PRORATED TRIP: HCPCS | Mod: ORL | Performed by: FAMILY MEDICINE

## 2024-07-16 ENCOUNTER — TELEPHONE (OUTPATIENT)
Dept: GERIATRICS | Facility: CLINIC | Age: 74
End: 2024-07-16
Payer: COMMERCIAL

## 2024-07-16 NOTE — TELEPHONE ENCOUNTER
[facility] Triage Nurse Telephone Encounter    Provider: Ginny Johnson MD  Facility: [facility]  Facility Type:  TCU      Call Back Number: [phone]    Allergies:    Allergies   Allergen Reactions    Amoxicillin Unknown    Oxycodone Other (See Comments)     Does not remember    Latex Rash        Reason for call: New orders per MD.  On-call provider on 7/15/24 gave Warfarin orders, but primary MD wants the order changed.      Verbal Order/Direction given by Provider: Warfarin 6mg daily.  Make sure patient actually takes the Warfarin.  Check INR on 7/22/24.  Cancel the previous Warfarin orders and INR recheck date.      Provider giving Order:  Ginny Johnson MD    Verbal Order given to: Chelsi Campbell RN

## 2024-07-17 ENCOUNTER — LAB REQUISITION (OUTPATIENT)
Dept: LAB | Facility: CLINIC | Age: 74
End: 2024-07-17
Payer: COMMERCIAL

## 2024-07-17 DIAGNOSIS — I48.91 UNSPECIFIED ATRIAL FIBRILLATION (H): ICD-10-CM

## 2024-07-18 ENCOUNTER — LAB REQUISITION (OUTPATIENT)
Dept: LAB | Facility: CLINIC | Age: 74
End: 2024-07-18
Payer: COMMERCIAL

## 2024-07-18 DIAGNOSIS — I48.91 UNSPECIFIED ATRIAL FIBRILLATION (H): ICD-10-CM

## 2024-07-22 ENCOUNTER — TELEPHONE (OUTPATIENT)
Dept: GERIATRICS | Facility: CLINIC | Age: 74
End: 2024-07-22

## 2024-07-22 LAB — INR PPP: 2.12 (ref 0.85–1.15)

## 2024-07-22 PROCEDURE — 85610 PROTHROMBIN TIME: CPT | Mod: ORL | Performed by: FAMILY MEDICINE

## 2024-07-22 PROCEDURE — 36415 COLL VENOUS BLD VENIPUNCTURE: CPT | Mod: ORL | Performed by: FAMILY MEDICINE

## 2024-07-22 PROCEDURE — P9604 ONE-WAY ALLOW PRORATED TRIP: HCPCS | Mod: ORL | Performed by: FAMILY MEDICINE

## 2024-07-22 NOTE — TELEPHONE ENCOUNTER
[facility] Geriatrics Triage Nurse INR     Provider: Ginny Johnson MD  Facility: [facility]   Facility Type:  [facility]    Reason for call: INR  Diagnosis/Goal: A. Fib    Date INR New Dose/Orders   7/22 2.12 6 mg        Heparin/Lovenox:  No  Currently on ABX?: No  Other interacting medication:  None  Missed or refused doses: No    Verbal Order/Direction given by Provider: Coumadin 6 mg PO daily. Check INR on 7/29/24.    Provider Giving Order:  Ginny Johnson MD    Verbal Order given to: Corinne Hough RN

## 2024-07-29 ENCOUNTER — LAB REQUISITION (OUTPATIENT)
Dept: LAB | Facility: CLINIC | Age: 74
End: 2024-07-29
Payer: COMMERCIAL

## 2024-07-29 ENCOUNTER — TELEPHONE (OUTPATIENT)
Dept: GERIATRICS | Facility: CLINIC | Age: 74
End: 2024-07-29
Payer: COMMERCIAL

## 2024-07-29 DIAGNOSIS — I48.91 UNSPECIFIED ATRIAL FIBRILLATION (H): ICD-10-CM

## 2024-07-29 NOTE — TELEPHONE ENCOUNTER
[facility] [place] Triage Nurse Telephone Encounter    Provider: Ginny Johnson MD  Facility: [facility]  Facility Type:  [facility]    Caller: Tianna  Call Back Number: [phone]    Allergies:    Allergies   Allergen Reactions    Amoxicillin Unknown    Oxycodone Other (See Comments)     Does not remember    Latex Rash        Reason for call: Nurse is calling to report that patient's INR draw was missed today.  Previous INR on 7/22/24 was 2.12 and she was ordered to have 6mg daily.      Verbal Order/Direction given by Provider: Take 6mg on 7/29.  Next INR 7/30/24.      Provider giving Order:  Ginny Johnson MD    Verbal Order given to: Tianna Campbell RN

## 2024-07-30 ENCOUNTER — TELEPHONE (OUTPATIENT)
Dept: GERIATRICS | Facility: CLINIC | Age: 74
End: 2024-07-30

## 2024-07-30 LAB — INR PPP: 1.74 (ref 0.85–1.15)

## 2024-07-30 PROCEDURE — P9604 ONE-WAY ALLOW PRORATED TRIP: HCPCS | Mod: ORL | Performed by: FAMILY MEDICINE

## 2024-07-30 PROCEDURE — 36415 COLL VENOUS BLD VENIPUNCTURE: CPT | Mod: ORL | Performed by: FAMILY MEDICINE

## 2024-07-30 PROCEDURE — 85610 PROTHROMBIN TIME: CPT | Mod: ORL | Performed by: FAMILY MEDICINE

## 2024-07-30 NOTE — TELEPHONE ENCOUNTER
[facility] [place] [facility] Triage Nurse INR     Provider: Ginny Johnson MD  Facility: [facility]   Facility Type:  [facility]    Caller: Marianne  Call Back Number: [phone]  Reason for call: INR  Diagnosis/Goal: A. Fib    Date INR New Dose/Orders             7/22/24 2.12 Take 6mg daily.  Next INR 7/29/24.   7/30/24 1.74 Take 6.5mg daily.  Next INR 8/5/24.          Heparin/Lovenox:  No  Currently on ABX?: No  Other interacting medication:  None  Missed or refused doses: No    Verbal Order/Direction given by Provider: Warfarin 6.5mg daily.  Next INR 8/5/24.      Provider Giving Order:  Ginny Johnson MD    Verbal Order given to: Julio C Campbell RN

## 2024-08-02 ENCOUNTER — LAB REQUISITION (OUTPATIENT)
Dept: LAB | Facility: CLINIC | Age: 74
End: 2024-08-02
Payer: COMMERCIAL

## 2024-08-02 DIAGNOSIS — I48.91 UNSPECIFIED ATRIAL FIBRILLATION (H): ICD-10-CM

## 2024-08-05 ENCOUNTER — TELEPHONE (OUTPATIENT)
Dept: GERIATRICS | Facility: CLINIC | Age: 74
End: 2024-08-05

## 2024-08-05 ENCOUNTER — LAB REQUISITION (OUTPATIENT)
Dept: LAB | Facility: CLINIC | Age: 74
End: 2024-08-05
Payer: COMMERCIAL

## 2024-08-05 DIAGNOSIS — I48.91 UNSPECIFIED ATRIAL FIBRILLATION (H): ICD-10-CM

## 2024-08-05 LAB — INR PPP: 3.85 (ref 0.85–1.15)

## 2024-08-05 PROCEDURE — P9604 ONE-WAY ALLOW PRORATED TRIP: HCPCS | Mod: ORL | Performed by: FAMILY MEDICINE

## 2024-08-05 PROCEDURE — 36415 COLL VENOUS BLD VENIPUNCTURE: CPT | Mod: ORL | Performed by: FAMILY MEDICINE

## 2024-08-05 PROCEDURE — 85610 PROTHROMBIN TIME: CPT | Mod: ORL | Performed by: FAMILY MEDICINE

## 2024-08-05 NOTE — TELEPHONE ENCOUNTER
Orders relayed to facility nurse, Jacinta.      ----- Message from Ginny Johnson sent at 8/5/2024  1:20 PM CDT -----  Hold coumadin x 2 d  Check inr on 8/7/24

## 2024-08-06 ENCOUNTER — PATIENT OUTREACH (OUTPATIENT)
Dept: GERIATRIC MEDICINE | Facility: CLINIC | Age: 74
End: 2024-08-06
Payer: COMMERCIAL

## 2024-08-06 NOTE — PROGRESS NOTES
[facility] Care Coordination Contact  [facility] Six-Month Assessment    6 month assessment completed on 08/05/24 with Angela Beckham.    ER visits: No  Hospitalizations: No  TCU stays: No  Significant health status changes: Health has had no changes noted in past 6 months   Falls/Injuries: No  ADL/IADL changes: No    Reviewed Institutional Assessment and updated as needed.     Will see member in 6 months for an annual health risk assessment.   Encouraged member to call CC with any questions or concerns in the meantime.     Yohana PICKERINGN/PHN [facility]  Long Term Care/ Care Coordinator  [address] [place] [address]   [id number]  [place] [address]  [email or web address]   [email or web address]     Cell: [phone]  Office: [phone]  Fax: [phone]

## 2024-08-07 ENCOUNTER — TELEPHONE (OUTPATIENT)
Dept: GERIATRICS | Facility: CLINIC | Age: 74
End: 2024-08-07

## 2024-08-07 LAB — INR PPP: 2.32 (ref 0.85–1.15)

## 2024-08-07 PROCEDURE — 36415 COLL VENOUS BLD VENIPUNCTURE: CPT | Mod: ORL | Performed by: FAMILY MEDICINE

## 2024-08-07 PROCEDURE — 85610 PROTHROMBIN TIME: CPT | Mod: ORL | Performed by: FAMILY MEDICINE

## 2024-08-07 PROCEDURE — P9604 ONE-WAY ALLOW PRORATED TRIP: HCPCS | Mod: ORL | Performed by: FAMILY MEDICINE

## 2024-08-07 NOTE — TELEPHONE ENCOUNTER
[facility] Geriatrics Triage Nurse INR     Provider: Ginny Johnson MD  Facility: [facility]   Facility Type:  [facility]    Caller: Claudia  Call Back Number: [phone]  Reason for call: INR  Diagnosis/Goal: A. Fib    Date INR New Dose/Orders   7/22/24 2.12 Take 6mg daily.  Next INR 7/30/24.   7/30/24 1.74 Take 6.5mg daily.  Next INR 8/5/24.   8/5/24 3.85 Hold x 2 days.  Next INR 8/7/24.     8/7/24 2.32 Take 6mg M-W-F and 6.5mg all other days.  Next INR 8/21/24.          Heparin/Lovenox:  No  Currently on ABX?: No  Other interacting medication:  None  Missed or refused doses: No    Verbal Order/Direction given by Provider: Warfarin 6mg M-W-F and 6.5mg all other days.  Next INR 8/21/24.      Provider Giving Order:  Ginny Johnson MD    Verbal Order given to: Claudia Campbell, THERESA

## 2024-08-17 ENCOUNTER — LAB REQUISITION (OUTPATIENT)
Dept: LAB | Facility: CLINIC | Age: 74
End: 2024-08-17
Payer: COMMERCIAL

## 2024-08-17 DIAGNOSIS — Z86.711 PERSONAL HISTORY OF PULMONARY EMBOLISM: ICD-10-CM

## 2024-08-21 ENCOUNTER — LAB REQUISITION (OUTPATIENT)
Dept: LAB | Facility: CLINIC | Age: 74
End: 2024-08-21
Payer: COMMERCIAL

## 2024-08-21 ENCOUNTER — TELEPHONE (OUTPATIENT)
Dept: GERIATRICS | Facility: CLINIC | Age: 74
End: 2024-08-21

## 2024-08-21 DIAGNOSIS — Z86.711 PERSONAL HISTORY OF PULMONARY EMBOLISM: ICD-10-CM

## 2024-08-21 LAB — INR PPP: 4.13 (ref 0.85–1.15)

## 2024-08-21 PROCEDURE — 36415 COLL VENOUS BLD VENIPUNCTURE: CPT | Mod: ORL | Performed by: FAMILY MEDICINE

## 2024-08-21 PROCEDURE — 85610 PROTHROMBIN TIME: CPT | Mod: ORL | Performed by: FAMILY MEDICINE

## 2024-08-21 PROCEDURE — P9604 ONE-WAY ALLOW PRORATED TRIP: HCPCS | Mod: ORL | Performed by: FAMILY MEDICINE

## 2024-08-21 NOTE — TELEPHONE ENCOUNTER
[facility] Geriatrics Triage Nurse INR     Provider: Ginny Johnson MD  Facility: [facility]   Facility Type:  [facility]    Caller: Claudia  Call Back Number: [phone]  Reason for call: INR  Diagnosis/Goal: A. Fib    Date INR New Dose/Orders        7/22/24 2.12 Take 6mg daily.  Next INR 7/30/24.   7/30/24 1.74 Take 6.5mg daily.  Next INR 8/5/24.     8/5/24 3.85 Hold x 2 days.  Next INR 8/7/24.     8/7/24 2.32 Warfarin 6mg M-W-F and 6.5mg AOD.  Next INR 8/21/24.     8/21/24 4.13 Hold Warfarin on 8/21.  Next INR 8/22/24.          Heparin/Lovenox:  No  Currently on ABX?: No  Other interacting medication:  None  Missed or refused doses: No    Verbal Order/Direction given by Provider: Hold Warfarin on 8/21.  Next INR 8/22/24.      Provider Giving Order:  JOE Rayo CNP    Verbal Order given to: Claudia Campbell RN

## 2024-08-22 ENCOUNTER — TELEPHONE (OUTPATIENT)
Dept: GERIATRICS | Facility: CLINIC | Age: 74
End: 2024-08-22

## 2024-08-22 ENCOUNTER — LAB REQUISITION (OUTPATIENT)
Dept: LAB | Facility: CLINIC | Age: 74
End: 2024-08-22
Payer: COMMERCIAL

## 2024-08-22 DIAGNOSIS — Z86.711 PERSONAL HISTORY OF PULMONARY EMBOLISM: ICD-10-CM

## 2024-08-22 LAB — INR PPP: 2.96 (ref 0.85–1.15)

## 2024-08-22 PROCEDURE — P9603 ONE-WAY ALLOW PRORATED MILES: HCPCS | Mod: ORL | Performed by: FAMILY MEDICINE

## 2024-08-22 PROCEDURE — 85610 PROTHROMBIN TIME: CPT | Mod: ORL | Performed by: FAMILY MEDICINE

## 2024-08-22 PROCEDURE — 36415 COLL VENOUS BLD VENIPUNCTURE: CPT | Mod: ORL | Performed by: FAMILY MEDICINE

## 2024-08-22 NOTE — TELEPHONE ENCOUNTER
Orders given to THERESA Seo RN    ----- Message from Martha Nixon sent at 8/22/2024  3:51 PM CDT -----  Coumadin 4mg po today, 6mg po every day. Recheck INR Monday

## 2024-08-26 ENCOUNTER — LAB REQUISITION (OUTPATIENT)
Dept: LAB | Facility: CLINIC | Age: 74
End: 2024-08-26
Payer: COMMERCIAL

## 2024-08-26 DIAGNOSIS — Z86.711 PERSONAL HISTORY OF PULMONARY EMBOLISM: ICD-10-CM

## 2024-08-26 LAB — INR PPP: 3.35 (ref 0.85–1.15)

## 2024-08-26 PROCEDURE — P9604 ONE-WAY ALLOW PRORATED TRIP: HCPCS | Mod: ORL | Performed by: FAMILY MEDICINE

## 2024-08-26 PROCEDURE — 85610 PROTHROMBIN TIME: CPT | Mod: ORL | Performed by: FAMILY MEDICINE

## 2024-08-26 PROCEDURE — 36415 COLL VENOUS BLD VENIPUNCTURE: CPT | Mod: ORL | Performed by: FAMILY MEDICINE

## 2024-08-27 ENCOUNTER — TELEPHONE (OUTPATIENT)
Dept: GERIATRICS | Facility: CLINIC | Age: 74
End: 2024-08-27

## 2024-08-27 LAB — INR PPP: 2.69 (ref 0.85–1.15)

## 2024-08-27 PROCEDURE — P9604 ONE-WAY ALLOW PRORATED TRIP: HCPCS | Mod: ORL | Performed by: FAMILY MEDICINE

## 2024-08-27 PROCEDURE — 36415 COLL VENOUS BLD VENIPUNCTURE: CPT | Mod: ORL | Performed by: FAMILY MEDICINE

## 2024-08-27 PROCEDURE — 85610 PROTHROMBIN TIME: CPT | Mod: ORL | Performed by: FAMILY MEDICINE

## 2024-08-28 ENCOUNTER — LAB REQUISITION (OUTPATIENT)
Dept: LAB | Facility: CLINIC | Age: 74
End: 2024-08-28
Payer: COMMERCIAL

## 2024-08-28 DIAGNOSIS — Z79.01 LONG TERM (CURRENT) USE OF ANTICOAGULANTS: ICD-10-CM

## 2024-09-03 ENCOUNTER — TELEPHONE (OUTPATIENT)
Dept: GERIATRICS | Facility: CLINIC | Age: 74
End: 2024-09-03

## 2024-09-03 LAB — INR PPP: 2.8 (ref 0.85–1.15)

## 2024-09-03 PROCEDURE — P9604 ONE-WAY ALLOW PRORATED TRIP: HCPCS | Mod: ORL | Performed by: FAMILY MEDICINE

## 2024-09-03 PROCEDURE — 36415 COLL VENOUS BLD VENIPUNCTURE: CPT | Mod: ORL | Performed by: FAMILY MEDICINE

## 2024-09-03 PROCEDURE — 85610 PROTHROMBIN TIME: CPT | Mod: ORL | Performed by: FAMILY MEDICINE

## 2024-09-03 NOTE — TELEPHONE ENCOUNTER
Telephone order given to nurse Lr  Current dose is 6mg every day.   Rachel Price RN on 9/3/2024 at 3:08 PM     ----- Message from Ginny Johnson sent at 9/3/2024  1:29 PM CDT -----  Cont current dose   R/c inr in 1 week 9/10

## 2024-09-05 ENCOUNTER — OFFICE VISIT (OUTPATIENT)
Dept: NEUROLOGY | Facility: CLINIC | Age: 74
End: 2024-09-05
Attending: NURSE PRACTITIONER
Payer: COMMERCIAL

## 2024-09-05 DIAGNOSIS — F43.23 ADJUSTMENT DISORDER WITH MIXED ANXIETY AND DEPRESSED MOOD: ICD-10-CM

## 2024-09-05 DIAGNOSIS — F02.80 MAJOR NEUROCOGNITIVE DISORDER DUE TO MULTIPLE ETIOLOGIES (H): Primary | ICD-10-CM

## 2024-09-05 PROCEDURE — 96133 NRPSYC TST EVAL PHYS/QHP EA: CPT | Performed by: CLINICAL NEUROPSYCHOLOGIST

## 2024-09-05 PROCEDURE — 96138 PSYCL/NRPSYC TECH 1ST: CPT | Performed by: CLINICAL NEUROPSYCHOLOGIST

## 2024-09-05 PROCEDURE — 96139 PSYCL/NRPSYC TST TECH EA: CPT | Performed by: CLINICAL NEUROPSYCHOLOGIST

## 2024-09-05 PROCEDURE — 96116 NUBHVL XM PHYS/QHP 1ST HR: CPT | Performed by: CLINICAL NEUROPSYCHOLOGIST

## 2024-09-05 PROCEDURE — 96132 NRPSYC TST EVAL PHYS/QHP 1ST: CPT | Performed by: CLINICAL NEUROPSYCHOLOGIST

## 2024-09-05 NOTE — PROGRESS NOTES
The patient was seen for a neuropsychological evaluation for the purposes of diagnostic clarification and treatment planning. 115 minutes of face-to-face testing were provided by this writer. An additional 30 minutes were spent scoring and compiling test results. The patient was cooperative with testing. No concerns were brought to my attention. Please see Dr. Smith's report for a detailed description of the charges and interpretation and integration of the findings.

## 2024-09-05 NOTE — PROGRESS NOTES
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 and was able to correctly name the current and previous presidents. However, she stated that it was \"October\" or \"November 7th\" instead of September 5th.    ATTENTION/WORKING MEMORY. The patient's score on a measure sensitive to sustained auditory-verbal attention and concentration (WAIS-IV Digit Span) was classified as average, as she was able to recite up to 8 digits forward (an average score), up to 4 digits backward (an average score), and up to 6 digits in sequence (an average score).      PROCESSING SPEED. Speeded digit-symbol coding was measured as low average (WAIS-IV Coding). On a test of complex concentration that requires speeded numeric sequencing (Trails A), the patient's score was low average for completion time and with 2 errors. On the Stroop test, speeded color naming was low average, and speeded word reading was low average.     LANGUAGE PROCESSING. Language comprehension appeared intact. Confrontation naming was measured as an average score (54/60; BNT-2). The patient's performance on a test of phonemic fluency resulted in a high average score (COWAT), and her semantic fluency was low average (Category Fluency). Her writing sample was intact " and there was no evidence of micrographia.     VISUOSPATIAL/CONSTRUCTIONAL SKILLS. Visuoconstruction with three-dimensional blocks was measured as an average score (WAIS-IV Block Design). Spatial judgment, as measured by Judgment of Line Orientation, was classified as low average. Copy of a complex geometric figure (RCFT Copy) was impaired due to a piecemeal approach and misplaced elements. She also ran out of time and was unable to complete the entire figure in 5 minutes. On a Clock Drawing Task, the patient was able to draw a large, well-formed [place] with equally spaced and correctly placed numbers. Her clock hands converged nicely in the center of the clock face and were well differentiated by length.      LEARNING/MEMORY. On the WMS-IV Logical Memory subtest, immediate memory for two paragraph-length stories resulted in a low average score. After a 20-minute delay, the patient's score on the delayed recall trial was below average with a 36% retention rate. On the recognition trial, the patient's score was classified as exceptionally low.    On a 9-item verbal list-learning task (CVLT-3 Short Form), the patient acquired up to 4 words of the word list by the 4th and final learning trial (raw scores over trials = 0, 4, 3, 2). Total learning acquisition was exceptionally low. Following a brief, 30-second distractor task, the patient recalled 2 items, which was exceptionally low. After a 10-minute delay, the patient recalled 1 item, which was exceptionally low. Her recall benefited only slightly from semantic cues (2 items; exceptionally low). Recognition discriminability was below average, as she recognized 6/9 items and made 4 false-positive errors.     On a visual memory measure (WMS-IV Visual Reproduction), immediate recall of simple geometric figures was low average, while delayed recall of the figures was average (with a 45% retention rate). Delayed recognition of the figures was low average.     EXECUTIVE  FUNCTIONS. On a test of inhibition and cognitive flexibility (Stroop), the patient's score was below average for completion time and made 2 errors. On a test that requires speeded alpha-numeric sequencing/cognitive set-shifting (Trails B), she was unable to complete the task within the maximum time limit (she got to item 11 and had made 7 errors by that point). Phonemic fluency was high average (COWAT). On the Clock Drawing Test, the patient was unable to accurately represent analog time.     INDEPENDENT LIVING SKILLS. The patient was administered the Health & Safety subtest of the Independent Living Skills measure, which assesses the patient's judgment and problem-solving abilities as it pertains to various hypothetical health and safety dilemmas. Compared to a healthy, community-dwelling geriatric population, the patient's score was average.     MOOD. On the GDS-15 a self report measure of depressive symptomatology, she obtained a score of 11, placing her in the range of moderate depressive symptoms. On the MARICHUY-7, a self-report measure of anxiety, she obtained a score of 14,  placing her in the range of moderate anxiety.    ____________________________________________________________________________________    SERVICES PROVIDED & TIME:  On-site Office Visit Details   Verbal consent for neuropsychological testing was received following the provision of information about the nature and purpose of the evaluation, and the opportunity to ask questions. Verbal permission to route a copy of the final report to her primary care provider was also obtained.  A clinical interview/neurobehavioral status examination was conducted with the patient and documented. I thoroughly reviewed the medical record, selected the neuropsychological test battery, provided supervision to the trained examiner/technician, interpreted/integrated patient data and test results, and engaged in clinical decision making, treatment planning, and  report writing/preparation. A trained examiner/technician administered and scored the neuropsychological tests (2+ tests).  Please see below for a breakdown of time spent and the associated codes billed for these services.  Services   Time Spent  CPT Codes   Neurobehavioral Status Exam:  (e.g., clinical interview and neurobehavioral status exam, interpretation, report)   78 minutes   1 x 96116     Neuropsychological Evaluation Services:   (e.g., integration, interpretation, treatment planning, clinical decision making)   171 minutes   1 x 96132  2 x 96133   Neuropsychological Testing by Trained Examiner/Technician:  (e.g., test administration, scoring, 2+ tests administered)   145 minutes   1 x 96138  4 x 96139   For diagnostic and coding purposes, Ms. Beckham has a history of type II diabetes mellitus, hypertension, atrial fibrillation, bilateral carotid artery stenosis, mitral valve regurgitation, history of CVAs, history of bilateral below-the-knee amputation, depression, and anxiety. She was referred for an evaluation of major neurocognitive disorder.

## 2024-09-05 NOTE — LETTER
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 status exam, assessing orientation to personal and current information, resulted in a low average score. She was oriented to personal information, place, the current year, the city, the clinic name, and the day of the week, and was able to correctly name the current and previous presidents. However, she stated that it was \"October\" or \"November 7th\" instead of September 5th.    ATTENTION/WORKING MEMORY. The patient's score on a measure sensitive to sustained auditory-verbal attention and concentration (WAIS-IV Digit Span) was classified as average, as she was able to recite up to 8 digits forward (an average score), up to 4 digits backward (an average score), and up to 6 digits in sequence (an average score).      PROCESSING SPEED. Speeded digit-symbol coding was measured as low average (WAIS-IV Coding). On a test of complex concentration that requires speeded numeric sequencing (Trails A), the patient's score was low average for completion time and with 2 errors. On the Stroop test, speeded color naming was low average, and speeded word reading was low average.     LANGUAGE " PROCESSING. Language comprehension appeared intact. Confrontation naming was measured as an average score (54/60; BNT-2). The patient's performance on a test of phonemic fluency resulted in a high average score (COWAT), and her semantic fluency was low average (Category Fluency). Her writing sample was intact and there was no evidence of micrographia.     VISUOSPATIAL/CONSTRUCTIONAL SKILLS. Visuoconstruction with three-dimensional blocks was measured as an average score (WAIS-IV Block Design). Spatial judgment, as measured by Judgment of Line Orientation, was classified as low average. Copy of a complex geometric figure (RCFT Copy) was impaired due to a piecemeal approach and misplaced elements. She also ran out of time and was unable to complete the entire figure in 5 minutes. On a Clock Drawing Task, the patient was able to draw a large, well-formed [place] with equally spaced and correctly placed numbers. Her clock hands converged nicely in the center of the clock face and were well differentiated by length.      LEARNING/MEMORY. On the WMS-IV Logical Memory subtest, immediate memory for two paragraph-length stories resulted in a low average score. After a 20-minute delay, the patient's score on the delayed recall trial was below average with a 36% retention rate. On the recognition trial, the patient's score was classified as exceptionally low.    On a 9-item verbal list-learning task (CVLT-3 Short Form), the patient acquired up to 4 words of the word list by the 4th and final learning trial (raw scores over trials = 0, 4, 3, 2). Total learning acquisition was exceptionally low. Following a brief, 30-second distractor task, the patient recalled 2 items, which was exceptionally low. After a 10-minute delay, the patient recalled 1 item, which was exceptionally low. Her recall benefited only slightly from semantic cues (2 items; exceptionally low). Recognition discriminability was below average, as she recognized  6/9 items and made 4 false-positive errors.     On a visual memory measure (WMS-IV Visual Reproduction), immediate recall of simple geometric figures was low average, while delayed recall of the figures was average (with a 45% retention rate). Delayed recognition of the figures was low average.     EXECUTIVE FUNCTIONS. On a test of inhibition and cognitive flexibility (Stroop), the patient's score was below average for completion time and made 2 errors. On a test that requires speeded alpha-numeric sequencing/cognitive set-shifting (Trails B), she was unable to complete the task within the maximum time limit (she got to item 11 and had made 7 errors by that point). Phonemic fluency was high average (COWAT). On the Clock Drawing Test, the patient was unable to accurately represent analog time.     INDEPENDENT LIVING SKILLS. The patient was administered the Health & Safety subtest of the Independent Living Skills measure, which assesses the patient's judgment and problem-solving abilities as it pertains to various hypothetical health and safety dilemmas. Compared to a healthy, community-dwelling geriatric population, the patient's score was average.     MOOD. On the GDS-15 a self report measure of depressive symptomatology, she obtained a score of 11, placing her in the range of moderate depressive symptoms. On the MARICHUY-7, a self-report measure of anxiety, she obtained a score of 14,  placing her in the range of moderate anxiety.    ____________________________________________________________________________________    SERVICES PROVIDED & TIME:  On-site Office Visit Details   Verbal consent for neuropsychological testing was received following the provision of information about the nature and purpose of the evaluation, and the opportunity to ask questions. Verbal permission to route a copy of the final report to her primary care provider was also obtained.  A clinical interview/neurobehavioral status examination was  conducted with the patient and documented. I thoroughly reviewed the medical record, selected the neuropsychological test battery, provided supervision to the trained examiner/technician, interpreted/integrated patient data and test results, and engaged in clinical decision making, treatment planning, and report writing/preparation. A trained examiner/technician administered and scored the neuropsychological tests (2+ tests).  Please see below for a breakdown of time spent and the associated codes billed for these services.  Services   Time Spent  CPT Codes   Neurobehavioral Status Exam:  (e.g., clinical interview and neurobehavioral status exam, interpretation, report)   78 minutes   1 x 96116     Neuropsychological Evaluation Services:   (e.g., integration, interpretation, treatment planning, clinical decision making)   171 minutes   1 x 96132  2 x 96133   Neuropsychological Testing by Trained Examiner/Technician:  (e.g., test administration, scoring, 2+ tests administered)   145 minutes   1 x 96138  4 x 96139   For diagnostic and coding purposes, Ms. Beckham has a history of type II diabetes mellitus, hypertension, atrial fibrillation, bilateral carotid artery stenosis, mitral valve regurgitation, history of CVAs, history of bilateral below-the-knee amputation, depression, and anxiety. She was referred for an evaluation of major neurocognitive disorder.         The patient was seen for a neuropsychological evaluation for the purposes of diagnostic clarification and treatment planning. 115 minutes of face-to-face testing were provided by this writer. An additional 30 minutes were spent scoring and compiling test results. The patient was cooperative with testing. No concerns were brought to my attention. Please see Dr. Smith's report for a detailed description of the charges and interpretation and integration of the findings.      Again, thank you for allowing me to participate in the care of your patient.         Sincerely,        Alber [email or web address].[email or web address], LP

## 2024-09-06 ENCOUNTER — LAB REQUISITION (OUTPATIENT)
Dept: LAB | Facility: CLINIC | Age: 74
End: 2024-09-06
Payer: COMMERCIAL

## 2024-09-06 DIAGNOSIS — Z79.01 LONG TERM (CURRENT) USE OF ANTICOAGULANTS: ICD-10-CM

## 2024-09-10 ENCOUNTER — TELEPHONE (OUTPATIENT)
Dept: GERIATRICS | Facility: CLINIC | Age: 74
End: 2024-09-10

## 2024-09-10 ENCOUNTER — LAB REQUISITION (OUTPATIENT)
Dept: LAB | Facility: CLINIC | Age: 74
End: 2024-09-10
Payer: COMMERCIAL

## 2024-09-10 DIAGNOSIS — Z79.01 LONG TERM (CURRENT) USE OF ANTICOAGULANTS: ICD-10-CM

## 2024-09-10 LAB — INR PPP: 3.78 (ref 0.85–1.15)

## 2024-09-10 PROCEDURE — 36415 COLL VENOUS BLD VENIPUNCTURE: CPT | Mod: ORL | Performed by: FAMILY MEDICINE

## 2024-09-10 PROCEDURE — P9604 ONE-WAY ALLOW PRORATED TRIP: HCPCS | Mod: ORL | Performed by: FAMILY MEDICINE

## 2024-09-10 PROCEDURE — 85610 PROTHROMBIN TIME: CPT | Mod: ORL | Performed by: FAMILY MEDICINE

## 2024-09-10 NOTE — TELEPHONE ENCOUNTER
Order given to THERESA Seo RN    ----- Message from Willie BERGER sent at 9/10/2024  2:43 PM CDT -----  Per Dr. Johnson, hold Warfarin x 2 days.  Next INR 9/12/24.

## 2024-09-12 ENCOUNTER — NURSING HOME VISIT (OUTPATIENT)
Dept: GERIATRICS | Facility: CLINIC | Age: 74
End: 2024-09-12
Payer: COMMERCIAL

## 2024-09-12 ENCOUNTER — TELEPHONE (OUTPATIENT)
Dept: GERIATRICS | Facility: CLINIC | Age: 74
End: 2024-09-12

## 2024-09-12 VITALS
HEIGHT: 63 IN | TEMPERATURE: 98 F | WEIGHT: 132 LBS | DIASTOLIC BLOOD PRESSURE: 60 MMHG | OXYGEN SATURATION: 96 % | SYSTOLIC BLOOD PRESSURE: 88 MMHG | RESPIRATION RATE: 16 BRPM | BODY MASS INDEX: 23.39 KG/M2 | HEART RATE: 84 BPM

## 2024-09-12 DIAGNOSIS — E11.59 TYPE 2 DIABETES MELLITUS WITH OTHER CIRCULATORY COMPLICATION, WITHOUT LONG-TERM CURRENT USE OF INSULIN (H): ICD-10-CM

## 2024-09-12 DIAGNOSIS — Z89.512 HX OF BKA, LEFT (H): ICD-10-CM

## 2024-09-12 DIAGNOSIS — Z89.512 STATUS POST BELOW-KNEE AMPUTATION OF LEFT LOWER EXTREMITY (H): ICD-10-CM

## 2024-09-12 DIAGNOSIS — I10 PRIMARY HYPERTENSION: ICD-10-CM

## 2024-09-12 DIAGNOSIS — Z79.01 LONG TERM CURRENT USE OF ANTICOAGULANT THERAPY: ICD-10-CM

## 2024-09-12 DIAGNOSIS — I48.20 CHRONIC ATRIAL FIBRILLATION WITH RVR (H): ICD-10-CM

## 2024-09-12 DIAGNOSIS — Z86.73 HISTORY OF CVA (CEREBROVASCULAR ACCIDENT): ICD-10-CM

## 2024-09-12 DIAGNOSIS — F01.53 VASCULAR DEMENTIA WITH DEPRESSED MOOD (H): Primary | ICD-10-CM

## 2024-09-12 LAB — INR PPP: 2.69 (ref 0.85–1.15)

## 2024-09-12 PROCEDURE — 85610 PROTHROMBIN TIME: CPT | Mod: ORL | Performed by: FAMILY MEDICINE

## 2024-09-12 PROCEDURE — 99309 SBSQ NF CARE MODERATE MDM 30: CPT | Performed by: FAMILY MEDICINE

## 2024-09-12 PROCEDURE — 36415 COLL VENOUS BLD VENIPUNCTURE: CPT | Mod: ORL | Performed by: FAMILY MEDICINE

## 2024-09-12 PROCEDURE — P9604 ONE-WAY ALLOW PRORATED TRIP: HCPCS | Mod: ORL | Performed by: FAMILY MEDICINE

## 2024-09-12 RX ORDER — MIRTAZAPINE 15 MG/1
15 TABLET, FILM COATED ORAL AT BEDTIME
COMMUNITY

## 2024-09-12 NOTE — TELEPHONE ENCOUNTER
Orders given to THERESA Anderson RN    ----- Message from Ginny Johnson sent at 9/12/2024 12:51 PM CDT -----  Coumadin 5mg daily  R/c INR x1 week on 9/19/24

## 2024-09-12 NOTE — PROGRESS NOTES
[facility] GERIATRIC SERVICES       Patient Angela Beckham  MRN: [id number]        Reason for Visit     Chief Complaint   Patient presents with    FDC Regulatory       Code Status     DNR/ selective treatment    Assessment     Failure to thrive with patient having more than 50 pound weight loss in the last 6 months or so  Current weight down to 132 pounds    Severe mitral valve disease with severe mitral calcification and stenosis  Along with aortic stenosis.  Not felt to be a surgical candidate and is on medical management as per cardiology  Follow-up visit notes reviewed  Has another follow-up with cardiology scheduled soon    Recent hospitalization related to A-fib with rapid ventricular response  Chronic anticoagulation with Coumadin INR 2 .6  Hypertension with low blood pressures which has been persistent  Blood pressure 88/60 today  H/o  ischemic CVA involving the right cardioembolic CVA  Chronic right basal ganglia infarct  Chronic microvascular ischemia  Urinary retention requiring Gambino catheter placement  History of left below the knee amputation  Generalized weakness  Type 2 diabetes  Aortic valve stenosis     Plan     Patient is a resident of long-term care  Seen today for follow-up visit due to progressive weight loss of greater than 60 pound  Current weight is 132 pounds  Patient reports she is eating and lacks the insight.  Care plan reviewed with dietitian nursing as well as   She is missing dentures but refusing to eat frequently.  Diet downgraded to puréed  Also on supplements as ordered  She is on mirtazapine at a higher dose of 15 mg at bedtime    ACP consult requested.  She continues to self isolate herself in her room.  She has had a neuropsych assessment done recently unfortunately no results of that visit are available as yet    Profound hypotension noted probably because of poor oral intake.  Blood pressures 88/60.  Metoprolol dosage has been reduced to 12.5 because of  inability to tolerate medication and frequently held due to further low blood pressures  Digoxin has been added to her regimen    She is on metformin for her diabetes  Last A1c done in June 24 was 6.5.  Continue low-dose metformin  Recheck A1c  At baseline due to cognitive impairment remains confused and talking somewhat nonsensically.  Refuses to wear a prosthesis and remains bedbound    History     Patient is a very pleasant 74 year who is a resident of long-term care  Patient was recently admitted to the hospital with A FIB with RVR  Seen by cardiology and on metoprolol for rate control   warfarin has been continued.  Not felt to be a candidate for any surgical intervention for her severe AS and MS.  Medication is frequently held due to profound hypotension noted.  Today her blood pressure is 88/60 and nursing held her low-dose of metoprolol.  She is also on digoxin for rate control.  Has an upcoming appointment for cardiology  Suspect she is not eating and drinking well contributing to low blood pressures  She has dm controlled with metformin  A1c 6.5 on recheck  Remains bed bound and desires going home  She has had further cognitive decline and talking nonsensically continues to desire going home reports she is taking care of her own home and her parents home which is an extension of this nursing home.  Does not wear her prosthesis any longer.      Past Medical & Surgical History     PAST MEDICAL HISTORY:   Past Medical History:   Diagnosis Date    Diabetic neuropathy (H)     HTN     Type 2 diabetes mellitus (H)           Past Social History     Reviewed,  reports that she quit smoking about 3 years ago. Her smoking use included cigarettes. She has never used smokeless tobacco. She reports that she does not drink alcohol and does not use drugs.    Family History     Reviewed, and family history includes Alzheimer Disease in her father; Cerebrovascular Disease in her maternal grandmother and mother; Diabetes in  "her paternal grandfather; Hypertension in her brother and mother; Obesity in her brother; Respiratory in her father.    Medication List   Post Discharge Medication Reconciliation Status: Post Discharge Medication Reconciliation Status: discharge medications reconciled, continue medications without change.  Current Outpatient Medications   Medication Sig Dispense Refill    atorvastatin (LIPITOR) 20 MG tablet Take 20 mg by mouth At Bedtime      cilostazol (PLETAL) 50 MG tablet Take 50 mg by mouth 2 times daily      digoxin (LANOXIN) 62.5 MCG tablet Take 62.5 mcg by mouth daily      ferrous sulfate (FEROSUL) 325 (65 Fe) MG tablet Take 325 mg by mouth daily (with breakfast)      furosemide (LASIX) 20 MG tablet Take 20 mg by mouth daily      lactase (LACTAID) 9000 units TABS tablet Take 9,000 Units by mouth 3 times daily (with meals)      menthol, Topical Analgesic, 2.5% (BENGAY VANISHING SCENT) 2.5 % GEL topical gel Apply topically 3 times daily as needed for moderate pain      metFORMIN (GLUCOPHAGE XR) 500 MG 24 hr tablet Take 500 mg by mouth daily (with dinner)      metoprolol succinate ER (TOPROL XL) 25 MG 24 hr tablet Take 50 mg by mouth daily      miconazole (MICATIN) 2 % cream 1 applicator 2 times daily Apply to skin folds      omeprazole (PRILOSEC) 20 MG DR capsule Take 20 mg by mouth daily      Vitamin D3 (VITAMIN D, CHOLECALCIFEROL,) 25 mcg (1000 units) tablet Take by mouth daily      WARFARIN SODIUM PO Per INR orders.       No current facility-administered medications for this visit.          Allergies     Allergies   Allergen Reactions    Amoxicillin Unknown    Oxycodone Other (See Comments)     Does not remember    Latex Rash       Review of Systems   A comprehensive review of 14 systems was not done due to confusion with worsening cognitive impairment/dementia      Physical Exam   BP (!) 88/60   Pulse 84   Temp 98  F (36.7  C)   Resp 16   Ht 1.588 m (5' 2.5\")   Wt 59.9 kg (132 lb)   SpO2 96%   BMI " 23.76 kg/m     GENERAL: no acute distress. Cooperative in conversation.   HEENT: pupils are equal, round and reactive. Oral mucosa is moist and intact.  Missing teeth  RESP:Chest symmetric. Regular respiratory rate. No stridor.  CVS S 1 S2.murmer heard  ABD: Nondistended, soft.  EXTREMITIES: No lower extremity edema.   Has a BKA  NEURO: non focal. Alert and oriented x self.   PSYCH: within normal limits. No depression or anxiety.  SKIN: warm dry intact      Labs  Last Comprehensive Metabolic Panel:  Lab Results   Component Value Date     06/06/2024    POTASSIUM 4.1 06/06/2024    CHLORIDE 102 06/06/2024    CO2 23 06/06/2024    ANIONGAP 15 06/06/2024     (H) 06/06/2024    BUN 22.8 06/06/2024    CR 0.67 06/06/2024    GFRESTIMATED >90 06/06/2024    SUSIE 9.8 06/06/2024   Hemoglobin A1c 6.5  INR is 2.6 today  Electronically signed by    Ginny Johnson MD

## 2024-09-12 NOTE — LETTER
9/12/2024      Angela Beckham  [facility]  [address] [address]        [facility] GERIATRIC SERVICES       Patient Angela Beckham  MRN: [id number]        Reason for Visit     Chief Complaint   Patient presents with     long term Regulatory       Code Status     DNR/ selective treatment    Assessment     Failure to thrive with patient having more than 50 pound weight loss in the last 6 months or so  Current weight down to 132 pounds    Severe mitral valve disease with severe mitral calcification and stenosis  Along with aortic stenosis.  Not felt to be a surgical candidate and is on medical management as per cardiology  Follow-up visit notes reviewed  Has another follow-up with cardiology scheduled soon    Recent hospitalization related to A-fib with rapid ventricular response  Chronic anticoagulation with Coumadin INR 2 .6  Hypertension with low blood pressures which has been persistent  Blood pressure 88/60 today  H/o  ischemic CVA involving the right cardioembolic CVA  Chronic right basal ganglia infarct  Chronic microvascular ischemia  Urinary retention requiring Gambino catheter placement  History of left below the knee amputation  Generalized weakness  Type 2 diabetes  Aortic valve stenosis     Plan     Patient is a resident of long-term care  Seen today for follow-up visit due to progressive weight loss of greater than 60 pound  Current weight is 132 pounds  Patient reports she is eating and lacks the insight.  Care plan reviewed with dietitian nursing as well as   She is missing dentures but refusing to eat frequently.  Diet downgraded to puréed  Also on supplements as ordered  She is on mirtazapine at a higher dose of 15 mg at bedtime    ACP consult requested.  She continues to self isolate herself in her room.  She has had a neuropsych assessment done recently unfortunately no results of that visit are available as yet    Profound hypotension noted probably because  of poor oral intake.  Blood pressures 88/60.  Metoprolol dosage has been reduced to 12.5 because of inability to tolerate medication and frequently held due to further low blood pressures  Digoxin has been added to her regimen    She is on metformin for her diabetes  Last A1c done in June 24 was 6.5.  Continue low-dose metformin  Recheck A1c  At baseline due to cognitive impairment remains confused and talking somewhat nonsensically.  Refuses to wear a prosthesis and remains bedbound    History     Patient is a very pleasant 74 year who is a resident of long-term care  Patient was recently admitted to the hospital with A FIB with RVR  Seen by cardiology and on metoprolol for rate control   warfarin has been continued.  Not felt to be a candidate for any surgical intervention for her severe AS and MS.  Medication is frequently held due to profound hypotension noted.  Today her blood pressure is 88/60 and nursing held her low-dose of metoprolol.  She is also on digoxin for rate control.  Has an upcoming appointment for cardiology  Suspect she is not eating and drinking well contributing to low blood pressures  She has dm controlled with metformin  A1c 6.5 on recheck  Remains bed bound and desires going home  She has had further cognitive decline and talking nonsensically continues to desire going home reports she is taking care of her own home and her parents home which is an extension of this nursing home.  Does not wear her prosthesis any longer.      Past Medical & Surgical History     PAST MEDICAL HISTORY:   Past Medical History:   Diagnosis Date     Diabetic neuropathy (H)      HTN      Type 2 diabetes mellitus (H)           Past Social History     Reviewed,  reports that she quit smoking about 3 years ago. Her smoking use included cigarettes. She has never used smokeless tobacco. She reports that she does not drink alcohol and does not use drugs.    Family History     Reviewed, and family history includes  Alzheimer Disease in her father; Cerebrovascular Disease in her maternal grandmother and mother; Diabetes in her paternal grandfather; Hypertension in her brother and mother; Obesity in her brother; Respiratory in her father.    Medication List   Post Discharge Medication Reconciliation Status: Post Discharge Medication Reconciliation Status: discharge medications reconciled, continue medications without change.  Current Outpatient Medications   Medication Sig Dispense Refill     atorvastatin (LIPITOR) 20 MG tablet Take 20 mg by mouth At Bedtime       cilostazol (PLETAL) 50 MG tablet Take 50 mg by mouth 2 times daily       digoxin (LANOXIN) 62.5 MCG tablet Take 62.5 mcg by mouth daily       ferrous sulfate (FEROSUL) 325 (65 Fe) MG tablet Take 325 mg by mouth daily (with breakfast)       furosemide (LASIX) 20 MG tablet Take 20 mg by mouth daily       lactase (LACTAID) 9000 units TABS tablet Take 9,000 Units by mouth 3 times daily (with meals)       menthol, Topical Analgesic, 2.5% (BENGAY VANISHING SCENT) 2.5 % GEL topical gel Apply topically 3 times daily as needed for moderate pain       metFORMIN (GLUCOPHAGE XR) 500 MG 24 hr tablet Take 500 mg by mouth daily (with dinner)       metoprolol succinate ER (TOPROL XL) 25 MG 24 hr tablet Take 50 mg by mouth daily       miconazole (MICATIN) 2 % cream 1 applicator 2 times daily Apply to skin folds       omeprazole (PRILOSEC) 20 MG DR capsule Take 20 mg by mouth daily       Vitamin D3 (VITAMIN D, CHOLECALCIFEROL,) 25 mcg (1000 units) tablet Take by mouth daily       WARFARIN SODIUM PO Per INR orders.       No current facility-administered medications for this visit.          Allergies     Allergies   Allergen Reactions     Amoxicillin Unknown     Oxycodone Other (See Comments)     Does not remember     Latex Rash       Review of Systems   A comprehensive review of 14 systems was not done due to confusion with worsening cognitive impairment/dementia      Physical Exam   BP  "(!) 88/60   Pulse 84   Temp 98  F (36.7  C)   Resp 16   Ht 1.588 m (5' 2.5\")   Wt 59.9 kg (132 lb)   SpO2 96%   BMI 23.76 kg/m     GENERAL: no acute distress. Cooperative in conversation.   HEENT: pupils are equal, round and reactive. Oral mucosa is moist and intact.  Missing teeth  RESP:Chest symmetric. Regular respiratory rate. No stridor.  CVS S 1 S2.murmer heard  ABD: Nondistended, soft.  EXTREMITIES: No lower extremity edema.   Has a BKA  NEURO: non focal. Alert and oriented x self.   PSYCH: within normal limits. No depression or anxiety.  SKIN: warm dry intact      Labs  Last Comprehensive Metabolic Panel:  Lab Results   Component Value Date     06/06/2024    POTASSIUM 4.1 06/06/2024    CHLORIDE 102 06/06/2024    CO2 23 06/06/2024    ANIONGAP 15 06/06/2024     (H) 06/06/2024    BUN 22.8 06/06/2024    CR 0.67 06/06/2024    GFRESTIMATED >90 06/06/2024    SUSIE 9.8 06/06/2024   Hemoglobin A1c 6.5  INR is 2.6 today  Electronically signed by    Ginny Johnson MD                           Sincerely,        OPAL Galarza      "

## 2024-09-17 ENCOUNTER — LAB REQUISITION (OUTPATIENT)
Dept: LAB | Facility: CLINIC | Age: 74
End: 2024-09-17
Payer: COMMERCIAL

## 2024-09-17 DIAGNOSIS — Z79.01 LONG TERM (CURRENT) USE OF ANTICOAGULANTS: ICD-10-CM

## 2024-09-19 ENCOUNTER — TELEPHONE (OUTPATIENT)
Dept: GERIATRICS | Facility: CLINIC | Age: 74
End: 2024-09-19

## 2024-09-19 LAB — INR PPP: 2.14 (ref 0.85–1.15)

## 2024-09-19 PROCEDURE — 85610 PROTHROMBIN TIME: CPT | Mod: ORL | Performed by: FAMILY MEDICINE

## 2024-09-19 PROCEDURE — 36415 COLL VENOUS BLD VENIPUNCTURE: CPT | Mod: ORL | Performed by: FAMILY MEDICINE

## 2024-09-19 PROCEDURE — P9604 ONE-WAY ALLOW PRORATED TRIP: HCPCS | Mod: ORL | Performed by: FAMILY MEDICINE

## 2024-09-19 NOTE — TELEPHONE ENCOUNTER
[facility] Geriatrics Triage Nurse INR     Provider: Ginny Johnson MD  Facility: [facility]   Facility Type:  [facility]    Caller: Be  Call Back Number: [phone]  Reason for call: INR  Diagnosis/Goal: A. Fib    Date INR New Dose/Orders        8/27/24 2.69 Cont 6mg daily.  Next INR 9/3/24.     9/3/24 2.80 Cont 6mg daily.  Next INR 9/10/24.   9/10/24 3.78 Hold x 2 days.  Next INR 9/12/24.   9/12/24 2.69 Take 5mg daily.  Next INR 9/19/24.     9/19/24 2.14 Cont 5mg daily.  Next INR 9/26/24.          Heparin/Lovenox:  No  Currently on ABX?: No  Other interacting medication:  none  Missed or refused doses: No    Verbal Order/Direction given by Provider: Continue Warfarin 5mg daily.  Next INR 9/26/24.      Provider Giving Order:  Ginny Johnson MD    Verbal Order given to: Be Campbell RN

## 2024-09-24 ENCOUNTER — LAB REQUISITION (OUTPATIENT)
Dept: LAB | Facility: CLINIC | Age: 74
End: 2024-09-24
Payer: COMMERCIAL

## 2024-09-24 DIAGNOSIS — I48.91 UNSPECIFIED ATRIAL FIBRILLATION (H): ICD-10-CM

## 2024-09-26 ENCOUNTER — TELEPHONE (OUTPATIENT)
Dept: GERIATRICS | Facility: CLINIC | Age: 74
End: 2024-09-26

## 2024-09-26 LAB — INR PPP: 2.59 (ref 0.85–1.15)

## 2024-09-26 PROCEDURE — 36415 COLL VENOUS BLD VENIPUNCTURE: CPT | Mod: ORL | Performed by: FAMILY MEDICINE

## 2024-09-26 PROCEDURE — P9604 ONE-WAY ALLOW PRORATED TRIP: HCPCS | Mod: ORL | Performed by: FAMILY MEDICINE

## 2024-09-26 PROCEDURE — 85610 PROTHROMBIN TIME: CPT | Mod: ORL | Performed by: FAMILY MEDICINE

## 2024-10-08 ENCOUNTER — LAB REQUISITION (OUTPATIENT)
Dept: LAB | Facility: CLINIC | Age: 74
End: 2024-10-08
Payer: COMMERCIAL

## 2024-10-08 DIAGNOSIS — I48.91 UNSPECIFIED ATRIAL FIBRILLATION (H): ICD-10-CM

## 2024-10-10 ENCOUNTER — TELEPHONE (OUTPATIENT)
Dept: GERIATRICS | Facility: CLINIC | Age: 74
End: 2024-10-10

## 2024-10-10 LAB — INR PPP: 2.62 (ref 0.85–1.15)

## 2024-10-10 PROCEDURE — 85610 PROTHROMBIN TIME: CPT | Mod: ORL | Performed by: FAMILY MEDICINE

## 2024-10-10 PROCEDURE — 36415 COLL VENOUS BLD VENIPUNCTURE: CPT | Mod: ORL | Performed by: FAMILY MEDICINE

## 2024-10-10 PROCEDURE — P9604 ONE-WAY ALLOW PRORATED TRIP: HCPCS | Mod: ORL | Performed by: FAMILY MEDICINE

## 2024-10-10 NOTE — TELEPHONE ENCOUNTER
[facility] Geriatrics Triage Nurse INR     Provider: Ginny Johnson MD  Facility: [facility]   Facility Type:  [facility]    Caller: Claudia  Call Back Number: [phone]  Reason for call: INR  Diagnosis/Goal: A. Fib    Date INR New Dose/Orders        9/19/24 2.14 Cont 5mg daily.  Next INR 9/26/24 9/26/24 2.59 Cont 5mg daily.  Next INR 10/10/24.     10/10/24 2.62 Cont 5mg daily.  Next INR 10/31/24.          Heparin/Lovenox:  No  Currently on ABX?: No  Other interacting medication:  None  Missed or refused doses: No    Verbal Order/Direction given by Provider: Continue Warfarin 5mg daily.  Next INR 10/31/24.      Provider Giving Order:  Ginny Johnson MD    Verbal Order given to: Claudia Campbell RN

## 2024-10-21 ENCOUNTER — CLINICAL UPDATE (OUTPATIENT)
Dept: PHARMACY | Facility: CLINIC | Age: 74
End: 2024-10-21
Payer: COMMERCIAL

## 2024-10-21 DIAGNOSIS — D63.8 ANEMIA, CHRONIC DISEASE: ICD-10-CM

## 2024-10-21 DIAGNOSIS — I63.9 CEREBROVASCULAR ACCIDENT (STROKE) (H): ICD-10-CM

## 2024-10-21 DIAGNOSIS — I27.21 PULMONARY ARTERY HYPERTENSION (H): ICD-10-CM

## 2024-10-21 DIAGNOSIS — E11.9 DIABETES MELLITUS, TYPE 2 (H): ICD-10-CM

## 2024-10-21 DIAGNOSIS — I48.20 CHRONIC ATRIAL FIBRILLATION WITH RVR (H): Primary | ICD-10-CM

## 2024-10-21 DIAGNOSIS — I10 BENIGN ESSENTIAL HYPERTENSION: ICD-10-CM

## 2024-10-21 DIAGNOSIS — R60.0 EDEMA OF BOTH LEGS: ICD-10-CM

## 2024-10-21 PROCEDURE — 99207 PR NO CHARGE LOS: CPT | Performed by: PHARMACIST

## 2024-10-21 NOTE — PROGRESS NOTES
This patient's medication list and chart were reviewed as part of the service provided by [facility].    Assessment/Recommendations:    Noted on chart review that patient was started on cilostazol years back (unsure when) by vascular.  History of BKA.  Patient is not walking per review of chart and cilostazol is indicated for intermittent claudication.  Question if reasonable to discontinue cilostazol at this time.  Also of note, cilostazol may increase risk of tachycardia, palpitations, hypotension.  Also should be aware of Black Box Warning on this med:   Cardiovascular disease: [US Boxed Warning]: Cilostazol is contraindicated in patients with heart failure of any severity. Phosphodiesterase inhibitors have caused decreased survival compared with placebo in patients with class III to IV heart failure. Patients with history of ischemic heart disease may be at increased risk for exacerbation of angina pectoris or myocardial infarction.    Please note - if cilostazol discontinued, follow-up INR due to cilostazol may increase INR, so discontinuation may impact INR.    Last CBC in June 2024.  Consider recheck CBC and ferritin to assess continued need of iron supplement. May be able to d'c or reduce frequency to one tab three times weekly and follow-up CBC and ferritin in 3 months from potential change.    Continue to periodically monitor magnesium, calcium, potassium due to digoxin use, as well as kidney function.  Should obtain dig level if noting clinical deterioration or any disease changes, changing kidney function, suspected dig toxicity, or starting or stopping of any medications that interact with digoxin.    I am unclear on if patient is still on furosemide.  Noted history of hypotension.  Furosemide on Epic med list, however am questioning accuracy.  Please verify if patient is receiving furosemide, reconcile med list, and assess for continued need.  If change is made to  furosemide, please follow-up BMP in one week from potential change.    Noted metformin XR was restarted in January 2024 due to A1c of 7.4.  follow-up A1c in June was 6.4.  do not need tight control in this patient with limited function and life expectancy, and metformin may be contributing to nausea/poor appetite.  Goal in this elderly patient in NH with cognitive and functional limitations is to avoid symptoms of hypo or hyperglycemia.   Please consider d'c metformin.    If patient has continued with symptoms of depression, may benefit from further increase in mirtazapine to 30mg daily.      Estimated Creatinine Clearance: 67.5 mL/min (based on SCr of 0.67 mg/dL).    Kellie Alvarez, Pharm.D.,[facility]  Board Certified Geriatric Pharmacist  Medication Therapy Management Pharmacist  [phone]

## 2024-10-29 ENCOUNTER — LAB REQUISITION (OUTPATIENT)
Dept: LAB | Facility: CLINIC | Age: 74
End: 2024-10-29
Payer: COMMERCIAL

## 2024-10-29 DIAGNOSIS — I48.91 UNSPECIFIED ATRIAL FIBRILLATION (H): ICD-10-CM

## 2024-10-31 ENCOUNTER — TELEPHONE (OUTPATIENT)
Dept: GERIATRICS | Facility: CLINIC | Age: 74
End: 2024-10-31

## 2024-10-31 LAB — INR PPP: 2.88 (ref 0.85–1.15)

## 2024-10-31 PROCEDURE — 36415 COLL VENOUS BLD VENIPUNCTURE: CPT | Mod: ORL | Performed by: FAMILY MEDICINE

## 2024-10-31 PROCEDURE — 85610 PROTHROMBIN TIME: CPT | Mod: ORL | Performed by: FAMILY MEDICINE

## 2024-10-31 PROCEDURE — P9604 ONE-WAY ALLOW PRORATED TRIP: HCPCS | Mod: ORL | Performed by: FAMILY MEDICINE

## 2024-10-31 NOTE — TELEPHONE ENCOUNTER
[facility] Geriatrics Triage Nurse INR     Provider: Ginny Johnson MD  Facility: [facility]   Facility Type:  [facility]    Caller: Mavis  Call Back Number: [phone]  Reason for call: INR  Diagnosis/Goal: A. Fib    Date INR New Dose/Orders   9/19/24 2.14 Cont 5mg daily.  Next INR 9/26/24.   9/26/24 2.59 Cont 5mg daily.  Next INR 10/10/24.   10/10 2.62 Cont 5mg daily.  Next INR 10/31/24   10/31 2.88 Take 4.5mg Q Sunday and 5mg AOD.  Next INR 11/14/24.          Heparin/Lovenox:  No  Currently on ABX?: No  Other interacting medication:  None  Missed or refused doses: No    Verbal Order/Direction given by Provider: Warfarin 4.5mg Q Sunday and 5mg all other days.  Next INR 11/14/24.      Provider Giving Order:  Ginny Johnson MD    Verbal Order given to: Mavis Campbell RN

## 2024-11-11 ENCOUNTER — LAB REQUISITION (OUTPATIENT)
Dept: LAB | Facility: CLINIC | Age: 74
End: 2024-11-11
Payer: COMMERCIAL

## 2024-11-11 DIAGNOSIS — I48.91 UNSPECIFIED ATRIAL FIBRILLATION (H): ICD-10-CM

## 2024-11-12 NOTE — PROGRESS NOTES
[facility] GERIATRIC SERVICES       Patient Angela Beckham  MRN: [id number]        Reason for Visit     Chief Complaint   Patient presents with    correction Regulatory       Code Status     DNR/ selective treatment    Assessment     Failure to thrive with patient having more than 50 pound weight loss in the last 6 months or so  Current weight are hovering around 130 pounds which seems to be not the new baseline for her  She has various interventions tried but have been unsuccessful    Severe mitral valve disease with severe mitral calcification and stenosis  Along with aortic stenosis.  Not felt to be a surgical candidate and is on medical management as per cardiology    Recent hospitalization related to A-fib with rapid ventricular response  Chronic anticoagulation with Coumadin INR 2 .8    Hypertension with low blood pressures which has been persistent    Major neurocognitive impairment felt to be a combination of vascular and Alzheimer's dementia    H/o  ischemic CVA involving the right cardioembolic CVA  Chronic right basal ganglia infarct  Chronic microvascular ischemia  Urinary retention requiring Gambino catheter placement  History of left below the knee amputation  Generalized weakness  Type 2 diabetes  Aortic valve stenosis     Plan     Patient is a resident of long-term care  Seen today for follow-up visit due to progressive weight loss of greater than 60 pound  Current weight is 130 pounds and she seems to be stable around that number  Patient reports she is eating and lacks the insight.  Currently on a higher dose of mirtazapine  Increase dose to 30mg daily and monitor mood  She also gets supplements  Diet has been downgraded as she is missing her dentures  At baseline she remains bedbound with poor insight into her condition.  She has some baseline cognitive impairment  She has persistently low blood pressures which I suspect is because of poor oral intake.  She is on a much lower dose of metoprolol  which is held almost daily because of low blood pressures  Digoxin has been added to her regimen  Heart rates have been stable.  Follow-up done with cardiology on 11/12/2024 and notes reviewed no new medication orders given.  Patient was felt to be stable  Discontinue pletal  She is on metformin for her diabetes  Last A1c done in June 24 was 6.5.  Continue low-dose metformin  Recheck A1c  Also has been diagnosed with dementia most likely vascular and Alzheimer's in combination she had a neuropsych testing done recently and notes were reviewed  Deemed to lack capacity  She has a history of amputation but does not wear her prostheses.  Continue with her care plan    History     Patient is a very pleasant 74 year who is a resident of long-term care  Patient was recently admitted to the hospital with A FIB with RVR  Seen by cardiology and on metoprolol for rate control   warfarin has been continued.  Not felt to be a candidate for any surgical intervention for her severe AS and MS.  Medication is frequently held due to profound hypotension noted.  Today her blood pressure is 88/60 and nursing held her low-dose of metoprolol.  She is also on digoxin for rate control.  Has an upcoming appointment for cardiology  Suspect she is not eating and drinking well contributing to low blood pressures  She has dm controlled with metformin  A1c 6.5 on recheck  Remains bed bound and desires going home  She has had further cognitive decline and talking nonsensically continues to desire going home reports she is taking care of her own home and her parents home which is an extension of this nursing home.  Does not wear her prosthesis any longer.      Past Medical & Surgical History     PAST MEDICAL HISTORY:   Past Medical History:   Diagnosis Date    Diabetic neuropathy (H)     HTN     Type 2 diabetes mellitus (H)           Past Social History     Reviewed,  reports that she quit smoking about 3 years ago. Her smoking use included  cigarettes. She has never used smokeless tobacco. She reports that she does not drink alcohol and does not use drugs.    Family History     Reviewed, and family history includes Alzheimer Disease in her father; Cerebrovascular Disease in her maternal grandmother and mother; Diabetes in her paternal grandfather; Hypertension in her brother and mother; Obesity in her brother; Respiratory in her father.    Medication List   Post Discharge Medication Reconciliation Status: Post Discharge Medication Reconciliation Status: discharge medications reconciled, continue medications without change.  Current Outpatient Medications   Medication Sig Dispense Refill    atorvastatin (LIPITOR) 20 MG tablet Take 20 mg by mouth At Bedtime      cilostazol (PLETAL) 50 MG tablet Take 50 mg by mouth 2 times daily      digoxin (LANOXIN) 62.5 MCG tablet Take 62.5 mcg by mouth daily      ferrous sulfate (FEROSUL) 325 (65 Fe) MG tablet Take 325 mg by mouth daily (with breakfast)      furosemide (LASIX) 20 MG tablet Take 20 mg by mouth daily      lactase (LACTAID) 9000 units TABS tablet Take 9,000 Units by mouth 3 times daily (with meals)      menthol, Topical Analgesic, 2.5% (BENGAY VANISHING SCENT) 2.5 % GEL topical gel Apply topically 3 times daily as needed for moderate pain      metFORMIN (GLUCOPHAGE XR) 500 MG 24 hr tablet Take 500 mg by mouth daily (with dinner)      metoprolol succinate ER (TOPROL XL) 25 MG 24 hr tablet Take 12.5 mg by mouth daily.      miconazole (MICATIN) 2 % cream 1 applicator 2 times daily Apply to skin folds      mirtazapine (REMERON) 15 MG tablet Take 15 mg by mouth at bedtime.      omeprazole (PRILOSEC) 20 MG DR capsule Take 20 mg by mouth daily      Vitamin D3 (VITAMIN D, CHOLECALCIFEROL,) 25 mcg (1000 units) tablet Take by mouth daily      WARFARIN SODIUM PO Per INR orders.       No current facility-administered medications for this visit.          Allergies     Allergies   Allergen Reactions    Amoxicillin  "Unknown    Oxycodone Other (See Comments)     Does not remember    Latex Rash       Review of Systems   A comprehensive review of 14 systems was not done due to confusion with worsening cognitive impairment/dementia      Physical Exam   BP 90/57   Pulse 89   Temp 97.3  F (36.3  C)   Resp 18   Ht 1.588 m (5' 2.5\")   Wt 59 kg (130 lb)   SpO2 96%   BMI 23.40 kg/m     GENERAL: no acute distress. Cooperative in conversation.   HEENT: pupils are equal, round and reactive. Oral mucosa is moist and intact.  Missing teeth  RESP:Chest symmetric. Regular respiratory rate. No stridor.  CVS S 1 S2.murmer heard  ABD: Nondistended, soft.  EXTREMITIES: No lower extremity edema.   Has a BKA  NEURO: non focal. Alert and oriented x self.   PSYCH: within normal limits. No depression or anxiety.  SKIN: warm dry intact      Labs  Last Comprehensive Metabolic Panel:  Lab Results   Component Value Date     06/06/2024    POTASSIUM 4.1 06/06/2024    CHLORIDE 102 06/06/2024    CO2 23 06/06/2024    ANIONGAP 15 06/06/2024     (H) 06/06/2024    BUN 22.8 06/06/2024    CR 0.67 06/06/2024    GFRESTIMATED >90 06/06/2024    SUSIE 9.8 06/06/2024   Hemoglobin A1c 6.5  INR is 2.6 today  Electronically signed by    Ginny Johnson MD                       "

## 2024-11-13 ENCOUNTER — NURSING HOME VISIT (OUTPATIENT)
Dept: GERIATRICS | Facility: CLINIC | Age: 74
End: 2024-11-13
Payer: COMMERCIAL

## 2024-11-13 VITALS
HEART RATE: 89 BPM | TEMPERATURE: 97.3 F | HEIGHT: 63 IN | WEIGHT: 130 LBS | BODY MASS INDEX: 23.04 KG/M2 | OXYGEN SATURATION: 96 % | DIASTOLIC BLOOD PRESSURE: 57 MMHG | RESPIRATION RATE: 18 BRPM | SYSTOLIC BLOOD PRESSURE: 90 MMHG

## 2024-11-13 DIAGNOSIS — I34.0 NONRHEUMATIC MITRAL VALVE REGURGITATION: ICD-10-CM

## 2024-11-13 DIAGNOSIS — F01.53 VASCULAR DEMENTIA WITH DEPRESSED MOOD (H): Primary | ICD-10-CM

## 2024-11-13 DIAGNOSIS — Z89.512 STATUS POST BELOW-KNEE AMPUTATION OF LEFT LOWER EXTREMITY (H): ICD-10-CM

## 2024-11-13 DIAGNOSIS — M54.50 CHRONIC RIGHT-SIDED LOW BACK PAIN WITHOUT SCIATICA: ICD-10-CM

## 2024-11-13 DIAGNOSIS — R41.89 COGNITIVE IMPAIRMENT: ICD-10-CM

## 2024-11-13 DIAGNOSIS — I10 PRIMARY HYPERTENSION: ICD-10-CM

## 2024-11-13 DIAGNOSIS — E11.59 TYPE 2 DIABETES MELLITUS WITH OTHER CIRCULATORY COMPLICATION, WITHOUT LONG-TERM CURRENT USE OF INSULIN (H): ICD-10-CM

## 2024-11-13 DIAGNOSIS — G89.29 CHRONIC RIGHT-SIDED LOW BACK PAIN WITHOUT SCIATICA: ICD-10-CM

## 2024-11-13 DIAGNOSIS — Z89.512 HX OF BKA, LEFT (H): ICD-10-CM

## 2024-11-13 DIAGNOSIS — Z86.73 HISTORY OF CVA (CEREBROVASCULAR ACCIDENT): ICD-10-CM

## 2024-11-13 DIAGNOSIS — G81.94 HEMIPLEGIA AFFECTING LEFT NONDOMINANT SIDE, UNSPECIFIED ETIOLOGY, UNSPECIFIED HEMIPLEGIA TYPE (H): ICD-10-CM

## 2024-11-13 DIAGNOSIS — I48.20 CHRONIC ATRIAL FIBRILLATION WITH RVR (H): ICD-10-CM

## 2024-11-13 PROCEDURE — 99309 SBSQ NF CARE MODERATE MDM 30: CPT | Performed by: FAMILY MEDICINE

## 2024-11-13 NOTE — LETTER
11/13/2024      Angela Beckham  [facility]  [address] [address]         HEALTH GERIATRIC SERVICES       Patient Angela Beckham  MRN: [id number]        Reason for Visit     Chief Complaint   Patient presents with     custodial Regulatory       Code Status     DNR/ selective treatment    Assessment     Failure to thrive with patient having more than 50 pound weight loss in the last 6 months or so  Current weight are hovering around 130 pounds which seems to be not the new baseline for her  She has various interventions tried but have been unsuccessful    Severe mitral valve disease with severe mitral calcification and stenosis  Along with aortic stenosis.  Not felt to be a surgical candidate and is on medical management as per cardiology    Recent hospitalization related to A-fib with rapid ventricular response  Chronic anticoagulation with Coumadin INR 2 .8    Hypertension with low blood pressures which has been persistent    Major neurocognitive impairment felt to be a combination of vascular and Alzheimer's dementia    H/o  ischemic CVA involving the right cardioembolic CVA  Chronic right basal ganglia infarct  Chronic microvascular ischemia  Urinary retention requiring Gambino catheter placement  History of left below the knee amputation  Generalized weakness  Type 2 diabetes  Aortic valve stenosis     Plan     Patient is a resident of long-term care  Seen today for follow-up visit due to progressive weight loss of greater than 60 pound  Current weight is 130 pounds and she seems to be stable around that number  Patient reports she is eating and lacks the insight.  Currently on a higher dose of mirtazapine  Increase dose to 30mg daily and monitor mood  She also gets supplements  Diet has been downgraded as she is missing her dentures  At baseline she remains bedbound with poor insight into her condition.  She has some baseline cognitive impairment  She has persistently low blood  pressures which I suspect is because of poor oral intake.  She is on a much lower dose of metoprolol which is held almost daily because of low blood pressures  Digoxin has been added to her regimen  Heart rates have been stable.  Follow-up done with cardiology on 11/12/2024 and notes reviewed no new medication orders given.  Patient was felt to be stable  Discontinue pletal  She is on metformin for her diabetes  Last A1c done in June 24 was 6.5.  Continue low-dose metformin  Recheck A1c  Also has been diagnosed with dementia most likely vascular and Alzheimer's in combination she had a neuropsych testing done recently and notes were reviewed  Deemed to lack capacity  She has a history of amputation but does not wear her prostheses.  Continue with her care plan    History     Patient is a very pleasant 74 year who is a resident of long-term care  Patient was recently admitted to the hospital with A FIB with RVR  Seen by cardiology and on metoprolol for rate control   warfarin has been continued.  Not felt to be a candidate for any surgical intervention for her severe AS and MS.  Medication is frequently held due to profound hypotension noted.  Today her blood pressure is 88/60 and nursing held her low-dose of metoprolol.  She is also on digoxin for rate control.  Has an upcoming appointment for cardiology  Suspect she is not eating and drinking well contributing to low blood pressures  She has dm controlled with metformin  A1c 6.5 on recheck  Remains bed bound and desires going home  She has had further cognitive decline and talking nonsensically continues to desire going home reports she is taking care of her own home and her parents home which is an extension of this nursing home.  Does not wear her prosthesis any longer.      Past Medical & Surgical History     PAST MEDICAL HISTORY:   Past Medical History:   Diagnosis Date     Diabetic neuropathy (H)      HTN      Type 2 diabetes mellitus (H)           Past Social  History     Reviewed,  reports that she quit smoking about 3 years ago. Her smoking use included cigarettes. She has never used smokeless tobacco. She reports that she does not drink alcohol and does not use drugs.    Family History     Reviewed, and family history includes Alzheimer Disease in her father; Cerebrovascular Disease in her maternal grandmother and mother; Diabetes in her paternal grandfather; Hypertension in her brother and mother; Obesity in her brother; Respiratory in her father.    Medication List   Post Discharge Medication Reconciliation Status: Post Discharge Medication Reconciliation Status: discharge medications reconciled, continue medications without change.  Current Outpatient Medications   Medication Sig Dispense Refill     atorvastatin (LIPITOR) 20 MG tablet Take 20 mg by mouth At Bedtime       cilostazol (PLETAL) 50 MG tablet Take 50 mg by mouth 2 times daily       digoxin (LANOXIN) 62.5 MCG tablet Take 62.5 mcg by mouth daily       ferrous sulfate (FEROSUL) 325 (65 Fe) MG tablet Take 325 mg by mouth daily (with breakfast)       furosemide (LASIX) 20 MG tablet Take 20 mg by mouth daily       lactase (LACTAID) 9000 units TABS tablet Take 9,000 Units by mouth 3 times daily (with meals)       menthol, Topical Analgesic, 2.5% (BENGAY VANISHING SCENT) 2.5 % GEL topical gel Apply topically 3 times daily as needed for moderate pain       metFORMIN (GLUCOPHAGE XR) 500 MG 24 hr tablet Take 500 mg by mouth daily (with dinner)       metoprolol succinate ER (TOPROL XL) 25 MG 24 hr tablet Take 12.5 mg by mouth daily.       miconazole (MICATIN) 2 % cream 1 applicator 2 times daily Apply to skin folds       mirtazapine (REMERON) 15 MG tablet Take 15 mg by mouth at bedtime.       omeprazole (PRILOSEC) 20 MG DR capsule Take 20 mg by mouth daily       Vitamin D3 (VITAMIN D, CHOLECALCIFEROL,) 25 mcg (1000 units) tablet Take by mouth daily       WARFARIN SODIUM PO Per INR orders.       No current  "facility-administered medications for this visit.          Allergies     Allergies   Allergen Reactions     Amoxicillin Unknown     Oxycodone Other (See Comments)     Does not remember     Latex Rash       Review of Systems   A comprehensive review of 14 systems was not done due to confusion with worsening cognitive impairment/dementia      Physical Exam   BP 90/57   Pulse 89   Temp 97.3  F (36.3  C)   Resp 18   Ht 1.588 m (5' 2.5\")   Wt 59 kg (130 lb)   SpO2 96%   BMI 23.40 kg/m     GENERAL: no acute distress. Cooperative in conversation.   HEENT: pupils are equal, round and reactive. Oral mucosa is moist and intact.  Missing teeth  RESP:Chest symmetric. Regular respiratory rate. No stridor.  CVS S 1 S2.murmer heard  ABD: Nondistended, soft.  EXTREMITIES: No lower extremity edema.   Has a BKA  NEURO: non focal. Alert and oriented x self.   PSYCH: within normal limits. No depression or anxiety.  SKIN: warm dry intact      Labs  Last Comprehensive Metabolic Panel:  Lab Results   Component Value Date     06/06/2024    POTASSIUM 4.1 06/06/2024    CHLORIDE 102 06/06/2024    CO2 23 06/06/2024    ANIONGAP 15 06/06/2024     (H) 06/06/2024    BUN 22.8 06/06/2024    CR 0.67 06/06/2024    GFRESTIMATED >90 06/06/2024    SUSIE 9.8 06/06/2024   Hemoglobin A1c 6.5  INR is 2.6 today  Electronically signed by    Ginny Johnson MD                           Sincerely,        OPAL Galarza      "

## 2024-11-14 ENCOUNTER — TELEPHONE (OUTPATIENT)
Dept: GERIATRICS | Facility: CLINIC | Age: 74
End: 2024-11-14

## 2024-11-14 LAB — INR PPP: 2.02 (ref 0.85–1.15)

## 2024-11-14 PROCEDURE — 36415 COLL VENOUS BLD VENIPUNCTURE: CPT | Mod: ORL | Performed by: FAMILY MEDICINE

## 2024-11-14 PROCEDURE — 85610 PROTHROMBIN TIME: CPT | Mod: ORL | Performed by: FAMILY MEDICINE

## 2024-11-14 PROCEDURE — P9604 ONE-WAY ALLOW PRORATED TRIP: HCPCS | Mod: ORL | Performed by: FAMILY MEDICINE

## 2024-11-14 NOTE — TELEPHONE ENCOUNTER
[facility] Geriatrics Triage Nurse INR     Provider: Ginny Johnson MD  Facility: [facility]   Facility Type:  [facility]    Caller: Claudia  Call Back Number: [phone]  Reason for call: INR  Diagnosis/Goal: A. Fib    Date INR New Dose/Orders        9/19/24 2.14 Cont 5mg daily.  Next INR 9/26/24.   9/26/24 2.59 Cont 5mg daily.  Next INR 10/10/24.   10/10 2.62 Cont 5mg daily.  Next INR 10/31/24   10/31 2.88 Take 4.5mg Q Sunday and 5mg AOD.  Next INR 11/14/24.   11/14 2.02 Take 5mg daily.  Next INR 11/26/24.         Heparin/Lovenox:  No  Currently on ABX?: No  Other interacting medication:  None  Missed or refused doses: No    Verbal Order/Direction given by Provider: Warfarin 5mg daily.  Next INR 11/26/24.      Provider Giving Order:  Ginny Johnson MD    Verbal Order given to: Claudia Campbell RN

## 2024-11-22 ENCOUNTER — LAB REQUISITION (OUTPATIENT)
Dept: LAB | Facility: CLINIC | Age: 74
End: 2024-11-22
Payer: COMMERCIAL

## 2024-11-22 DIAGNOSIS — Z79.01 LONG TERM (CURRENT) USE OF ANTICOAGULANTS: ICD-10-CM

## 2024-11-26 ENCOUNTER — TELEPHONE (OUTPATIENT)
Dept: GERIATRICS | Facility: CLINIC | Age: 74
End: 2024-11-26
Payer: COMMERCIAL

## 2024-11-26 ENCOUNTER — LAB REQUISITION (OUTPATIENT)
Dept: LAB | Facility: CLINIC | Age: 74
End: 2024-11-26
Payer: COMMERCIAL

## 2024-11-26 DIAGNOSIS — I48.91 UNSPECIFIED ATRIAL FIBRILLATION (H): ICD-10-CM

## 2024-11-26 DIAGNOSIS — Z79.01 LONG TERM (CURRENT) USE OF ANTICOAGULANTS: ICD-10-CM

## 2024-11-26 NOTE — TELEPHONE ENCOUNTER
[facility] Geriatrics Triage Nurse Telephone Encounter    Provider: Ginny Johnson MD  Facility: [facility]  Facility Type:  [facility]    Caller: Be  Call Back Number: [phone]    Allergies:    Allergies   Allergen Reactions    Amoxicillin Unknown    Oxycodone Other (See Comments)     Does not remember    Latex Rash        Reason for call: Nurse is calling to report that patient's INR draw was missed today.  Last INR on 11/14/24 was 2.02 and patient was ordered 5mg daily for Afib.      Verbal Order/Direction given by Provider: Give Warfarin 5mg on 11/26.  Next INR 11/27/24.      Provider giving Order:  Ginny Johnson MD    Verbal Order given to: Be Campbell RN

## 2024-11-27 ENCOUNTER — LAB REQUISITION (OUTPATIENT)
Dept: LAB | Facility: CLINIC | Age: 74
End: 2024-11-27
Payer: COMMERCIAL

## 2024-11-27 ENCOUNTER — TELEPHONE (OUTPATIENT)
Dept: GERIATRICS | Facility: CLINIC | Age: 74
End: 2024-11-27

## 2024-11-27 DIAGNOSIS — I48.91 UNSPECIFIED ATRIAL FIBRILLATION (H): ICD-10-CM

## 2024-11-27 LAB — INR PPP: 4.01 (ref 0.85–1.15)

## 2024-11-27 PROCEDURE — 36415 COLL VENOUS BLD VENIPUNCTURE: CPT | Mod: ORL | Performed by: FAMILY MEDICINE

## 2024-11-27 PROCEDURE — 85610 PROTHROMBIN TIME: CPT | Mod: ORL | Performed by: FAMILY MEDICINE

## 2024-11-27 PROCEDURE — P9604 ONE-WAY ALLOW PRORATED TRIP: HCPCS | Mod: ORL | Performed by: FAMILY MEDICINE

## 2024-11-27 NOTE — TELEPHONE ENCOUNTER
[facility] Geriatrics Triage Nurse INR     Provider: Ginny Johnson MD  Facility: [facility]   Facility Type:  [facility]    Caller: Chelsi  Call Back Number: [phone]  Reason for call: INR  Diagnosis/Goal: A. Fib    Date INR New Dose/Orders        9/19/24 2.14 Cont 5mg daily.  Next INR 9/26/24.   9/26/24 2.59 Cont 5mg daily.  Next INR 10/10/24.   10/10 2.62 Cont 5mg daily.  Next INR 10/31/24   10/31 2.88 Take 4.5mg Q Sunday and 5mg AOD.  Next INR 11/14/24.   11/14 2.02 Take 5mg daily.  Next INR 11/27/24.   11/27 4.01 Hold Warfarin x 2 days.  Next INR 11/29/24.          Heparin/Lovenox:  No  Currently on ABX?: No  Other interacting medication:  None  Missed or refused doses: No    Verbal Order/Direction given by Provider: Hold Warfarin x 2 days.  Next INR 11/29/24.      Provider Giving Order:  Ginny Johnson MD    Verbal Order given to: Chelsi Campbell RN

## 2024-11-29 LAB — INR PPP: 2.33 (ref 0.85–1.15)

## 2024-11-29 PROCEDURE — P9604 ONE-WAY ALLOW PRORATED TRIP: HCPCS | Mod: ORL | Performed by: FAMILY MEDICINE

## 2024-11-29 PROCEDURE — 36415 COLL VENOUS BLD VENIPUNCTURE: CPT | Mod: ORL | Performed by: FAMILY MEDICINE

## 2024-11-29 PROCEDURE — 85610 PROTHROMBIN TIME: CPT | Mod: ORL | Performed by: FAMILY MEDICINE

## 2024-11-30 ENCOUNTER — LAB REQUISITION (OUTPATIENT)
Dept: LAB | Facility: CLINIC | Age: 74
End: 2024-11-30
Payer: COMMERCIAL

## 2024-11-30 DIAGNOSIS — I48.91 UNSPECIFIED ATRIAL FIBRILLATION (H): ICD-10-CM

## 2024-12-02 ENCOUNTER — TELEPHONE (OUTPATIENT)
Dept: GERIATRICS | Facility: CLINIC | Age: 74
End: 2024-12-02

## 2024-12-02 LAB — INR PPP: 2.15 (ref 0.85–1.15)

## 2024-12-02 PROCEDURE — 85610 PROTHROMBIN TIME: CPT | Mod: ORL | Performed by: FAMILY MEDICINE

## 2024-12-02 PROCEDURE — P9603 ONE-WAY ALLOW PRORATED MILES: HCPCS | Mod: ORL | Performed by: FAMILY MEDICINE

## 2024-12-02 PROCEDURE — 36415 COLL VENOUS BLD VENIPUNCTURE: CPT | Mod: ORL | Performed by: FAMILY MEDICINE

## 2024-12-02 NOTE — TELEPHONE ENCOUNTER
Orders given to facility THERESA Saucedo RN    ----- Message from Ginny Johnson sent at 12/2/2024 12:29 PM CST -----  Cont coumadin 4.5 mg daily  Check INR x 2 weeks

## 2024-12-12 ENCOUNTER — LAB REQUISITION (OUTPATIENT)
Dept: LAB | Facility: CLINIC | Age: 74
End: 2024-12-12
Payer: COMMERCIAL

## 2024-12-12 DIAGNOSIS — I48.91 UNSPECIFIED ATRIAL FIBRILLATION (H): ICD-10-CM

## 2024-12-16 ENCOUNTER — TELEPHONE (OUTPATIENT)
Dept: GERIATRICS | Facility: CLINIC | Age: 74
End: 2024-12-16

## 2024-12-16 LAB — INR PPP: 2.3 (ref 0.85–1.15)

## 2024-12-16 PROCEDURE — 85610 PROTHROMBIN TIME: CPT | Mod: ORL | Performed by: FAMILY MEDICINE

## 2024-12-16 PROCEDURE — 36415 COLL VENOUS BLD VENIPUNCTURE: CPT | Mod: ORL | Performed by: FAMILY MEDICINE

## 2024-12-16 PROCEDURE — P9604 ONE-WAY ALLOW PRORATED TRIP: HCPCS | Mod: ORL | Performed by: FAMILY MEDICINE

## 2024-12-16 NOTE — TELEPHONE ENCOUNTER
Orders given to THERESA Lr RN    ----- Message from Ginny Johnson sent at 12/16/2024  1:38 PM CST -----  Coumadin 4.5 mg daily  Check inr x 4 weeks

## 2025-01-09 ENCOUNTER — LAB REQUISITION (OUTPATIENT)
Dept: LAB | Facility: CLINIC | Age: 75
End: 2025-01-09
Payer: COMMERCIAL

## 2025-01-09 ENCOUNTER — PATIENT OUTREACH (OUTPATIENT)
Dept: GERIATRIC MEDICINE | Facility: CLINIC | Age: 75
End: 2025-01-09
Payer: COMMERCIAL

## 2025-01-09 DIAGNOSIS — E11.9 TYPE 2 DIABETES MELLITUS WITHOUT COMPLICATIONS (H): ICD-10-CM

## 2025-01-09 NOTE — PROGRESS NOTES
[facility] Care Coordination Communication    Email sent to [facility]  at [facility] to  schedule annual assessment. For member. Assessment  is scheduled for 01-13-25.. CC will complete chart review and reach out to family or primary contact as part of the assessment.     Yohana Melendez RN, PHN, [facility]  Care Coordinator [facility]  [phone]

## 2025-01-13 ENCOUNTER — PATIENT OUTREACH (OUTPATIENT)
Dept: GERIATRIC MEDICINE | Facility: CLINIC | Age: 75
End: 2025-01-13

## 2025-01-13 LAB
EST. AVERAGE GLUCOSE BLD GHB EST-MCNC: 146 MG/DL
HBA1C MFR BLD: 6.7 %

## 2025-01-13 PROCEDURE — 83036 HEMOGLOBIN GLYCOSYLATED A1C: CPT | Mod: ORL | Performed by: FAMILY MEDICINE

## 2025-01-13 PROCEDURE — P9604 ONE-WAY ALLOW PRORATED TRIP: HCPCS | Mod: ORL | Performed by: FAMILY MEDICINE

## 2025-01-13 NOTE — Clinical Note
Dr Johnson I saw patsy on 01/13/25 for her Annual Health Plan Visit. Facility and Member deny any concerns. She refused update to vaccines and NH is aware. Please let me know if you have any concerns or if I can assist in her care.   Yohana Melendez BSN/PHN/W[facility]/ Care Coordinator [address]   [address]  [place] [address] [email or web address]   [email or web address]    Cell: [phone] Office: [phone] Fax: [phone]

## 2025-01-22 NOTE — PROGRESS NOTES
[facility] GERIATRIC SERVICES       Patient Angela Beckham  MRN: [id number]        Reason for Visit     Chief Complaint   Patient presents with    alf Regulatory       Code Status     DNR/ selective treatment    Assessment     Failure to thrive with patient having more than 50 pound weight loss in the last 6 months or so  Now has another wt loss of >16lb to 118lb  Discussion done with staff.  Speech therapist had downgraded the patient to a puréed diet because of missing dentures and she was not eating.  Diet to be upgraded to mechanical soft.  Speech therapist reports that she supervised patient at mealtime and patient ate 100% of the meal    Severe mitral valve disease with severe mitral calcification and stenosis  Along with aortic stenosis.  Not felt to be a surgical candidate and is on medical management as per cardiology    Recent hospitalization related to A-fib with rapid ventricular response  Chronic anticoagulation with Coumadin INR 2 .3 on last check    Hypertension with low blood pressures which has been persistent  Blood pressures again low because of poor intake    Major neurocognitive impairment felt to be a combination of vascular and Alzheimer's dementia speech evaluation requested for cognition    H/o  ischemic CVA involving the right cardioembolic CVA  Chronic right basal ganglia infarct  Chronic microvascular ischemia  Urinary retention requiring Gambino catheter placement  History of left below the knee amputation  Generalized weakness  Type 2 diabetes  Aortic valve stenosis     Plan     Patient is a resident of long-term care  Seen today for follow-up visit due to progressive weight loss of greater than 60 pound  She has lost additional weight and now currently down to 118 pounds from her last weight of 130.  On questioning patient she states that she has not been eating because of the puréed diet that was offered to her  Discussion done with speech and dietary as well as nursing.  She is  missing dentition and refuses to wear her dentures.  They have reported multiple attempts for her to wear them and she refuses.  Speech does report that she will eat 100% of the offered meal of supervise.  Also will monitor her for cognition due to overall decline.  OT and speech requested to do a cognitive evaluation for her.  She has been a failure to thrive.  She has remained bedbound and has been refusing cares and refusing to come out of her room  Hygiene is poor and she frequently refuses to take a shower  She is on Remeron and high doses.  Plan is to add Cymbalta to her regimen to see if that may help her with her mood and behaviors.  Also requested  to evaluate her for guardianship  Consider hospice if she continues to decline  Recheck labs to rule out any other cause for her weight loss  Advised to push fluids due to low blood pressures  Multidisciplinary discussion was done and patient has not done well.  Psychiatry was also consulted on the phone and they recommend she needs more of psychosocial support rather than medication  Agree with adding Cymbalta though.  Time spent is 50 minutes in this visit including reviewing medications weight loss concerns oral intake with social work dietary speech as well as therapy and nursing  Staff advised to pursue guardianship and consider hospice if she continues to decline    History     Patient is a very pleasant 74 year who is a resident of long-term care  Patient was recently admitted to the hospital with A FIB with RVR  Seen by cardiology and on metoprolol for rate control   warfarin has been continued.  Not felt to be a candidate for any surgical intervention for her severe AS and MS.  Medication is frequently held due to profound hypotension noted.  Today her blood pressure is 89/62 and nursing held her low-dose of metoprolol.  She is also on digoxin for rate control.  Med is held frequently due to low blood pressures  Has an upcoming appointment  for cardiology  Suspect she is not eating and drinking well contributing to low blood pressures  She has dm controlled with metformin  A1c 6.5 on recheck  Remains bed bound and desires going home  She has had further cognitive decline and talking nonsensically continues to desire going home reports she is taking care of her own home and her parents home which is an extension of this nursing home.  Does not wear her prosthesis any longer.  Has lost more than 16 pounds recently.  Reports that she is not eating because she has been on a puréed diet.  Staff reports that she will not wear her denture and throws them away.  Suspect that she may be lonely and requires companionship  Has poor psychosocial support    Past Medical & Surgical History     PAST MEDICAL HISTORY:   Past Medical History:   Diagnosis Date    Diabetic neuropathy (H)     HTN     Type 2 diabetes mellitus (H)           Past Social History     Reviewed,  reports that she quit smoking about 4 years ago. Her smoking use included cigarettes. She has never used smokeless tobacco. She reports that she does not drink alcohol and does not use drugs.    Family History     Reviewed, and family history includes Alzheimer Disease in her father; Cerebrovascular Disease in her maternal grandmother and mother; Diabetes in her paternal grandfather; Hypertension in her brother and mother; Obesity in her brother; Respiratory in her father.    Medication List   Post Discharge Medication Reconciliation Status: Post Discharge Medication Reconciliation Status: discharge medications reconciled, continue medications without change.  Current Outpatient Medications   Medication Sig Dispense Refill    atorvastatin (LIPITOR) 20 MG tablet Take 20 mg by mouth At Bedtime      digoxin (LANOXIN) 62.5 MCG tablet Take 62.5 mcg by mouth daily      ferrous sulfate (FEROSUL) 325 (65 Fe) MG tablet Take 325 mg by mouth daily (with breakfast)      furosemide (LASIX) 20 MG tablet Take 20 mg by  "mouth daily      lactase (LACTAID) 9000 units TABS tablet Take 9,000 Units by mouth 3 times daily (with meals)      menthol, Topical Analgesic, 2.5% (BENGAY VANISHING SCENT) 2.5 % GEL topical gel Apply topically 3 times daily as needed for moderate pain      metFORMIN (GLUCOPHAGE XR) 500 MG 24 hr tablet Take 500 mg by mouth daily (with dinner)      metoprolol succinate ER (TOPROL XL) 25 MG 24 hr tablet Take 12.5 mg by mouth daily.      miconazole (MICATIN) 2 % cream 1 applicator 2 times daily Apply to skin folds      mirtazapine (REMERON) 15 MG tablet Take 30 mg by mouth at bedtime.      omeprazole (PRILOSEC) 20 MG DR capsule Take 20 mg by mouth daily      Vitamin D3 (VITAMIN D, CHOLECALCIFEROL,) 25 mcg (1000 units) tablet Take by mouth daily      WARFARIN SODIUM PO Per INR orders.       No current facility-administered medications for this visit.          Allergies     Allergies   Allergen Reactions    Amoxicillin Unknown    Oxycodone Other (See Comments)     Does not remember    Latex Rash       Review of Systems   A comprehensive review of 14 systems was not done due to confusion with worsening cognitive impairment/dementia      Physical Exam   BP (!) 89/62   Pulse 63   Temp 97.3  F (36.3  C)   Resp 16   Ht 1.588 m (5' 2.5\")   Wt 53.5 kg (118 lb)   SpO2 94%   BMI 21.24 kg/m     GENERAL: no acute distress. Cooperative in conversation.   HEENT: pupils are equal, round and reactive. Oral mucosa is moist and intact.  Missing teeth  RESP:Chest symmetric. Regular respiratory rate. No stridor.  CVS S 1 S2.murmer heard  ABD: Nondistended, soft.  EXTREMITIES: No lower extremity edema.   Has a BKA  NEURO: non focal. Alert and oriented x self.   PSYCH: within normal limits. No depression or anxiety.  Has poor insight  SKIN: warm dry intact      Labs  Last Comprehensive Metabolic Panel:  Lab Results   Component Value Date     06/06/2024    POTASSIUM 4.1 06/06/2024    CHLORIDE 102 06/06/2024    CO2 23 06/06/2024 "    ANIONGAP 15 06/06/2024     (H) 06/06/2024    BUN 22.8 06/06/2024    CR 0.67 06/06/2024    GFRESTIMATED >90 06/06/2024    SUSIE 9.8 06/06/2024   Hemoglobin A1c 6.7    Electronically signed by    Ginny Johnson MD

## 2025-01-23 ENCOUNTER — NURSING HOME VISIT (OUTPATIENT)
Dept: GERIATRICS | Facility: CLINIC | Age: 75
End: 2025-01-23
Payer: COMMERCIAL

## 2025-01-23 VITALS
HEART RATE: 63 BPM | BODY MASS INDEX: 20.91 KG/M2 | TEMPERATURE: 97.3 F | SYSTOLIC BLOOD PRESSURE: 89 MMHG | WEIGHT: 118 LBS | RESPIRATION RATE: 16 BRPM | HEIGHT: 63 IN | DIASTOLIC BLOOD PRESSURE: 62 MMHG | OXYGEN SATURATION: 94 %

## 2025-01-23 DIAGNOSIS — I10 PRIMARY HYPERTENSION: ICD-10-CM

## 2025-01-23 DIAGNOSIS — I48.20 CHRONIC ATRIAL FIBRILLATION WITH RVR (H): ICD-10-CM

## 2025-01-23 DIAGNOSIS — Z89.512 HX OF BKA, LEFT (H): ICD-10-CM

## 2025-01-23 DIAGNOSIS — Z79.01 LONG TERM CURRENT USE OF ANTICOAGULANT THERAPY: ICD-10-CM

## 2025-01-23 DIAGNOSIS — E11.69 TYPE 2 DIABETES MELLITUS WITH OTHER SPECIFIED COMPLICATION, WITHOUT LONG-TERM CURRENT USE OF INSULIN (H): ICD-10-CM

## 2025-01-23 DIAGNOSIS — F01.53 VASCULAR DEMENTIA WITH DEPRESSED MOOD (H): Primary | ICD-10-CM

## 2025-01-23 DIAGNOSIS — I48.11 LONGSTANDING PERSISTENT ATRIAL FIBRILLATION (H): ICD-10-CM

## 2025-01-23 DIAGNOSIS — I34.0 NONRHEUMATIC MITRAL VALVE REGURGITATION: ICD-10-CM

## 2025-01-23 DIAGNOSIS — Z89.512 STATUS POST BELOW-KNEE AMPUTATION OF LEFT LOWER EXTREMITY (H): ICD-10-CM

## 2025-01-23 DIAGNOSIS — I27.21 PULMONARY ARTERY HYPERTENSION (H): ICD-10-CM

## 2025-01-23 DIAGNOSIS — G81.94 HEMIPLEGIA AFFECTING LEFT NONDOMINANT SIDE, UNSPECIFIED ETIOLOGY, UNSPECIFIED HEMIPLEGIA TYPE (H): ICD-10-CM

## 2025-01-23 NOTE — LETTER
1/23/2025      Angela Beckham  [facility]  [address] [address]        [facility] GERIATRIC SERVICES       Patient Angela Beckham  MRN: [id number]        Reason for Visit     Chief Complaint   Patient presents with     FDC Regulatory       Code Status     DNR/ selective treatment    Assessment     Failure to thrive with patient having more than 50 pound weight loss in the last 6 months or so  Now has another wt loss of >16lb to 118lb  Discussion done with staff.  Speech therapist had downgraded the patient to a puréed diet because of missing dentures and she was not eating.  Diet to be upgraded to mechanical soft.  Speech therapist reports that she supervised patient at mealtime and patient ate 100% of the meal    Severe mitral valve disease with severe mitral calcification and stenosis  Along with aortic stenosis.  Not felt to be a surgical candidate and is on medical management as per cardiology    Recent hospitalization related to A-fib with rapid ventricular response  Chronic anticoagulation with Coumadin INR 2 .3 on last check    Hypertension with low blood pressures which has been persistent  Blood pressures again low because of poor intake    Major neurocognitive impairment felt to be a combination of vascular and Alzheimer's dementia speech evaluation requested for cognition    H/o  ischemic CVA involving the right cardioembolic CVA  Chronic right basal ganglia infarct  Chronic microvascular ischemia  Urinary retention requiring Gambino catheter placement  History of left below the knee amputation  Generalized weakness  Type 2 diabetes  Aortic valve stenosis     Plan     Patient is a resident of long-term care  Seen today for follow-up visit due to progressive weight loss of greater than 60 pound  She has lost additional weight and now currently down to 118 pounds from her last weight of 130.  On questioning patient she states that she has not been eating because of the  puréed diet that was offered to her  Discussion done with speech and dietary as well as nursing.  She is missing dentition and refuses to wear her dentures.  They have reported multiple attempts for her to wear them and she refuses.  Speech does report that she will eat 100% of the offered meal of supervise.  Also will monitor her for cognition due to overall decline.  OT and speech requested to do a cognitive evaluation for her.  She has been a failure to thrive.  She has remained bedbound and has been refusing cares and refusing to come out of her room  Hygiene is poor and she frequently refuses to take a shower  She is on Remeron and high doses.  Plan is to add Cymbalta to her regimen to see if that may help her with her mood and behaviors.  Also requested  to evaluate her for guardianship  Consider hospice if she continues to decline  Recheck labs to rule out any other cause for her weight loss  Advised to push fluids due to low blood pressures  Multidisciplinary discussion was done and patient has not done well.  Psychiatry was also consulted on the phone and they recommend she needs more of psychosocial support rather than medication  Agree with adding Cymbalta though.  Time spent is 50 minutes in this visit including reviewing medications weight loss concerns oral intake with social work dietary speech as well as therapy and nursing  Staff advised to pursue guardianship and consider hospice if she continues to decline    History     Patient is a very pleasant 74 year who is a resident of long-term care  Patient was recently admitted to the hospital with A FIB with RVR  Seen by cardiology and on metoprolol for rate control   warfarin has been continued.  Not felt to be a candidate for any surgical intervention for her severe AS and MS.  Medication is frequently held due to profound hypotension noted.  Today her blood pressure is 89/62 and nursing held her low-dose of metoprolol.  She is also on  digoxin for rate control.  Med is held frequently due to low blood pressures  Has an upcoming appointment for cardiology  Suspect she is not eating and drinking well contributing to low blood pressures  She has dm controlled with metformin  A1c 6.5 on recheck  Remains bed bound and desires going home  She has had further cognitive decline and talking nonsensically continues to desire going home reports she is taking care of her own home and her parents home which is an extension of this nursing home.  Does not wear her prosthesis any longer.  Has lost more than 16 pounds recently.  Reports that she is not eating because she has been on a puréed diet.  Staff reports that she will not wear her denture and throws them away.  Suspect that she may be lonely and requires companionship  Has poor psychosocial support    Past Medical & Surgical History     PAST MEDICAL HISTORY:   Past Medical History:   Diagnosis Date     Diabetic neuropathy (H)      HTN      Type 2 diabetes mellitus (H)           Past Social History     Reviewed,  reports that she quit smoking about 4 years ago. Her smoking use included cigarettes. She has never used smokeless tobacco. She reports that she does not drink alcohol and does not use drugs.    Family History     Reviewed, and family history includes Alzheimer Disease in her father; Cerebrovascular Disease in her maternal grandmother and mother; Diabetes in her paternal grandfather; Hypertension in her brother and mother; Obesity in her brother; Respiratory in her father.    Medication List   Post Discharge Medication Reconciliation Status: Post Discharge Medication Reconciliation Status: discharge medications reconciled, continue medications without change.  Current Outpatient Medications   Medication Sig Dispense Refill     atorvastatin (LIPITOR) 20 MG tablet Take 20 mg by mouth At Bedtime       digoxin (LANOXIN) 62.5 MCG tablet Take 62.5 mcg by mouth daily       ferrous sulfate (FEROSUL) 325  "(65 Fe) MG tablet Take 325 mg by mouth daily (with breakfast)       furosemide (LASIX) 20 MG tablet Take 20 mg by mouth daily       lactase (LACTAID) 9000 units TABS tablet Take 9,000 Units by mouth 3 times daily (with meals)       menthol, Topical Analgesic, 2.5% (BENGAY VANISHING SCENT) 2.5 % GEL topical gel Apply topically 3 times daily as needed for moderate pain       metFORMIN (GLUCOPHAGE XR) 500 MG 24 hr tablet Take 500 mg by mouth daily (with dinner)       metoprolol succinate ER (TOPROL XL) 25 MG 24 hr tablet Take 12.5 mg by mouth daily.       miconazole (MICATIN) 2 % cream 1 applicator 2 times daily Apply to skin folds       mirtazapine (REMERON) 15 MG tablet Take 30 mg by mouth at bedtime.       omeprazole (PRILOSEC) 20 MG DR capsule Take 20 mg by mouth daily       Vitamin D3 (VITAMIN D, CHOLECALCIFEROL,) 25 mcg (1000 units) tablet Take by mouth daily       WARFARIN SODIUM PO Per INR orders.       No current facility-administered medications for this visit.          Allergies     Allergies   Allergen Reactions     Amoxicillin Unknown     Oxycodone Other (See Comments)     Does not remember     Latex Rash       Review of Systems   A comprehensive review of 14 systems was not done due to confusion with worsening cognitive impairment/dementia      Physical Exam   BP (!) 89/62   Pulse 63   Temp 97.3  F (36.3  C)   Resp 16   Ht 1.588 m (5' 2.5\")   Wt 53.5 kg (118 lb)   SpO2 94%   BMI 21.24 kg/m     GENERAL: no acute distress. Cooperative in conversation.   HEENT: pupils are equal, round and reactive. Oral mucosa is moist and intact.  Missing teeth  RESP:Chest symmetric. Regular respiratory rate. No stridor.  CVS S 1 S2.murmer heard  ABD: Nondistended, soft.  EXTREMITIES: No lower extremity edema.   Has a BKA  NEURO: non focal. Alert and oriented x self.   PSYCH: within normal limits. No depression or anxiety.  Has poor insight  SKIN: warm dry intact      Labs  Last Comprehensive Metabolic Panel:  Lab " Results   Component Value Date     06/06/2024    POTASSIUM 4.1 06/06/2024    CHLORIDE 102 06/06/2024    CO2 23 06/06/2024    ANIONGAP 15 06/06/2024     (H) 06/06/2024    BUN 22.8 06/06/2024    CR 0.67 06/06/2024    GFRESTIMATED >90 06/06/2024    SUSIE 9.8 06/06/2024   Hemoglobin A1c 6.7    Electronically signed by    Ginny Johnson MD                           Sincerely,        OPAL Galarza    Electronically signed

## 2025-01-29 NOTE — PROGRESS NOTES
[facility] Care Coordination Contact  [facility] Institutional Assessment     Institutional Assessment for Health Risk Assessment with Angela Beckham completed on January 13, 2025 at [facility]    Type of residence:: Nursing home ([facility])  Current living arrangement:: I live in a nursing home ([facility])     Assessment completed with:: Patient, Care Team Member, Family    Mental/Behavioral Health   Depression Screening:   PHQ-2 Total Score (Adult) - Positive if 3 or more points; Administer PHQ-9 if positive: 0       Mental health DX:: Yes   Mental health DX how managed:: In Home Counseling    Falls Assessment:   Fallen 2 or more times in the past year?: No   Any fall with injury in the past year?: No    ADL/IADL Dependencies:   Dependent ADLs:: Bathing, Dressing, Grooming, Incontinence, Positioning, Transfers, Wheelchair-with assist, Toileting  Dependent IADLs:: Cleaning, Cooking, Laundry, Shopping, Meal Preparation, Medication Management, Money Management, Transportation, Incontinence      Care Plan & Recommendations: Angela is a 74-year-old female living in long-term care at [facility].  She is alert and oriented to person place time and situation and is able to direct own care.  Assessment completed today via chart review, staff and member interview, care coordinatior observations.  Angela denies any concerns related to care at this time.  Close chart review indicates most recent PHQ-9 of 11/27 and BIMS 15/15.  Primary diagnosis of cerebral ischemia with secondary diagnoses that include type 2 diabetes, amputation of the left leg below the knee, especially in situ care atrial FaBB, Charcot's joint of the right ankle and foot, weakness, hypertension, TIAs, mild cognitive impairment and history of COVID.  Mental health diagnoses also including adjustment disorder with mixed anxiety and mood.  Immunization review indicates Angela is up-to-date on flu  vaccine but has declined COVID RSV vaccines and is in need of a Tdap which she did agree to have completed.  She declined hearing and vision exams and does need a dental exam which she is agreeable to have done nursing home notified of vaccine needs and ancillary exam needs.  Current diet is a regular diet regular consistency thin liquids and current weight is 118 pounds.  Angela has had no falls and denies any pain at this time.  Current POLST is DNR/DNI/selective interventions. Discussed options/opportunities for transitions.    See Institutional Care Plan for detailed assessment information.    Obtained a copy of the facility care plan and MDS from facility electronic records. Requested of half-way social worker to put this care coordinator on care conference attendee list.    Placed the Health Plan facility face sheet in the member's facility chart.    Follow-Up Plan: Member informed of future contact, plan to f/u with member with a 6 month assessment, attend 1 care conference annually, and will follow any hospitalizations or transitions. Care Coordinator contact information shared with member/family and facility, and encouraged to call this care coordinator with any questions or concerns at any time.     [facility] care continuum providers: Please see Snapshot and Care Management Flowsheets for Specific details of care plan.    This CC note routed to PCP, Ginny Johnson.      Yohana Melendez BSN/PHN/WCC/[facility]  Long Term Care/ Care Coordinator  [address]   [address]  [place] [address]  [email or web address]   [email or web address]     Cell: [phone]  Office: [phone]  Fax: [phone]         [see HPI] : see HPI [Negative] : Heme/Lymph

## 2025-01-30 ENCOUNTER — PATIENT OUTREACH (OUTPATIENT)
Dept: GERIATRIC MEDICINE | Facility: CLINIC | Age: 75
End: 2025-01-30
Payer: COMMERCIAL

## 2025-01-30 NOTE — LETTER
January 30, 2025       JENNY CRUZ  [facility]  [address] [address]      Dear Jenny,    At [facility], we re dedicated to improving your health and wellness. Enclosed is the Support Plan developed with you on 1/13/2025. Please review the Support Plan carefully.    As a reminder, during your visit we talked about:   Ways to manage your physical and mental health   Using health care to maintain and improve your health    Your preventive care needs      Remember to contact your care coordinator if you:   Are hospitalized or plan to be hospitalized    Have a fall     Have a change in your physical or mental health   Need help finding support or services    If you have questions or don t agree with your Support Plan, call me at [phone]. You can also call me if your needs change. TTY users call the [place] [employer] at 846 or 1-284.800.3372 (zbujsw-gt-mmtxyj relay service).    Sincerely,       Yohana Melendez RN, PHN  [phone]  [email or web address]                [id number] accepted     (06/2024)                [address] [place] [address]  [phone]  fax [phone]  [facility]

## 2025-01-30 NOTE — PROGRESS NOTES
[facility] Care Coordination Contact    Received after visit chart from care coordinator.  Completed following tasks: Mailed [facility] POC Letter to member/rep with Support Plan & Signature Page    Dania Brown  Care Management Specialist  [facility]  [phone]

## 2025-02-09 ENCOUNTER — LAB REQUISITION (OUTPATIENT)
Dept: LAB | Facility: CLINIC | Age: 75
End: 2025-02-09
Payer: COMMERCIAL

## 2025-02-09 DIAGNOSIS — I48.91 UNSPECIFIED ATRIAL FIBRILLATION (H): ICD-10-CM

## 2025-02-10 ENCOUNTER — HOSPITAL ENCOUNTER (EMERGENCY)
Facility: CLINIC | Age: 75
Discharge: HOME | End: 2025-02-10
Attending: EMERGENCY MEDICINE | Admitting: EMERGENCY MEDICINE
Payer: COMMERCIAL

## 2025-02-10 ENCOUNTER — APPOINTMENT (OUTPATIENT)
Dept: CT IMAGING | Facility: CLINIC | Age: 75
End: 2025-02-10
Attending: EMERGENCY MEDICINE
Payer: COMMERCIAL

## 2025-02-10 ENCOUNTER — APPOINTMENT (OUTPATIENT)
Dept: RADIOLOGY | Facility: CLINIC | Age: 75
End: 2025-02-10
Attending: EMERGENCY MEDICINE
Payer: COMMERCIAL

## 2025-02-10 ENCOUNTER — TELEPHONE (OUTPATIENT)
Dept: GERIATRICS | Facility: CLINIC | Age: 75
End: 2025-02-10
Payer: COMMERCIAL

## 2025-02-10 VITALS
OXYGEN SATURATION: 95 % | TEMPERATURE: 97.2 F | RESPIRATION RATE: 14 BRPM | BODY MASS INDEX: 24.9 KG/M2 | HEIGHT: 60 IN | DIASTOLIC BLOOD PRESSURE: 68 MMHG | HEART RATE: 78 BPM | SYSTOLIC BLOOD PRESSURE: 120 MMHG | WEIGHT: 126.8 LBS

## 2025-02-10 DIAGNOSIS — R79.1 SUPRATHERAPEUTIC INR: ICD-10-CM

## 2025-02-10 DIAGNOSIS — K86.89 PANCREATIC MASS: ICD-10-CM

## 2025-02-10 DIAGNOSIS — I31.39 PERICARDIAL EFFUSION: ICD-10-CM

## 2025-02-10 DIAGNOSIS — K59.00 CONSTIPATION, UNSPECIFIED CONSTIPATION TYPE: ICD-10-CM
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
## 2025-02-10 DIAGNOSIS — S00.01XA ABRASION OF SCALP, INITIAL ENCOUNTER: ICD-10-CM

## 2025-02-10 DIAGNOSIS — J90 PLEURAL EFFUSION: ICD-10-CM

## 2025-02-10 LAB
ANION GAP SERPL CALCULATED.3IONS-SCNC: 9 MMOL/L (ref 7–15)
ATRIAL RATE - MUSE: 170 BPM
BASOPHILS # BLD AUTO: 0 10E3/UL (ref 0–0.2)
BASOPHILS NFR BLD AUTO: 0 %
BUN SERPL-MCNC: 21.1 MG/DL (ref 8–23)
CALCIUM SERPL-MCNC: 10.2 MG/DL (ref 8.8–10.4)
CHLORIDE SERPL-SCNC: 100 MMOL/L (ref 98–107)
CREAT SERPL-MCNC: 0.48 MG/DL (ref 0.51–0.95)
DIASTOLIC BLOOD PRESSURE - MUSE: NORMAL MMHG
EGFRCR SERPLBLD CKD-EPI 2021: >90 ML/MIN/1.73M2
EOSINOPHIL # BLD AUTO: 0.2 10E3/UL (ref 0–0.7)
EOSINOPHIL NFR BLD AUTO: 2 %
ERYTHROCYTE [DISTWIDTH] IN BLOOD BY AUTOMATED COUNT: 17.4 % (ref 10–15)
GLUCOSE SERPL-MCNC: 178 MG/DL (ref 70–99)
HCO3 SERPL-SCNC: 29 MMOL/L (ref 22–29)
HCT VFR BLD AUTO: 41.9 % (ref 35–47)
HGB BLD-MCNC: 13.7 G/DL (ref 11.7–15.7)
IMM GRANULOCYTES # BLD: 0 10E3/UL
IMM GRANULOCYTES NFR BLD: 0 %
INR PPP: 3.5 (ref 0.85–1.15)
INTERPRETATION ECG - MUSE: NORMAL
LYMPHOCYTES # BLD AUTO: 1.7 10E3/UL (ref 0.8–5.3)
LYMPHOCYTES NFR BLD AUTO: 18 %
MCH RBC QN AUTO: 27.8 PG (ref 26.5–33)
MCHC RBC AUTO-ENTMCNC: 32.7 G/DL (ref 31.5–36.5)
MCV RBC AUTO: 85 FL (ref 78–100)
MONOCYTES # BLD AUTO: 0.8 10E3/UL (ref 0–1.3)
MONOCYTES NFR BLD AUTO: 8 %
NEUTROPHILS # BLD AUTO: 7 10E3/UL (ref 1.6–8.3)
NEUTROPHILS NFR BLD AUTO: 72 %
NRBC # BLD AUTO: 0 10E3/UL
NRBC BLD AUTO-RTO: 0 /100
P AXIS - MUSE: NORMAL DEGREES
PLATELET # BLD AUTO: 238 10E3/UL (ref 150–450)
POTASSIUM SERPL-SCNC: 5 MMOL/L (ref 3.4–5.3)
PR INTERVAL - MUSE: NORMAL MS
QRS DURATION - MUSE: 82 MS
QT - MUSE: 360 MS
QTC - MUSE: 433 MS
R AXIS - MUSE: 87 DEGREES
RBC # BLD AUTO: 4.93 10E6/UL (ref 3.8–5.2)
SODIUM SERPL-SCNC: 138 MMOL/L (ref 135–145)
SYSTOLIC BLOOD PRESSURE - MUSE: NORMAL MMHG
T AXIS - MUSE: -7 DEGREES
TROPONIN T SERPL HS-MCNC: 29 NG/L
TROPONIN T SERPL HS-MCNC: 29 NG/L
VENTRICULAR RATE- MUSE: 87 BPM
WBC # BLD AUTO: 9.7 10E3/UL (ref 4–11)

## 2025-02-10 PROCEDURE — 85610 PROTHROMBIN TIME: CPT | Performed by: EMERGENCY MEDICINE

## 2025-02-10 PROCEDURE — 82374 ASSAY BLOOD CARBON DIOXIDE: CPT | Performed by: EMERGENCY MEDICINE

## 2025-02-10 PROCEDURE — 74177 CT ABD & PELVIS W/CONTRAST: CPT

## 2025-02-10 PROCEDURE — 72125 CT NECK SPINE W/O DYE: CPT

## 2025-02-10 PROCEDURE — 85018 HEMOGLOBIN: CPT | Performed by: EMERGENCY MEDICINE

## 2025-02-10 PROCEDURE — 73502 X-RAY EXAM HIP UNI 2-3 VIEWS: CPT

## 2025-02-10 PROCEDURE — 250N000011 HC RX IP 250 OP 636: Performed by: EMERGENCY MEDICINE

## 2025-02-10 PROCEDURE — 99285 EMERGENCY DEPT VISIT HI MDM: CPT | Mod: 25

## 2025-02-10 PROCEDURE — 80048 BASIC METABOLIC PNL TOTAL CA: CPT | Performed by: EMERGENCY MEDICINE

## 2025-02-10 PROCEDURE — 85004 AUTOMATED DIFF WBC COUNT: CPT | Performed by: EMERGENCY MEDICINE

## 2025-02-10 PROCEDURE — 82435 ASSAY OF BLOOD CHLORIDE: CPT | Performed by: EMERGENCY MEDICINE

## 2025-02-10 PROCEDURE — 93005 ELECTROCARDIOGRAM TRACING: CPT | Performed by: EMERGENCY MEDICINE

## 2025-02-10 PROCEDURE — 36415 COLL VENOUS BLD VENIPUNCTURE: CPT | Performed by: EMERGENCY MEDICINE

## 2025-02-10 PROCEDURE — 70450 CT HEAD/BRAIN W/O DYE: CPT

## 2025-02-10 PROCEDURE — 84484 ASSAY OF TROPONIN QUANT: CPT | Performed by: EMERGENCY MEDICINE

## 2025-02-10 RX ORDER — IOPAMIDOL 755 MG/ML
90 INJECTION, SOLUTION INTRAVASCULAR ONCE
Status: COMPLETED | OUTPATIENT
Start: 2025-02-10 | End: 2025-02-10

## 2025-02-10 RX ORDER — POLYETHYLENE GLYCOL 3350 17 G/17G
17 POWDER, FOR SOLUTION ORAL DAILY
Qty: 510 G | Refills: 0 | Status: SHIPPED | OUTPATIENT
Start: 2025-02-10

## 2025-02-10 RX ADMIN — IOPAMIDOL 90 ML: 755 INJECTION, SOLUTION INTRAVENOUS at 16:50

## 2025-02-10 ASSESSMENT — ACTIVITIES OF DAILY LIVING (ADL)
ADLS_ACUITY_SCORE: 59

## 2025-02-10 ASSESSMENT — ENCOUNTER SYMPTOMS: ARTHRALGIAS: 1

## 2025-02-10 NOTE — ED NOTES
Bed: WWED-11  Expected date:   Expected time:   Means of arrival:   Comments:  [facility]

## 2025-02-10 NOTE — ED TRIAGE NOTES
Patient arriving to department via by EMS.  Patient had an unwitnessed fall out of bed with head injury.  Hit her head on the bed rail.  Patient on warfarin for atrial fibrillilation.  Patient reports that she was reaching for something out of the left side of the bed when she realized she fell out of bed when she hit her head.  NO LOC  Staff states that patient is at her baseline.  VSS while en route to the department.  Is a type II dm with blood sugar reported to be 214     Triage Assessment (Adult)       Row Name 02/10/25 1407          Triage Assessment    Airway WDL WDL        Respiratory WDL    Respiratory WDL X;all;mucous membranes     Mucous Membranes dry        Skin Circulation/Temperature WDL    Skin Circulation/Temperature WDL X;all     Skin Circulation --  dry and patchy on forehead.  round red abrasion on right forehead from bed rail.  Had unwittnessed fall after breakfast today.  2) Has bruising on anterior right wrist that patient reports is from staff at [facility] from Friday night,     Skin Temperature neutral        Cardiac WDL    Cardiac WDL WDL        Peripheral/Neurovascular WDL    Peripheral Neurovascular WDL WDL        Cognitive/Neuro/Behavioral WDL    Cognitive/Neuro/Behavioral WDL WDL  does have a compainion doll named Shalini from [facility]

## 2025-02-10 NOTE — TELEPHONE ENCOUNTER
[facility] Triage Nurse Telephone Encounter    Provider: Ginny Johnson MD  Facility: [facility]  Facility Type:  [facility]    Caller: Be  Call Back Number: [phone]    Allergies:    Allergies   Allergen Reactions    Amoxicillin Unknown    Oxycodone Other (See Comments)     Does not remember    Latex Rash        Reason for call: Nurse is calling to report that patient had a fall at 12:05pm today.  Patient fell out of bed.  It was noted that patient hit her head on the floor as evidenced by a rugburn on her forehead.  VS are stable.  Neuros are per baseline, however patient is confused at baseline due to dementia.  Of note, patient is taking Warfarin.      Verbal Order/Direction given by Provider: Send patient to the ER due to fall with head trauma and patient taking anticoagulation.      Provider giving Order:  JOE Rayo CNP    Verbal Order given to: Be Campbell RN

## 2025-02-10 NOTE — ED PROVIDER NOTES
EMERGENCY DEPARTMENT ENCOUNTER      NAME: Angela Beckham  AGE: 74 year old female  YOB: 1950  MRN: [id number]  EVALUATION DATE & TIME: 2/10/2025  1:47 PM    PCP: Ginny Johnson    ED PROVIDER: Butch Parr MD      Chief Complaint   Patient presents with    Fall     On warfarin, injury to head         FINAL IMPRESSION:  1. Fall, initial encounter    2. Pericardial effusion    3. Supratherapeutic INR    4. Pleural effusion    5. Pancreatic mass    6. Constipation, unspecified constipation type    7. Abrasion of scalp, initial encounter          ED COURSE & MEDICAL DECISION MAKING:    Pertinent Labs & Imaging studies reviewed. (See chart for details)  74 year old female presents to the Emergency Department for evaluation of unwitnessed fall.  Is on warfarin.  Trauma alert.    Alert.  Eyes open spontaneously.  Follows commands.  Knows what year it is eyes open.  Follows commands.        3:10 PM I met with the patient, obtained history, performed an initial exam, and discussed options and plan for diagnostics and treatment here in the ED.  6:54 PM I updated the patient.   6:58 PM I spoke with Anu, her sister, and updated her.       ED Course as of 02/10/25 1902   Mon Feb 10, 2025   1510 Comes from assisted living with unwitnessed fall on blood thinners.  Abrasion to forehead and some mild right hip pain.  Abrasion left knee but clinically fracture unlikely   1511 Chronic below the knee amputation on left   1511 History of vascular dementia.  Patient is awake knows what year it is, follows commands   1511 Plan for fall evaluation EKG, troponin, CBC, BNP, INR   1511 Afebrile.  Doubt sepsis   1511 Will obtain CT head and neck based on fall on blood thinners with vascular dementia, CT abdomen pelvis since right lower quadrant abdominal pain with fall on blood thinners as well as x-ray of right hip   1511 EKG shows stable A-fib the patient a history   1856 I called Anu her sister in law and updated her on plan  for discharge home and need for follow up with PCP   CT imaging head and neck does not show any intercranial hemorrhage per my independent read.  CT C-spine negative for fracture.  CT abdomen pelvis shows constipation, small pericardial effusion, as well as pancreatic mass that needs follow-up with outpatient MRI which I discussed with patient's sister-in-law    Plan for discharge home with MiraLAX for constipation.    Discharged home with instructions to return to the ER for worsening symptoms.    Medical Decision Making  Obtained supplemental history:Supplemental history obtained?: No  Reviewed external records: External records reviewed?: Documented in chart  Care impacted by chronic illness:Anticoagulated State, Diabetes, Heart Disease, and Hypertension  Did you consider but not order tests?: Work up considered but not performed and documented in chart, if applicable  Did you interpret images independently?: Independent interpretation of ECG and images noted in documentation, when applicable.  Consultation discussion with other provider:Did you involve another provider (consultant, MH, pharmacy, etc.)?: No  Discharge. I prescribed additional prescription strength medication(s) as charted. N/A.    MIPS: Adult Minor Head Trauma:Age 65 years or older and Currently taking anticoagulant medications: warfarin or other novel anticoagulant medications            At the conclusion of the encounter I discussed the results of all of the tests and the disposition. The questions were answered. The patient or family acknowledged understanding and was agreeable with the care plan.         MEDICATIONS GIVEN IN THE EMERGENCY:  Medications   iopamidol (ISOVUE-370) solution 90 mL (90 mLs Intravenous $Given 2/10/25 1650)       NEW PRESCRIPTIONS STARTED AT TODAY'S ER VISIT  New Prescriptions    POLYETHYLENE GLYCOL (MIRALAX) 17 GM/DOSE POWDER    Take 17 g by mouth daily.           =================================================================    TRIAGE ASSESSMENT:  Patient arriving to department via [facility].  Patient had an unwitnessed fall out of bed with head injury.  Hit her head on the bed rail.  Patient on warfarin for atrial fibrillilation.  Patient reports that she was reaching for something out of the left side of the bed when she realized she fell out of bed when she hit her head.  NO LOC  Staff states that patient is at her baseline.  VSS while en route to the department.  Is a type II dm with blood sugar reported to be 214     Triage Assessment (Adult)       Row Name 02/10/25 1407          Triage Assessment    Airway WDL WDL        Respiratory WDL    Respiratory WDL X;all;mucous membranes     Mucous Membranes dry        Skin Circulation/Temperature WDL    Skin Circulation/Temperature WDL X;all     Skin Circulation --  dry and patchy on forehead.  round red abrasion on right forehead from bed rail.  Had unwittnessed fall after breakfast today.  2) Has bruising on anterior right wrist that patient reports is from staff at [facility] from Friday night,     Skin Temperature neutral        Cardiac WDL    Cardiac WDL WDL        Peripheral/Neurovascular WDL    Peripheral Neurovascular WDL WDL        Cognitive/Neuro/Behavioral WDL    Cognitive/Neuro/Behavioral WDL WDL  does have a compainion doll named Shalini from [facility]    Patient information was obtained from: Patient    Use of : N/A        Angela Beckham is a 74 year old female with a pertinent history of anticoagulation on warfarin, DM2, hypertension, and A-fib who presents to this ED via EMS for evaluation after fall.     Patient had unwitnessed fall today after falling out of her bed.  She is anticoagulated on warfarin.  She endorses right hip pain after the fall.  She does have an abrasion to the top of her right head as well.    She denies any other complaints at this  time.     REVIEW OF SYSTEMS   Review of Systems   Musculoskeletal:  Positive for arthralgias (Right hip).   All other systems reviewed and are negative.      PAST MEDICAL HISTORY:  Past Medical History:   Diagnosis Date    Diabetic neuropathy (H)     HTN     Type 2 diabetes mellitus (H)        PAST SURGICAL HISTORY:  History reviewed. No pertinent surgical history.        CURRENT MEDICATIONS:    polyethylene glycol (MIRALAX) 17 GM/Dose powder  atorvastatin (LIPITOR) 20 MG tablet  digoxin (LANOXIN) 62.5 MCG tablet  ferrous sulfate (FEROSUL) 325 (65 Fe) MG tablet  furosemide (LASIX) 20 MG tablet  lactase (LACTAID) 9000 units TABS tablet  menthol, Topical Analgesic, 2.5% (BENGAY VANISHING SCENT) 2.5 % GEL topical gel  metFORMIN (GLUCOPHAGE XR) 500 MG 24 hr tablet  metoprolol succinate ER (TOPROL XL) 25 MG 24 hr tablet  miconazole (MICATIN) 2 % cream  mirtazapine (REMERON) 15 MG tablet  omeprazole (PRILOSEC) 20 MG DR capsule  Vitamin D3 (VITAMIN D, CHOLECALCIFEROL,) 25 mcg (1000 units) tablet  WARFARIN SODIUM PO        ALLERGIES:  Allergies   Allergen Reactions    Amoxicillin Unknown    Oxycodone Other (See Comments)     Does not remember    Latex Rash       FAMILY HISTORY:  Family History   Problem Relation Age of Onset    Cerebrovascular Disease Mother     Hypertension Mother     Alzheimer Disease Father     Respiratory Father     Cerebrovascular Disease Maternal Grandmother     Diabetes Paternal Grandfather     Hypertension Brother     Obesity Brother        SOCIAL HISTORY:   Social History     Socioeconomic History    Marital status:     Number of children: 0   Tobacco Use    Smoking status: Former     Current packs/day: 0.00     Types: Cigarettes     Quit date: 12/3/2020     Years since quittin.1    Smokeless tobacco: Never   Vaping Use    Vaping status: Never Used   Substance and Sexual Activity    Alcohol use: No    Drug use: No    Sexual activity: Not Currently     Partners: Male   Other Topics  Concern     Service No    Blood Transfusions Yes     Comment: 1970    Caffeine Concern No    Occupational Exposure No    Hobby Hazards No    Sleep Concern No    Stress Concern No    Weight Concern No    Special Diet No    Back Care No    Exercise Yes    Bike Helmet No    Seat Belt Yes    Self-Exams No     Social Drivers of Health     Food Insecurity: No Food Insecurity (10/19/2023)    Received from [facility]    Hunger Vital Sign     Worried About Running Out of Food in the Last Year: Never true     Ran Out of Food in the Last Year: Never true       VITALS:  /59   Pulse 85   Temp 97.2  F (36.2  C) (Oral)   Resp 20   Ht 1.524 m (5')   Wt 57.5 kg (126 lb 12.8 oz)   SpO2 98%   BMI 24.76 kg/m      PHYSICAL EXAM      Vitals: /59   Pulse 85   Temp 97.2  F (36.2  C) (Oral)   Resp 20   Ht 1.524 m (5')   Wt 57.5 kg (126 lb 12.8 oz)   SpO2 98%   BMI 24.76 kg/m    /59   Pulse 85   Temp 97.2  F (36.2  C) (Oral)   Resp 20   Ht 1.524 m (5')   Wt 57.5 kg (126 lb 12.8 oz)   SpO2 98%   BMI 24.76 kg/m      General Appearance: Alert, cooperative, normal speech and facial symmetry,  appears stated age,     Primary survey:     Airway patent  Breath sounds: bilateral breath sounds  Cardiovascular: 2+ radial pulses and DP pulses  Disability GCS 15.     Secondary survey    Head:  Normocephalic, without obvious abnormality, abrasion to top of right side of head.   Eyes:  PERRL,pupils midsized, conjunctiva/corneas clear, EOM's intact, no orbital injury  ENT:  No obvious facial deformity.  No tenderness to palpation.  No epistaxis.  Extraocular movements are intact.  No evidence of orbital injury.    Neck:  No midline cervical spine tenderness.  No paraspinal tenderness.  Chest:  No tenderness or deformity, no crepitus  Cardio:  Regular rate and rhythm, S1 and S2 normal, no murmur, rub    or gallop, 2+ pulses symmetric in all extremities  Pulm:  Clear to auscultation bilaterally, respirations  unlabored,   Back:   no CVA tenderness, no spinal tenderness  Abdomen:  Soft, non-tender, no rebound or guarding, no pelvic pain to compression  Extremities:  No obvious deformity, all joints palpated in place with full range of motion.  No tenderness or instability.  Patient is able to bear full weight, no tenderness over joints or extremities, no cyanosis or edema, full function and range of motion, pulses equal in all extremities, normal cap refill. Below the knee amputation on left.  Mild tenderness right hip  Skin:  Skin color, texture, turgor normal, no rashes or lesions  Neuro:  Awake, alert, responsive to voice, follows commands, normal speech, No gross motor weakness or sensory loss, moves all extremities, GCS 15      LAB:  All pertinent labs reviewed and interpreted.  Results for orders placed or performed during the hospital encounter of 02/10/25   Head CT w/o contrast    Impression    IMPRESSION:  HEAD CT:  1.  No CT evidence for acute intracranial process.  2.  Brain atrophy and presumed chronic microvascular ischemic changes as above.    CERVICAL SPINE CT:  1.  No CT evidence for acute fracture or post traumatic subluxation.   CT Cervical Spine w/o Contrast    Impression    IMPRESSION:  HEAD CT:  1.  No CT evidence for acute intracranial process.  2.  Brain atrophy and presumed chronic microvascular ischemic changes as above.    CERVICAL SPINE CT:  1.  No CT evidence for acute fracture or post traumatic subluxation.   CT Abdomen Pelvis w Contrast    Impression    IMPRESSION:   1.  Constipation, with large volume of stool distal sigmoid colon and rectum.  2.  Small follicle right ovary.  3.  No other findings to account for the patient's right lower abdominal pain.  4.  1.5 cm cystic lesion within the body of pancreas, likely representing an IPMN. Please see guidelines below for follow-up.  5.  Small left pleural effusion and subsegmental atelectasis or scarring left lower lobe.  6.  Small pericardial  effusion.    REFERENCE:  Revisions of international consensus [place] guidelines for the management of IPMN of the pancreas. Pancreatology 2017;17(5):738-753.    Largest cyst between 10-20 mm: CT or MRI/MRCP every 6 months for 1 year and then yearly for 2 years and then every 2 years if no change.      XR Pelvis and Hip Right 2 Views    Impression    IMPRESSION: No acute displaced fracture. If there is persistent clinical concern for occult hip fracture, consider MRI which is more sensitive. Normal alignment of the hips with mild degenerative change. Diffuse osseous demineralization which limits   evaluation for nondisplaced fractures and subtle osseous lesions. Vascular calcifications.   Result Value Ref Range    INR 3.50 (H) 0.85 - 1.15   Basic metabolic panel   Result Value Ref Range    Sodium 138 135 - 145 mmol/L    Potassium 5.0 3.4 - 5.3 mmol/L    Chloride 100 98 - 107 mmol/L    Carbon Dioxide (CO2) 29 22 - 29 mmol/L    Anion Gap 9 7 - 15 mmol/L    Urea Nitrogen 21.1 8.0 - 23.0 mg/dL    Creatinine 0.48 (L) 0.51 - 0.95 mg/dL    GFR Estimate >90 >60 mL/min/1.73m2    Calcium 10.2 8.8 - 10.4 mg/dL    Glucose 178 (H) 70 - 99 mg/dL   Result Value Ref Range    Troponin T, High Sensitivity 29 (H) <=14 ng/L   CBC with platelets and differential   Result Value Ref Range    WBC Count 9.7 4.0 - 11.0 10e3/uL    RBC Count 4.93 3.80 - 5.20 10e6/uL    Hemoglobin 13.7 11.7 - 15.7 g/dL    Hematocrit 41.9 35.0 - 47.0 %    MCV 85 78 - 100 fL    MCH 27.8 26.5 - 33.0 pg    MCHC 32.7 31.5 - 36.5 g/dL    RDW 17.4 (H) 10.0 - 15.0 %    Platelet Count 238 150 - 450 10e3/uL    % Neutrophils 72 %    % Lymphocytes 18 %    % Monocytes 8 %    % Eosinophils 2 %    % Basophils 0 %    % Immature Granulocytes 0 %    NRBCs per 100 WBC 0 <1 /100    Absolute Neutrophils 7.0 1.6 - 8.3 10e3/uL    Absolute Lymphocytes 1.7 0.8 - 5.3 10e3/uL    Absolute Monocytes 0.8 0.0 - 1.3 10e3/uL    Absolute Eosinophils 0.2 0.0 - 0.7 10e3/uL    Absolute Basophils  0.0 0.0 - 0.2 10e3/uL    Absolute Immature Granulocytes 0.0 <=0.4 10e3/uL    Absolute NRBCs 0.0 10e3/uL   Result Value Ref Range    Troponin T, High Sensitivity 29 (H) <=14 ng/L   ECG 12-LEAD WITH MUSE (LHE)   Result Value Ref Range    Systolic Blood Pressure  mmHg    Diastolic Blood Pressure  mmHg    Ventricular Rate 87 BPM    Atrial Rate 170 BPM    CO Interval  ms    QRS Duration 82 ms     ms    QTc 433 ms    P Axis  degrees    R AXIS 87 degrees    T Axis -7 degrees    Interpretation ECG       Atrial fibrillation  Low voltage QRS  Abnormal QRS-T angle, consider primary T wave abnormality  Abnormal ECG  When compared with ECG of 13-Jan-2023 23:20,  No significant change was found  Confirmed by SEE ED PROVIDER NOTE FOR, ECG INTERPRETATION ([id number]),  RAMANDEEP MICHAEL ([id number]) on 2/10/2025 4:00:04 PM         RADIOLOGY:  Reviewed all pertinent imaging. Please see official radiology report.  XR Pelvis and Hip Right 2 Views   Final Result   IMPRESSION: No acute displaced fracture. If there is persistent clinical concern for occult hip fracture, consider MRI which is more sensitive. Normal alignment of the hips with mild degenerative change. Diffuse osseous demineralization which limits    evaluation for nondisplaced fractures and subtle osseous lesions. Vascular calcifications.      CT Cervical Spine w/o Contrast   Final Result   IMPRESSION:   HEAD CT:   1.  No CT evidence for acute intracranial process.   2.  Brain atrophy and presumed chronic microvascular ischemic changes as above.      CERVICAL SPINE CT:   1.  No CT evidence for acute fracture or post traumatic subluxation.      Head CT w/o contrast   Final Result   IMPRESSION:   HEAD CT:   1.  No CT evidence for acute intracranial process.   2.  Brain atrophy and presumed chronic microvascular ischemic changes as above.      CERVICAL SPINE CT:   1.  No CT evidence for acute fracture or post traumatic subluxation.      CT Abdomen Pelvis w Contrast   Final  Result   IMPRESSION:    1.  Constipation, with large volume of stool distal sigmoid colon and rectum.   2.  Small follicle right ovary.   3.  No other findings to account for the patient's right lower abdominal pain.   4.  1.5 cm cystic lesion within the body of pancreas, likely representing an IPMN. Please see guidelines below for follow-up.   5.  Small left pleural effusion and subsegmental atelectasis or scarring left lower lobe.   6.  Small pericardial effusion.      REFERENCE:   Revisions of international consensus [place] guidelines for the management of IPMN of the pancreas. Pancreatology 2017;17(5):738-753.      Largest cyst between 10-20 mm: CT or MRI/MRCP every 6 months for 1 year and then yearly for 2 years and then every 2 years if no change.              EKG:    Performed at: 15:05:20    Impression: Atrial fibrillation.  Low voltage QRS.  Abnormal QRS-T angle, consider primary T wave abnormality.    Rate: 87 BPM  Rhythm: Atrial fibrillation.  Axis: * 87 -7  MS Interval: *  QRS Interval: 82 ms  QTc Interval: 360/433 ms  ST Changes: None  Comparison: When compared with ECG of 13-Jan-2023 23:20, No significant change found..     I have independently reviewed and interpreted the EKG(s) documented above.            I, Cruz Saenz, am serving as a scribe to document services personally performed by Butch Parr MD based on my observation and the provider's statements to me. I, Butch Parr MD, attest that Cruz Saenz is acting in a scribe capacity, has observed my performance of the services and has documented them in accordance with my direction.    Butch Parr MD  [facility] EMERGENCY ROOM  [address] [address]  [phone]      Butch Parr MD  02/10/25 8418

## 2025-02-11 ENCOUNTER — LAB REQUISITION (OUTPATIENT)
Dept: LAB | Facility: CLINIC | Age: 75
End: 2025-02-11
Payer: COMMERCIAL

## 2025-02-11 ENCOUNTER — TELEPHONE (OUTPATIENT)
Dept: GERIATRICS | Facility: CLINIC | Age: 75
End: 2025-02-11

## 2025-02-11 DIAGNOSIS — I48.91 UNSPECIFIED ATRIAL FIBRILLATION (H): ICD-10-CM

## 2025-02-11 LAB — INR PPP: 3.71 (ref 0.85–1.15)

## 2025-02-11 PROCEDURE — P9604 ONE-WAY ALLOW PRORATED TRIP: HCPCS | Mod: ORL | Performed by: FAMILY MEDICINE

## 2025-02-11 PROCEDURE — 36415 COLL VENOUS BLD VENIPUNCTURE: CPT | Mod: ORL | Performed by: FAMILY MEDICINE

## 2025-02-11 PROCEDURE — 85610 PROTHROMBIN TIME: CPT | Mod: ORL | Performed by: FAMILY MEDICINE

## 2025-02-11 NOTE — TELEPHONE ENCOUNTER
Verbal order per Martha Nixon CNP:    Hold Warfarin on 2/11.  Next INR 2/12/25.        Verbal order given to:  Julio C Campbell RN

## 2025-02-11 NOTE — DISCHARGE INSTRUCTIONS
As we discussed you have constipation therefore recommend MiraLAX.  Your INR is a little high therefore your next dose needs to be lowered.  Recheck primary care doctor for MRI of your pancreas to look at this mass.  Recheck primary care doctor for recheck.    Follow-up ER for worsening symptoms

## 2025-02-11 NOTE — TELEPHONE ENCOUNTER
"Provider: Ginny Johnson MD  Facility: [facility]   Facility Type:  [facility]    Caller: Matilde  Call Back Number: [phone]  Reason for call: INR  Diagnosis/Goal: A. Fib  Heparin/Lovenox:  No  Currently on ABX?: No  Other interacting medication:  None  Missed or refused doses: No    Date INR Previous Dose/Orders   12/2 2.15 Cont 4.5mg daily.  Next INR 12/16/24.   12/16 2.30 Cont 4.5mg daily.  Next INR 1/10/25   1/10/25 2.33 Cont 4.5mg daily.  Next INR 2/11/25 2/11/25 3.71 Hold Warfarin on 2/11.  Next INR 2/12/25.       Telephone encounter sent to: JOE Rayo CNP    Requesting orders for Warfarin dosing and date of next INR draw  Please respond to \"Geriatrics Nurse Pool\".    Willie Campbell RN   "

## 2025-02-12 ENCOUNTER — LAB REQUISITION (OUTPATIENT)
Dept: LAB | Facility: CLINIC | Age: 75
End: 2025-02-12
Payer: COMMERCIAL

## 2025-02-12 ENCOUNTER — TELEPHONE (OUTPATIENT)
Dept: GERIATRICS | Facility: CLINIC | Age: 75
End: 2025-02-12

## 2025-02-12 DIAGNOSIS — I48.91 UNSPECIFIED ATRIAL FIBRILLATION (H): ICD-10-CM

## 2025-02-12 LAB — INR PPP: 3.11 (ref 0.85–1.15)

## 2025-02-12 PROCEDURE — P9604 ONE-WAY ALLOW PRORATED TRIP: HCPCS | Mod: ORL | Performed by: FAMILY MEDICINE

## 2025-02-12 PROCEDURE — 36415 COLL VENOUS BLD VENIPUNCTURE: CPT | Mod: ORL | Performed by: FAMILY MEDICINE

## 2025-02-12 PROCEDURE — 85610 PROTHROMBIN TIME: CPT | Mod: ORL | Performed by: FAMILY MEDICINE

## 2025-02-12 NOTE — TELEPHONE ENCOUNTER
Verbal order per Martha Nixon CNP:  Warfarin 4mg daily.  Next INR 2/17/25.      Verbal order/direction given to: Be Campbell RN

## 2025-02-12 NOTE — TELEPHONE ENCOUNTER
"Provider: Ginny Johnson MD  Facility: [facility]   Facility Type:  [facility]    Caller: Be  Call Back Number: [phone]  Reason for call: INR  Diagnosis/Goal: A. Fib  Heparin/Lovenox:  No  Currently on ABX?: No  Other interacting medication:  None  Missed or refused doses: No    Date INR Previous Dose/Orders        12/2/24 2.15 Cont 4.5mg daily.  Next INR 12/16/24 12/16 2.30 Cont 4.5mg daily.  Next INR 1/10/25   1/10/25 2.33 Cont 4.5mg daily.  Next INR 2/11/25 2/11/25 3.71 Hold   2/12/25 3.11 Warfarin 4mg daily.  Next INR 2/17/25.     Telephone encounter sent to: JOE Rayo CNP    Requesting orders for Warfarin dosing and date of next INR draw  Please respond to \"Geriatrics Nurse Pool\".    Willie Campbell RN   "

## 2025-02-17 ENCOUNTER — LAB REQUISITION (OUTPATIENT)
Dept: LAB | Facility: CLINIC | Age: 75
End: 2025-02-17
Payer: COMMERCIAL

## 2025-02-17 ENCOUNTER — TELEPHONE (OUTPATIENT)
Dept: GERIATRICS | Facility: CLINIC | Age: 75
End: 2025-02-17

## 2025-02-17 DIAGNOSIS — I48.91 UNSPECIFIED ATRIAL FIBRILLATION (H): ICD-10-CM

## 2025-02-17 LAB — INR PPP: 3.37 (ref 0.85–1.15)

## 2025-02-17 PROCEDURE — 85610 PROTHROMBIN TIME: CPT | Mod: ORL | Performed by: FAMILY MEDICINE

## 2025-02-17 PROCEDURE — P9603 ONE-WAY ALLOW PRORATED MILES: HCPCS | Mod: ORL | Performed by: FAMILY MEDICINE

## 2025-02-17 PROCEDURE — 36415 COLL VENOUS BLD VENIPUNCTURE: CPT | Mod: ORL | Performed by: FAMILY MEDICINE

## 2025-02-17 NOTE — TELEPHONE ENCOUNTER
----- Message from Ginny Johnson sent at 2/17/2025  9:56 AM CST -----  Hold coumadin x 2d  Check INR on 2/19

## 2025-02-19 ENCOUNTER — TELEPHONE (OUTPATIENT)
Dept: GERIATRICS | Facility: CLINIC | Age: 75
End: 2025-02-19

## 2025-02-19 LAB — INR PPP: 2.35 (ref 0.85–1.15)

## 2025-02-19 PROCEDURE — 36415 COLL VENOUS BLD VENIPUNCTURE: CPT | Mod: ORL | Performed by: FAMILY MEDICINE

## 2025-02-19 PROCEDURE — 85610 PROTHROMBIN TIME: CPT | Mod: ORL | Performed by: FAMILY MEDICINE

## 2025-02-19 PROCEDURE — P9604 ONE-WAY ALLOW PRORATED TRIP: HCPCS | Mod: ORL | Performed by: FAMILY MEDICINE

## 2025-02-19 NOTE — TELEPHONE ENCOUNTER
----- Message from Ginny Johnson sent at 2/19/2025 12:09 PM CST -----  Coumadin 3.5mg alt with 4mg   Check INR x 1 week

## 2025-02-21 ENCOUNTER — LAB REQUISITION (OUTPATIENT)
Dept: LAB | Facility: CLINIC | Age: 75
End: 2025-02-21
Payer: COMMERCIAL

## 2025-02-21 DIAGNOSIS — I48.91 UNSPECIFIED ATRIAL FIBRILLATION (H): ICD-10-CM

## 2025-02-26 ENCOUNTER — TELEPHONE (OUTPATIENT)
Dept: GERIATRICS | Facility: CLINIC | Age: 75
End: 2025-02-26

## 2025-02-26 ENCOUNTER — LAB REQUISITION (OUTPATIENT)
Dept: LAB | Facility: CLINIC | Age: 75
End: 2025-02-26
Payer: COMMERCIAL

## 2025-02-26 DIAGNOSIS — I48.91 UNSPECIFIED ATRIAL FIBRILLATION (H): ICD-10-CM

## 2025-02-26 LAB — INR PPP: 4.36 (ref 0.85–1.15)

## 2025-02-26 PROCEDURE — P9604 ONE-WAY ALLOW PRORATED TRIP: HCPCS | Mod: ORL | Performed by: FAMILY MEDICINE

## 2025-02-26 PROCEDURE — 85610 PROTHROMBIN TIME: CPT | Mod: ORL | Performed by: FAMILY MEDICINE

## 2025-02-26 PROCEDURE — 36415 COLL VENOUS BLD VENIPUNCTURE: CPT | Mod: ORL | Performed by: FAMILY MEDICINE

## 2025-02-26 NOTE — TELEPHONE ENCOUNTER
Orders given to THERESA Stevens RN    ----- Message from Ginny Johnson sent at 2/26/2025 12:52 PM CST -----  Hold coumadin x 2 d  R/c inr on 2/28/25

## 2025-02-28 LAB — INR PPP: 2.5 (ref 0.85–1.15)

## 2025-02-28 PROCEDURE — 85610 PROTHROMBIN TIME: CPT | Mod: ORL | Performed by: FAMILY MEDICINE

## 2025-02-28 PROCEDURE — P9604 ONE-WAY ALLOW PRORATED TRIP: HCPCS | Mod: ORL | Performed by: FAMILY MEDICINE

## 2025-02-28 PROCEDURE — 36415 COLL VENOUS BLD VENIPUNCTURE: CPT | Mod: ORL | Performed by: FAMILY MEDICINE

## 2025-03-04 ENCOUNTER — LAB REQUISITION (OUTPATIENT)
Dept: LAB | Facility: CLINIC | Age: 75
End: 2025-03-04
Payer: COMMERCIAL

## 2025-03-04 ENCOUNTER — TELEPHONE (OUTPATIENT)
Dept: GERIATRICS | Facility: CLINIC | Age: 75
End: 2025-03-04
Payer: COMMERCIAL

## 2025-03-04 DIAGNOSIS — I10 ESSENTIAL (PRIMARY) HYPERTENSION: ICD-10-CM

## 2025-03-04 NOTE — TELEPHONE ENCOUNTER
[facility] Triage Nurse Telephone Encounter    Provider: Ginny Johnson MD  Facility: [facility]  Facility Type:  [facility]    Allergies:    Allergies   Allergen Reactions    Amoxicillin Unknown    Oxycodone Other (See Comments)     Does not remember    Latex Rash        Reason for call: Pt missed INR lab draw today.    QUAN provided: Continue Coumadin 3.5 mg PO tonight. Check INR tomorrow - 3/5/25.    Provider giving Order:  Ginny Johnson MD    Verbal Order given to: Be Hough RN

## 2025-03-05 ENCOUNTER — TELEPHONE (OUTPATIENT)
Dept: GERIATRICS | Facility: CLINIC | Age: 75
End: 2025-03-05

## 2025-03-05 LAB — INR PPP: 1.71 (ref 0.85–1.15)

## 2025-03-05 PROCEDURE — 36415 COLL VENOUS BLD VENIPUNCTURE: CPT | Mod: ORL | Performed by: FAMILY MEDICINE

## 2025-03-05 PROCEDURE — 85610 PROTHROMBIN TIME: CPT | Mod: ORL | Performed by: FAMILY MEDICINE

## 2025-03-05 PROCEDURE — P9603 ONE-WAY ALLOW PRORATED MILES: HCPCS | Mod: ORL | Performed by: FAMILY MEDICINE

## 2025-03-05 NOTE — TELEPHONE ENCOUNTER
Order given to THERESA Clayton, THERESA    ----- Message from Ginny Johnson sent at 3/5/2025  3:06 PM CST -----  Coumadin 4mg alt with 3.5 mg  Check inr on 3/10

## 2025-03-06 ENCOUNTER — LAB REQUISITION (OUTPATIENT)
Dept: LAB | Facility: CLINIC | Age: 75
End: 2025-03-06
Payer: COMMERCIAL

## 2025-03-06 DIAGNOSIS — I48.91 UNSPECIFIED ATRIAL FIBRILLATION (H): ICD-10-CM

## 2025-03-10 ENCOUNTER — TELEPHONE (OUTPATIENT)
Dept: GERIATRICS | Facility: CLINIC | Age: 75
End: 2025-03-10

## 2025-03-10 LAB — INR PPP: 2.27 (ref 0.85–1.15)

## 2025-03-10 PROCEDURE — P9604 ONE-WAY ALLOW PRORATED TRIP: HCPCS | Mod: ORL | Performed by: FAMILY MEDICINE

## 2025-03-10 PROCEDURE — 85610 PROTHROMBIN TIME: CPT | Mod: ORL | Performed by: FAMILY MEDICINE

## 2025-03-10 PROCEDURE — 36415 COLL VENOUS BLD VENIPUNCTURE: CPT | Mod: ORL | Performed by: FAMILY MEDICINE

## 2025-03-10 NOTE — TELEPHONE ENCOUNTER
----- Message from Ginny Johnson sent at 3/10/2025 12:04 PM CDT -----  Give warfarin 4mg alt with 3 mg  R/c inr x 1 week

## 2025-03-14 ENCOUNTER — LAB REQUISITION (OUTPATIENT)
Dept: LAB | Facility: CLINIC | Age: 75
End: 2025-03-14
Payer: COMMERCIAL

## 2025-03-14 DIAGNOSIS — I48.91 UNSPECIFIED ATRIAL FIBRILLATION (H): ICD-10-CM

## 2025-03-17 ENCOUNTER — TELEPHONE (OUTPATIENT)
Dept: GERIATRICS | Facility: CLINIC | Age: 75
End: 2025-03-17

## 2025-03-17 ENCOUNTER — LAB REQUISITION (OUTPATIENT)
Dept: LAB | Facility: CLINIC | Age: 75
End: 2025-03-17
Payer: COMMERCIAL

## 2025-03-17 DIAGNOSIS — I48.91 UNSPECIFIED ATRIAL FIBRILLATION (H): ICD-10-CM

## 2025-03-17 LAB — INR PPP: 3.76 (ref 0.85–1.15)

## 2025-03-17 PROCEDURE — P9603 ONE-WAY ALLOW PRORATED MILES: HCPCS | Mod: ORL | Performed by: FAMILY MEDICINE

## 2025-03-17 PROCEDURE — 36415 COLL VENOUS BLD VENIPUNCTURE: CPT | Mod: ORL | Performed by: FAMILY MEDICINE

## 2025-03-17 PROCEDURE — 85610 PROTHROMBIN TIME: CPT | Mod: ORL | Performed by: FAMILY MEDICINE

## 2025-03-17 NOTE — TELEPHONE ENCOUNTER
[facility]   2025     Name: Angela Beckham   : 1950     INR: 3.76    Response/Orders: Hold coumadin x 2 d   Check INR on 3/19     Order given to: Be    Provider Giving Order:  Alex CUNNINGHAM, Ginny Hough RN

## 2025-03-19 ENCOUNTER — TELEPHONE (OUTPATIENT)
Dept: GERIATRICS | Facility: CLINIC | Age: 75
End: 2025-03-19

## 2025-03-19 ENCOUNTER — NURSING HOME VISIT (OUTPATIENT)
Dept: GERIATRICS | Facility: CLINIC | Age: 75
End: 2025-03-19
Payer: COMMERCIAL

## 2025-03-19 ENCOUNTER — LAB REQUISITION (OUTPATIENT)
Dept: LAB | Facility: CLINIC | Age: 75
End: 2025-03-19
Payer: COMMERCIAL

## 2025-03-19 VITALS
HEART RATE: 94 BPM | SYSTOLIC BLOOD PRESSURE: 96 MMHG | BODY MASS INDEX: 21.79 KG/M2 | TEMPERATURE: 97 F | HEIGHT: 63 IN | OXYGEN SATURATION: 96 % | WEIGHT: 123 LBS | RESPIRATION RATE: 18 BRPM | DIASTOLIC BLOOD PRESSURE: 70 MMHG

## 2025-03-19 DIAGNOSIS — I48.91 UNSPECIFIED ATRIAL FIBRILLATION (H): ICD-10-CM

## 2025-03-19 DIAGNOSIS — Z79.01 LONG TERM CURRENT USE OF ANTICOAGULANT THERAPY: ICD-10-CM

## 2025-03-19 DIAGNOSIS — I10 BENIGN ESSENTIAL HYPERTENSION: ICD-10-CM

## 2025-03-19 DIAGNOSIS — R41.89 COGNITIVE IMPAIRMENT: ICD-10-CM

## 2025-03-19 DIAGNOSIS — F01.53 VASCULAR DEMENTIA WITH DEPRESSED MOOD (H): Primary | ICD-10-CM

## 2025-03-19 DIAGNOSIS — K86.2 PANCREATIC CYST: ICD-10-CM

## 2025-03-19 DIAGNOSIS — Z89.512 STATUS POST BELOW-KNEE AMPUTATION OF LEFT LOWER EXTREMITY (H): ICD-10-CM

## 2025-03-19 DIAGNOSIS — I48.11 LONGSTANDING PERSISTENT ATRIAL FIBRILLATION (H): ICD-10-CM

## 2025-03-19 LAB — INR PPP: 2.84 (ref 0.85–1.15)

## 2025-03-19 PROCEDURE — P9604 ONE-WAY ALLOW PRORATED TRIP: HCPCS | Mod: ORL | Performed by: FAMILY MEDICINE

## 2025-03-19 PROCEDURE — 36415 COLL VENOUS BLD VENIPUNCTURE: CPT | Mod: ORL | Performed by: FAMILY MEDICINE

## 2025-03-19 PROCEDURE — 85610 PROTHROMBIN TIME: CPT | Mod: ORL | Performed by: FAMILY MEDICINE

## 2025-03-19 PROCEDURE — 99309 SBSQ NF CARE MODERATE MDM 30: CPT | Performed by: FAMILY MEDICINE

## 2025-03-19 NOTE — PROGRESS NOTES
[facility] GERIATRIC SERVICES       Patient Angela Beckham  MRN: [id number]        Reason for Visit     Chief Complaint   Patient presents with    FCI Regulatory       Code Status     DNR/ selective treatment    Assessment     Failure to thrive with patient having more than 50 pound weight loss in the last 6 months or so  Current weight are hovering around 130 pounds which seems to be the new baseline for her  She has various interventions tried but have been unsuccessful    CT A/P during ED visit on 2/10/25 noted pancreatic cystic lesion, likely representing an IPMN. Family informed; noted for now but will not pursue further imaging at this time.    Severe mitral valve disease with severe mitral calcification and stenosis  Along with aortic stenosis.  Not felt to be a surgical candidate and is on medical management as per cardiology    Chronic anticoagulation with Coumadin    Hypertension with low blood pressures which has been persistent    Major neurocognitive impairment felt to be a combination of vascular and Alzheimer's dementia    H/o  ischemic CVA involving the right cardioembolic CVA  Chronic right basal ganglia infarct  Chronic microvascular ischemia  Urinary retention requiring Gambino catheter placement  History of left below the knee amputation  Generalized weakness  Type 2 diabetes  Aortic valve stenosis     Plan     Patient is a resident of long-term care  Seen today for follow-up visit due to progressive weight loss of greater than 60 pound  Current weight is 130 pounds and she seems to be stable around that number  Patient reports she is eating and lacks the insight.  Currently on a higher dose of mirtazapine  Increase dose to 30mg daily and monitor mood  She also gets supplements but does refuse them  Diet has been downgraded as she is missing her dentures  At baseline she remains bedbound with poor insight into her condition.  She has some baseline cognitive impairment  She has persistently  low blood pressures which I suspect is because of poor oral intake.  She is on a much lower dose of metoprolol which is held almost daily because of low blood pressures  Digoxin has been added to her regimen  Heart rates have been stable.  Follow-up done with cardiology on 11/12/2024 and notes reviewed no new medication orders given.  Patient was felt to be stable  Discontinue pletal  She is on metformin for her diabetes  Last A1c done in January 2025 was 6.7%.  Continue low-dose metformin  Recheck A1c every 6 months  Also has been diagnosed with dementia most likely vascular and Alzheimer's in combination she had a neuropsych testing done recently and notes were reviewed  Deemed to lack capacity  She has a history of amputation but does not wear her prostheses.  Continue with her care plan    History     Patient is a very pleasant 74 year who is a resident of long-term care  Was seen in ED on 2/10/25 due to fall; CT imaging negative for acute fracture/changes. Did note pancreatic mass; could consider outpatient MRI.  Seen by cardiology and on metoprolol for rate control   warfarin has been continued.  Not felt to be a candidate for any surgical intervention for her severe AS and MS.  Medication is frequently held due to profound hypotension noted.  She is also on digoxin for rate control.  Suspect she is not eating and drinking well contributing to low blood pressures  She has dm controlled with metformin  Remains bed bound and desires going home  She has had cognitive decline; unable to fully answer questions or know her environment - thinks she is in the hospital.  Does not wear her prosthesis any longer.  Denies headache, vision changes, chest pain, dyspnea, nausea, abdominal pain, and urinary issues.      Past Medical & Surgical History     PAST MEDICAL HISTORY:   Past Medical History:   Diagnosis Date    Diabetic neuropathy (H)     HTN     Type 2 diabetes mellitus (H)           Past Social History      Reviewed,  reports that she quit smoking about 4 years ago. Her smoking use included cigarettes. She has never used smokeless tobacco. She reports that she does not drink alcohol and does not use drugs.    Family History     Reviewed, and family history includes Alzheimer Disease in her father; Cerebrovascular Disease in her maternal grandmother and mother; Diabetes in her paternal grandfather; Hypertension in her brother and mother; Obesity in her brother; Respiratory in her father.    Medication List   Post Discharge Medication Reconciliation Status: Post Discharge Medication Reconciliation Status: discharge medications reconciled, continue medications without change.  Current Outpatient Medications   Medication Sig Dispense Refill    atorvastatin (LIPITOR) 20 MG tablet Take 20 mg by mouth At Bedtime      digoxin (LANOXIN) 62.5 MCG tablet Take 62.5 mcg by mouth daily      ferrous sulfate (FEROSUL) 325 (65 Fe) MG tablet Take 325 mg by mouth daily (with breakfast)      furosemide (LASIX) 20 MG tablet Take 20 mg by mouth daily      lactase (LACTAID) 9000 units TABS tablet Take 9,000 Units by mouth 3 times daily (with meals)      menthol, Topical Analgesic, 2.5% (BENGAY VANISHING SCENT) 2.5 % GEL topical gel Apply topically 3 times daily as needed for moderate pain      metFORMIN (GLUCOPHAGE XR) 500 MG 24 hr tablet Take 500 mg by mouth daily (with dinner)      metoprolol succinate ER (TOPROL XL) 25 MG 24 hr tablet Take 12.5 mg by mouth daily.      miconazole (MICATIN) 2 % cream 1 applicator 2 times daily Apply to skin folds      mirtazapine (REMERON) 15 MG tablet Take 30 mg by mouth at bedtime.      omeprazole (PRILOSEC) 20 MG DR capsule Take 20 mg by mouth daily      polyethylene glycol (MIRALAX) 17 GM/Dose powder Take 17 g by mouth daily. 510 g 0    Vitamin D3 (VITAMIN D, CHOLECALCIFEROL,) 25 mcg (1000 units) tablet Take by mouth daily      WARFARIN SODIUM PO Per INR orders.       No current  "facility-administered medications for this visit.          Allergies     Allergies   Allergen Reactions    Amoxicillin Unknown    Oxycodone Other (See Comments)     Does not remember    Latex Rash       Review of Systems   A comprehensive review of 14 systems was not done due to confusion with worsening cognitive impairment/dementia      Physical Exam   BP 96/70   Pulse 94   Temp 97  F (36.1  C)   Resp 18   Ht 1.588 m (5' 2.5\")   Wt 55.8 kg (123 lb)   SpO2 96%   BMI 22.14 kg/m     GENERAL: no acute distress. Cooperative in conversation. Lying in bed comfortably.  RESP:Chest symmetric. Regular respiratory rate. No stridor. CTAB from anterior.  CV: RRR, normal S 1 S2, no murmur  ABD: Nondistended, soft, nontender, +BS  EXTREMITIES: No lower extremity edema.   Has a BKA  NEURO: non focal. Alert and oriented x self.   PSYCH: within normal limits. No depression or anxiety.  SKIN: warm dry intact      Labs  Last Comprehensive Metabolic Panel:  Lab Results   Component Value Date     02/10/2025    POTASSIUM 5.0 02/10/2025    CHLORIDE 100 02/10/2025    CO2 29 02/10/2025    ANIONGAP 9 02/10/2025     (H) 02/10/2025    BUN 21.1 02/10/2025    CR 0.48 (L) 02/10/2025    GFRESTIMATED >90 02/10/2025    SUSIE 10.2 02/10/2025   Hemoglobin A1c 6.7% on 1/13/25  INR pending, was 3.76 2 days ago  Electronically signed by    Jody Chambers MD PGY-3    Ginny Johnson MD                       "

## 2025-03-19 NOTE — TELEPHONE ENCOUNTER
[facility] Geriatrics Triage Nurse INR     Provider: Ginny Johnson MD  Facility: [facility]   Facility Type:  [facility]    Caller: Odessa  Call Back Number: [phone]  Reason for call: INR  Diagnosis/Goal: A. Fib    Date INR New Dose/Orders   3/5/25 1.71 4mg alt with 3.5mg   3/10/25 2.27 4mg alt with 3mg   3/17/25 3.76 Hold x 2 days.  Next INR 3/19/25.   3/19/25 2.84 3mg daily.  Next INR 3/24/25.        Heparin/Lovenox:  No  Currently on ABX?: No  Other interacting medication:  None  Missed or refused doses: No    Verbal Order/Direction given by Provider: Warfarin 3mg daily.  Next INR 3/24/25.      Provider Giving Order:  Ginny Johnson MD    Verbal Order given to: Odessa Campbell RN

## 2025-03-19 NOTE — PROGRESS NOTES
[facility] GERIATRIC SERVICES       Patient Angela Beckham  MRN: [id number]        Reason for Visit     Chief Complaint   Patient presents with    jail Regulatory       Code Status     DNR/ selective treatment    Assessment     Failure to thrive with patient having more than 50 pound weight loss in the last 6 months or so  Ongoing weight loss noted  Reviewed again with the speech therapist she has a denture diet has been upgraded  Unfortunately felt to be behavioral as she is still eating 0 to 25% of the offered meal    Severe mitral valve disease with severe mitral calcification and stenosis  Along with aortic stenosis.  Not felt to be a surgical candidate and is on medical management as per cardiology    Recent hospitalization related to A-fib with rapid ventricular response  Chronic anticoagulation with Coumadin  Recheck INR today is 2.8 she was supratherapeutic before and her dosage has been adjusted   monitor closely      Hypertension with low blood pressures which has been persistent  Blood pressures again low because of poor intake    Major neurocognitive impairment felt to be a combination of vascular and Alzheimer's dementia speech evaluation requested for cognition    H/o  ischemic CVA involving the right cardioembolic CVA  Chronic right basal ganglia infarct  Chronic microvascular ischemia  Urinary retention requiring Gambino catheter placement  History of left below the knee amputation  Generalized weakness  Type 2 diabetes  Aortic valve stenosis     Plan     Patient is a resident of long-term care  Seen today for follow-up visit due to progressive weight loss of greater than 60 pound  She has lost additional weight and now currently down to 123lb  On questioning patient she states that she has not been eating because of the puréed diet that was offered to her  She has new dentures  Speech has upgraded diet but she still refuses to eat most of the time.  She has been a failure to thrive.  She has  remained bedbound and has been refusing cares and refusing to come out of her room  Currently on Remeron  Also requested  to evaluate her for guardianship  Consider hospice if she continues to decline  Discontinue Lasix due to poor oral intake and low blood pressures  Monitor for any worsening edema    History     Patient is a very pleasant 74 year who is a resident of long-term care  Patient was recently admitted to the hospital with A FIB with RVR  Seen by cardiology and on metoprolol for rate control   warfarin has been continued.  INR was supratherapeutic but is back to 2.8 today  Not felt to be a candidate for any surgical intervention for her severe AS and MS.  Medication is frequently held due to profound hypotension noted.  She is also on digoxin for rate control.  Med is held frequently due to low blood pressures  Has an upcoming appointment for cardiology  Suspect she is not eating and drinking well contributing to low blood pressures  She has dm controlled with metformin  A1c 6.7 on recheck  Remains bed bound and desires going home  She has had further cognitive decline and talking nonsensically   Does not wear her prosthesis any longer.  Patient has had progressive weight loss  She has been given new dentures and has a diet upgrade  Staff reports she will eat 0 to 25% at best.  Continues to refuse cares    Past Medical & Surgical History     PAST MEDICAL HISTORY:   Past Medical History:   Diagnosis Date    Diabetic neuropathy (H)     HTN     Type 2 diabetes mellitus (H)           Past Social History     Reviewed,  reports that she quit smoking about 4 years ago. Her smoking use included cigarettes. She has never used smokeless tobacco. She reports that she does not drink alcohol and does not use drugs.    Family History     Reviewed, and family history includes Alzheimer Disease in her father; Cerebrovascular Disease in her maternal grandmother and mother; Diabetes in her paternal  grandfather; Hypertension in her brother and mother; Obesity in her brother; Respiratory in her father.    Medication List   Post Discharge Medication Reconciliation Status: Post Discharge Medication Reconciliation Status: discharge medications reconciled, continue medications without change.  Current Outpatient Medications   Medication Sig Dispense Refill    atorvastatin (LIPITOR) 20 MG tablet Take 20 mg by mouth At Bedtime      digoxin (LANOXIN) 62.5 MCG tablet Take 62.5 mcg by mouth daily      ferrous sulfate (FEROSUL) 325 (65 Fe) MG tablet Take 325 mg by mouth daily (with breakfast)      furosemide (LASIX) 20 MG tablet Take 20 mg by mouth daily      lactase (LACTAID) 9000 units TABS tablet Take 9,000 Units by mouth 3 times daily (with meals)      menthol, Topical Analgesic, 2.5% (BENGAY VANISHING SCENT) 2.5 % GEL topical gel Apply topically 3 times daily as needed for moderate pain      metFORMIN (GLUCOPHAGE XR) 500 MG 24 hr tablet Take 500 mg by mouth daily (with dinner)      metoprolol succinate ER (TOPROL XL) 25 MG 24 hr tablet Take 12.5 mg by mouth daily.      miconazole (MICATIN) 2 % cream 1 applicator 2 times daily Apply to skin folds      mirtazapine (REMERON) 15 MG tablet Take 30 mg by mouth at bedtime.      omeprazole (PRILOSEC) 20 MG DR capsule Take 20 mg by mouth daily      polyethylene glycol (MIRALAX) 17 GM/Dose powder Take 17 g by mouth daily. 510 g 0    Vitamin D3 (VITAMIN D, CHOLECALCIFEROL,) 25 mcg (1000 units) tablet Take by mouth daily      WARFARIN SODIUM PO Per INR orders.       No current facility-administered medications for this visit.          Allergies     Allergies   Allergen Reactions    Amoxicillin Unknown    Oxycodone Other (See Comments)     Does not remember    Latex Rash       Review of Systems   A comprehensive review of 14 systems was not done due to confusion with worsening cognitive impairment/dementia      Physical Exam   BP 96/70   Pulse 94   Temp 97  F (36.1  C)   Resp  "18   Ht 1.588 m (5' 2.5\")   Wt 55.8 kg (123 lb)   SpO2 96%   BMI 22.14 kg/m     GENERAL: no acute distress. Cooperative in conversation.   HEENT: pupils are equal, round and reactive. Oral mucosa is moist and intact.  Missing teeth  RESP:Chest symmetric. Regular respiratory rate. No stridor.  CVS S 1 S2.murmer heard  ABD: Nondistended, soft.  EXTREMITIES: No lower extremity edema.   Has a BKA  NEURO: non focal. Alert and oriented x self.   PSYCH: within normal limits. No depression or anxiety.  Has poor insight  SKIN: warm dry intact      Labs  Last Comprehensive Metabolic Panel:  Lab Results   Component Value Date     02/10/2025    POTASSIUM 5.0 02/10/2025    CHLORIDE 100 02/10/2025    CO2 29 02/10/2025    ANIONGAP 9 02/10/2025     (H) 02/10/2025    BUN 21.1 02/10/2025    CR 0.48 (L) 02/10/2025    GFRESTIMATED >90 02/10/2025    SUSIE 10.2 02/10/2025   Hemoglobin A1c 6.7    Electronically signed by    Ginny Johnson MD                       "

## 2025-03-24 ENCOUNTER — LAB REQUISITION (OUTPATIENT)
Dept: LAB | Facility: CLINIC | Age: 75
End: 2025-03-24
Payer: COMMERCIAL

## 2025-03-24 ENCOUNTER — TELEPHONE (OUTPATIENT)
Dept: GERIATRICS | Facility: CLINIC | Age: 75
End: 2025-03-24
Payer: COMMERCIAL

## 2025-03-24 DIAGNOSIS — I48.91 UNSPECIFIED ATRIAL FIBRILLATION (H): ICD-10-CM

## 2025-03-24 LAB — INR PPP: 3.1 (ref 0.85–1.15)

## 2025-03-24 PROCEDURE — 36415 COLL VENOUS BLD VENIPUNCTURE: CPT | Mod: ORL | Performed by: FAMILY MEDICINE

## 2025-03-24 PROCEDURE — P9604 ONE-WAY ALLOW PRORATED TRIP: HCPCS | Mod: ORL | Performed by: FAMILY MEDICINE

## 2025-03-24 PROCEDURE — 85610 PROTHROMBIN TIME: CPT | Mod: ORL | Performed by: FAMILY MEDICINE

## 2025-03-24 NOTE — TELEPHONE ENCOUNTER
[facility] Geriatrics Triage Nurse INR     Provider: Ginny Johnson MD  Facility: [facility]   Facility Type:  [facility]    Caller: Mavis  Call Back Number: [phone]  Reason for call: INR  Diagnosis/Goal: A. Fib    Date INR New Dose/Orders        3/5/25 1.71 4mg alt with 3.5mg   3/10/25 2.27 4mg alt with 3mg   3/17/25 3.76 Hold x 2 days   3/19/25 2.84 3mg daily   3/24/25 3.10 Hold Warfarin on 3/24.  Next INR 3/25/25.          Heparin/Lovenox:  No  Currently on ABX?: No  Other interacting medication:  None  Missed or refused doses: No    Verbal Order/Direction given by Provider: Hold Warfarin on 3/24.  Next INR 3/25/25.      Provider Giving Order:  Ginny Johnson MD    Verbal Order given to: Mavis Campbell RN

## 2025-03-25 ENCOUNTER — LAB REQUISITION (OUTPATIENT)
Dept: LAB | Facility: CLINIC | Age: 75
End: 2025-03-25
Payer: COMMERCIAL

## 2025-03-25 ENCOUNTER — TELEPHONE (OUTPATIENT)
Dept: GERIATRICS | Facility: CLINIC | Age: 75
End: 2025-03-25

## 2025-03-25 DIAGNOSIS — Z79.01 LONG TERM (CURRENT) USE OF ANTICOAGULANTS: ICD-10-CM

## 2025-03-25 LAB — INR PPP: 3.23 (ref 0.85–1.15)

## 2025-03-25 PROCEDURE — 36415 COLL VENOUS BLD VENIPUNCTURE: CPT | Mod: ORL | Performed by: FAMILY MEDICINE

## 2025-03-25 PROCEDURE — P9604 ONE-WAY ALLOW PRORATED TRIP: HCPCS | Mod: ORL | Performed by: FAMILY MEDICINE

## 2025-03-25 PROCEDURE — 85610 PROTHROMBIN TIME: CPT | Mod: ORL | Performed by: FAMILY MEDICINE

## 2025-03-25 NOTE — TELEPHONE ENCOUNTER
----- Message from Ginny Johnson sent at 3/25/2025 11:39 AM CDT -----  Hold coumadin x1d  Check inr in am

## 2025-03-26 ENCOUNTER — TELEPHONE (OUTPATIENT)
Dept: GERIATRICS | Facility: CLINIC | Age: 75
End: 2025-03-26

## 2025-03-26 LAB — INR PPP: 2.62 (ref 0.85–1.15)

## 2025-03-26 PROCEDURE — 85610 PROTHROMBIN TIME: CPT | Mod: ORL | Performed by: FAMILY MEDICINE

## 2025-03-26 PROCEDURE — P9604 ONE-WAY ALLOW PRORATED TRIP: HCPCS | Mod: ORL | Performed by: FAMILY MEDICINE

## 2025-03-26 PROCEDURE — 36415 COLL VENOUS BLD VENIPUNCTURE: CPT | Mod: ORL | Performed by: FAMILY MEDICINE

## 2025-03-26 NOTE — TELEPHONE ENCOUNTER
----- Message from Ginny Johnson sent at 3/26/2025  1:33 PM CDT -----  Coumadin 2.5 mg alt with 3 mg  R/c inr x 1 week

## 2025-03-31 ENCOUNTER — LAB REQUISITION (OUTPATIENT)
Dept: LAB | Facility: CLINIC | Age: 75
End: 2025-03-31
Payer: COMMERCIAL

## 2025-03-31 DIAGNOSIS — I48.91 UNSPECIFIED ATRIAL FIBRILLATION (H): ICD-10-CM

## 2025-04-02 ENCOUNTER — TELEPHONE (OUTPATIENT)
Dept: GERIATRICS | Facility: CLINIC | Age: 75
End: 2025-04-02

## 2025-04-02 LAB — INR PPP: 2.8 (ref 0.85–1.15)

## 2025-04-02 PROCEDURE — 36415 COLL VENOUS BLD VENIPUNCTURE: CPT | Mod: ORL | Performed by: FAMILY MEDICINE

## 2025-04-02 PROCEDURE — P9604 ONE-WAY ALLOW PRORATED TRIP: HCPCS | Mod: ORL | Performed by: FAMILY MEDICINE

## 2025-04-02 PROCEDURE — 85610 PROTHROMBIN TIME: CPT | Mod: ORL | Performed by: FAMILY MEDICINE

## 2025-04-02 NOTE — TELEPHONE ENCOUNTER
[facility] Geriatrics Triage Nurse INR     Provider: Ginny Johnson MD  Facility: [facility]   Facility Type:  [facility]    Caller: Claudia  Call Back Number: [phone]  Reason for call: INR  Diagnosis/Goal: A. Fib    Date INR New Dose/Orders        3/5/25 1.71 4mg alt with 3.5mg   3/10/25 2.27 4mg alt with 3mg   3/17/25 3.76 Hold x 2 days   3/19/25 2.84 3mg daily   3/24/25 3.10 hold   3/25/25 3.23 hold   3/26/25 2.62 2.5mg alt with 3mg   4/2/25 2.80 Warfarin 3mg M-W-F and 2.5mg AOD.  Next INR 4/23/25        Heparin/Lovenox:  No  Currently on ABX?: No  Other interacting medication:  None  Missed or refused doses: No    Verbal Order/Direction given by Provider: Warfarin 3mg M-W-F and 2.5mg all other days.  Next INR 4/23/25    Provider Giving Order:  Ginny Johnson MD    Verbal Order given to: Claudia Campbell RN

## 2025-04-20 ENCOUNTER — LAB REQUISITION (OUTPATIENT)
Dept: LAB | Facility: CLINIC | Age: 75
End: 2025-04-20
Payer: COMMERCIAL

## 2025-04-20 DIAGNOSIS — I48.91 UNSPECIFIED ATRIAL FIBRILLATION (H): ICD-10-CM

## 2025-04-23 ENCOUNTER — TELEPHONE (OUTPATIENT)
Dept: GERIATRICS | Facility: CLINIC | Age: 75
End: 2025-04-23

## 2025-04-23 LAB — INR PPP: 1.99 (ref 0.85–1.15)

## 2025-04-23 PROCEDURE — 85610 PROTHROMBIN TIME: CPT | Mod: ORL | Performed by: FAMILY MEDICINE

## 2025-04-23 PROCEDURE — P9604 ONE-WAY ALLOW PRORATED TRIP: HCPCS | Mod: ORL | Performed by: FAMILY MEDICINE

## 2025-04-23 PROCEDURE — 36415 COLL VENOUS BLD VENIPUNCTURE: CPT | Mod: ORL | Performed by: FAMILY MEDICINE

## 2025-04-23 NOTE — TELEPHONE ENCOUNTER
Order given to THERESA Seo RN    ----- Message from Ginny Johnson sent at 4/23/2025  4:26 PM CDT -----  Warfarin 3 mg po every day  Check inr in 1 wk

## 2025-04-25 ENCOUNTER — LAB REQUISITION (OUTPATIENT)
Dept: LAB | Facility: CLINIC | Age: 75
End: 2025-04-25
Payer: COMMERCIAL

## 2025-04-25 DIAGNOSIS — I48.91 UNSPECIFIED ATRIAL FIBRILLATION (H): ICD-10-CM

## 2025-04-30 ENCOUNTER — TELEPHONE (OUTPATIENT)
Dept: GERIATRICS | Facility: CLINIC | Age: 75
End: 2025-04-30

## 2025-04-30 LAB
INR PPP: 2.38 (ref 0.85–1.15)
PROTHROMBIN TIME: 25.5 SECONDS (ref 11.8–14.8)

## 2025-04-30 PROCEDURE — 36415 COLL VENOUS BLD VENIPUNCTURE: CPT | Mod: ORL | Performed by: FAMILY MEDICINE

## 2025-04-30 PROCEDURE — P9604 ONE-WAY ALLOW PRORATED TRIP: HCPCS | Mod: ORL | Performed by: FAMILY MEDICINE

## 2025-04-30 PROCEDURE — 85610 PROTHROMBIN TIME: CPT | Mod: ORL | Performed by: FAMILY MEDICINE

## 2025-04-30 NOTE — TELEPHONE ENCOUNTER
[facility] Geriatrics Triage Nurse INR     Provider: Ginny Johnson MD  Facility: [facility]   Facility Type:  [facility]    Caller: Be  Call Back Number: [phone]  Reason for call: INR  Diagnosis/Goal: A. Fib    Date INR New Dose/Orders   3/5/25 1.71 4mg alt with 3.5mg   3/10/25 2.27 4mg alt with 3mg   3/17/25 3.76 Hold x 2 days   3/19/25 2.84 3mg daily   3/24/25 3.10 Hold   3/25/25 3.23 Hold   3/26/25 2.62 2.5mg alt with 3mg    4/2/25 2.80 3mg M-W-F and 2.5mg AOD   4/23/25 1.99 3mg daily   4/30/25 2.38 Cont 3mg daily.  Next INR 5/14/25.        Heparin/Lovenox:  No  Currently on ABX?: No  Other interacting medication:  None  Missed or refused doses: No    Verbal Order/Direction given by Provider: Continue Warfarin 3mg daily.  Next INR 5/14/25.      Provider Giving Order:  Ginny Johnson MD    Verbal Order given to: Be Campbell, RN

## 2025-05-13 ENCOUNTER — LAB REQUISITION (OUTPATIENT)
Dept: LAB | Facility: CLINIC | Age: 75
End: 2025-05-13
Payer: COMMERCIAL

## 2025-05-13 DIAGNOSIS — I48.91 UNSPECIFIED ATRIAL FIBRILLATION (H): ICD-10-CM

## 2025-05-14 ENCOUNTER — RESULTS FOLLOW-UP (OUTPATIENT)
Dept: GERIATRICS | Facility: CLINIC | Age: 75
End: 2025-05-14

## 2025-05-14 LAB
INR PPP: 2.47 (ref 0.85–1.15)
PROTHROMBIN TIME: 26.8 SECONDS (ref 11.8–14.8)

## 2025-05-14 PROCEDURE — P9604 ONE-WAY ALLOW PRORATED TRIP: HCPCS | Mod: ORL | Performed by: FAMILY MEDICINE

## 2025-05-14 PROCEDURE — 36415 COLL VENOUS BLD VENIPUNCTURE: CPT | Mod: ORL | Performed by: FAMILY MEDICINE

## 2025-05-14 PROCEDURE — 85610 PROTHROMBIN TIME: CPT | Mod: ORL | Performed by: FAMILY MEDICINE

## 2025-05-28 ENCOUNTER — NURSING HOME VISIT (OUTPATIENT)
Dept: GERIATRICS | Facility: CLINIC | Age: 75
End: 2025-05-28
Payer: COMMERCIAL

## 2025-05-28 VITALS
OXYGEN SATURATION: 95 % | WEIGHT: 120 LBS | HEART RATE: 82 BPM | BODY MASS INDEX: 22.08 KG/M2 | HEIGHT: 62 IN | SYSTOLIC BLOOD PRESSURE: 96 MMHG | TEMPERATURE: 97.4 F | RESPIRATION RATE: 16 BRPM | DIASTOLIC BLOOD PRESSURE: 64 MMHG

## 2025-05-28 DIAGNOSIS — I27.21 PULMONARY ARTERY HYPERTENSION (H): ICD-10-CM

## 2025-05-28 DIAGNOSIS — Z79.01 LONG TERM CURRENT USE OF ANTICOAGULANT THERAPY: ICD-10-CM

## 2025-05-28 DIAGNOSIS — Z00.00 ENCOUNTER FOR MEDICARE ANNUAL WELLNESS EXAM: ICD-10-CM

## 2025-05-28 DIAGNOSIS — Z89.512 HX OF BKA, LEFT (H): ICD-10-CM

## 2025-05-28 DIAGNOSIS — F01.53 VASCULAR DEMENTIA WITH DEPRESSED MOOD (H): Primary | ICD-10-CM

## 2025-05-28 DIAGNOSIS — K86.2 PANCREATIC CYST: ICD-10-CM

## 2025-05-28 DIAGNOSIS — I34.0 NONRHEUMATIC MITRAL VALVE REGURGITATION: ICD-10-CM

## 2025-05-28 DIAGNOSIS — Z86.73 HISTORY OF CVA (CEREBROVASCULAR ACCIDENT): ICD-10-CM

## 2025-05-28 DIAGNOSIS — I48.11 LONGSTANDING PERSISTENT ATRIAL FIBRILLATION (H): ICD-10-CM

## 2025-05-28 DIAGNOSIS — I10 PRIMARY HYPERTENSION: ICD-10-CM

## 2025-05-28 DIAGNOSIS — E11.69 TYPE 2 DIABETES MELLITUS WITH OTHER SPECIFIED COMPLICATION, WITHOUT LONG-TERM CURRENT USE OF INSULIN (H): ICD-10-CM

## 2025-05-28 DIAGNOSIS — G81.94 HEMIPLEGIA AFFECTING LEFT NONDOMINANT SIDE, UNSPECIFIED ETIOLOGY, UNSPECIFIED HEMIPLEGIA TYPE (H): ICD-10-CM

## 2025-05-28 DIAGNOSIS — I10 BENIGN ESSENTIAL HYPERTENSION: ICD-10-CM

## 2025-05-28 DIAGNOSIS — I35.0 NONRHEUMATIC AORTIC VALVE STENOSIS: ICD-10-CM

## 2025-05-28 PROCEDURE — 99309 SBSQ NF CARE MODERATE MDM 30: CPT | Mod: 25 | Performed by: FAMILY MEDICINE

## 2025-05-28 PROCEDURE — G0438 PPPS, INITIAL VISIT: HCPCS | Performed by: FAMILY MEDICINE

## 2025-05-28 ASSESSMENT — ACTIVITIES OF DAILY LIVING (ADL): CURRENT_FUNCTION: NEEDS ASSISTANCE

## 2025-05-28 NOTE — PROGRESS NOTES
Preventive Care Visit  [facility] GERIATRIC SERVICES  OPAL Galarza, Family Medicine  May 28, 2025      Assessment & Plan   Vascular dementia with depression  At baseline patient remains self isolating  Does not participate in any of her cares  Does not wear a prosthesis  This has been steadily worsening over the period of time    Persistent atrial fibrillation continues with warfarin for anticoagulation  Heart rates have been stable    Hypertension with hypotension which is a chronic issue on a low-dose of medication    Pancreatic cyst family has elected not to pursue any aggressive interventions or imaging  [place] to be most likely representing an IPMN  Family was informed decided not to pursue further imaging    Prior history of CVA ischemic with history of chronic right basal ganglia infarct   with hemiplegia affecting left nondominant side  Currently not ambulatory    Pulmonary artery hypertension no longer on any treatment    Type 2 diabetes currently being diet controlled  Last A1c was 6.7    Left BKA does not want her to wear a prosthesis    Failure to thrive overall decline with patient has been lost more than 50 pounds of weight.  She also continues to self isolate remains bedbound with further further worsening cognitive decline  Care to be palliative in intent    Nonrheumatic mitral valve as well as aortic valve disease  Family has elected not to pursue any intervention  Cardiology feels she is high risk for any procedure      Subjective   Angela is a 74 year old, presenting for the following:  Wellness Visit      HPI  Patient is a 74-year-old with advanced dementia who is currently in the nursing home.  Reporting that she now lives with her parents and her sisters were all sick with flu in her own home not oriented to person or place     Annual Wellness Visit   {  Patient has been advised of split billing requirements and indicates understanding: Yes        Health Care Directive    Document on  file is a Health Care Directive or POLST.  In general, how would you rate your overall physical health? good  Do you have a special diet?  Regular (no restrictions)        2025   Exercise, Social Connection, Stress   Days per week of moderate/strenous exercise 0 days   Average minutes spent exercising at this level 0 min   Frequency of gathering with friends or relatives Never   Feel stress (tense, anxious, or unable to sleep) To some exte     Do you see a dentist two times every year?  Yes  Have you been more tired than usual lately?  (!) YES   Discussed possible causes of fatigue.   If you drink alcohol do you typically have >3 drinks per day or >7 drinks per week? No  Do you have a current opioid prescription? No  Do you use any other controlled substances or medications that are not prescribed by a provider? None  Social History     Tobacco Use    Smoking status: Former     Current packs/day: 0.00     Types: Cigarettes     Quit date: 12/3/2020     Years since quittin.4    Smokeless tobacco: Never   Vaping Use    Vaping status: Never Used   Substance Use Topics    Alcohol use: No    Drug use: No       Needs assistance for the following daily activities: (!) TELEPHONE USE, (!) TRANSPORTATION, (!) SHOPPING, (!) PREPARING MEALS, (!) HOUSEWORK, (!) BATHING, (!) LAUNDRY, (!) MONEY MANAGEMENT, (!) TOILETING, and (!) DRESSING  Which of the following safety concerns are present in your home?  none identified   Do you (or your family members) have any concerns about your safety while driving?  No  Do you have any of the following hearing concerns?: No hearing concerns  In the past 6 months, have you been bothered by leaking of urine? (!) YES   Information on urinary incontinence and treatment options given to patient.           2025   Fall Risk   Fallen 2 or more times in the past year? No            Today's PHQ-2 Score:       2025    11:10 AM   PHQ-2 (  [employer])   Q1: Little interest or pleasure in  doing things 1        Mammogram Screening - Mammography discussed and declined    ASCVD Risk   The ASCVD Risk score (Yuli DK, et al., 2019) failed to calculate for the following reasons:    Risk score cannot be calculated because patient has a medical history suggesting prior/existing ASCVD      Reviewed and updated as needed this visit by Provider           Sexual Activity          Current providers sharing in care for this patient include:  Patient Care Team:  Ginny Johnson MBBS as PCP - General (Family Medicine)  [facility] as Nursing Home Facility  Ginny Johnson MBBS as Physician (Family Medicine)  Suzi Mcfarlane CMA as Geriatric Services   Gloria Pena as Case Management Specialist  Yohana Melendez RN as Lead Care Coordinator  Kellie Alvarez RPH as Pharmacist (Pharmacist)  Pamela Smith Psy.D, LP as Psychologist (Neuropsychology)  Ginny Johnson MBBS as Assigned PCP  Pamela Smith Psy.D, LP as Assigned Behavioral Health Provider    The following health maintenance items are reviewed in Epic and correct as of today:  Health Maintenance   Topic Date Due    DEXA  Never done    HF ACTION PLAN  Never done    DIABETIC FOOT EXAM  Never done    DTAP/TDAP/TD VACCINE (1 - Tdap) Never done    ZOSTER VACCINE (1 of 2) Never done    MICROALBUMIN  10/06/2010    MEDICARE ANNUAL WELLNESS VISIT  03/31/2022    RSV VACCINE (1 - 1-dose 75+ series) Never done    LUNG CANCER SCREENING  01/13/2024    LIPID  01/14/2024    EYE EXAM  08/10/2024    DIGOXIN  01/09/2025    COVID-19 VACCINE (2 - 2024-25 season) 04/30/2025    ALT  06/06/2025    A1C  07/13/2025    BMP  08/10/2025    FALL RISK ASSESSMENT  01/13/2026    CBC  02/10/2026    ADVANCE CARE PLANNING  02/27/2029    TSH W/FREE T4 REFLEX  Completed    HEPATITIS C SCREENING  Completed    PHQ-2 (once per calendar year)  Completed    INFLUENZA VACCINE  Completed    PNEUMOCOCCAL VACCINE 50+ YEARS  Completed    HPV  VACCINE  Aged Out    MENINGITIS VACCINE  Aged Out    COLORECTAL CANCER SCREENING  Discontinued       Appropriate preventive services were discussed with this patient, including applicable screening as appropriate for fall prevention, nutrition, physical activity, Tobacco-use cessation, weight loss and cognition.  Checklist reviewing preventive services available has been given to the patient.             2025   Mini Cog   Mini-Cog Not Completed (choose reason) Known dementia   Clock Draw Score 0 Abnormal   3 Item Recall 1 object recalled   Mini Cog Total Score 1            Advance Care Planning    Document on file is a Health Care Directive or POLST.         No data to display                   No data to display                   No data to display                      No data to display                   No data to display                   No data to display                     No data to display                Today's PHQ-2 Score:       2025    11:10 AM   PHQ-2 (  [employer])   Q1: Little interest or pleasure in doing things 1          No data to display              Social History     Tobacco Use    Smoking status: Former     Current packs/day: 0.00     Types: Cigarettes     Quit date: 12/3/2020     Years since quittin.4    Smokeless tobacco: Never   Vaping Use    Vaping status: Never Used   Substance Use Topics    Alcohol use: No    Drug use: No          Mammogram Screening - After age 74- determine frequency with patient based on health status, life expectancy and patient goals    ASCVD Risk   The ASCVD Risk score (Yuli YODER, et al., 2019) failed to calculate for the following reasons:    Risk score cannot be calculated because patient has a medical history suggesting prior/existing ASCVD      Reviewed and updated as needed this visit by Provider           Sexual Activity            Current providers sharing in care for this patient include:  Patient Care Team:  Ginny Johnson MBBS as  "PCP - General (Family Medicine)  [facility] as Nursing Home Facility  Ginny Johnson MBBS as Physician (Family Medicine)  Suzi Mcfarlane CMA as Geriatric Services   Gloria Pena as Case Management Specialist  Yohana Melendez, RN as Lead Care Coordinator  Kellie Alvarez RPH as Pharmacist (Pharmacist)  Pamela Smith Psy.D, SUNG as Psychologist (Neuropsychology)  Ginny Johnson MBBS as Assigned PCP  Pamela Smith Psy.D, SUNG as Assigned Behavioral Health Provider    The following health maintenance items are reviewed in Epic and correct as of today:  Health Maintenance   Topic Date Due    DEXA  Never done    HF ACTION PLAN  Never done    DIABETIC FOOT EXAM  Never done    DTAP/TDAP/TD VACCINE (1 - Tdap) Never done    ZOSTER VACCINE (1 of 2) Never done    MICROALBUMIN  10/06/2010    MEDICARE ANNUAL WELLNESS VISIT  03/31/2022    RSV VACCINE (1 - 1-dose 75+ series) Never done    LUNG CANCER SCREENING  01/13/2024    LIPID  01/14/2024    EYE EXAM  08/10/2024    DIGOXIN  01/09/2025    COVID-19 VACCINE (2 - 2024-25 season) 04/30/2025    ALT  06/06/2025    A1C  07/13/2025    BMP  08/10/2025    FALL RISK ASSESSMENT  01/13/2026    CBC  02/10/2026    ADVANCE CARE PLANNING  02/27/2029    TSH W/FREE T4 REFLEX  Completed    HEPATITIS C SCREENING  Completed    PHQ-2 (once per calendar year)  Completed    INFLUENZA VACCINE  Completed    PNEUMOCOCCAL VACCINE 50+ YEARS  Completed    HPV VACCINE  Aged Out    MENINGITIS VACCINE  Aged Out    COLORECTAL CANCER SCREENING  Discontinued            Objective    Exam  BP 96/64   Pulse 82   Temp 97.4  F (36.3  C)   Resp 16   Ht 1.575 m (5' 2\")   Wt 54.4 kg (120 lb)   SpO2 95%   BMI 21.95 kg/m     Estimated body mass index is 21.95 kg/m  as calculated from the following:    Height as of this encounter: 1.575 m (5' 2\").    Weight as of this encounter: 54.4 kg (120 lb).    Physical Exam  GENERAL: no acute distress. Cooperative in " conversation.   HEENT: pupils are equal, round and reactive. Oral mucosa is moist and intact.  Missing her teeth  RESP:Chest symmetric. Regular respiratory rate. No stridor.  Cvs s1s2  ABD: Nondistended, soft.  EXTREMITIES: No lower extremity edema.  Has a l BKA  NEURO: non focal. Alert and oriented x self  Very confused  PSYCH: within normal limits. No depression or anxiety.  SKIN: warm dry intact           5/28/2025   Mini Cog   Mini-Cog Not Completed (choose reason) Known dementia   Clock Draw Score 0 Abnormal   3 Item Recall 1 object recalled   Mini Cog Total Score 1            Signed Electronically by: OPAL Galarza

## 2025-05-28 NOTE — PATIENT INSTRUCTIONS
Patient Education   Preventive Care Advice   This is general advice given by our system to help you stay healthy. However, your care team may have specific advice just for you. Please talk to your care team about your preventive care needs.  Nutrition  Eat 5 or more servings of fruits and vegetables each day.  Try wheat bread, brown rice and whole grain pasta (instead of white bread, rice, and pasta).  Get enough calcium and vitamin D. Check the label on foods and aim for 100% of the RDA (recommended daily allowance).  Lifestyle  Exercise at least 150 minutes each week  (30 minutes a day, 5 days a week).  Do muscle strengthening activities 2 days a week. These help control your weight and prevent disease.  No smoking.  Wear sunscreen to prevent skin cancer.  Have a dental exam and cleaning every 6 months.  Yearly exams  See your health care team every year to talk about:  Any changes in your health.  Any medicines your care team has prescribed.  Preventive care, family planning, and ways to prevent chronic diseases.  Shots (vaccines)   HPV shots (up to age 26), if you've never had them before.  Hepatitis B shots (up to age 59), if you've never had them before.  COVID-19 shot: Get this shot when it's due.  Flu shot: Get a flu shot every year.  Tetanus shot: Get a tetanus shot every 10 years.  Pneumococcal, hepatitis A, and RSV shots: Ask your care team if you need these based on your risk.  Shingles shot (for age 50 and up)  General health tests  Diabetes screening:  Starting at age 35, Get screened for diabetes at least every 3 years.  If you are younger than age 35, ask your care team if you should be screened for diabetes.  Cholesterol test: At age 39, start having a cholesterol test every 5 years, or more often if advised.  Bone density scan (DEXA): At age 50, ask your care team if you should have this scan for osteoporosis (brittle bones).  Hepatitis C: Get tested at least once in your life.  STIs (sexually  transmitted infections)  Before age 24: Ask your care team if you should be screened for STIs.  After age 24: Get screened for STIs if you're at risk. You are at risk for STIs (including HIV) if:  You are sexually active with more than one person.  You don't use condoms every time.  You or a partner was diagnosed with a sexually transmitted infection.  If you are at risk for HIV, ask about PrEP medicine to prevent HIV.  Get tested for HIV at least once in your life, whether you are at risk for HIV or not.  Cancer screening tests  Cervical cancer screening: If you have a cervix, begin getting regular cervical cancer screening tests starting at age 21.  Breast cancer scan (mammogram): If you've ever had breasts, begin having regular mammograms starting at age 40. This is a scan to check for breast cancer.  Colon cancer screening: It is important to start screening for colon cancer at age 45.  Have a colonoscopy test every 10 years (or more often if you're at risk) Or, ask your provider about stool tests like a FIT test every year or Cologuard test every 3 years.  To learn more about your testing options, visit:   .  For help making a decision, visit:   https://bit.ly/ik39557.  Prostate cancer screening test: If you have a prostate, ask your care team if a prostate cancer screening test (PSA) at age 55 is right for you.  Lung cancer screening: If you are a current or former smoker ages 50 to 80, ask your care team if ongoing lung cancer screenings are right for you.  For informational purposes only. Not to replace the advice of your health care provider. Copyright   2023 [employer]. All rights reserved. Clinically reviewed by the [facility] Transitions Program. [employer] [id number] - REV 01/24.

## 2025-05-28 NOTE — LETTER
5/28/2025      Angela Beckham  [facility]   [address]  [facility] [address]        Preventive Care Visit  [facility] GERIATRIC SERVICES  OPAL Galarza, Family Medicine  May 28, 2025      Assessment & Plan  Vascular dementia with depression  At baseline patient remains self isolating  Does not participate in any of her cares  Does not wear a prosthesis  This has been steadily worsening over the period of time    Persistent atrial fibrillation continues with warfarin for anticoagulation  Heart rates have been stable    Hypertension with hypotension which is a chronic issue on a low-dose of medication    Pancreatic cyst family has elected not to pursue any aggressive interventions or imaging  [place] to be most likely representing an IPMN  Family was informed decided not to pursue further imaging    Prior history of CVA ischemic with history of chronic right basal ganglia infarct   with hemiplegia affecting left nondominant side  Currently not ambulatory    Pulmonary artery hypertension no longer on any treatment    Type 2 diabetes currently being diet controlled  Last A1c was 6.7    Left BKA does not want her to wear a prosthesis    Failure to thrive overall decline with patient has been lost more than 50 pounds of weight.  She also continues to self isolate remains bedbound with further further worsening cognitive decline  Care to be palliative in intent    Nonrheumatic mitral valve as well as aortic valve disease  Family has elected not to pursue any intervention  Cardiology feels she is high risk for any procedure      Subjective  Angela is a 74 year old, presenting for the following:  Wellness Visit      HPI  Patient is a 74-year-old with advanced dementia who is currently in the nursing home.  Reporting that she now lives with her parents and her sisters were all sick with flu in her own home not oriented to person or place     Annual Wellness Visit   {  Patient has been advised of split  billing requirements and indicates understanding: Yes        Health Care Directive    Document on file is a Health Care Directive or POLST.  In general, how would you rate your overall physical health? good  Do you have a special diet?  Regular (no restrictions)        2025   Exercise, Social Connection, Stress   Days per week of moderate/strenous exercise 0 days   Average minutes spent exercising at this level 0 min   Frequency of gathering with friends or relatives Never   Feel stress (tense, anxious, or unable to sleep) To some exte     Do you see a dentist two times every year?  Yes  Have you been more tired than usual lately?  (!) YES   Discussed possible causes of fatigue.   If you drink alcohol do you typically have >3 drinks per day or >7 drinks per week? No  Do you have a current opioid prescription? No  Do you use any other controlled substances or medications that are not prescribed by a provider? None  Social History     Tobacco Use     Smoking status: Former     Current packs/day: 0.00     Types: Cigarettes     Quit date: 12/3/2020     Years since quittin.4     Smokeless tobacco: Never   Vaping Use     Vaping status: Never Used   Substance Use Topics     Alcohol use: No     Drug use: No       Needs assistance for the following daily activities: (!) TELEPHONE USE, (!) TRANSPORTATION, (!) SHOPPING, (!) PREPARING MEALS, (!) HOUSEWORK, (!) BATHING, (!) LAUNDRY, (!) MONEY MANAGEMENT, (!) TOILETING, and (!) DRESSING  Which of the following safety concerns are present in your home?  none identified   Do you (or your family members) have any concerns about your safety while driving?  No  Do you have any of the following hearing concerns?: No hearing concerns  In the past 6 months, have you been bothered by leaking of urine? (!) YES   Information on urinary incontinence and treatment options given to patient.           2025   Fall Risk   Fallen 2 or more times in the past year? No             Today's PHQ-2 Score:       5/28/2025    11:10 AM   PHQ-2 ( 1999 [employer])   Q1: Little interest or pleasure in doing things 1        Mammogram Screening - Mammography discussed and declined    ASCVD Risk   The ASCVD Risk score (Yuli YODER, et al., 2019) failed to calculate for the following reasons:    Risk score cannot be calculated because patient has a medical history suggesting prior/existing ASCVD      Reviewed and updated as needed this visit by Provider           Sexual Activity          Current providers sharing in care for this patient include:  Patient Care Team:  Ginny Johnson MBBS as PCP - General (Family Medicine)  [facility] [place]  Ginny Johnson MBBS as Physician (Family Medicine)  Suzi Mcfarlane CMA as Geriatric Services   Gloria Pena as Case Management Specialist  Yohana Melendez RN as Lead Care Coordinator  Kellie Alvarez RPH as Pharmacist (Pharmacist)  Pamela Smith Psy.D, LP as Psychologist (Neuropsychology)  Ginny Johnson MBBS as Assigned PCP  Pamela Smith Psy.D, LP as Assigned Behavioral Health Provider    The following health maintenance items are reviewed in Epic and correct as of today:  Health Maintenance   Topic Date Due     DEXA  Never done     HF ACTION PLAN  Never done     DIABETIC FOOT EXAM  Never done     DTAP/TDAP/TD VACCINE (1 - Tdap) Never done     ZOSTER VACCINE (1 of 2) Never done     MICROALBUMIN  10/06/2010     MEDICARE ANNUAL WELLNESS VISIT  03/31/2022     RSV VACCINE (1 - 1-dose 75+ series) Never done     LUNG CANCER SCREENING  01/13/2024     LIPID  01/14/2024     EYE EXAM  08/10/2024     DIGOXIN  01/09/2025     COVID-19 VACCINE (2 - 2024-25 season) 04/30/2025     ALT  06/06/2025     A1C  07/13/2025     BMP  08/10/2025     FALL RISK ASSESSMENT  01/13/2026     CBC  02/10/2026     ADVANCE CARE PLANNING  02/27/2029     TSH W/FREE T4 REFLEX  Completed     HEPATITIS C SCREENING   Completed     PHQ-2 (once per calendar year)  Completed     INFLUENZA VACCINE  Completed     PNEUMOCOCCAL VACCINE 50+ YEARS  Completed     HPV VACCINE  Aged Out     MENINGITIS VACCINE  Aged Out     COLORECTAL CANCER SCREENING  Discontinued       Appropriate preventive services were discussed with this patient, including applicable screening as appropriate for fall prevention, nutrition, physical activity, Tobacco-use cessation, weight loss and cognition.  Checklist reviewing preventive services available has been given to the patient.             2025   Mini Cog   Mini-Cog Not Completed (choose reason) Known dementia   Clock Draw Score 0 Abnormal   3 Item Recall 1 object recalled   Mini Cog Total Score 1            Advance Care Planning    Document on file is a Health Care Directive or POLST.         No data to display                   No data to display                   No data to display                      No data to display                   No data to display                   No data to display                     No data to display                Today's PHQ-2 Score:       2025    11:10 AM   PHQ-2 (  [employer])   Q1: Little interest or pleasure in doing things 1          No data to display              Social History     Tobacco Use     Smoking status: Former     Current packs/day: 0.00     Types: Cigarettes     Quit date: 12/3/2020     Years since quittin.4     Smokeless tobacco: Never   Vaping Use     Vaping status: Never Used   Substance Use Topics     Alcohol use: No     Drug use: No          Mammogram Screening - After age 74- determine frequency with patient based on health status, life expectancy and patient goals    ASCVD Risk   The ASCVD Risk score (Yuli YODER, et al., 2019) failed to calculate for the following reasons:    Risk score cannot be calculated because patient has a medical history suggesting prior/existing ASCVD      Reviewed and updated as needed this visit by  "Provider           Sexual Activity            Current providers sharing in care for this patient include:  Patient Care Team:  Ginny Johnson MBBS as PCP - General (Family Medicine)  [facility] Facility  Ginny Johnson MBBS as Physician (Family Medicine)  Suzi Mcfarlane CMA as Geriatric Services   Gloria Pena as Case Management Specialist  Yohana Melendez RN as Lead Care Coordinator  Kellie Alvarez [facility] as Pharmacist (Pharmacist)  Pamela Smith Psy.D, LP as Psychologist (Neuropsychology)  Ginny Johnson MBBS as Assigned PCP  Pamela Smith Psy.D, LP as Assigned Behavioral Health Provider    The following health maintenance items are reviewed in Epic and correct as of today:  Health Maintenance   Topic Date Due     DEXA  Never done     HF ACTION PLAN  Never done     DIABETIC FOOT EXAM  Never done     DTAP/TDAP/TD VACCINE (1 - Tdap) Never done     ZOSTER VACCINE (1 of 2) Never done     MICROALBUMIN  10/06/2010     MEDICARE ANNUAL WELLNESS VISIT  03/31/2022     RSV VACCINE (1 - 1-dose 75+ series) Never done     LUNG CANCER SCREENING  01/13/2024     LIPID  01/14/2024     EYE EXAM  08/10/2024     DIGOXIN  01/09/2025     COVID-19 VACCINE (2 - 2024-25 season) 04/30/2025     ALT  06/06/2025     A1C  07/13/2025     BMP  08/10/2025     FALL RISK ASSESSMENT  01/13/2026     CBC  02/10/2026     ADVANCE CARE PLANNING  02/27/2029     TSH W/FREE T4 REFLEX  Completed     HEPATITIS C SCREENING  Completed     PHQ-2 (once per calendar year)  Completed     INFLUENZA VACCINE  Completed     PNEUMOCOCCAL VACCINE 50+ YEARS  Completed     HPV VACCINE  Aged Out     MENINGITIS VACCINE  Aged Out     COLORECTAL CANCER SCREENING  Discontinued            Objective   Exam  BP 96/64   Pulse 82   Temp 97.4  F (36.3  C)   Resp 16   Ht 1.575 m (5' 2\")   Wt 54.4 kg (120 lb)   SpO2 95%   BMI 21.95 kg/m     Estimated body mass index is 21.95 kg/m  as calculated from " "the following:    Height as of this encounter: 1.575 m (5' 2\").    Weight as of this encounter: 54.4 kg (120 lb).    Physical Exam  GENERAL: no acute distress. Cooperative in conversation.   HEENT: pupils are equal, round and reactive. Oral mucosa is moist and intact.  Missing her teeth  RESP:Chest symmetric. Regular respiratory rate. No stridor.  Cvs s1s2  ABD: Nondistended, soft.  EXTREMITIES: No lower extremity edema.  Has a l BKA  NEURO: non focal. Alert and oriented x self  Very confused  PSYCH: within normal limits. No depression or anxiety.  SKIN: warm dry intact           5/28/2025   Mini Cog   Mini-Cog Not Completed (choose reason) Known dementia   Clock Draw Score 0 Abnormal   3 Item Recall 1 object recalled   Mini Cog Total Score 1            Signed Electronically by: OPAL Galarza      Sincerely,        OPAL Galarza    Electronically signed  "

## 2025-06-02 ENCOUNTER — LAB REQUISITION (OUTPATIENT)
Dept: LAB | Facility: CLINIC | Age: 75
End: 2025-06-02
Payer: COMMERCIAL

## 2025-06-02 DIAGNOSIS — I48.91 UNSPECIFIED ATRIAL FIBRILLATION (H): ICD-10-CM

## 2025-06-04 ENCOUNTER — LAB REQUISITION (OUTPATIENT)
Dept: LAB | Facility: CLINIC | Age: 75
End: 2025-06-04
Payer: COMMERCIAL

## 2025-06-04 ENCOUNTER — RESULTS FOLLOW-UP (OUTPATIENT)
Dept: GERIATRICS | Facility: CLINIC | Age: 75
End: 2025-06-04

## 2025-06-04 DIAGNOSIS — Z86.73 PERSONAL HISTORY OF TRANSIENT ISCHEMIC ATTACK (TIA), AND CEREBRAL INFARCTION WITHOUT RESIDUAL DEFICITS: ICD-10-CM

## 2025-06-04 LAB
INR PPP: 3.68 (ref 0.85–1.15)
PROTHROMBIN TIME: 35.5 SECONDS (ref 11.8–14.8)

## 2025-06-04 PROCEDURE — P9604 ONE-WAY ALLOW PRORATED TRIP: HCPCS | Mod: ORL | Performed by: FAMILY MEDICINE

## 2025-06-04 PROCEDURE — 85610 PROTHROMBIN TIME: CPT | Mod: ORL | Performed by: FAMILY MEDICINE

## 2025-06-04 PROCEDURE — 36415 COLL VENOUS BLD VENIPUNCTURE: CPT | Mod: ORL | Performed by: FAMILY MEDICINE

## 2025-06-05 ENCOUNTER — RESULTS FOLLOW-UP (OUTPATIENT)
Dept: GERIATRICS | Facility: CLINIC | Age: 75
End: 2025-06-05

## 2025-06-05 ENCOUNTER — LAB REQUISITION (OUTPATIENT)
Dept: LAB | Facility: CLINIC | Age: 75
End: 2025-06-05
Payer: COMMERCIAL

## 2025-06-05 DIAGNOSIS — I48.91 UNSPECIFIED ATRIAL FIBRILLATION (H): ICD-10-CM

## 2025-06-05 LAB
INR PPP: 4.47 (ref 0.85–1.15)
PROTHROMBIN TIME: 41 SECONDS (ref 11.8–14.8)

## 2025-06-05 PROCEDURE — 85610 PROTHROMBIN TIME: CPT | Mod: ORL | Performed by: FAMILY MEDICINE

## 2025-06-05 PROCEDURE — 36415 COLL VENOUS BLD VENIPUNCTURE: CPT | Mod: ORL | Performed by: FAMILY MEDICINE

## 2025-06-05 PROCEDURE — P9604 ONE-WAY ALLOW PRORATED TRIP: HCPCS | Mod: ORL | Performed by: FAMILY MEDICINE

## 2025-06-09 ENCOUNTER — RESULTS FOLLOW-UP (OUTPATIENT)
Dept: GERIATRICS | Facility: CLINIC | Age: 75
End: 2025-06-09

## 2025-06-09 LAB
INR PPP: 1.39 (ref 0.85–1.15)
PROTHROMBIN TIME: 17.3 SECONDS (ref 11.8–14.8)

## 2025-06-09 PROCEDURE — 85610 PROTHROMBIN TIME: CPT | Mod: ORL | Performed by: FAMILY MEDICINE

## 2025-06-09 PROCEDURE — 36415 COLL VENOUS BLD VENIPUNCTURE: CPT | Mod: ORL | Performed by: FAMILY MEDICINE

## 2025-06-09 PROCEDURE — P9604 ONE-WAY ALLOW PRORATED TRIP: HCPCS | Mod: ORL | Performed by: FAMILY MEDICINE

## 2025-06-10 ENCOUNTER — LAB REQUISITION (OUTPATIENT)
Dept: LAB | Facility: CLINIC | Age: 75
End: 2025-06-10
Payer: COMMERCIAL

## 2025-06-10 DIAGNOSIS — I48.91 UNSPECIFIED ATRIAL FIBRILLATION (H): ICD-10-CM

## 2025-06-16 ENCOUNTER — LAB REQUISITION (OUTPATIENT)
Dept: LAB | Facility: CLINIC | Age: 75
End: 2025-06-16
Payer: COMMERCIAL

## 2025-06-16 ENCOUNTER — RESULTS FOLLOW-UP (OUTPATIENT)
Dept: GERIATRICS | Facility: CLINIC | Age: 75
End: 2025-06-16

## 2025-06-16 DIAGNOSIS — I48.91 UNSPECIFIED ATRIAL FIBRILLATION (H): ICD-10-CM

## 2025-06-16 LAB
INR PPP: 1.45 (ref 0.85–1.15)
PROTHROMBIN TIME: 17.5 SECONDS (ref 11.8–14.8)

## 2025-06-16 PROCEDURE — 36415 COLL VENOUS BLD VENIPUNCTURE: CPT | Mod: ORL | Performed by: FAMILY MEDICINE

## 2025-06-16 PROCEDURE — 85610 PROTHROMBIN TIME: CPT | Mod: ORL | Performed by: FAMILY MEDICINE

## 2025-06-16 PROCEDURE — P9604 ONE-WAY ALLOW PRORATED TRIP: HCPCS | Mod: ORL | Performed by: FAMILY MEDICINE

## 2025-06-16 NOTE — TELEPHONE ENCOUNTER
Verbal order/direction given to: Matilde    Pt had been getting 2.5mg alt with 2mg every day prior.   Rachel Price RN

## 2025-06-18 NOTE — TELEPHONE ENCOUNTER
[facility] Progress Note    Date: 2025    Name: Alea Lindsay    MRN: [id number]         : 1991    Ms. Lindsay Arrived ambulatory and in no distress for C1D15 of   RES VA [id number] + Taxol Regimen.  Assessment was completed, no acute issues at this time, no new complaints voiced. Right chest wall port remains accessed with positive blood return. Patient was given the ok to treat per Rosalba García RN post MD appointment.        Ms. Lindsay's vitals were reviewed.  Vitals:    25 1130   BP: 128/82   Pulse: 88   Resp: 18   Temp: 98 °F (36.7 °C)   SpO2: 95%       Lab results were obtained and reviewed from 1725.      Medications:  Medications Administered         ([id number]) investigational 1,400 mg in sodium chloride 0.9 % 250 mL IVPB Admin Date  2025 Action  New Bag Dose  1,400 mg Rate  183.3 mL/hr Route  IntraVENous Documented By  Jeniffer Wiley RN        *[facility]* PACLitaxel (TAXOL) 186 mg in sodium chloride 0.9 % 250 mL chemo infusion Admin Date  2025 Action  New Bag Dose  186 mg Rate  306 mL/hr Route  IntraVENous Documented By  Jeniffer Wiley RN        0.9 % sodium chloride infusion Admin Date  2025 Action  New Bag Dose  25 mL/hr Rate  25 mL/hr Route  IntraVENous Documented By  Jeniffer Wiley RN        dexAMETHasone (DECADRON) injection 10 mg Admin Date  2025 Action  Given Dose  10 mg Rate   Route  IntraVENous Documented By  Jeniffer Wiley RN        diphenhydrAMINE (BENADRYL) injection 25 mg Admin Date  2025 Action  Given Dose  25 mg Rate   Route  IntraVENous Documented By  Jeniffer Wiley RN        famotidine (PEPCID) injection 20 mg Admin Date  2025 Action  Given Dose  20 mg Rate   Route  IntraVENous Documented By  Jeniffer Wiley RN                Two nurses verified prior to administering: Drug name, drug dose, infusion volume or drug volume when prepared in a syringe, rate of administration, route of administration,  expiration dates and/or times,  Verbal order/direction given to: Be Price, RN   VITAL SIGNS: I have reviewed nursing notes and confirm.  CONSTITUTIONAL: Well-developed; well-nourished; in no acute distress.  SKIN: Skin exam is warm and dry, no acute rash.  HEAD: Normocephalic; atraumatic.  EYES: PERRL, EOM intact; conjunctiva and sclera clear.  ENT: No nasal discharge; airway clear.  NECK: Supple; non tender.  CARD: S1, S2 normal; no murmurs, gallops, or rubs. Regular rate and rhythm.  RESP: No wheezes, rales or rhonchi.  ABD: Normal bowel sounds; soft; non-distended; non-tender; no hepatosplenomegaly.  EXT: Normal ROM. No clubbing, cyanosis or edema.  NEURO: Awake, alert, oriented. CN II-XII intact, 5/5 strength at upper and lower extremities, no pronator drift, intact finger to nose, steady gait, clear speech  PSYCH: Cooperative, appropriate.

## 2025-06-19 ENCOUNTER — RESULTS FOLLOW-UP (OUTPATIENT)
Dept: GERIATRICS | Facility: CLINIC | Age: 75
End: 2025-06-19

## 2025-06-19 LAB
INR PPP: 1.86 (ref 0.85–1.15)
PROTHROMBIN TIME: 21.1 SECONDS (ref 11.8–14.8)

## 2025-06-19 PROCEDURE — 85610 PROTHROMBIN TIME: CPT | Mod: ORL | Performed by: FAMILY MEDICINE

## 2025-06-19 PROCEDURE — P9604 ONE-WAY ALLOW PRORATED TRIP: HCPCS | Mod: ORL | Performed by: FAMILY MEDICINE

## 2025-06-19 PROCEDURE — 36415 COLL VENOUS BLD VENIPUNCTURE: CPT | Mod: ORL | Performed by: FAMILY MEDICINE

## 2025-06-20 ENCOUNTER — LAB REQUISITION (OUTPATIENT)
Dept: LAB | Facility: CLINIC | Age: 75
End: 2025-06-20
Payer: COMMERCIAL

## 2025-06-20 DIAGNOSIS — I48.91 UNSPECIFIED ATRIAL FIBRILLATION (H): ICD-10-CM

## 2025-06-26 ENCOUNTER — RESULTS FOLLOW-UP (OUTPATIENT)
Dept: GERIATRICS | Facility: CLINIC | Age: 75
End: 2025-06-26

## 2025-06-26 LAB
INR PPP: 2.42 (ref 0.85–1.15)
PROTHROMBIN TIME: 25.8 SECONDS (ref 11.8–14.8)

## 2025-06-26 PROCEDURE — 36415 COLL VENOUS BLD VENIPUNCTURE: CPT | Mod: ORL | Performed by: FAMILY MEDICINE

## 2025-06-26 PROCEDURE — P9604 ONE-WAY ALLOW PRORATED TRIP: HCPCS | Mod: ORL | Performed by: FAMILY MEDICINE

## 2025-06-26 PROCEDURE — 85610 PROTHROMBIN TIME: CPT | Mod: ORL | Performed by: FAMILY MEDICINE

## 2025-07-01 NOTE — PROGRESS NOTES
[facility] GERIATRIC SERVICES       Patient Angela Beckham  MRN: [id number]        Reason for Visit     Chief Complaint   Patient presents with    alf Regulatory       Code Status     DNR/ selective treatment    Assessment     Failure to thrive with patient having more than 50 pound weight loss in the last 6 months or so  Ongoing weight loss noted; however current weights are noted to be stable  Oral intake remains erratic suspect due to cognitive decline    Severe mitral valve disease with severe mitral calcification and stenosis  Along with aortic stenosis.  Not felt to be a surgical candidate and is on medical management as per cardiology    Recent hospitalization related to A-fib with rapid ventricular response  Chronic anticoagulation with Coumadin  Recheck INR today is 2.4  INR is remain labile and she is frequently either high or low  monitor closely    Hypertension with low blood pressures which has been persistent  Blood pressures again low because of poor intake  On a very low-dose of metoprolol for atrial fibrillation which is often held by nursing due to low blood pressures.    Major neurocognitive impairment felt to be a combination of vascular and Alzheimer's dementia  Having progressive cognitive decline    H/o  ischemic CVA involving the right cardioembolic CVA  Chronic right basal ganglia infarct  Chronic microvascular ischemia  History of left below the knee amputation  Generalized weakness  Type 2 diabetes  Aortic valve stenosis     Plan     Patient is a resident of long-term care  Seen today for follow-up.  At baseline bedbound and continues to self isolate and poorly participatory in her care and refuses to come out.  No longer wears a prosthesis.  Profound cognitive decline noted and at best she is alert to self appears to be confused with no short-term recall noted.  Diabetic control is poor  Last A1c was 6.7 and she has lost a significant amount of weight since then  Will consider  discontinuing metformin if her A1c goals remain 6.5 or less.  Oral intake remains erratic she refuses to wear her dentures speech has also tried various interventions which have not been successful.  Continue with the care plan  Recheck labs -hgaic and cmp    History     Patient is a very pleasant 74 year who is a resident of long-term care  Patient has a history of CAD as well as atrial fibrillation and was last admitted to the hospital with A FIB with RVR  Seen by cardiology and on metoprolol for rate control   warfarin has been continued.  INR was 2.4 at last check but remains labile with highs and lows noted  Not felt to be a candidate for any surgical intervention for her severe AS and MS.  Medication is frequently held due to profound hypotension noted.  She is also on digoxin for rate control.  Med is held frequently due to low blood pressures with not much improvement in blood pressures noted    She has dm controlled with metformin  A1c 6.7 on recheck  Oral intake has remained poor and overall she has lost weight but currently she appears to have plateaued at 124 pound  Remains bed bound and desires going home  She has had further cognitive decline and talking nonsensically   Does not wear her prosthesis any longer.  Patient has had progressive weight loss; continues to self isolate herself in her room    Past Medical & Surgical History     PAST MEDICAL HISTORY:   Past Medical History:   Diagnosis Date    Diabetic neuropathy (H)     HTN     Type 2 diabetes mellitus (H)           Past Social History     Reviewed,  reports that she quit smoking about 4 years ago. Her smoking use included cigarettes. She has never used smokeless tobacco. She reports that she does not drink alcohol and does not use drugs.    Family History     Reviewed, and family history includes Alzheimer Disease in her father; Cerebrovascular Disease in her maternal grandmother and mother; Diabetes in her paternal grandfather; Hypertension in  "her brother and mother; Obesity in her brother; Respiratory in her father.    Medication List   Post Discharge Medication Reconciliation Status: Post Discharge Medication Reconciliation Status: discharge medications reconciled, continue medications without change.  Current Outpatient Medications   Medication Sig Dispense Refill    atorvastatin (LIPITOR) 20 MG tablet Take 20 mg by mouth At Bedtime      digoxin (LANOXIN) 62.5 MCG tablet Take 62.5 mcg by mouth daily      ferrous sulfate (FEROSUL) 325 (65 Fe) MG tablet Take 325 mg by mouth daily (with breakfast)      furosemide (LASIX) 20 MG tablet Take 20 mg by mouth daily      lactase (LACTAID) 9000 units TABS tablet Take 9,000 Units by mouth 3 times daily (with meals)      menthol, Topical Analgesic, 2.5% (BENGAY VANISHING SCENT) 2.5 % GEL topical gel Apply topically 3 times daily as needed for moderate pain      metFORMIN (GLUCOPHAGE XR) 500 MG 24 hr tablet Take 500 mg by mouth daily (with dinner)      metoprolol succinate ER (TOPROL XL) 25 MG 24 hr tablet Take 12.5 mg by mouth daily.      miconazole (MICATIN) 2 % cream 1 applicator 2 times daily Apply to skin folds      mirtazapine (REMERON) 15 MG tablet Take 30 mg by mouth at bedtime.      omeprazole (PRILOSEC) 20 MG DR capsule Take 20 mg by mouth daily      polyethylene glycol (MIRALAX) 17 GM/Dose powder Take 17 g by mouth daily. 510 g 0    Vitamin D3 (VITAMIN D, CHOLECALCIFEROL,) 25 mcg (1000 units) tablet Take by mouth daily      WARFARIN SODIUM PO Per INR orders.       No current facility-administered medications for this visit.          Allergies     Allergies   Allergen Reactions    Amoxicillin Unknown    Oxycodone Other (See Comments)     Does not remember    Latex Rash       Review of Systems   A comprehensive review of 14 systems was not done due to confusion with worsening cognitive impairment/dementia      Physical Exam   /70   Pulse 63   Temp 97.6  F (36.4  C)   Resp 16   Ht 1.588 m (5' 2.5\") "   Wt 56.2 kg (124 lb)   SpO2 (!) 91%   BMI 22.32 kg/m     GENERAL: no acute distress. Cooperative in conversation.   HEENT: pupils are equal, round and reactive. Oral mucosa is moist and intact.  Missing teeth  RESP:Chest symmetric. Regular respiratory rate. No stridor.  CVS S 1 S2.murmer heard  ABD: Nondistended, soft.  EXTREMITIES: No lower extremity edema.   Has a BKA  NEURO: non focal. Alert and oriented x self.   PSYCH: within normal limits. No depression or anxiety.  Has poor insight  SKIN: warm dry intact      Labs  Last Comprehensive Metabolic Panel:  Lab Results   Component Value Date     02/10/2025    POTASSIUM 5.0 02/10/2025    CHLORIDE 100 02/10/2025    CO2 29 02/10/2025    ANIONGAP 9 02/10/2025     (H) 02/10/2025    BUN 21.1 02/10/2025    CR 0.48 (L) 02/10/2025    GFRESTIMATED >90 02/10/2025    SUSIE 10.2 02/10/2025   Hemoglobin A1c 6.7    Electronically signed by    Ginny Johnson MD

## 2025-07-02 ENCOUNTER — NURSING HOME VISIT (OUTPATIENT)
Dept: GERIATRICS | Facility: CLINIC | Age: 75
End: 2025-07-02
Payer: COMMERCIAL

## 2025-07-02 VITALS
HEART RATE: 63 BPM | WEIGHT: 124 LBS | OXYGEN SATURATION: 91 % | RESPIRATION RATE: 16 BRPM | TEMPERATURE: 97.6 F | HEIGHT: 63 IN | DIASTOLIC BLOOD PRESSURE: 70 MMHG | SYSTOLIC BLOOD PRESSURE: 112 MMHG | BODY MASS INDEX: 21.97 KG/M2

## 2025-07-02 DIAGNOSIS — F01.53 VASCULAR DEMENTIA WITH DEPRESSED MOOD (H): Primary | ICD-10-CM

## 2025-07-02 DIAGNOSIS — Z86.73 HISTORY OF CVA (CEREBROVASCULAR ACCIDENT): ICD-10-CM

## 2025-07-02 DIAGNOSIS — G81.94 HEMIPLEGIA AFFECTING LEFT NONDOMINANT SIDE, UNSPECIFIED ETIOLOGY, UNSPECIFIED HEMIPLEGIA TYPE (H): ICD-10-CM

## 2025-07-02 DIAGNOSIS — Z89.512 HX OF BKA, LEFT (H): ICD-10-CM

## 2025-07-02 DIAGNOSIS — I48.11 LONGSTANDING PERSISTENT ATRIAL FIBRILLATION (H): ICD-10-CM

## 2025-07-02 DIAGNOSIS — I10 PRIMARY HYPERTENSION: ICD-10-CM

## 2025-07-02 DIAGNOSIS — E11.69 TYPE 2 DIABETES MELLITUS WITH OTHER SPECIFIED COMPLICATION, WITHOUT LONG-TERM CURRENT USE OF INSULIN (H): ICD-10-CM

## 2025-07-02 PROCEDURE — 99309 SBSQ NF CARE MODERATE MDM 30: CPT | Performed by: FAMILY MEDICINE

## 2025-07-02 NOTE — LETTER
7/2/2025      Angela Beckham  [facility]   [address]  [facility] [address]        [facility]       Patient Angela Beckham  MRN: [id number]        Reason for Visit     Chief Complaint   Patient presents with     correction Regulatory       Code Status     DNR/ selective treatment    Assessment     Failure to thrive with patient having more than 50 pound weight loss in the last 6 months or so  Ongoing weight loss noted; however current weights are noted to be stable  Oral intake remains erratic suspect due to cognitive decline    Severe mitral valve disease with severe mitral calcification and stenosis  Along with aortic stenosis.  Not felt to be a surgical candidate and is on medical management as per cardiology    Recent hospitalization related to A-fib with rapid ventricular response  Chronic anticoagulation with Coumadin  Recheck INR today is 2.4  INR is remain labile and she is frequently either high or low  monitor closely    Hypertension with low blood pressures which has been persistent  Blood pressures again low because of poor intake  On a very low-dose of metoprolol for atrial fibrillation which is often held by nursing due to low blood pressures.    Major neurocognitive impairment felt to be a combination of vascular and Alzheimer's dementia  Having progressive cognitive decline    H/o  ischemic CVA involving the right cardioembolic CVA  Chronic right basal ganglia infarct  Chronic microvascular ischemia  History of left below the knee amputation  Generalized weakness  Type 2 diabetes  Aortic valve stenosis     Plan     Patient is a resident of long-term care  Seen today for follow-up.  At baseline bedbound and continues to self isolate and poorly participatory in her care and refuses to come out.  No longer wears a prosthesis.  Profound cognitive decline noted and at best she is alert to self appears to be confused with no short-term recall noted.  Diabetic control is  poor  Last A1c was 6.7 and she has lost a significant amount of weight since then  Will consider discontinuing metformin if her A1c goals remain 6.5 or less.  Oral intake remains erratic she refuses to wear her dentures speech has also tried various interventions which have not been successful.  Continue with the care plan  Recheck labs -hgaic and cmp    History     Patient is a very pleasant 74 year who is a resident of long-term care  Patient has a history of CAD as well as atrial fibrillation and was last admitted to the hospital with A FIB with RVR  Seen by cardiology and on metoprolol for rate control   warfarin has been continued.  INR was 2.4 at last check but remains labile with highs and lows noted  Not felt to be a candidate for any surgical intervention for her severe AS and MS.  Medication is frequently held due to profound hypotension noted.  She is also on digoxin for rate control.  Med is held frequently due to low blood pressures with not much improvement in blood pressures noted    She has dm controlled with metformin  A1c 6.7 on recheck  Oral intake has remained poor and overall she has lost weight but currently she appears to have plateaued at 124 pound  Remains bed bound and desires going home  She has had further cognitive decline and talking nonsensically   Does not wear her prosthesis any longer.  Patient has had progressive weight loss; continues to self isolate herself in her room    Past Medical & Surgical History     PAST MEDICAL HISTORY:   Past Medical History:   Diagnosis Date     Diabetic neuropathy (H)      HTN      Type 2 diabetes mellitus (H)           Past Social History     Reviewed,  reports that she quit smoking about 4 years ago. Her smoking use included cigarettes. She has never used smokeless tobacco. She reports that she does not drink alcohol and does not use drugs.    Family History     Reviewed, and family history includes Alzheimer Disease in her father; Cerebrovascular  Disease in her maternal grandmother and mother; Diabetes in her paternal grandfather; Hypertension in her brother and mother; Obesity in her brother; Respiratory in her father.    Medication List   Post Discharge Medication Reconciliation Status: Post Discharge Medication Reconciliation Status: discharge medications reconciled, continue medications without change.  Current Outpatient Medications   Medication Sig Dispense Refill     atorvastatin (LIPITOR) 20 MG tablet Take 20 mg by mouth At Bedtime       digoxin (LANOXIN) 62.5 MCG tablet Take 62.5 mcg by mouth daily       ferrous sulfate (FEROSUL) 325 (65 Fe) MG tablet Take 325 mg by mouth daily (with breakfast)       furosemide (LASIX) 20 MG tablet Take 20 mg by mouth daily       lactase (LACTAID) 9000 units TABS tablet Take 9,000 Units by mouth 3 times daily (with meals)       menthol, Topical Analgesic, 2.5% (BENGAY VANISHING SCENT) 2.5 % GEL topical gel Apply topically 3 times daily as needed for moderate pain       metFORMIN (GLUCOPHAGE XR) 500 MG 24 hr tablet Take 500 mg by mouth daily (with dinner)       metoprolol succinate ER (TOPROL XL) 25 MG 24 hr tablet Take 12.5 mg by mouth daily.       miconazole (MICATIN) 2 % cream 1 applicator 2 times daily Apply to skin folds       mirtazapine (REMERON) 15 MG tablet Take 30 mg by mouth at bedtime.       omeprazole (PRILOSEC) 20 MG DR capsule Take 20 mg by mouth daily       polyethylene glycol (MIRALAX) 17 GM/Dose powder Take 17 g by mouth daily. 510 g 0     Vitamin D3 (VITAMIN D, CHOLECALCIFEROL,) 25 mcg (1000 units) tablet Take by mouth daily       WARFARIN SODIUM PO Per INR orders.       No current facility-administered medications for this visit.          Allergies     Allergies   Allergen Reactions     Amoxicillin Unknown     Oxycodone Other (See Comments)     Does not remember     Latex Rash       Review of Systems   A comprehensive review of 14 systems was not done due to confusion with worsening cognitive  "impairment/dementia      Physical Exam   /70   Pulse 63   Temp 97.6  F (36.4  C)   Resp 16   Ht 1.588 m (5' 2.5\")   Wt 56.2 kg (124 lb)   SpO2 (!) 91%   BMI 22.32 kg/m     GENERAL: no acute distress. Cooperative in conversation.   HEENT: pupils are equal, round and reactive. Oral mucosa is moist and intact.  Missing teeth  RESP:Chest symmetric. Regular respiratory rate. No stridor.  CVS S 1 S2.murmer heard  ABD: Nondistended, soft.  EXTREMITIES: No lower extremity edema.   Has a BKA  NEURO: non focal. Alert and oriented x self.   PSYCH: within normal limits. No depression or anxiety.  Has poor insight  SKIN: warm dry intact      Labs  Last Comprehensive Metabolic Panel:  Lab Results   Component Value Date     02/10/2025    POTASSIUM 5.0 02/10/2025    CHLORIDE 100 02/10/2025    CO2 29 02/10/2025    ANIONGAP 9 02/10/2025     (H) 02/10/2025    BUN 21.1 02/10/2025    CR 0.48 (L) 02/10/2025    GFRESTIMATED >90 02/10/2025    SUSIE 10.2 02/10/2025   Hemoglobin A1c 6.7    Electronically signed by    Ginny Johnson MD                           Sincerely,        OPAL Galarza    Electronically signed  "

## 2025-07-07 ENCOUNTER — LAB REQUISITION (OUTPATIENT)
Dept: LAB | Facility: CLINIC | Age: 75
End: 2025-07-07
Payer: COMMERCIAL

## 2025-07-07 DIAGNOSIS — E11.610 TYPE 2 DIABETES MELLITUS WITH DIABETIC NEUROPATHIC ARTHROPATHY (H): ICD-10-CM

## 2025-07-07 DIAGNOSIS — I48.91 UNSPECIFIED ATRIAL FIBRILLATION (H): ICD-10-CM

## 2025-07-09 ENCOUNTER — LAB REQUISITION (OUTPATIENT)
Dept: LAB | Facility: CLINIC | Age: 75
End: 2025-07-09
Payer: COMMERCIAL

## 2025-07-09 DIAGNOSIS — E11.610 TYPE 2 DIABETES MELLITUS WITH DIABETIC NEUROPATHIC ARTHROPATHY (H): ICD-10-CM

## 2025-07-10 ENCOUNTER — RESULTS FOLLOW-UP (OUTPATIENT)
Dept: GERIATRICS | Facility: CLINIC | Age: 75
End: 2025-07-10

## 2025-07-10 ENCOUNTER — LAB REQUISITION (OUTPATIENT)
Dept: LAB | Facility: CLINIC | Age: 75
End: 2025-07-10
Payer: COMMERCIAL

## 2025-07-10 DIAGNOSIS — I48.91 UNSPECIFIED ATRIAL FIBRILLATION (H): ICD-10-CM

## 2025-07-10 LAB
ALBUMIN SERPL BCG-MCNC: 3.4 G/DL (ref 3.5–5.2)
ALP SERPL-CCNC: 79 U/L (ref 40–150)
ALT SERPL W P-5'-P-CCNC: 13 U/L (ref 0–50)
ANION GAP SERPL CALCULATED.3IONS-SCNC: 8 MMOL/L (ref 7–15)
AST SERPL W P-5'-P-CCNC: 18 U/L (ref 0–45)
BILIRUB SERPL-MCNC: 0.3 MG/DL
BUN SERPL-MCNC: 21.5 MG/DL (ref 8–23)
CALCIUM SERPL-MCNC: 9.4 MG/DL (ref 8.8–10.4)
CHLORIDE SERPL-SCNC: 104 MMOL/L (ref 98–107)
CREAT SERPL-MCNC: 0.59 MG/DL (ref 0.51–0.95)
DIGOXIN SERPL-MCNC: 0.6 NG/ML (ref 0.6–1.2)
EGFRCR SERPLBLD CKD-EPI 2021: >90 ML/MIN/1.73M2
ERYTHROCYTE [DISTWIDTH] IN BLOOD BY AUTOMATED COUNT: 15.2 % (ref 10–15)
EST. AVERAGE GLUCOSE BLD GHB EST-MCNC: 160 MG/DL
GLUCOSE SERPL-MCNC: 125 MG/DL (ref 70–99)
HBA1C MFR BLD: 7.2 %
HCO3 SERPL-SCNC: 28 MMOL/L (ref 22–29)
HCT VFR BLD AUTO: 44.5 % (ref 35–47)
HGB BLD-MCNC: 13.8 G/DL (ref 11.7–15.7)
INR PPP: 3.5 (ref 0.85–1.15)
MAGNESIUM SERPL-MCNC: 1.8 MG/DL (ref 1.7–2.3)
MCH RBC QN AUTO: 27.1 PG (ref 26.5–33)
MCHC RBC AUTO-ENTMCNC: 31 G/DL (ref 31.5–36.5)
MCV RBC AUTO: 87 FL (ref 78–100)
PLATELET # BLD AUTO: 149 10E3/UL (ref 150–450)
POTASSIUM SERPL-SCNC: 4.7 MMOL/L (ref 3.4–5.3)
PROT SERPL-MCNC: 5.7 G/DL (ref 6.4–8.3)
PROTHROMBIN TIME: 34.9 SECONDS (ref 11.8–14.8)
RBC # BLD AUTO: 5.09 10E6/UL (ref 3.8–5.2)
SODIUM SERPL-SCNC: 140 MMOL/L (ref 135–145)
WBC # BLD AUTO: 6 10E3/UL (ref 4–11)

## 2025-07-10 PROCEDURE — 83735 ASSAY OF MAGNESIUM: CPT | Mod: ORL | Performed by: FAMILY MEDICINE

## 2025-07-10 PROCEDURE — 80162 ASSAY OF DIGOXIN TOTAL: CPT | Mod: ORL | Performed by: FAMILY MEDICINE

## 2025-07-10 PROCEDURE — 83036 HEMOGLOBIN GLYCOSYLATED A1C: CPT | Mod: ORL | Performed by: FAMILY MEDICINE

## 2025-07-10 PROCEDURE — 80053 COMPREHEN METABOLIC PANEL: CPT | Mod: ORL | Performed by: FAMILY MEDICINE

## 2025-07-10 PROCEDURE — 36415 COLL VENOUS BLD VENIPUNCTURE: CPT | Mod: ORL | Performed by: FAMILY MEDICINE

## 2025-07-10 PROCEDURE — 85610 PROTHROMBIN TIME: CPT | Mod: ORL | Performed by: FAMILY MEDICINE

## 2025-07-10 PROCEDURE — P9604 ONE-WAY ALLOW PRORATED TRIP: HCPCS | Mod: ORL | Performed by: FAMILY MEDICINE

## 2025-07-10 PROCEDURE — 85027 COMPLETE CBC AUTOMATED: CPT | Mod: ORL | Performed by: FAMILY MEDICINE

## 2025-07-11 LAB
INR PPP: 3.45 (ref 0.85–1.15)
PROTHROMBIN TIME: 33.8 SECONDS (ref 11.8–14.8)

## 2025-07-11 PROCEDURE — 85610 PROTHROMBIN TIME: CPT | Mod: ORL | Performed by: FAMILY MEDICINE

## 2025-07-11 PROCEDURE — P9604 ONE-WAY ALLOW PRORATED TRIP: HCPCS | Mod: ORL | Performed by: FAMILY MEDICINE

## 2025-07-11 PROCEDURE — 36415 COLL VENOUS BLD VENIPUNCTURE: CPT | Mod: ORL | Performed by: FAMILY MEDICINE

## 2025-07-12 ENCOUNTER — LAB REQUISITION (OUTPATIENT)
Dept: LAB | Facility: CLINIC | Age: 75
End: 2025-07-12
Payer: COMMERCIAL

## 2025-07-12 DIAGNOSIS — I48.91 UNSPECIFIED ATRIAL FIBRILLATION (H): ICD-10-CM

## 2025-07-14 ENCOUNTER — TELEPHONE (OUTPATIENT)
Dept: GERIATRICS | Facility: CLINIC | Age: 75
End: 2025-07-14

## 2025-07-14 LAB
INR PPP: 1.63 (ref 0.85–1.15)
PROTHROMBIN TIME: 19.5 SECONDS (ref 11.8–14.8)

## 2025-07-14 PROCEDURE — 85610 PROTHROMBIN TIME: CPT | Mod: ORL | Performed by: FAMILY MEDICINE

## 2025-07-14 PROCEDURE — 36415 COLL VENOUS BLD VENIPUNCTURE: CPT | Mod: ORL | Performed by: FAMILY MEDICINE

## 2025-07-14 PROCEDURE — P9604 ONE-WAY ALLOW PRORATED TRIP: HCPCS | Mod: ORL | Performed by: FAMILY MEDICINE

## 2025-07-14 NOTE — TELEPHONE ENCOUNTER
[facility]   2025     Name: Angela Beckham   : 1950     INR: 1.63    Response/Orders: Coumadin 2.5 mg PO daily. Check INR in 1 week - 25    Orders given to: Be    Provider Giving Order:  Alex CUNNINGHAM, Ginny Hough RN

## 2025-07-16 ENCOUNTER — PATIENT OUTREACH (OUTPATIENT)
Dept: GERIATRIC MEDICINE | Facility: CLINIC | Age: 75
End: 2025-07-16
Payer: COMMERCIAL

## 2025-07-16 ENCOUNTER — LAB REQUISITION (OUTPATIENT)
Dept: LAB | Facility: CLINIC | Age: 75
End: 2025-07-16
Payer: COMMERCIAL

## 2025-07-16 DIAGNOSIS — I48.91 UNSPECIFIED ATRIAL FIBRILLATION (H): ICD-10-CM

## 2025-07-16 NOTE — PROGRESS NOTES
[facility] Care Coordination Contact  [facility] Six-Month Assessment    6 month assessment completed on 07/14/25 with Angela.    ER visits: No  Hospitalizations: No  TCU stays: No  Significant health status changes: Medically Stable   Falls/Injuries: No  ADL/IADL changes: No    Reviewed Institutional Assessment and updated as needed.     Will see member in 6 months for an annual health risk assessment.   Encouraged member to call CC with any questions or concerns in the meantime.     Yohana Melendez BSN/PHN/WCC/CMC [facility]  Long Term Care/ Care Coordinator  [address] [place] [address]   [address]  [place] [address]  [email or web address]   [email or web address]     Cell: [phone]  Office: [phone]  Fax: [phone]

## 2025-07-21 ENCOUNTER — RESULTS FOLLOW-UP (OUTPATIENT)
Dept: GERIATRICS | Facility: CLINIC | Age: 75
End: 2025-07-21

## 2025-07-21 LAB
INR PPP: 2.14 (ref 0.85–1.15)
PROTHROMBIN TIME: 23.5 SECONDS (ref 11.8–14.8)

## 2025-07-21 PROCEDURE — 85610 PROTHROMBIN TIME: CPT | Mod: ORL | Performed by: FAMILY MEDICINE

## 2025-07-21 PROCEDURE — P9604 ONE-WAY ALLOW PRORATED TRIP: HCPCS | Mod: ORL | Performed by: FAMILY MEDICINE

## 2025-07-21 PROCEDURE — 36415 COLL VENOUS BLD VENIPUNCTURE: CPT | Mod: ORL | Performed by: FAMILY MEDICINE

## 2025-07-30 ENCOUNTER — LAB REQUISITION (OUTPATIENT)
Dept: LAB | Facility: CLINIC | Age: 75
End: 2025-07-30
Payer: COMMERCIAL

## 2025-07-30 DIAGNOSIS — I48.91 UNSPECIFIED ATRIAL FIBRILLATION (H): ICD-10-CM

## 2025-08-04 LAB
INR PPP: 2.65 (ref 0.85–1.15)
PROTHROMBIN TIME: 27.6 SECONDS (ref 11.8–14.8)

## 2025-08-04 PROCEDURE — 36415 COLL VENOUS BLD VENIPUNCTURE: CPT | Mod: ORL | Performed by: FAMILY MEDICINE

## 2025-08-04 PROCEDURE — P9604 ONE-WAY ALLOW PRORATED TRIP: HCPCS | Mod: ORL | Performed by: FAMILY MEDICINE

## 2025-08-04 PROCEDURE — 85610 PROTHROMBIN TIME: CPT | Mod: ORL | Performed by: FAMILY MEDICINE

## 2025-08-20 ENCOUNTER — LAB REQUISITION (OUTPATIENT)
Dept: LAB | Facility: CLINIC | Age: 75
End: 2025-08-20
Payer: COMMERCIAL

## 2025-08-20 DIAGNOSIS — I48.91 UNSPECIFIED ATRIAL FIBRILLATION (H): ICD-10-CM

## 2025-08-25 LAB
INR PPP: 1.94 (ref 0.85–1.15)
PROTHROMBIN TIME: 21.8 SECONDS (ref 11.8–14.8)

## 2025-08-29 ENCOUNTER — LAB REQUISITION (OUTPATIENT)
Dept: LAB | Facility: CLINIC | Age: 75
End: 2025-08-29
Payer: COMMERCIAL

## 2025-08-29 DIAGNOSIS — I48.91 UNSPECIFIED ATRIAL FIBRILLATION (H): ICD-10-CM

## 2025-09-03 LAB
INR PPP: 1.78 (ref 0.85–1.15)
PROTHROMBIN TIME: 20.9 SECONDS (ref 11.8–14.8)